# Patient Record
Sex: FEMALE | Race: WHITE | NOT HISPANIC OR LATINO
[De-identification: names, ages, dates, MRNs, and addresses within clinical notes are randomized per-mention and may not be internally consistent; named-entity substitution may affect disease eponyms.]

---

## 2019-03-25 PROBLEM — Z87.891 FORMER SMOKER: Status: ACTIVE | Noted: 2019-03-25

## 2019-03-25 PROBLEM — Z71.9 ENCOUNTER FOR CONSULTATION: Status: ACTIVE | Noted: 2019-03-25

## 2019-03-26 ENCOUNTER — APPOINTMENT (OUTPATIENT)
Dept: CV DIAGNOSITCS | Facility: HOSPITAL | Age: 84
End: 2019-03-26

## 2019-03-26 ENCOUNTER — APPOINTMENT (OUTPATIENT)
Dept: CARDIOTHORACIC SURGERY | Facility: CLINIC | Age: 84
End: 2019-03-26
Payer: MEDICARE

## 2019-03-26 ENCOUNTER — NON-APPOINTMENT (OUTPATIENT)
Age: 84
End: 2019-03-26

## 2019-03-26 ENCOUNTER — APPOINTMENT (OUTPATIENT)
Age: 84
End: 2019-03-26
Payer: MEDICARE

## 2019-03-26 ENCOUNTER — OUTPATIENT (OUTPATIENT)
Dept: OUTPATIENT SERVICES | Facility: HOSPITAL | Age: 84
LOS: 1 days | End: 2019-03-26

## 2019-03-26 VITALS — WEIGHT: 123 LBS | HEIGHT: 59 IN | BODY MASS INDEX: 24.8 KG/M2

## 2019-03-26 VITALS — HEART RATE: 57 BPM | RESPIRATION RATE: 12 BRPM | OXYGEN SATURATION: 100 % | TEMPERATURE: 98 F

## 2019-03-26 VITALS — SYSTOLIC BLOOD PRESSURE: 180 MMHG | DIASTOLIC BLOOD PRESSURE: 87 MMHG

## 2019-03-26 DIAGNOSIS — Z71.9 COUNSELING, UNSPECIFIED: ICD-10-CM

## 2019-03-26 DIAGNOSIS — I35.0 NONRHEUMATIC AORTIC (VALVE) STENOSIS: ICD-10-CM

## 2019-03-26 DIAGNOSIS — Z01.419 ENCOUNTER FOR GYNECOLOGICAL EXAMINATION (GENERAL) (ROUTINE) W/OUT ABNORMAL FINDINGS: ICD-10-CM

## 2019-03-26 DIAGNOSIS — Z87.891 PERSONAL HISTORY OF NICOTINE DEPENDENCE: ICD-10-CM

## 2019-03-26 DIAGNOSIS — I10 ESSENTIAL (PRIMARY) HYPERTENSION: ICD-10-CM

## 2019-03-26 LAB
ALBUMIN SERPL ELPH-MCNC: 4.6 G/DL
ALP BLD-CCNC: 57 U/L
ALT SERPL-CCNC: 8 U/L
ANION GAP SERPL CALC-SCNC: 13 MMOL/L
AST SERPL-CCNC: 19 U/L
BASOPHILS # BLD AUTO: 0.04 K/UL
BASOPHILS NFR BLD AUTO: 0.4 %
BILIRUB SERPL-MCNC: 0.6 MG/DL
BUN SERPL-MCNC: 23 MG/DL
CALCIUM SERPL-MCNC: 10 MG/DL
CHLORIDE SERPL-SCNC: 107 MMOL/L
CO2 SERPL-SCNC: 20 MMOL/L
CREAT SERPL-MCNC: 0.84 MG/DL
EOSINOPHIL # BLD AUTO: 0.3 K/UL
EOSINOPHIL NFR BLD AUTO: 3.2 %
GLUCOSE SERPL-MCNC: 87 MG/DL
HCT VFR BLD CALC: 40 %
HGB BLD-MCNC: 12.8 G/DL
IMM GRANULOCYTES NFR BLD AUTO: 0.2 %
LYMPHOCYTES # BLD AUTO: 3.6 K/UL
LYMPHOCYTES NFR BLD AUTO: 38.2 %
MAN DIFF?: NORMAL
MCHC RBC-ENTMCNC: 31.4 PG
MCHC RBC-ENTMCNC: 32 GM/DL
MCV RBC AUTO: 98 FL
MONOCYTES # BLD AUTO: 0.57 K/UL
MONOCYTES NFR BLD AUTO: 6.1 %
NEUTROPHILS # BLD AUTO: 4.89 K/UL
NEUTROPHILS NFR BLD AUTO: 51.9 %
NT-PROBNP SERPL-MCNC: 369 PG/ML
PLATELET # BLD AUTO: 280 K/UL
POTASSIUM SERPL-SCNC: 4.2 MMOL/L
PROT SERPL-MCNC: 7.6 G/DL
RBC # BLD: 4.08 M/UL
RBC # FLD: 13.4 %
SODIUM SERPL-SCNC: 140 MMOL/L
WBC # FLD AUTO: 9.42 K/UL

## 2019-03-26 PROCEDURE — 93000 ELECTROCARDIOGRAM COMPLETE: CPT | Mod: PD

## 2019-03-26 PROCEDURE — 94010 BREATHING CAPACITY TEST: CPT

## 2019-03-26 PROCEDURE — 99205 OFFICE O/P NEW HI 60 MIN: CPT | Mod: PD

## 2019-03-26 PROCEDURE — 93306 TTE W/DOPPLER COMPLETE: CPT | Mod: 26

## 2019-03-26 NOTE — HISTORY OF PRESENT ILLNESS
[FreeTextEntry1] : Nerissa is a 89 year old female with PMH of asthma, HLD, HTN and aortic stenosis. She has followed with her cardiologist Dr. Sosa for several years for routine cardiac care. She reports fatigue and dyspnea on moderate to heavy exertion. A recent echocardiogram revealed severe AS and she was referred to valve clinic for consideration for MARC. \par \par Echocardiogram on 3/14/2019 revealed \par Aov 5.2 m/sec, pGr 106 mmHg, mGr 55 mmHg, DELON 0.53 cm2, EF 64%

## 2019-03-26 NOTE — ASSESSMENT
[FreeTextEntry1] : 89 year old woman with critical aortic valve stenosis, severe hypertension and questionable symptoms for possible TAVR.  Patient is intermediate risk for TAVR . She will require a cath and CTA prior to scheduling.  Risks of TAVR explained in detail including risk of pacemaker and paravalvular regurgitation.

## 2019-03-26 NOTE — PHYSICAL EXAM
[General Appearance - Well Developed] : well developed [Normal Appearance] : normal appearance [Well Groomed] : well groomed [No Deformities] : no deformities [General Appearance - In No Acute Distress] : no acute distress [Eyelids - No Xanthelasma] : the eyelids demonstrated no xanthelasmas [No Oral Pallor] : no oral pallor [No Oral Cyanosis] : no oral cyanosis [Normal Jugular Venous V Waves Present] : normal jugular venous V waves present [Bradycardia] : bradycardic [Rhythm Regular] : regular [IV] : a grade 4 [No Pitting Edema] : no pitting edema present [Respiration, Rhythm And Depth] : normal respiratory rhythm and effort [Exaggerated Use Of Accessory Muscles For Inspiration] : no accessory muscle use [Auscultation Breath Sounds / Voice Sounds] : lungs were clear to auscultation bilaterally [Bowel Sounds] : normal bowel sounds [Abdomen Soft] : soft [Abdomen Tenderness] : non-tender [Nail Clubbing] : no clubbing of the fingernails [Cyanosis, Localized] : no localized cyanosis [Petechial Hemorrhages (___cm)] : no petechial hemorrhages [Nail Splinter Hemorrhages] : no splinter hemorrhages of the nails [Skin Color & Pigmentation] : normal skin color and pigmentation [] : no rash [No Venous Stasis] : no venous stasis [Skin Lesions] : no skin lesions [No Skin Ulcers] : no skin ulcer [No Xanthoma] : no  xanthoma was observed [Oriented To Time, Place, And Person] : oriented to person, place, and time [Affect] : the affect was normal [Mood] : the mood was normal [No Anxiety] : not feeling anxious [FreeTextEntry1] : gait speed assessed today

## 2019-03-26 NOTE — PHYSICAL EXAM
[General Appearance - Alert] : alert [General Appearance - In No Acute Distress] : in no acute distress [Sclera] : the sclera and conjunctiva were normal [PERRL With Normal Accommodation] : pupils were equal in size, round, and reactive to light [Extraocular Movements] : extraocular movements were intact [Outer Ear] : the ears and nose were normal in appearance [Oropharynx] : the oropharynx was normal [Jugular Venous Distention Increased] : there was no jugular-venous distention [] : no respiratory distress [Respiration, Rhythm And Depth] : normal respiratory rhythm and effort [III] : a grade 3 [II] : a grade 2 [Examination Of The Chest] : the chest was normal in appearance [Edema] : there was no peripheral edema [Veins - Varicosity Changes] : there were no varicosital changes [Breast Appearance] : normal in appearance [Breast Palpation Mass] : no palpable masses [Bowel Sounds] : normal bowel sounds [Abdomen Soft] : soft [Cervical Lymph Nodes Enlarged Posterior Bilaterally] : posterior cervical [Cervical Lymph Nodes Enlarged Anterior Bilaterally] : anterior cervical [No CVA Tenderness] : no ~M costovertebral angle tenderness [Abnormal Walk] : normal gait [Involuntary Movements] : no involuntary movements were seen [Skin Color & Pigmentation] : normal skin color and pigmentation [Skin Turgor] : normal skin turgor [No Focal Deficits] : no focal deficits [Oriented To Time, Place, And Person] : oriented to person, place, and time [Impaired Insight] : insight and judgment were intact [FreeTextEntry1] : deferred

## 2019-03-26 NOTE — CONSULT LETTER
[Dear  ___] : Dear ~CAMMIE, [Please see my note below.] : Please see my note below. [Consult Closing:] : Thank you very much for allowing me to participate in the care of this patient.  If you have any questions, please do not hesitate to contact me. [Sincerely,] : Sincerely, [Consult Letter:] : I had the pleasure of evaluating your patient, [unfilled]. [FreeTextEntry2] : James Palmer DO\par [address]\par [address]\[place] [address] [FreeTextEntry3] : Leighton Saenz MD\par \par Cardiovascular & Thoracic Surgery\par Professor\par [facility]\par \par

## 2019-03-26 NOTE — HISTORY OF PRESENT ILLNESS
[FreeTextEntry1] : Nerissa is referred by Dr HOWIE Palmer for evaluation of severe aortic stenosis and recently progressive symptoms.  She notes fatigue and a decline in functional capacity over the prior few months (NYHA II).  Her TTE today revealed (preliminarily) a mean gradient in the 50s and an DELON of 0.5 (similar to her recent study at [facility]).  Her STS is 4.6% and she meets 1 of 4 frailty criteria, placing her into an intermediate risk surgical cohort.  Her PMH is significant for treated HTN, HLD, and asthma (she sees Dr Rosa Muñoz; stable on Breo).  She notes her BP is labile (as low as 98/65mmHg in the morning) but she is nervous being here today.\par \par She notes that Dr Palmer has been following her AS closely for several years; her referral today was prompted by progression of AS on her most recent TTE in his office.  She is "upset" about her AS but realizes it needs to be addressed.  She still walks a mile daily and plays platform tennis (the latter which I advised she stop); she admits to "slowing down" with her activities recently.  She has no angina or syncopal symptoms.  Her appetite and bathroom habits are "good".  She describes her memory as "pretty good", which her daughter in law confirms.  She lives alone and remains independent.

## 2019-03-26 NOTE — REVIEW OF SYSTEMS
[Feeling Fatigued] : feeling fatigued [Dyspnea on exertion] : dyspnea during exertion [Joint Pain] : joint pain [Under Stress] : under stress [Negative] : Heme/Lymph [Fever] : no fever [Chills] : no chills

## 2019-03-26 NOTE — DATA REVIEWED
[FreeTextEntry1] : Echocardiogram on 3/14/2019 revealed \par Aov 5.2 m/sec, pGr 106 mmHg, mGr 55 mmHg, DELON 0.53 cm2, EF 64%

## 2019-03-26 NOTE — DISCUSSION/SUMMARY
[Severe Aortic Stenosis] : severe aortic stenosis [Deteriorating] : deteriorating [Multidetector Cardiac CT] : multidetector cardiac CT [Cardiac Catheterization] : cardiac catheterization [Coronary Angiography] : coronary angiography [Cardiothoracic Surgery Consult] : cardiothoracic surgery consultation [Aortic Valve Replacement] : aortic valve replacement [Essential Hypertension] : essential hypertension [Stable] : stable [None] : none [Patient] : the patient [Family] : the patient's family [FreeTextEntry1] : Nerissa has severe AS with recent functional decline and progression by TTE--for which we are recommending she proceed with evaluation for TAVR.  As such, she will be scheduled for angiography and a cardiac CT.  She will be considered for our TAVR PAD study.  I have answered all of her and her daughter-in-law's questions regarding the potential risks and benefits of TAVR in extensive detail.

## 2019-03-29 ENCOUNTER — TRANSCRIPTION ENCOUNTER (OUTPATIENT)
Age: 84
End: 2019-03-29

## 2019-03-29 ENCOUNTER — INPATIENT (INPATIENT)
Facility: HOSPITAL | Age: 84
LOS: 0 days | Discharge: HOME | DRG: 247 | End: 2019-03-30
Attending: INTERNAL MEDICINE | Admitting: INTERNAL MEDICINE
Payer: MEDICARE

## 2019-03-29 VITALS
TEMPERATURE: 98 F | HEART RATE: 58 BPM | SYSTOLIC BLOOD PRESSURE: 176 MMHG | OXYGEN SATURATION: 100 % | DIASTOLIC BLOOD PRESSURE: 74 MMHG | HEIGHT: 59 IN | WEIGHT: 123.02 LBS | RESPIRATION RATE: 16 BRPM

## 2019-03-29 DIAGNOSIS — Z98.890 OTHER SPECIFIED POSTPROCEDURAL STATES: Chronic | ICD-10-CM

## 2019-03-29 DIAGNOSIS — Z90.721 ACQUIRED ABSENCE OF OVARIES, UNILATERAL: Chronic | ICD-10-CM

## 2019-03-29 DIAGNOSIS — I35.0 NONRHEUMATIC AORTIC (VALVE) STENOSIS: ICD-10-CM

## 2019-03-29 DIAGNOSIS — H26.9 UNSPECIFIED CATARACT: Chronic | ICD-10-CM

## 2019-03-29 LAB
ALBUMIN SERPL ELPH-MCNC: 4.3 G/DL — SIGNIFICANT CHANGE UP (ref 3.3–5)
ALP SERPL-CCNC: 53 U/L — SIGNIFICANT CHANGE UP (ref 40–120)
ALT FLD-CCNC: 10 U/L — SIGNIFICANT CHANGE UP (ref 10–45)
ANION GAP SERPL CALC-SCNC: 13 MMOL/L — SIGNIFICANT CHANGE UP (ref 5–17)
AST SERPL-CCNC: 17 U/L — SIGNIFICANT CHANGE UP (ref 10–40)
BILIRUB SERPL-MCNC: 0.6 MG/DL — SIGNIFICANT CHANGE UP (ref 0.2–1.2)
BUN SERPL-MCNC: 23 MG/DL — SIGNIFICANT CHANGE UP (ref 7–23)
CALCIUM SERPL-MCNC: 9.9 MG/DL — SIGNIFICANT CHANGE UP (ref 8.4–10.5)
CHLORIDE SERPL-SCNC: 109 MMOL/L — HIGH (ref 96–108)
CO2 SERPL-SCNC: 19 MMOL/L — LOW (ref 22–31)
CREAT SERPL-MCNC: 0.94 MG/DL — SIGNIFICANT CHANGE UP (ref 0.5–1.3)
GLUCOSE SERPL-MCNC: 92 MG/DL — SIGNIFICANT CHANGE UP (ref 70–99)
HCT VFR BLD CALC: 36 % — SIGNIFICANT CHANGE UP (ref 34.5–45)
HGB BLD-MCNC: 12.3 G/DL — SIGNIFICANT CHANGE UP (ref 11.5–15.5)
MCHC RBC-ENTMCNC: 32.9 PG — SIGNIFICANT CHANGE UP (ref 27–34)
MCHC RBC-ENTMCNC: 34.2 GM/DL — SIGNIFICANT CHANGE UP (ref 32–36)
MCV RBC AUTO: 96 FL — SIGNIFICANT CHANGE UP (ref 80–100)
PLATELET # BLD AUTO: 279 K/UL — SIGNIFICANT CHANGE UP (ref 150–400)
POTASSIUM SERPL-MCNC: 4.3 MMOL/L — SIGNIFICANT CHANGE UP (ref 3.5–5.3)
POTASSIUM SERPL-SCNC: 4.3 MMOL/L — SIGNIFICANT CHANGE UP (ref 3.5–5.3)
PROT SERPL-MCNC: 7.3 G/DL — SIGNIFICANT CHANGE UP (ref 6–8.3)
RBC # BLD: 3.75 M/UL — LOW (ref 3.8–5.2)
RBC # FLD: 12.7 % — SIGNIFICANT CHANGE UP (ref 10.3–14.5)
SODIUM SERPL-SCNC: 141 MMOL/L — SIGNIFICANT CHANGE UP (ref 135–145)
WBC # BLD: 8.6 K/UL — SIGNIFICANT CHANGE UP (ref 3.8–10.5)
WBC # FLD AUTO: 8.6 K/UL — SIGNIFICANT CHANGE UP (ref 3.8–10.5)

## 2019-03-29 PROCEDURE — 99152 MOD SED SAME PHYS/QHP 5/>YRS: CPT | Mod: GC

## 2019-03-29 PROCEDURE — 93456 R HRT CORONARY ARTERY ANGIO: CPT | Mod: 26,59,GC

## 2019-03-29 PROCEDURE — 93010 ELECTROCARDIOGRAM REPORT: CPT | Mod: 77

## 2019-03-29 PROCEDURE — 92928 PRQ TCAT PLMT NTRAC ST 1 LES: CPT | Mod: RC,GC

## 2019-03-29 PROCEDURE — 93010 ELECTROCARDIOGRAM REPORT: CPT

## 2019-03-29 RX ORDER — PANTOPRAZOLE SODIUM 20 MG/1
40 TABLET, DELAYED RELEASE ORAL
Qty: 0 | Refills: 0 | Status: DISCONTINUED | OUTPATIENT
Start: 2019-03-29 | End: 2019-03-30

## 2019-03-29 RX ORDER — SIMVASTATIN 20 MG/1
40 TABLET, FILM COATED ORAL AT BEDTIME
Qty: 0 | Refills: 0 | Status: DISCONTINUED | OUTPATIENT
Start: 2019-03-29 | End: 2019-03-30

## 2019-03-29 RX ORDER — SUCRALFATE 1 G
1 TABLET ORAL ONCE
Qty: 0 | Refills: 0 | Status: DISCONTINUED | OUTPATIENT
Start: 2019-03-29 | End: 2019-03-30

## 2019-03-29 RX ORDER — CLOPIDOGREL BISULFATE 75 MG/1
75 TABLET, FILM COATED ORAL DAILY
Qty: 0 | Refills: 0 | Status: DISCONTINUED | OUTPATIENT
Start: 2019-03-30 | End: 2019-03-30

## 2019-03-29 RX ORDER — ASPIRIN/CALCIUM CARB/MAGNESIUM 324 MG
81 TABLET ORAL DAILY
Qty: 0 | Refills: 0 | Status: DISCONTINUED | OUTPATIENT
Start: 2019-03-30 | End: 2019-03-30

## 2019-03-29 RX ORDER — LOSARTAN POTASSIUM 100 MG/1
50 TABLET, FILM COATED ORAL DAILY
Qty: 0 | Refills: 0 | Status: DISCONTINUED | OUTPATIENT
Start: 2019-03-29 | End: 2019-03-30

## 2019-03-29 RX ORDER — SODIUM CHLORIDE 9 MG/ML
3 INJECTION INTRAMUSCULAR; INTRAVENOUS; SUBCUTANEOUS EVERY 8 HOURS
Qty: 0 | Refills: 0 | Status: DISCONTINUED | OUTPATIENT
Start: 2019-03-29 | End: 2019-03-30

## 2019-03-29 RX ADMIN — SODIUM CHLORIDE 3 MILLILITER(S): 9 INJECTION INTRAMUSCULAR; INTRAVENOUS; SUBCUTANEOUS at 18:07

## 2019-03-29 RX ADMIN — SODIUM CHLORIDE 3 MILLILITER(S): 9 INJECTION INTRAMUSCULAR; INTRAVENOUS; SUBCUTANEOUS at 21:20

## 2019-03-29 NOTE — H&P CARDIOLOGY - HISTORY OF PRESENT ILLNESS
89 year old  female with PMH of asthma, HTN, HLD, severe AS, CHF NYHA class 11, ACC/AHA stage C, and basal cell cancer presents today for cardiac catheterization. Patient reports she has known AS for years but upon recent follow up with Dr Palmer her TTE revealed DELON 0.5. Referred for TAVR evaluation. Patient reports fatigue and decline in functional capacity over the past few months. No chest pain.   For R+L heart cath

## 2019-03-29 NOTE — H&P CARDIOLOGY - PMH
Aortic stenosis    Asthma    Hiatal hernia    HLD (hyperlipidemia)    HTN (hypertension)    Skin cancer  basal Aortic stenosis    Asthma    Heart failure    Hiatal hernia    HLD (hyperlipidemia)    HTN (hypertension)    Skin cancer  basal

## 2019-03-29 NOTE — PROGRESS NOTE ADULT - SUBJECTIVE AND OBJECTIVE BOX
Removal of Femoral Sheath    Pulses in the (right) lower extremity are (palpable). The patient was placed in the supine position. The insertion site was identified and the sutures were removed per protocol.  The 6 arterial  Malaysian catheter and 7venous  Icelandic femoral sheath was then removed. Direct pressure was applied for  22_ minutes.     Monitoring of the (right) groin and both lower extremities including neuro-vascular checks and vital signs every 15 minutes x 4, then every 30 minutes x 2, then every 1 hour was ordered.    Complications: None    Comments: right groin with dsg clean dry intact no hematoma

## 2019-03-29 NOTE — CHART NOTE - NSCHARTNOTEFT_GEN_A_CORE
Patient underwent a PCI procedure and is being admitted as they are at increased risk for major adverse cardiac and vascular events if discharged due to the following high risk characteristics:      Pre- Procedural Clinical Criteria  Unstable angina     Admit- patient underwent a PCI procedure and is being admitted due to high risk characteristics and is considered to be at an increased risk of major adverse cardiovascular events if discharged at this time   MISTY to RCA via RFA dry intact no hematoma noted or bleeding sheath intact to be removed at 1310

## 2019-03-30 ENCOUNTER — TRANSCRIPTION ENCOUNTER (OUTPATIENT)
Age: 84
End: 2019-03-30

## 2019-03-30 VITALS
OXYGEN SATURATION: 99 % | TEMPERATURE: 98 F | DIASTOLIC BLOOD PRESSURE: 74 MMHG | SYSTOLIC BLOOD PRESSURE: 157 MMHG | RESPIRATION RATE: 16 BRPM | HEART RATE: 71 BPM

## 2019-03-30 DIAGNOSIS — I25.118 ATHEROSCLEROTIC HEART DISEASE OF NATIVE CORONARY ARTERY WITH OTHER FORMS OF ANGINA PECTORIS: ICD-10-CM

## 2019-03-30 DIAGNOSIS — I10 ESSENTIAL (PRIMARY) HYPERTENSION: ICD-10-CM

## 2019-03-30 DIAGNOSIS — E78.5 HYPERLIPIDEMIA, UNSPECIFIED: ICD-10-CM

## 2019-03-30 LAB
ANION GAP SERPL CALC-SCNC: 12 MMOL/L — SIGNIFICANT CHANGE UP (ref 5–17)
BUN SERPL-MCNC: 21 MG/DL — SIGNIFICANT CHANGE UP (ref 7–23)
CALCIUM SERPL-MCNC: 9.2 MG/DL — SIGNIFICANT CHANGE UP (ref 8.4–10.5)
CHLORIDE SERPL-SCNC: 107 MMOL/L — SIGNIFICANT CHANGE UP (ref 96–108)
CO2 SERPL-SCNC: 21 MMOL/L — LOW (ref 22–31)
CREAT SERPL-MCNC: 0.92 MG/DL — SIGNIFICANT CHANGE UP (ref 0.5–1.3)
GLUCOSE SERPL-MCNC: 97 MG/DL — SIGNIFICANT CHANGE UP (ref 70–99)
HCT VFR BLD CALC: 31 % — LOW (ref 34.5–45)
HGB BLD-MCNC: 10.8 G/DL — LOW (ref 11.5–15.5)
MCHC RBC-ENTMCNC: 33.4 PG — SIGNIFICANT CHANGE UP (ref 27–34)
MCHC RBC-ENTMCNC: 34.7 GM/DL — SIGNIFICANT CHANGE UP (ref 32–36)
MCV RBC AUTO: 96.3 FL — SIGNIFICANT CHANGE UP (ref 80–100)
PLATELET # BLD AUTO: 224 K/UL — SIGNIFICANT CHANGE UP (ref 150–400)
POTASSIUM SERPL-MCNC: 4.3 MMOL/L — SIGNIFICANT CHANGE UP (ref 3.5–5.3)
POTASSIUM SERPL-SCNC: 4.3 MMOL/L — SIGNIFICANT CHANGE UP (ref 3.5–5.3)
RBC # BLD: 3.22 M/UL — LOW (ref 3.8–5.2)
RBC # FLD: 12.7 % — SIGNIFICANT CHANGE UP (ref 10.3–14.5)
SODIUM SERPL-SCNC: 140 MMOL/L — SIGNIFICANT CHANGE UP (ref 135–145)
WBC # BLD: 8 K/UL — SIGNIFICANT CHANGE UP (ref 3.8–10.5)
WBC # FLD AUTO: 8 K/UL — SIGNIFICANT CHANGE UP (ref 3.8–10.5)

## 2019-03-30 PROCEDURE — C1725: CPT

## 2019-03-30 PROCEDURE — 93306 TTE W/DOPPLER COMPLETE: CPT

## 2019-03-30 PROCEDURE — 99231 SBSQ HOSP IP/OBS SF/LOW 25: CPT

## 2019-03-30 PROCEDURE — 85027 COMPLETE CBC AUTOMATED: CPT

## 2019-03-30 PROCEDURE — 80053 COMPREHEN METABOLIC PANEL: CPT

## 2019-03-30 PROCEDURE — C9600: CPT | Mod: RC

## 2019-03-30 PROCEDURE — C1887: CPT

## 2019-03-30 PROCEDURE — 93005 ELECTROCARDIOGRAM TRACING: CPT

## 2019-03-30 PROCEDURE — 99153 MOD SED SAME PHYS/QHP EA: CPT

## 2019-03-30 PROCEDURE — 93456 R HRT CORONARY ARTERY ANGIO: CPT | Mod: 59

## 2019-03-30 PROCEDURE — C1874: CPT

## 2019-03-30 PROCEDURE — 99152 MOD SED SAME PHYS/QHP 5/>YRS: CPT

## 2019-03-30 PROCEDURE — C1769: CPT

## 2019-03-30 PROCEDURE — 80048 BASIC METABOLIC PNL TOTAL CA: CPT

## 2019-03-30 PROCEDURE — C1894: CPT

## 2019-03-30 RX ORDER — SIMVASTATIN 20 MG/1
1 TABLET, FILM COATED ORAL
Qty: 0 | Refills: 0 | COMMUNITY

## 2019-03-30 RX ORDER — CLOPIDOGREL BISULFATE 75 MG/1
1 TABLET, FILM COATED ORAL
Qty: 90 | Refills: 3 | OUTPATIENT
Start: 2019-03-30 | End: 2020-03-23

## 2019-03-30 RX ORDER — SIMVASTATIN 20 MG/1
1 TABLET, FILM COATED ORAL
Qty: 30 | Refills: 0 | OUTPATIENT
Start: 2019-03-30 | End: 2019-04-28

## 2019-03-30 RX ADMIN — CLOPIDOGREL BISULFATE 75 MILLIGRAM(S): 75 TABLET, FILM COATED ORAL at 06:41

## 2019-03-30 RX ADMIN — PANTOPRAZOLE SODIUM 40 MILLIGRAM(S): 20 TABLET, DELAYED RELEASE ORAL at 06:41

## 2019-03-30 RX ADMIN — Medication 81 MILLIGRAM(S): at 06:41

## 2019-03-30 RX ADMIN — LOSARTAN POTASSIUM 50 MILLIGRAM(S): 100 TABLET, FILM COATED ORAL at 06:41

## 2019-03-30 NOTE — DISCHARGE NOTE NURSING/CASE MANAGEMENT/SOCIAL WORK - NSDCPEPT PROEDHF_GEN_ALL_CORE
Activities as tolerated/Call primary care provider for follow up after discharge/Monitor weight daily/Report signs and symptoms to primary care provider/Low salt diet

## 2019-03-30 NOTE — DISCHARGE NOTE NURSING/CASE MANAGEMENT/SOCIAL WORK - NSDCDPATPORTLINK_GEN_ALL_CORE
You can access the [facility] Patient Portal, offered by [facility], by registering with the following website: [email or web address]

## 2019-03-30 NOTE — DISCHARGE NOTE PROVIDER - CARE PROVIDER_API CALL
James Palmer (DO)  Cardiovascular Disease; Internal Medicine  [facility], [address] [place] [address]  Phone: [phone]  Fax: [phone]  Follow Up Time:

## 2019-03-30 NOTE — DISCHARGE NOTE PROVIDER - CARE PROVIDERS DIRECT ADDRESSES
[email or web address]

## 2019-03-30 NOTE — DISCHARGE NOTE PROVIDER - HOSPITAL COURSE
HPI:    89 year old  female with PMH of asthma, HTN, HLD, severe AS, CHF NYHA class 11, ACC/AHA stage C, and basal cell cancer presents today for cardiac catheterization. Patient reports she has known AS for years but upon recent follow up with Dr Palmer, her TTE revealed DELON 0.5. Referred for TAVR evaluation. Patient reports fatigue and decline in functional capacity over the past few months. No chest pain.    For R+L heart cath (29 Mar 2019 09:11)        3/29 cardiac cath with one stent to the RCA. Right groin site without swelling, bleeding.

## 2019-03-30 NOTE — DISCHARGE NOTE PROVIDER - NSDCCPTREATMENT_GEN_ALL_CORE_FT
PRINCIPAL PROCEDURE  Procedure: Catheterization of both left and right heart  Findings and Treatment: one RCA stent

## 2019-03-30 NOTE — PROGRESS NOTE ADULT - ASSESSMENT
HPI:  89 year old  female with PMH of asthma, HTN, HLD, severe AS, CHF NYHA class 11, ACC/AHA stage C, and basal cell cancer presents today for cardiac catheterization. Patient reports she has known AS for years but upon recent follow up with Dr Palmer her TTE revealed DELON 0.5. Referred for TAVR evaluation. Patient reports fatigue and decline in functional capacity over the past few months. No chest pain.   For R+L heart cath (29 Mar 2019 09:11)

## 2019-03-30 NOTE — PROGRESS NOTE ADULT - SUBJECTIVE AND OBJECTIVE BOX
Patient is a 89y old  Female who presents with a chief complaint of abnormal stress test (30 Mar 2019 03:30)          Allergies    amoxicillin (Rash)    Intolerances        Medications:  aspirin enteric coated 81 milliGRAM(s) Oral daily  clopidogrel Tablet 75 milliGRAM(s) Oral daily  losartan 50 milliGRAM(s) Oral daily  pantoprazole    Tablet 40 milliGRAM(s) Oral before breakfast  simvastatin 40 milliGRAM(s) Oral at bedtime  sodium chloride 0.9% lock flush 3 milliLiter(s) IV Push every 8 hours  sucralfate 1 Gram(s) Oral once      Vitals:  T(C): 36.6 (19 @ 21:40), Max: 36.6 (19 @ 09:11)  HR: 65 (19 @ 21:40) (45 - 67)  BP: 139/70 (19 @ 21:40) (139/70 - 176/74)  BP(mean): 108 (19 @ 09:11) (108 - 108)  RR: 16 (19 @ 21:40) (16 - 16)  SpO2: 100% (19 @ 21:40) (100% - 100%)  Wt(kg): --  Daily Height in cm: 149.86 (29 Mar 2019 11:35)    Daily Weight in k.8 (29 Mar 2019 09:11)  I&O's Summary    29 Mar 2019 07:01  -  30 Mar 2019 05:16  --------------------------------------------------------  IN: 240 mL / OUT: 0 mL / NET: 240 mL          Physical Exam:  Appearance: Normal  Eyes: PERRL, EOMI  HENT: Normal oral muscosa, NC/AT  Cardiovascular: S1S2, RRR, No M/R/G, no JVD, No Lower extremity edema  Procedural Access Site: No hematoma, Non-tender to palpation, 2+ pulse, No bruit, No Ecchymosis  Respiratory: Clear to auscultation bilaterally  Gastrointestinal: Soft, Non tender, Normal Bowel Sounds  Musculoskeletal: No clubbing, No joint deformity   Neurologic: Non-focal  Lymphatic: No lymphadenopathy  Psychiatry: AAOx3, Mood & affect appropriate  Skin: No rashes, No ecchymoses, No cyanosis        140  |  107  |  21  ----------------------------<  97  4.3   |  21<L>  |  0.92    Ca    9.2      30 Mar 2019 01:15    TPro  7.3  /  Alb  4.3  /  TBili  0.6  /  DBili  x   /  AST  17  /  ALT  10  /  AlkPhos  53  -            Lipid panel   Hgb A1c                         10.8   8.0   )-----------( 224      ( 30 Mar 2019 01:15 )             31.0         ECG: SB 1st degree 55 bpm    Cath: one RCA stent    Imaging:    Interpretation of Telemetry:

## 2019-03-30 NOTE — DISCHARGE NOTE PROVIDER - NSDCCPCAREPLAN_GEN_ALL_CORE_FT
PRINCIPAL DISCHARGE DIAGNOSIS  Diagnosis: CAD (coronary artery disease), native coronary artery  Assessment and Plan of Treatment: Do not stop your aspirin or Plavix unless instructed to do so by your cardiologist, they help keep your stented arteries open.   No heavy lifting, strenuous activity, bending, straining, or unnecessary stair climbing for 2 weeks. No driving for 2 days. You may shower 24 hours following the procedure but avoid baths/swimming for 1 week. Check your groin site for bleeding and/or swelling daily following procedure and call your doctor immediately if it occurs or if you experience increased pain at the site. Follow up with your cardiologist in 1-2 weeks. You may call [facility] if you have any questions/concerns regarding your procedure [phone].      SECONDARY DISCHARGE DIAGNOSES  Diagnosis: HTN (hypertension)  Assessment and Plan of Treatment: Continue with your blood pressure medications; eat a heart healthy diet with low salt diet; exercise regularly (consult with your physician or cardiologist first); maintain a heart healthy weight; if you smoke - quit (A resource to help you stop smoking is the [facility] [employer] – phone number [phone].); include healthy ways to manage stress. Continue to follow with your primary care physician or cardiologist.    Diagnosis: HLD (hyperlipidemia)  Assessment and Plan of Treatment: Continue with your cholesterol medications. Eat a heart healthy diet that is low in saturated fats and salt, and includes whole grains, fruits, vegetables and lean protein; exercise regularly (consult with your physician or cardiologist first); maintain a heart healthy weight; if you smoke - quit (A resource to help you stop smoking is the [facility] [employer] – phone number [phone].). Continue to follow with your primary physician or cardiologist.

## 2019-03-30 NOTE — DISCHARGE NOTE PROVIDER - NSDCPNSUBOBJ_GEN_ALL_CORE
Patient is a 89y old  Female who presents with a chief complaint of abnormal stress test (30 Mar 2019 03:30)                    Allergies        amoxicillin (Rash)        Intolerances                Medications:    aspirin enteric coated 81 milliGRAM(s) Oral daily    clopidogrel Tablet 75 milliGRAM(s) Oral daily    losartan 50 milliGRAM(s) Oral daily    pantoprazole    Tablet 40 milliGRAM(s) Oral before breakfast    simvastatin 40 milliGRAM(s) Oral at bedtime    sodium chloride 0.9% lock flush 3 milliLiter(s) IV Push every 8 hours    sucralfate 1 Gram(s) Oral once            Vitals:    T(C): 36.6 (19 @ 21:40), Max: 36.6 (19 @ 09:11)    HR: 65 (19 @ 21:40) (45 - 67)    BP: 139/70 (19 @ 21:40) (139/70 - 176/74)    BP(mean): 108 (19 @ 09:11) (108 - 108)    RR: 16 (19 @ 21:40) (16 - 16)    SpO2: 100% (19 @ 21:40) (100% - 100%)    Wt(kg): --    Daily Height in cm: 149.86 (29 Mar 2019 11:35)      Daily Weight in k.8 (29 Mar 2019 09:11)    I&O's Summary        29 Mar 2019 07:01  -  30 Mar 2019 05:20    --------------------------------------------------------    IN: 240 mL / OUT: 0 mL / NET: 240 mL                    Physical Exam:    Appearance: Normal    Eyes: PERRL, EOMI    HENT: Normal oral muscosa, NC/AT    Cardiovascular: S1S2, RRR, No M/R/G, no JVD, No Lower extremity edema    Procedural Access Site: No hematoma, Non-tender to palpation, 2+ pulse, No bruit, No Ecchymosis    Respiratory: Clear to auscultation bilaterally    Gastrointestinal: Soft, Non tender, Normal Bowel Sounds    Musculoskeletal: No clubbing, No joint deformity     Neurologic: Non-focal    Lymphatic: No lymphadenopathy    Psychiatry: AAOx3, Mood & affect appropriate    Skin: No rashes, No ecchymoses, No cyanosis                140  |  107  |  21    ----------------------------<  97    4.3   |  21<L>  |  0.92        Ca    9.2      30 Mar 2019 01:15        TPro  7.3  /  Alb  4.3  /  TBili  0.6  /  DBili  x   /  AST  17  /  ALT  10  /  AlkPhos  53  03-29                        Lipid panel     Hgb A1c                             10.8     8.0   )-----------( 224      ( 30 Mar 2019 01:15 )               31.0                 ECG: SB 55 bpm        Cath: one RCA stent        Imaging:        Interpretation of Telemetry: SR 60-70 bpm

## 2019-04-03 PROBLEM — K44.9 DIAPHRAGMATIC HERNIA WITHOUT OBSTRUCTION OR GANGRENE: Chronic | Status: ACTIVE | Noted: 2019-03-29

## 2019-04-03 PROBLEM — I35.0 NONRHEUMATIC AORTIC (VALVE) STENOSIS: Chronic | Status: ACTIVE | Noted: 2019-03-29

## 2019-04-03 PROBLEM — I50.9 HEART FAILURE, UNSPECIFIED: Chronic | Status: ACTIVE | Noted: 2019-03-29

## 2019-04-03 PROBLEM — C44.90 UNSPECIFIED MALIGNANT NEOPLASM OF SKIN, UNSPECIFIED: Chronic | Status: ACTIVE | Noted: 2019-03-29

## 2019-04-03 PROBLEM — I10 ESSENTIAL (PRIMARY) HYPERTENSION: Chronic | Status: ACTIVE | Noted: 2019-03-29

## 2019-04-05 ENCOUNTER — OUTPATIENT (OUTPATIENT)
Dept: OUTPATIENT SERVICES | Facility: HOSPITAL | Age: 84
LOS: 1 days | End: 2019-04-05
Payer: MEDICARE

## 2019-04-05 ENCOUNTER — APPOINTMENT (OUTPATIENT)
Dept: CARDIOLOGY | Facility: CLINIC | Age: 84
End: 2019-04-05

## 2019-04-05 DIAGNOSIS — H26.9 UNSPECIFIED CATARACT: Chronic | ICD-10-CM

## 2019-04-05 DIAGNOSIS — I35.0 NONRHEUMATIC AORTIC (VALVE) STENOSIS: ICD-10-CM

## 2019-04-05 DIAGNOSIS — Z98.890 OTHER SPECIFIED POSTPROCEDURAL STATES: Chronic | ICD-10-CM

## 2019-04-05 DIAGNOSIS — Z90.721 ACQUIRED ABSENCE OF OVARIES, UNILATERAL: Chronic | ICD-10-CM

## 2019-04-05 DIAGNOSIS — R07.9 CHEST PAIN, UNSPECIFIED: ICD-10-CM

## 2019-04-05 PROCEDURE — 75572 CT HRT W/3D IMAGE: CPT | Mod: 26

## 2019-04-05 PROCEDURE — 75572 CT HRT W/3D IMAGE: CPT

## 2019-04-22 ENCOUNTER — APPOINTMENT (OUTPATIENT)
Dept: ULTRASOUND IMAGING | Facility: HOSPITAL | Age: 84
End: 2019-04-22

## 2019-04-22 ENCOUNTER — OUTPATIENT (OUTPATIENT)
Dept: OUTPATIENT SERVICES | Facility: HOSPITAL | Age: 84
LOS: 1 days | End: 2019-04-22
Payer: MEDICARE

## 2019-04-22 VITALS
HEART RATE: 57 BPM | OXYGEN SATURATION: 99 % | HEIGHT: 59 IN | TEMPERATURE: 98 F | RESPIRATION RATE: 20 BRPM | SYSTOLIC BLOOD PRESSURE: 146 MMHG | WEIGHT: 125 LBS | DIASTOLIC BLOOD PRESSURE: 79 MMHG

## 2019-04-22 DIAGNOSIS — Z98.890 OTHER SPECIFIED POSTPROCEDURAL STATES: Chronic | ICD-10-CM

## 2019-04-22 DIAGNOSIS — H26.9 UNSPECIFIED CATARACT: Chronic | ICD-10-CM

## 2019-04-22 DIAGNOSIS — I35.0 NONRHEUMATIC AORTIC (VALVE) STENOSIS: ICD-10-CM

## 2019-04-22 DIAGNOSIS — Z98.61 CORONARY ANGIOPLASTY STATUS: Chronic | ICD-10-CM

## 2019-04-22 DIAGNOSIS — Z90.721 ACQUIRED ABSENCE OF OVARIES, UNILATERAL: Chronic | ICD-10-CM

## 2019-04-22 DIAGNOSIS — Z01.84 ENCOUNTER FOR ANTIBODY RESPONSE EXAMINATION: ICD-10-CM

## 2019-04-22 LAB
ALBUMIN SERPL ELPH-MCNC: 4.2 G/DL — SIGNIFICANT CHANGE UP (ref 3.3–5)
ALP SERPL-CCNC: 50 U/L — SIGNIFICANT CHANGE UP (ref 40–120)
ALT FLD-CCNC: 10 U/L — SIGNIFICANT CHANGE UP (ref 10–45)
ANION GAP SERPL CALC-SCNC: 10 MMOL/L — SIGNIFICANT CHANGE UP (ref 5–17)
AST SERPL-CCNC: 13 U/L — SIGNIFICANT CHANGE UP (ref 10–40)
BILIRUB SERPL-MCNC: 0.5 MG/DL — SIGNIFICANT CHANGE UP (ref 0.2–1.2)
BLD GP AB SCN SERPL QL: NEGATIVE — SIGNIFICANT CHANGE UP
BUN SERPL-MCNC: 22 MG/DL — SIGNIFICANT CHANGE UP (ref 7–23)
CALCIUM SERPL-MCNC: 9.9 MG/DL — SIGNIFICANT CHANGE UP (ref 8.4–10.5)
CHLORIDE SERPL-SCNC: 105 MMOL/L — SIGNIFICANT CHANGE UP (ref 96–108)
CO2 SERPL-SCNC: 23 MMOL/L — SIGNIFICANT CHANGE UP (ref 22–31)
CREAT SERPL-MCNC: 0.9 MG/DL — SIGNIFICANT CHANGE UP (ref 0.5–1.3)
GLUCOSE SERPL-MCNC: 99 MG/DL — SIGNIFICANT CHANGE UP (ref 70–99)
HBA1C BLD-MCNC: 5.2 % — SIGNIFICANT CHANGE UP (ref 4–5.6)
HCT VFR BLD CALC: 34.3 % — LOW (ref 34.5–45)
HGB BLD-MCNC: 10.8 G/DL — LOW (ref 11.5–15.5)
MCHC RBC-ENTMCNC: 31.5 GM/DL — LOW (ref 32–36)
MCHC RBC-ENTMCNC: 31.8 PG — SIGNIFICANT CHANGE UP (ref 27–34)
MCV RBC AUTO: 100.9 FL — HIGH (ref 80–100)
MRSA PCR RESULT.: SIGNIFICANT CHANGE UP
PLATELET # BLD AUTO: 211 K/UL — SIGNIFICANT CHANGE UP (ref 150–400)
POTASSIUM SERPL-MCNC: 4.4 MMOL/L — SIGNIFICANT CHANGE UP (ref 3.5–5.3)
POTASSIUM SERPL-SCNC: 4.4 MMOL/L — SIGNIFICANT CHANGE UP (ref 3.5–5.3)
PROT SERPL-MCNC: 6.8 G/DL — SIGNIFICANT CHANGE UP (ref 6–8.3)
RBC # BLD: 3.4 M/UL — LOW (ref 3.8–5.2)
RBC # FLD: 13.5 % — SIGNIFICANT CHANGE UP (ref 10.3–14.5)
RH IG SCN BLD-IMP: POSITIVE — SIGNIFICANT CHANGE UP
S AUREUS DNA NOSE QL NAA+PROBE: SIGNIFICANT CHANGE UP
SODIUM SERPL-SCNC: 138 MMOL/L — SIGNIFICANT CHANGE UP (ref 135–145)
WBC # BLD: 6.99 K/UL — SIGNIFICANT CHANGE UP (ref 3.8–10.5)
WBC # FLD AUTO: 6.99 K/UL — SIGNIFICANT CHANGE UP (ref 3.8–10.5)

## 2019-04-22 PROCEDURE — 93880 EXTRACRANIAL BILAT STUDY: CPT | Mod: 26

## 2019-04-22 PROCEDURE — 71046 X-RAY EXAM CHEST 2 VIEWS: CPT | Mod: 26

## 2019-04-22 RX ORDER — SODIUM CHLORIDE 9 MG/ML
3 INJECTION INTRAMUSCULAR; INTRAVENOUS; SUBCUTANEOUS EVERY 8 HOURS
Qty: 0 | Refills: 0 | Status: DISCONTINUED | OUTPATIENT
Start: 2019-04-24 | End: 2019-04-24

## 2019-04-22 RX ORDER — VANCOMYCIN HCL 1 G
750 VIAL (EA) INTRAVENOUS ONCE
Qty: 0 | Refills: 0 | Status: DISCONTINUED | OUTPATIENT
Start: 2019-04-24 | End: 2019-04-24

## 2019-04-22 NOTE — H&P PST ADULT - NSICDXPROBLEM_GEN_ALL_CORE_FT
PROBLEM DIAGNOSES  Problem: Nonrheumatic aortic (valve) stenosis  Assessment and Plan: TAVR/transfemoral

## 2019-04-22 NOTE — H&P PST ADULT - NSICDXPASTSURGICALHX_GEN_ALL_CORE_FT
PAST SURGICAL HISTORY:  Acquired cataract     H/O eye surgery     H/O unilateral oophorectomy PAST SURGICAL HISTORY:  H/O eye surgery bilateral cataracts    H/O unilateral oophorectomy     S/P PTCA (percutaneous transluminal coronary angioplasty) coronary stent 3/19

## 2019-04-22 NOTE — H&P PST ADULT - NSANTHOSAYNRD_GEN_A_CORE
No. ALIA screening performed.  STOP BANG Legend: 0-2 = LOW Risk; 3-4 = INTERMEDIATE Risk; 5-8 = HIGH Risk

## 2019-04-22 NOTE — H&P PST ADULT - ABILITY TO HEAR (WITH HEARING AID OR HEARING APPLIANCE IF NORMALLY USED):
hearing aids bilaterally/Mildly to Moderately Impaired: difficulty hearing in some environments or speaker may need to increase volume or speak distinctly

## 2019-04-22 NOTE — H&P PST ADULT - ASSESSMENT
MIRLANDEI VTE 2.0 SCORE [CLOT updated 2019]    AGE RELATED RISK FACTORS                                                       MOBILITY RELATED FACTORS  [ ] Age 41-60 years                                            (1 Point)                    [ ] Bed rest                                                        (1 Point)  [ ] Age: 61-74 years                                           (2 Points)                  [ ] Plaster cast                                                   (2 Points)  [x ] Age= 75 years                                              (3 Points)                    [ ] Bed bound for more than 72 hours                 (2 Points)    DISEASE RELATED RISK FACTORS                                               GENDER SPECIFIC FACTORS  [ ] Edema in the lower extremities                       (1 Point)              [ ] Pregnancy                                                     (1 Point)  [ ] Varicose veins                                               (1 Point)                     [ ] Post-partum < 6 weeks                                   (1 Point)             [x ] BMI > 25 Kg/m2                                            (1 Point)                     [ ] Hormonal therapy  or oral contraception          (1 Point)                 [ ] Sepsis (in the previous month)                        (1 Point)               [ ] History of pregnancy complications                 (1 point)  [ ] Pneumonia or serious lung disease                                               [ ] Unexplained or recurrent                     (1 Point)           (in the previous month)                               (1 Point)  [ ] Abnormal pulmonary function test                     (1 Point)                 SURGERY RELATED RISK FACTORS  [ ] Acute myocardial infarction                              (1 Point)               [ ]  Section                                             (1 Point)  [ ] Congestive heart failure (in the previous month)  (1 Point)      [ ] Minor surgery                                                  (1 Point)   [ ] Inflammatory bowel disease                             (1 Point)               [ ] Arthroscopic surgery                                        (2 Points)  [ ] Central venous access                                      (2 Points)                [ x] General surgery lasting more than 45 minutes (2 points)  [x ] Malignancy- Present or previous                   (2 Points)                [ ] Elective arthroplasty                                         (5 points)    [ ] Stroke (in the previous month)                          (5 Points)                                                                                                                                                           HEMATOLOGY RELATED FACTORS                                                 TRAUMA RELATED RISK FACTORS  [ ] Prior episodes of VTE                                     (3 Points)                [ ] Fracture of the hip, pelvis, or leg                       (5 Points)  [ ] Positive family history for VTE                         (3 Points)             [ ] Acute spinal cord injury (in the previous month)  (5 Points)  [ ] Prothrombin 76561 A                                     (3 Points)               [ ] Paralysis  (less than 1 month)                             (5 Points)  [ ] Factor V Leiden                                             (3 Points)                  [ ] Multiple Trauma within 1 month                        (5 Points)  [ ] Lupus anticoagulants                                     (3 Points)                                                           [ ] Anticardiolipin antibodies                               (3 Points)                                                       [ ] High homocysteine in the blood                      (3 Points)                                             [ ] Other congenital or acquired thrombophilia      (3 Points)                                                [ ] Heparin induced thrombocytopenia                  (3 Points)                                     Total Score [      8    ]

## 2019-04-22 NOTE — H&P PST ADULT - NSICDXPASTMEDICALHX_GEN_ALL_CORE_FT
PAST MEDICAL HISTORY:  Aortic stenosis     Asthma on breo for 2 yrs    Heart failure     Hiatal hernia     HLD (hyperlipidemia)     HTN (hypertension)     Skin cancer basal PAST MEDICAL HISTORY:  Aortic stenosis     Asthma on breo for 2 yrs    CAD (coronary artery disease) recent coronary stent 3/19    Heart failure     Hiatal hernia     HLD (hyperlipidemia)     HTN (hypertension)     Skin cancer basal

## 2019-04-23 PROBLEM — I25.10 ATHEROSCLEROTIC HEART DISEASE OF NATIVE CORONARY ARTERY WITHOUT ANGINA PECTORIS: Chronic | Status: ACTIVE | Noted: 2019-04-22

## 2019-04-23 PROBLEM — J45.909 UNSPECIFIED ASTHMA, UNCOMPLICATED: Chronic | Status: ACTIVE | Noted: 2019-03-29

## 2019-04-24 ENCOUNTER — INPATIENT (INPATIENT)
Facility: HOSPITAL | Age: 84
LOS: 2 days | Discharge: HOME | DRG: 266 | End: 2019-04-27
Attending: THORACIC SURGERY (CARDIOTHORACIC VASCULAR SURGERY) | Admitting: THORACIC SURGERY (CARDIOTHORACIC VASCULAR SURGERY)
Payer: MEDICARE

## 2019-04-24 ENCOUNTER — APPOINTMENT (OUTPATIENT)
Dept: CARDIOTHORACIC SURGERY | Facility: HOSPITAL | Age: 84
End: 2019-04-24

## 2019-04-24 VITALS
HEIGHT: 59 IN | OXYGEN SATURATION: 100 % | DIASTOLIC BLOOD PRESSURE: 84 MMHG | SYSTOLIC BLOOD PRESSURE: 147 MMHG | WEIGHT: 113.32 LBS | TEMPERATURE: 98 F | RESPIRATION RATE: 18 BRPM | HEART RATE: 52 BPM

## 2019-04-24 DIAGNOSIS — Z98.890 OTHER SPECIFIED POSTPROCEDURAL STATES: Chronic | ICD-10-CM

## 2019-04-24 DIAGNOSIS — Z90.721 ACQUIRED ABSENCE OF OVARIES, UNILATERAL: Chronic | ICD-10-CM

## 2019-04-24 DIAGNOSIS — Z95.2 PRESENCE OF PROSTHETIC HEART VALVE: ICD-10-CM

## 2019-04-24 DIAGNOSIS — Z98.61 CORONARY ANGIOPLASTY STATUS: Chronic | ICD-10-CM

## 2019-04-24 DIAGNOSIS — I35.0 NONRHEUMATIC AORTIC (VALVE) STENOSIS: ICD-10-CM

## 2019-04-24 LAB
ALBUMIN SERPL ELPH-MCNC: 3.6 G/DL — SIGNIFICANT CHANGE UP (ref 3.3–5)
ALP SERPL-CCNC: 39 U/L — LOW (ref 40–120)
ALT FLD-CCNC: 8 U/L — LOW (ref 10–45)
ANION GAP SERPL CALC-SCNC: 11 MMOL/L — SIGNIFICANT CHANGE UP (ref 5–17)
APTT BLD: 114.9 SEC — HIGH (ref 27.5–36.3)
APTT BLD: 25.7 SEC — LOW (ref 27.5–36.3)
AST SERPL-CCNC: 17 U/L — SIGNIFICANT CHANGE UP (ref 10–40)
BASOPHILS # BLD AUTO: 0 K/UL — SIGNIFICANT CHANGE UP (ref 0–0.2)
BASOPHILS NFR BLD AUTO: 0.6 % — SIGNIFICANT CHANGE UP (ref 0–2)
BILIRUB SERPL-MCNC: 0.6 MG/DL — SIGNIFICANT CHANGE UP (ref 0.2–1.2)
BUN SERPL-MCNC: 21 MG/DL — SIGNIFICANT CHANGE UP (ref 7–23)
CALCIUM SERPL-MCNC: 9 MG/DL — SIGNIFICANT CHANGE UP (ref 8.4–10.5)
CHLORIDE SERPL-SCNC: 106 MMOL/L — SIGNIFICANT CHANGE UP (ref 96–108)
CK MB CFR SERPL CALC: 9.8 NG/ML — HIGH (ref 0–3.8)
CK SERPL-CCNC: 73 U/L — SIGNIFICANT CHANGE UP (ref 25–170)
CO2 SERPL-SCNC: 19 MMOL/L — LOW (ref 22–31)
CREAT SERPL-MCNC: 0.88 MG/DL — SIGNIFICANT CHANGE UP (ref 0.5–1.3)
EOSINOPHIL # BLD AUTO: 0.5 K/UL — SIGNIFICANT CHANGE UP (ref 0–0.5)
EOSINOPHIL NFR BLD AUTO: 9.3 % — HIGH (ref 0–6)
FIBRINOGEN PPP-MCNC: 372 MG/DL — SIGNIFICANT CHANGE UP (ref 350–510)
GAS PNL BLDA: SIGNIFICANT CHANGE UP
GAS PNL BLDA: SIGNIFICANT CHANGE UP
GLUCOSE BLDC GLUCOMTR-MCNC: 96 MG/DL — SIGNIFICANT CHANGE UP (ref 70–99)
GLUCOSE SERPL-MCNC: 101 MG/DL — HIGH (ref 70–99)
HCT VFR BLD CALC: 28.9 % — LOW (ref 34.5–45)
HGB BLD-MCNC: 9.9 G/DL — LOW (ref 11.5–15.5)
INR BLD: 1.09 RATIO — SIGNIFICANT CHANGE UP (ref 0.88–1.16)
INR BLD: 1.16 RATIO — SIGNIFICANT CHANGE UP (ref 0.88–1.16)
LYMPHOCYTES # BLD AUTO: 1.7 K/UL — SIGNIFICANT CHANGE UP (ref 1–3.3)
LYMPHOCYTES # BLD AUTO: 33.3 % — SIGNIFICANT CHANGE UP (ref 13–44)
MAGNESIUM SERPL-MCNC: 1.8 MG/DL — SIGNIFICANT CHANGE UP (ref 1.6–2.6)
MCHC RBC-ENTMCNC: 33.8 PG — SIGNIFICANT CHANGE UP (ref 27–34)
MCHC RBC-ENTMCNC: 34.4 GM/DL — SIGNIFICANT CHANGE UP (ref 32–36)
MCV RBC AUTO: 98.2 FL — SIGNIFICANT CHANGE UP (ref 80–100)
MONOCYTES # BLD AUTO: 0.3 K/UL — SIGNIFICANT CHANGE UP (ref 0–0.9)
MONOCYTES NFR BLD AUTO: 6.5 % — SIGNIFICANT CHANGE UP (ref 2–14)
NEUTROPHILS # BLD AUTO: 2.6 K/UL — SIGNIFICANT CHANGE UP (ref 1.8–7.4)
NEUTROPHILS NFR BLD AUTO: 50.4 % — SIGNIFICANT CHANGE UP (ref 43–77)
PHOSPHATE SERPL-MCNC: 3.9 MG/DL — SIGNIFICANT CHANGE UP (ref 2.5–4.5)
PLATELET # BLD AUTO: 172 K/UL — SIGNIFICANT CHANGE UP (ref 150–400)
POTASSIUM SERPL-MCNC: 4.3 MMOL/L — SIGNIFICANT CHANGE UP (ref 3.5–5.3)
POTASSIUM SERPL-SCNC: 4.3 MMOL/L — SIGNIFICANT CHANGE UP (ref 3.5–5.3)
PROT SERPL-MCNC: 6 G/DL — SIGNIFICANT CHANGE UP (ref 6–8.3)
PROTHROM AB SERPL-ACNC: 12.6 SEC — SIGNIFICANT CHANGE UP (ref 10–12.9)
PROTHROM AB SERPL-ACNC: 13.4 SEC — HIGH (ref 10–12.9)
RBC # BLD: 2.94 M/UL — LOW (ref 3.8–5.2)
RBC # FLD: 12.2 % — SIGNIFICANT CHANGE UP (ref 10.3–14.5)
RH IG SCN BLD-IMP: POSITIVE — SIGNIFICANT CHANGE UP
SODIUM SERPL-SCNC: 136 MMOL/L — SIGNIFICANT CHANGE UP (ref 135–145)
TROPONIN T, HIGH SENSITIVITY RESULT: 229 NG/L — HIGH (ref 0–51)
WBC # BLD: 5.2 K/UL — SIGNIFICANT CHANGE UP (ref 3.8–10.5)
WBC # FLD AUTO: 5.2 K/UL — SIGNIFICANT CHANGE UP (ref 3.8–10.5)

## 2019-04-24 PROCEDURE — 37246 TRLUML BALO ANGIOP 1ST ART: CPT

## 2019-04-24 PROCEDURE — 93010 ELECTROCARDIOGRAM REPORT: CPT

## 2019-04-24 PROCEDURE — 33361 REPLACE AORTIC VALVE PERQ: CPT | Mod: 62,Q0

## 2019-04-24 PROCEDURE — ZZZZZ: CPT

## 2019-04-24 PROCEDURE — 93306 TTE W/DOPPLER COMPLETE: CPT | Mod: 26

## 2019-04-24 PROCEDURE — 75716 ARTERY X-RAYS ARMS/LEGS: CPT | Mod: 26,XU

## 2019-04-24 RX ORDER — CEFUROXIME AXETIL 250 MG
1500 TABLET ORAL EVERY 8 HOURS
Qty: 0 | Refills: 0 | Status: COMPLETED | OUTPATIENT
Start: 2019-04-24 | End: 2019-04-25

## 2019-04-24 RX ORDER — NICARDIPINE HYDROCHLORIDE 30 MG/1
5 CAPSULE, EXTENDED RELEASE ORAL
Qty: 40 | Refills: 0 | Status: DISCONTINUED | OUTPATIENT
Start: 2019-04-24 | End: 2019-04-25

## 2019-04-24 RX ORDER — CHLORHEXIDINE GLUCONATE 213 G/1000ML
5 SOLUTION TOPICAL EVERY 4 HOURS
Qty: 0 | Refills: 0 | Status: DISCONTINUED | OUTPATIENT
Start: 2019-04-24 | End: 2019-04-24

## 2019-04-24 RX ORDER — ALBUTEROL 90 UG/1
1 AEROSOL, METERED ORAL EVERY 4 HOURS
Qty: 0 | Refills: 0 | Status: DISCONTINUED | OUTPATIENT
Start: 2019-04-24 | End: 2019-04-27

## 2019-04-24 RX ORDER — TIMOLOL 0.5 %
1 DROPS OPHTHALMIC (EYE)
Qty: 0 | Refills: 0 | Status: DISCONTINUED | OUTPATIENT
Start: 2019-04-24 | End: 2019-04-27

## 2019-04-24 RX ORDER — POTASSIUM CHLORIDE 20 MEQ
10 PACKET (EA) ORAL
Qty: 0 | Refills: 0 | Status: DISCONTINUED | OUTPATIENT
Start: 2019-04-24 | End: 2019-04-27

## 2019-04-24 RX ORDER — PROTAMINE SULFATE 10 MG/ML
25 AMPUL (ML) INTRAVENOUS ONCE
Qty: 0 | Refills: 0 | Status: COMPLETED | OUTPATIENT
Start: 2019-04-24 | End: 2019-04-24

## 2019-04-24 RX ORDER — POTASSIUM CHLORIDE 20 MEQ
10 PACKET (EA) ORAL
Qty: 0 | Refills: 0 | Status: COMPLETED | OUTPATIENT
Start: 2019-04-24 | End: 2019-04-24

## 2019-04-24 RX ORDER — TIOTROPIUM BROMIDE 18 UG/1
1 CAPSULE ORAL; RESPIRATORY (INHALATION) DAILY
Qty: 0 | Refills: 0 | Status: DISCONTINUED | OUTPATIENT
Start: 2019-04-24 | End: 2019-04-27

## 2019-04-24 RX ORDER — CLOPIDOGREL BISULFATE 75 MG/1
75 TABLET, FILM COATED ORAL DAILY
Qty: 0 | Refills: 0 | Status: DISCONTINUED | OUTPATIENT
Start: 2019-04-24 | End: 2019-04-27

## 2019-04-24 RX ORDER — SODIUM CHLORIDE 9 MG/ML
1000 INJECTION INTRAMUSCULAR; INTRAVENOUS; SUBCUTANEOUS
Qty: 0 | Refills: 0 | Status: DISCONTINUED | OUTPATIENT
Start: 2019-04-24 | End: 2019-04-25

## 2019-04-24 RX ORDER — LOSARTAN POTASSIUM 100 MG/1
25 TABLET, FILM COATED ORAL DAILY
Qty: 0 | Refills: 0 | Status: DISCONTINUED | OUTPATIENT
Start: 2019-04-24 | End: 2019-04-27

## 2019-04-24 RX ORDER — IPRATROPIUM/ALBUTEROL SULFATE 18-103MCG
3 AEROSOL WITH ADAPTER (GRAM) INHALATION EVERY 6 HOURS
Qty: 0 | Refills: 0 | Status: DISCONTINUED | OUTPATIENT
Start: 2019-04-24 | End: 2019-04-27

## 2019-04-24 RX ORDER — BRIMONIDINE TARTRATE 2 MG/MG
1 SOLUTION/ DROPS OPHTHALMIC
Qty: 0 | Refills: 0 | Status: DISCONTINUED | OUTPATIENT
Start: 2019-04-24 | End: 2019-04-27

## 2019-04-24 RX ORDER — ACETAMINOPHEN 500 MG
1000 TABLET ORAL ONCE
Qty: 0 | Refills: 0 | Status: COMPLETED | OUTPATIENT
Start: 2019-04-24 | End: 2019-04-24

## 2019-04-24 RX ORDER — SIMVASTATIN 20 MG/1
40 TABLET, FILM COATED ORAL AT BEDTIME
Qty: 0 | Refills: 0 | Status: DISCONTINUED | OUTPATIENT
Start: 2019-04-24 | End: 2019-04-27

## 2019-04-24 RX ORDER — LIDOCAINE 4 G/100G
1 CREAM TOPICAL DAILY
Qty: 0 | Refills: 0 | Status: DISCONTINUED | OUTPATIENT
Start: 2019-04-24 | End: 2019-04-27

## 2019-04-24 RX ORDER — PANTOPRAZOLE SODIUM 20 MG/1
40 TABLET, DELAYED RELEASE ORAL DAILY
Qty: 0 | Refills: 0 | Status: DISCONTINUED | OUTPATIENT
Start: 2019-04-24 | End: 2019-04-27

## 2019-04-24 RX ORDER — DOCUSATE SODIUM 100 MG
100 CAPSULE ORAL THREE TIMES A DAY
Qty: 0 | Refills: 0 | Status: DISCONTINUED | OUTPATIENT
Start: 2019-04-24 | End: 2019-04-27

## 2019-04-24 RX ORDER — ASPIRIN/CALCIUM CARB/MAGNESIUM 324 MG
81 TABLET ORAL DAILY
Qty: 0 | Refills: 0 | Status: DISCONTINUED | OUTPATIENT
Start: 2019-04-24 | End: 2019-04-27

## 2019-04-24 RX ADMIN — Medication 400 MILLIGRAM(S): at 21:41

## 2019-04-24 RX ADMIN — Medication 1000 MILLIGRAM(S): at 22:06

## 2019-04-24 RX ADMIN — LOSARTAN POTASSIUM 25 MILLIGRAM(S): 100 TABLET, FILM COATED ORAL at 18:49

## 2019-04-24 RX ADMIN — Medication 100 MILLIEQUIVALENT(S): at 13:00

## 2019-04-24 RX ADMIN — Medication 100 MILLIGRAM(S): at 16:37

## 2019-04-24 RX ADMIN — Medication 25 MILLIGRAM(S): at 12:23

## 2019-04-24 RX ADMIN — CHLORHEXIDINE GLUCONATE 5 MILLILITER(S): 213 SOLUTION TOPICAL at 12:52

## 2019-04-24 RX ADMIN — Medication 1 DROP(S): at 22:28

## 2019-04-24 RX ADMIN — Medication 50 MILLIEQUIVALENT(S): at 22:28

## 2019-04-24 RX ADMIN — SODIUM CHLORIDE 3 MILLILITER(S): 9 INJECTION INTRAMUSCULAR; INTRAVENOUS; SUBCUTANEOUS at 06:03

## 2019-04-24 RX ADMIN — Medication 50 MILLIEQUIVALENT(S): at 21:16

## 2019-04-24 RX ADMIN — CLOPIDOGREL BISULFATE 75 MILLIGRAM(S): 75 TABLET, FILM COATED ORAL at 17:17

## 2019-04-24 RX ADMIN — BRIMONIDINE TARTRATE 1 DROP(S): 2 SOLUTION/ DROPS OPHTHALMIC at 22:29

## 2019-04-24 RX ADMIN — Medication 3 MILLILITER(S): at 17:01

## 2019-04-24 RX ADMIN — PANTOPRAZOLE SODIUM 40 MILLIGRAM(S): 20 TABLET, DELAYED RELEASE ORAL at 12:22

## 2019-04-24 RX ADMIN — Medication 100 MILLIEQUIVALENT(S): at 13:44

## 2019-04-24 RX ADMIN — SIMVASTATIN 40 MILLIGRAM(S): 20 TABLET, FILM COATED ORAL at 22:28

## 2019-04-24 RX ADMIN — Medication 81 MILLIGRAM(S): at 17:21

## 2019-04-24 RX ADMIN — Medication 3 MILLILITER(S): at 11:54

## 2019-04-24 RX ADMIN — LIDOCAINE 1 PATCH: 4 CREAM TOPICAL at 21:41

## 2019-04-24 RX ADMIN — Medication 100 MILLIEQUIVALENT(S): at 13:43

## 2019-04-24 RX ADMIN — Medication 100 MILLIGRAM(S): at 22:28

## 2019-04-24 NOTE — PATIENT PROFILE ADULT - FUNCTIONAL SCREEN CURRENT LEVEL: DRESSING, MLM
no abdominal pain, no bloating, no constipation, no diarrhea, no nausea and no vomiting.
0 = independent

## 2019-04-24 NOTE — PROGRESS NOTE ADULT - PROBLEM SELECTOR PLAN 1
post tavr echo in the AM  L groin TVP in place  BP control  Asa, Plavix restarted  monitor tele  daily EKG's  monitor lytes, replete prn  titrate cardene gtt  resume home anti-BP meds post tavr echo in the AM  L groin TVP in place, d/c in AM if rhythm stable overnight  BP control  Asa, Plavix restarted  monitor tele  daily EKG's  monitor lytes, replete prn  titrate cardene gtt  resume home anti-BP meds

## 2019-04-24 NOTE — PROGRESS NOTE ADULT - SUBJECTIVE AND OBJECTIVE BOX
Interval Hx; Events Overnight:  SUBJECTIVE: "I feel okay, I have no pain  "    LABS:                9.9                  136  | 19   | 21           5.2   >-----------< 172     ------------------------< 101                   28.9                 4.3  | 106  | 0.88                                         Ca 9.0   Mg 1.8   Ph 3.9        PT/INR - ( 24 Apr 2019 13:18 )   PT: 13.4 sec;   INR: 1.16 ratio         PTT - ( 24 Apr 2019 13:18 )  PTT:25.7 sec    VITAL SIGNS    Telemetry: SR 75     Daily Height in cm: 149.86 (24 Apr 2019 07:21)    Daily       Vital Signs Last 24 Hrs  T(C): 36.9 (04-24-19 @ 20:00), Max: 36.9 (04-24-19 @ 20:00)  T(F): 98.4 (04-24-19 @ 20:00), Max: 98.4 (04-24-19 @ 20:00)  HR: 67 (04-24-19 @ 20:00) (45 - 81)  BP: 108/54 (04-24-19 @ 18:00) (108/54 - 157/64)  RR: 17 (04-24-19 @ 20:00) (13 - 33)  SpO2: 97% (04-24-19 @ 20:00) (95% - 100%)             I&O's Detail    24 Apr 2019 07:01  -  24 Apr 2019 20:05  --------------------------------------------------------  IN:    IV PiggyBack: 250 mL    niCARdipine Infusion: 50 mL    Oral Fluid: 300 mL    sodium chloride 0.9%.: 200 mL  Total IN: 800 mL    OUT:    Voided: 750 mL  Total OUT: 750 mL    Total NET: 50 mL                    GLUCOSE  CAPILLARY BLOOD GLUCOSE      POCT Blood Glucose.: 96 mg/dL (24 Apr 2019 05:50)            Tubes/Lines/Drains  L groin sheath , + TVP VVI 40/10           PACER WIRE: YES [x ] NO [ ]  Setting: VVI 40        A-LINE:  [ x] YES [ ] NO  LOCATION:  RADIAL                        PHYSICAL EXAM      General: NAD, well appearing, in no distress  Neurology: A&O x3, non focal, no neuro deficits. Moves all extremities to command.  CV : s1 s2 RRR, no murmurs, gallops, clicks.   Wounds: b/l groin stable  Lungs: clear to auscultation  Abdomen: soft, nontender, nondistended, positive bowel sounds, +flatus  :    voiding         Extremities:    no  edema. + pedal pulses , L groin TVP. +Fem emma   Incision: R groin stable, cdi, no hematoma, +distal PP  Skin: intact, no lesions        MEDICATIONS  ALBUTerol    90 MICROgram(s) HFA Inhaler 1 Puff(s) Inhalation every 4 hours  ALBUTerol/ipratropium for Nebulization 3 milliLiter(s) Nebulizer every 6 hours  aspirin enteric coated 81 milliGRAM(s) Oral daily  brimonidine 0.2% Ophthalmic Solution 1 Drop(s) Both EYES two times a day  cefuroxime  IVPB 1500 milliGRAM(s) IV Intermittent every 8 hours  clopidogrel Tablet 75 milliGRAM(s) Oral daily  docusate sodium 100 milliGRAM(s) Oral three times a day  losartan 25 milliGRAM(s) Oral daily  niCARdipine Infusion 5 mG/Hr IV Continuous <Continuous>  pantoprazole  Injectable 40 milliGRAM(s) IV Push daily  potassium chloride  10 mEq/50 mL IVPB 10 milliEquivalent(s) IV Intermittent every 1 hour  potassium chloride  10 mEq/50 mL IVPB 10 milliEquivalent(s) IV Intermittent every 1 hour  potassium chloride  10 mEq/50 mL IVPB 10 milliEquivalent(s) IV Intermittent every 1 hour  potassium chloride  10 mEq/50 mL IVPB 10 milliEquivalent(s) IV Intermittent every 1 hour  simvastatin 40 milliGRAM(s) Oral at bedtime  sodium chloride 0.9%. 1000 milliLiter(s) IV Continuous <Continuous>  timolol 0.5% Solution 1 Drop(s) Both EYES two times a day  tiotropium 18 MICROgram(s) Capsule 1 Capsule(s) Inhalation daily Interval Hx; Events Overnight:  SUBJECTIVE: "I feel okay, I have no pain  "    LABS:                9.9                  136  | 19   | 21           5.2   >-----------< 172     ------------------------< 101                   28.9                 4.3  | 106  | 0.88                                         Ca 9.0   Mg 1.8   Ph 3.9        PT/INR - ( 24 Apr 2019 13:18 )   PT: 13.4 sec;   INR: 1.16 ratio         PTT - ( 24 Apr 2019 13:18 )  PTT:25.7 sec    VITAL SIGNS    Telemetry: SR 75     Daily Height in cm: 149.86 (24 Apr 2019 07:21)    Daily       Vital Signs Last 24 Hrs  T(C): 36.9 (04-24-19 @ 20:00), Max: 36.9 (04-24-19 @ 20:00)  T(F): 98.4 (04-24-19 @ 20:00), Max: 98.4 (04-24-19 @ 20:00)  HR: 67 (04-24-19 @ 20:00) (45 - 81)  BP: 108/54 (04-24-19 @ 18:00) (108/54 - 157/64)  RR: 17 (04-24-19 @ 20:00) (13 - 33)  SpO2: 97% (04-24-19 @ 20:00) (95% - 100%)             I&O's Detail    24 Apr 2019 07:01  -  24 Apr 2019 20:05  --------------------------------------------------------  IN:    IV PiggyBack: 250 mL    niCARdipine Infusion: 50 mL    Oral Fluid: 300 mL    sodium chloride 0.9%.: 200 mL  Total IN: 800 mL    OUT:    Voided: 750 mL  Total OUT: 750 mL    Total NET: 50 mL                    GLUCOSE  CAPILLARY BLOOD GLUCOSE      POCT Blood Glucose.: 96 mg/dL (24 Apr 2019 05:50)            Tubes/Lines/Drains  L groin sheath , + TVP VVI 40/10           PACER WIRE: YES [x ] NO [ ]  Setting: VVI 40        A-LINE:  [ x] YES [ ] NO  LOCATION:  L fem                         PHYSICAL EXAM      General: NAD, well appearing, in no distress  Neurology: A&O x3, non focal, no neuro deficits. Moves all extremities to command.  CV : s1 s2 RRR, no murmurs, gallops, clicks.   Wounds: b/l groin stable  Lungs: clear to auscultation  Abdomen: soft, nontender, nondistended, positive bowel sounds, +flatus  :    voiding         Extremities:    no  edema. + pedal pulses , L groin TVP. +Fem emma   Incision: R groin stable, cdi, no hematoma, +distal PP  Skin: intact, no lesions        MEDICATIONS  ALBUTerol    90 MICROgram(s) HFA Inhaler 1 Puff(s) Inhalation every 4 hours  ALBUTerol/ipratropium for Nebulization 3 milliLiter(s) Nebulizer every 6 hours  aspirin enteric coated 81 milliGRAM(s) Oral daily  brimonidine 0.2% Ophthalmic Solution 1 Drop(s) Both EYES two times a day  cefuroxime  IVPB 1500 milliGRAM(s) IV Intermittent every 8 hours  clopidogrel Tablet 75 milliGRAM(s) Oral daily  docusate sodium 100 milliGRAM(s) Oral three times a day  losartan 25 milliGRAM(s) Oral daily  niCARdipine Infusion 5 mG/Hr IV Continuous <Continuous>  pantoprazole  Injectable 40 milliGRAM(s) IV Push daily  potassium chloride  10 mEq/50 mL IVPB 10 milliEquivalent(s) IV Intermittent every 1 hour  potassium chloride  10 mEq/50 mL IVPB 10 milliEquivalent(s) IV Intermittent every 1 hour  potassium chloride  10 mEq/50 mL IVPB 10 milliEquivalent(s) IV Intermittent every 1 hour  potassium chloride  10 mEq/50 mL IVPB 10 milliEquivalent(s) IV Intermittent every 1 hour  simvastatin 40 milliGRAM(s) Oral at bedtime  sodium chloride 0.9%. 1000 milliLiter(s) IV Continuous <Continuous>  timolol 0.5% Solution 1 Drop(s) Both EYES two times a day  tiotropium 18 MICROgram(s) Capsule 1 Capsule(s) Inhalation daily

## 2019-04-24 NOTE — PRE-ANESTHESIA EVALUATION ADULT - NSANTHPMHFT_GEN_ALL_CORE
89 year old  female with PMH of asthma, HTN, HLD, severe AS, CAD, CHF NYHA class 11, ACC/AHA stage C, and basal cell cancer, s/p cardiac cath with cardiac stent placed x1 on 3/29/19.   Patient reports she has known AS for years but upon recent follow up with Dr Palmer her TTE revealed DELON 0.5. presents for PSt for  TAVR.  Patient reports fatigue and decline in functional capacity over the past few months. No chest pain.

## 2019-04-24 NOTE — PROGRESS NOTE ADULT - SUBJECTIVE AND OBJECTIVE BOX
This patient has been implanted with a Transcatheter Aortic Valve Implant under the following NCT/DAHIANA:    TAVR:    Commercial Implant NCT [id number], DAHIANA N/A and will be placed into the TVT Registry.    DEDE Dillon  [id number]

## 2019-04-24 NOTE — PROGRESS NOTE ADULT - ASSESSMENT
89 year old  female with PMH of asthma, HTN, HLD, severe AS, CAD, CHF NYHA class 11, ACC/AHA stage C, and basal cell cancer, s/p cardiac cath with cardiac stent placed x1 on 3/29/19.   Patient reports she has known AS for years but upon recent follow up with Dr Palmer her TTE revealed DELON 0.5. presents for PSt for TAVR.  Patient reports fatigue and decline in functional capacity over the past few months. No chest pain. Pt now s/p TAVR 4/24.     4/24 TAVR (26mm CoreValve Evolut) via percutaneous right transfemoral approach - R femoral balloon angioplasty, post op LBBB

## 2019-04-25 LAB
ALBUMIN SERPL ELPH-MCNC: 3.5 G/DL — SIGNIFICANT CHANGE UP (ref 3.3–5)
ALP SERPL-CCNC: 38 U/L — LOW (ref 40–120)
ALT FLD-CCNC: 12 U/L — SIGNIFICANT CHANGE UP (ref 10–45)
ANION GAP SERPL CALC-SCNC: 10 MMOL/L — SIGNIFICANT CHANGE UP (ref 5–17)
ANION GAP SERPL CALC-SCNC: 14 MMOL/L — SIGNIFICANT CHANGE UP (ref 5–17)
APTT BLD: 28.2 SEC — SIGNIFICANT CHANGE UP (ref 27.5–36.3)
AST SERPL-CCNC: 36 U/L — SIGNIFICANT CHANGE UP (ref 10–40)
BILIRUB SERPL-MCNC: 0.5 MG/DL — SIGNIFICANT CHANGE UP (ref 0.2–1.2)
BUN SERPL-MCNC: 14 MG/DL — SIGNIFICANT CHANGE UP (ref 7–23)
BUN SERPL-MCNC: 19 MG/DL — SIGNIFICANT CHANGE UP (ref 7–23)
CALCIUM SERPL-MCNC: 8.6 MG/DL — SIGNIFICANT CHANGE UP (ref 8.4–10.5)
CALCIUM SERPL-MCNC: 9.1 MG/DL — SIGNIFICANT CHANGE UP (ref 8.4–10.5)
CHLORIDE SERPL-SCNC: 108 MMOL/L — SIGNIFICANT CHANGE UP (ref 96–108)
CHLORIDE SERPL-SCNC: 109 MMOL/L — HIGH (ref 96–108)
CO2 SERPL-SCNC: 19 MMOL/L — LOW (ref 22–31)
CO2 SERPL-SCNC: 19 MMOL/L — LOW (ref 22–31)
CREAT SERPL-MCNC: 0.77 MG/DL — SIGNIFICANT CHANGE UP (ref 0.5–1.3)
CREAT SERPL-MCNC: 0.9 MG/DL — SIGNIFICANT CHANGE UP (ref 0.5–1.3)
GAS PNL BLDA: SIGNIFICANT CHANGE UP
GAS PNL BLDA: SIGNIFICANT CHANGE UP
GLUCOSE SERPL-MCNC: 106 MG/DL — HIGH (ref 70–99)
GLUCOSE SERPL-MCNC: 93 MG/DL — SIGNIFICANT CHANGE UP (ref 70–99)
HCT VFR BLD CALC: 26 % — LOW (ref 34.5–45)
HCT VFR BLD CALC: 28.9 % — LOW (ref 34.5–45)
HGB BLD-MCNC: 10.2 G/DL — LOW (ref 11.5–15.5)
HGB BLD-MCNC: 9.1 G/DL — LOW (ref 11.5–15.5)
INR BLD: 1.04 RATIO — SIGNIFICANT CHANGE UP (ref 0.88–1.16)
MCHC RBC-ENTMCNC: 34.3 PG — HIGH (ref 27–34)
MCHC RBC-ENTMCNC: 34.6 PG — HIGH (ref 27–34)
MCHC RBC-ENTMCNC: 35 GM/DL — SIGNIFICANT CHANGE UP (ref 32–36)
MCHC RBC-ENTMCNC: 35.2 GM/DL — SIGNIFICANT CHANGE UP (ref 32–36)
MCV RBC AUTO: 98.3 FL — SIGNIFICANT CHANGE UP (ref 80–100)
MCV RBC AUTO: 98.4 FL — SIGNIFICANT CHANGE UP (ref 80–100)
PLATELET # BLD AUTO: 144 K/UL — LOW (ref 150–400)
PLATELET # BLD AUTO: 145 K/UL — LOW (ref 150–400)
POTASSIUM SERPL-MCNC: 4.3 MMOL/L — SIGNIFICANT CHANGE UP (ref 3.5–5.3)
POTASSIUM SERPL-MCNC: 5.3 MMOL/L — SIGNIFICANT CHANGE UP (ref 3.5–5.3)
POTASSIUM SERPL-SCNC: 4.3 MMOL/L — SIGNIFICANT CHANGE UP (ref 3.5–5.3)
POTASSIUM SERPL-SCNC: 5.3 MMOL/L — SIGNIFICANT CHANGE UP (ref 3.5–5.3)
PROT SERPL-MCNC: 5.3 G/DL — LOW (ref 6–8.3)
PROTHROM AB SERPL-ACNC: 11.9 SEC — SIGNIFICANT CHANGE UP (ref 10–12.9)
RBC # BLD: 2.65 M/UL — LOW (ref 3.8–5.2)
RBC # BLD: 2.94 M/UL — LOW (ref 3.8–5.2)
RBC # FLD: 12.3 % — SIGNIFICANT CHANGE UP (ref 10.3–14.5)
RBC # FLD: 12.4 % — SIGNIFICANT CHANGE UP (ref 10.3–14.5)
SODIUM SERPL-SCNC: 137 MMOL/L — SIGNIFICANT CHANGE UP (ref 135–145)
SODIUM SERPL-SCNC: 142 MMOL/L — SIGNIFICANT CHANGE UP (ref 135–145)
WBC # BLD: 6.4 K/UL — SIGNIFICANT CHANGE UP (ref 3.8–10.5)
WBC # BLD: 7 K/UL — SIGNIFICANT CHANGE UP (ref 3.8–10.5)
WBC # FLD AUTO: 6.4 K/UL — SIGNIFICANT CHANGE UP (ref 3.8–10.5)
WBC # FLD AUTO: 7 K/UL — SIGNIFICANT CHANGE UP (ref 3.8–10.5)

## 2019-04-25 PROCEDURE — 93010 ELECTROCARDIOGRAM REPORT: CPT | Mod: 76

## 2019-04-25 PROCEDURE — 93306 TTE W/DOPPLER COMPLETE: CPT | Mod: 26

## 2019-04-25 PROCEDURE — 71045 X-RAY EXAM CHEST 1 VIEW: CPT | Mod: 26,76

## 2019-04-25 PROCEDURE — 33274 TCAT INSJ/RPL PERM LDLS PM: CPT | Mod: Q0

## 2019-04-25 PROCEDURE — 99232 SBSQ HOSP IP/OBS MODERATE 35: CPT

## 2019-04-25 RX ORDER — HYDRALAZINE HCL 50 MG
5 TABLET ORAL ONCE
Qty: 0 | Refills: 0 | Status: COMPLETED | OUTPATIENT
Start: 2019-04-25 | End: 2019-04-25

## 2019-04-25 RX ORDER — FENTANYL CITRATE 50 UG/ML
25 INJECTION INTRAVENOUS ONCE
Qty: 0 | Refills: 0 | Status: DISCONTINUED | OUTPATIENT
Start: 2019-04-25 | End: 2019-04-25

## 2019-04-25 RX ADMIN — FENTANYL CITRATE 25 MICROGRAM(S): 50 INJECTION INTRAVENOUS at 16:44

## 2019-04-25 RX ADMIN — Medication 100 MILLIGRAM(S): at 21:19

## 2019-04-25 RX ADMIN — Medication 5 MILLIGRAM(S): at 16:17

## 2019-04-25 RX ADMIN — Medication 3 MILLILITER(S): at 00:01

## 2019-04-25 RX ADMIN — Medication 100 MILLIGRAM(S): at 00:17

## 2019-04-25 RX ADMIN — LIDOCAINE 1 PATCH: 4 CREAM TOPICAL at 10:50

## 2019-04-25 RX ADMIN — LIDOCAINE 1 PATCH: 4 CREAM TOPICAL at 07:09

## 2019-04-25 RX ADMIN — Medication 3 MILLILITER(S): at 23:41

## 2019-04-25 RX ADMIN — LIDOCAINE 1 PATCH: 4 CREAM TOPICAL at 22:00

## 2019-04-25 RX ADMIN — FENTANYL CITRATE 25 MICROGRAM(S): 50 INJECTION INTRAVENOUS at 17:01

## 2019-04-25 RX ADMIN — CLOPIDOGREL BISULFATE 75 MILLIGRAM(S): 75 TABLET, FILM COATED ORAL at 10:51

## 2019-04-25 RX ADMIN — LIDOCAINE 1 PATCH: 4 CREAM TOPICAL at 20:00

## 2019-04-25 RX ADMIN — LOSARTAN POTASSIUM 25 MILLIGRAM(S): 100 TABLET, FILM COATED ORAL at 05:19

## 2019-04-25 RX ADMIN — PANTOPRAZOLE SODIUM 40 MILLIGRAM(S): 20 TABLET, DELAYED RELEASE ORAL at 10:51

## 2019-04-25 RX ADMIN — BRIMONIDINE TARTRATE 1 DROP(S): 2 SOLUTION/ DROPS OPHTHALMIC at 21:21

## 2019-04-25 RX ADMIN — BRIMONIDINE TARTRATE 1 DROP(S): 2 SOLUTION/ DROPS OPHTHALMIC at 05:19

## 2019-04-25 RX ADMIN — Medication 1 DROP(S): at 05:19

## 2019-04-25 RX ADMIN — LIDOCAINE 1 PATCH: 4 CREAM TOPICAL at 09:13

## 2019-04-25 RX ADMIN — Medication 81 MILLIGRAM(S): at 10:51

## 2019-04-25 RX ADMIN — Medication 3 MILLILITER(S): at 06:07

## 2019-04-25 RX ADMIN — Medication 3 MILLILITER(S): at 17:16

## 2019-04-25 RX ADMIN — Medication 1 DROP(S): at 21:20

## 2019-04-25 RX ADMIN — Medication 100 MILLIGRAM(S): at 05:19

## 2019-04-25 RX ADMIN — SIMVASTATIN 40 MILLIGRAM(S): 20 TABLET, FILM COATED ORAL at 21:19

## 2019-04-25 RX ADMIN — Medication 3 MILLILITER(S): at 11:42

## 2019-04-25 NOTE — CHART NOTE - NSCHARTNOTEFT_GEN_A_CORE
BRIEF PROCEDURE NOTE    BLANCA BENSON  [id number]    Pre-op Diagnosis: LBBB/AZ prolongation post TAVR    Post-op Diagnosis: same    Procedure: Micra leadless pacemaker    Electrophysiologist: Sirisha Early MD    Assistant: none    Anesthesia: IV sedation/local    Access: RFV    Description:  6Fr sheath RFV  serial dilation with 12/18Fr, 27Fr Micra sheath placed to RA  Micra delivered to RV septum  Tested and released  Sheath removed, hemostasis with figure of 8 stitch  temp wire removed    Complications: none    EBL: 10cc    Disposition: to CTU stable    Plan:  Remove stitch in Am  CXR  ECG

## 2019-04-25 NOTE — CONSULT NOTE ADULT - SUBJECTIVE AND OBJECTIVE BOX
89 y/o F w/ Pmhx  of asthma, HTN, HLD, severe AS, CAD, CHF NYHA class 11, ACC/AHA stage C, and basal cell cancer, s/p cardiac cath with cardiac stent placed x1 on 3/29/19.  Admitted for TAVR due to Severe AS, s/p procedure ; now with new LBBB for which EP was consulted. Patient seen and examined; lying in bed, in no acute distress. She denied chest pain/sob/dizziness/palpitations.     PAST MEDICAL & SURGICAL HISTORY:  CAD (coronary artery disease): recent coronary stent to mid RCA 3/19  Heart failure  Skin cancer: basal  Hiatal hernia  HLD (hyperlipidemia)  HTN (hypertension)  Aortic stenosis  Asthma: on breo for 2 yrs  S/P PTCA (percutaneous transluminal coronary angioplasty): coronary stent 3/19  H/O eye surgery: bilateral cataracts  H/O unilateral oophorectomy      SOCIAL HISTORY: Single                            Quit smoking 40 yrs ago                            ETOH use on occasion   FAMILY HISTORY:  Family history of heart failure; Father       MEDICATIONS  (STANDING):  ALBUTerol    90 MICROgram(s) HFA Inhaler 1 Puff(s) Inhalation every 4 hours  ALBUTerol/ipratropium for Nebulization 3 milliLiter(s) Nebulizer every 6 hours  aspirin enteric coated 81 milliGRAM(s) Oral daily  brimonidine 0.2% Ophthalmic Solution 1 Drop(s) Both EYES two times a day  clopidogrel Tablet 75 milliGRAM(s) Oral daily  docusate sodium 100 milliGRAM(s) Oral three times a day  lidocaine   Patch 1 Patch Transdermal daily  losartan 25 milliGRAM(s) Oral daily  pantoprazole  Injectable 40 milliGRAM(s) IV Push daily  potassium chloride  10 mEq/50 mL IVPB 10 milliEquivalent(s) IV Intermittent every 1 hour  potassium chloride  10 mEq/50 mL IVPB 10 milliEquivalent(s) IV Intermittent every 1 hour  potassium chloride  10 mEq/50 mL IVPB 10 milliEquivalent(s) IV Intermittent every 1 hour  simvastatin 40 milliGRAM(s) Oral at bedtime  sodium chloride 0.9%. 1000 milliLiter(s) (10 mL/Hr) IV Continuous <Continuous>  timolol 0.5% Solution 1 Drop(s) Both EYES two times a day  tiotropium 18 MICROgram(s) Capsule 1 Capsule(s) Inhalation daily    MEDICATIONS  (PRN):      Allergies  amoxicillin (Rash)        REVIEW OF SYSTEM:    Constitutional: denies fevers/ constitutional discomfort.   Neuro: denies weakness/dizziness  Resp: denies shortness of breath  CVS: denies chest pain/palpitations.  GI: denies abdominal pain/ nausea/diarrhea   : denies dysuria  Msk: denies joint pain or swelling  Psych: denies anxiety/depression/insomnia  Heme: denies abnormal bleeding    Vital Signs Last 24 Hrs  T(C): 36.8 (2019 12:00), Max: 36.9 (2019 20:00)  T(F): 98.3 (2019 12:00), Max: 98.4 (2019 20:00)  HR: 62 (2019 12:00) (46 - 81)  BP: 133/60 (2019 12:00) (108/54 - 157/64)  BP(mean): 87 (2019 12:00) (78 - 101)  RR: 30 (2019 12:00) (13 - 34)  SpO2: 96% (2019 12:00) (95% - 100%)    Physical Exam:  General : well developed, well nourished and in  no acute distress  Neuro : Alert and oriented x 3, no focal deficits  HEENT : Sclera clear, no JVD, no lymphadenopathy,  neck supple  Lungs: Breathing nonlabored; clear to auscultation bilaterally.   Cardiovascular : +S 1 +S 2  GI : abdomen soft, NT, ND, + BS   :  external catheter in place; light maribel urine  Extremities : +2 bilateral radial, +1 bilateral DP pulses.   Skin : Normal color and pigmentation with no skin lesions noted. left femoral access in place.     TELE: Sinus w/ 1st degree AV block ( 249 ms), +LBBB  40's-60's with  occasional Vpacing, APCs and PVCs    EK/25: SR w/ 1st degree AV block ( 240 ms on manual calculation) + LBBB QRSd 122 ms, 61 bpm     Previous EKG 3/29/19; SB w/ 1st deg AV block ( 216ms)  . QRSd 74ms    LABS:                        9.1    6.4   )-----------( 144      ( 2019 01:20 )             26.0     -    137  |  108  |  19  ----------------------------<  106<H>  5.3   |  19<L>  |  0.90    Ca    8.6      2019 01:20  Phos  3.9       Mg     1.8         TPro  5.3<L>  /  Alb  3.5  /  TBili  0.5  /  DBili  x   /  AST  36  /  ALT  12  /  AlkPhos  38<L>      PT/INR - ( 2019 13:18 )   PT: 13.4 sec;   INR: 1.16 ratio         PTT - ( 2019 13:18 )  PTT:25.7 sec      RADIOLOGY & ADDITIONAL STUDIES: 89 y/o F w/ Pmhx  of asthma, HTN, HLD, severe AS, Diastolic HF, CAD, s/p cardiac cath with cardiac stent placed x1 on 3/29/19.  Admitted for TAVR due to Severe AS, s/p procedure ; now with new LBBB for which EP was consulted. Patient seen and examined; lying in bed, in no acute distress. She denied chest pain/sob/dizziness/palpitations.     PAST MEDICAL & SURGICAL HISTORY:  CAD (coronary artery disease): recent coronary stent to mid RCA 3/19  Heart failure  Skin cancer: basal  Hiatal hernia  HLD (hyperlipidemia)  HTN (hypertension)  Aortic stenosis  Asthma: on breo for 2 yrs  S/P PTCA (percutaneous transluminal coronary angioplasty): coronary stent 3/19  H/O eye surgery: bilateral cataracts  H/O unilateral oophorectomy      SOCIAL HISTORY: Single                            Quit smoking 40 yrs ago                            ETOH use on occasion   FAMILY HISTORY:  Family history of heart failure; Father       MEDICATIONS  (STANDING):  ALBUTerol    90 MICROgram(s) HFA Inhaler 1 Puff(s) Inhalation every 4 hours  ALBUTerol/ipratropium for Nebulization 3 milliLiter(s) Nebulizer every 6 hours  aspirin enteric coated 81 milliGRAM(s) Oral daily  brimonidine 0.2% Ophthalmic Solution 1 Drop(s) Both EYES two times a day  clopidogrel Tablet 75 milliGRAM(s) Oral daily  docusate sodium 100 milliGRAM(s) Oral three times a day  lidocaine   Patch 1 Patch Transdermal daily  losartan 25 milliGRAM(s) Oral daily  pantoprazole  Injectable 40 milliGRAM(s) IV Push daily  potassium chloride  10 mEq/50 mL IVPB 10 milliEquivalent(s) IV Intermittent every 1 hour  potassium chloride  10 mEq/50 mL IVPB 10 milliEquivalent(s) IV Intermittent every 1 hour  potassium chloride  10 mEq/50 mL IVPB 10 milliEquivalent(s) IV Intermittent every 1 hour  simvastatin 40 milliGRAM(s) Oral at bedtime  sodium chloride 0.9%. 1000 milliLiter(s) (10 mL/Hr) IV Continuous <Continuous>  timolol 0.5% Solution 1 Drop(s) Both EYES two times a day  tiotropium 18 MICROgram(s) Capsule 1 Capsule(s) Inhalation daily    MEDICATIONS  (PRN):      Allergies  amoxicillin (Rash)        REVIEW OF SYSTEM:    Constitutional: denies fevers/ constitutional discomfort.   Neuro: denies weakness/dizziness  Resp: denies shortness of breath  CVS: denies chest pain/palpitations.  GI: denies abdominal pain/ nausea/diarrhea   : denies dysuria  Msk: denies joint pain or swelling  Psych: denies anxiety/depression/insomnia  Heme: denies abnormal bleeding    Vital Signs Last 24 Hrs  T(C): 36.8 (2019 12:00), Max: 36.9 (2019 20:00)  T(F): 98.3 (2019 12:00), Max: 98.4 (2019 20:00)  HR: 62 (2019 12:00) (46 - 81)  BP: 133/60 (2019 12:00) (108/54 - 157/64)  BP(mean): 87 (2019 12:00) (78 - 101)  RR: 30 (2019 12:00) (13 - 34)  SpO2: 96% (2019 12:00) (95% - 100%)    Physical Exam:  General : well developed, well nourished and in  no acute distress  Neuro : Alert and oriented x 3, no focal deficits  HEENT : Sclera clear, no JVD, no lymphadenopathy,  neck supple  Lungs: Breathing nonlabored; clear to auscultation bilaterally.   Cardiovascular : +S 1 +S 2  GI : abdomen soft, NT, ND, + BS   :  external catheter in place; light maribel urine  Extremities : +2 bilateral radial, +1 bilateral DP pulses.   Skin : Normal color and pigmentation with no skin lesions noted. left femoral access in place.     TELE: Sinus w/ 1st degree AV block ( 249 ms), +LBBB  40's-60's with  occasional Vpacing, APCs and PVCs    EK/25: SR w/ 1st degree AV block ( 240 ms on manual calculation) + LBBB QRSd 122 ms, 61 bpm     Previous EKG 3/29/19; SB w/ 1st deg AV block ( 216ms)  . QRSd 74ms    LABS:                        9.1    6.4   )-----------( 144      ( 2019 01:20 )             26.0     -    137  |  108  |  19  ----------------------------<  106<H>  5.3   |  19<L>  |  0.90    Ca    8.6      2019 01:20  Phos  3.9     04-24  Mg     1.8     04-24    TPro  5.3<L>  /  Alb  3.5  /  TBili  0.5  /  DBili  x   /  AST  36  /  ALT  12  /  AlkPhos  38<L>  04-25    PT/INR - ( 2019 13:18 )   PT: 13.4 sec;   INR: 1.16 ratio         PTT - ( 2019 13:18 )  PTT:25.7 sec      RADIOLOGY & ADDITIONAL STUDIES: < from: Transthoracic Echocardiogram (19 @ 09:56) >  Patient name: BLANCA BENSON  YOB: 1927   Age: 89 (F)   MR#: [id number]  Study Date: 3/26/2019  Location: O/PSonographer: Gilberto Jin RDCS  Study quality: Technically good  Referring Physician: MARY LOU ALDRIDGE MD  Blood Pressure:182/92 mmHg  Height: 150 cm  Weight: 56 kg  BSA: 1.5 m2  ------------------------------------------------------------------------  PROCEDURE: Transthoracic echocardiogram with 2-D, M-Mode  and complete spectral and color flow Doppler.  INDICATION: Nonrheumatic aortic (valve) stenosis (I35.0)  ------------------------------------------------------------------------  Dimensions:    Normal Values:  LA:     3.0    2.0 - 4.0 cm  Ao:     2.5    2.0 - 3.8 cm  SEPTUM: 1.0    0.6 - 1.2 cm  PWT:    0.7    0.6 - 1.1 cm  LVIDd:  3.8    3.0 - 5.6 cm  LVIDs:  2.3    1.8 - 4.0 cm  Derived variables:  LVMI: 62 g/m2  RWT: 0.36  Fractional short: 39 %  EF (Visual Estimate): 80 %  Doppler Peak Velocity (m/sec): AoV=4.6  ------------------------------------------------------------------------  Observations:  Mitral Valve: Mitral annular calcification  Calcified mitral chordae Minimal mitral regurgitation.  Aortic Valve/Aorta: Severe aortic stenosis.  Peak velocity in the LVOT and aortic valve 1.4 m/s and 4.5  m/s, respectively  Mean aortic gradient approximately 50 mmHg. Peak aortic  gradient is approximately 73 mmHg.  Aortic valve area by continuity (based on a measured LVOTd  of 1.8 cm) = 0.75 square cm. Mild aortic regurgitation.  Aortic Root: 2.5 cm.  LVOT diameter: 1.8 cm.  Left Atrium: Moderately dilated left atrium.  LA volume  index = 43 cc/m2.  Left Ventricle: Hyperdynamic left ventricle. Intracavitary  gradient seen.  Small left ventricular internal dimensions  and wall thicknesses. LV filling pressures are moderately  elevated (moderate diastolic dysfunction)  Right Heart: Normal right atrium. Normal right ventricular  size and function. Normal tricuspid valve. Mild tricuspid  regurgitation. Normal pulmonic valve.  Pericardium/Pleura: Normal pericardium with no pericardial  effusion.  Hemodynamic: No PFO seen with color Doppler.  Mild pulmonary hypertension. Estimated PASP 39 mmHg.  ------------------------------------------------------------------------  Conclusions:  Hyperdynamicleft ventricle. Intracavitary gradient seen.  Severe aortic stenosis. Aortic valve area by continuity  (based on a measured LVOTd of 1.8 cm) = 0.75 square cm.  Mild pulmonary hypertension  ------------------------------------------------------------------------  Confirmed on  3/26/2019 - 13:59:41 by Lisandro Shaikh M.D.  ------------------------------------------------------------------------

## 2019-04-25 NOTE — CONSULT NOTE ADULT - ASSESSMENT
89 y/o F w/ Pmhx  of asthma, HTN, HLD, severe AS,Diastolic HF, CAD s/p cardiac cath with cardiac stent placed x1 on 3/29/19.  Admitted for TAVR due to Severe AS, s/p procedure 4/24; now with new LBBB for which EP was consulted    # New LBBB with increased VT prolongation post TAVR;  - Keep NPO for micra placement today; risk/benefit of micra discussed with patient  who verbalized understanding and agrees with procedure  - Hold off AVN blockers for now  - Has active type and screen  - Continue telemetry monitoring   - Plan of care discussed with Attending and Primary Team.     [id number]

## 2019-04-25 NOTE — PROGRESS NOTE ADULT - SUBJECTIVE AND OBJECTIVE BOX
*****Structural Heart Team*****    Subjective:    Patient resting comfortably in bed, offering no complaints. She has a persistent new LBBB, for which the TVP wire was kept in place.      PAST MEDICAL & SURGICAL HISTORY:  CAD (coronary artery disease): recent coronary stent 3/19  Heart failure  Skin cancer: basal  Hiatal hernia  HLD (hyperlipidemia)  HTN (hypertension)  Aortic stenosis  Asthma: on breo for 2 yrs  S/P PTCA (percutaneous transluminal coronary angioplasty): coronary stent 3/19  H/O eye surgery: bilateral cataracts  H/O unilateral oophorectomy        T(C): 36.7 (04-25-19 @ 08:00), Max: 36.9 (04-24-19 @ 20:00)  HR: 65 (04-25-19 @ 08:00) (45 - 81)  BP: 108/54 (04-24-19 @ 18:00) (108/54 - 157/64)  RR: 28 (04-25-19 @ 08:00) (13 - 33)  SpO2: 97% (04-25-19 @ 08:00) (95% - 100%)  Wt(kg): --  04-24 @ 07:01  -  04-25 @ 07:00  --------------------------------------------------------  IN: 1310 mL / OUT: 1200 mL / NET: 110 mL    04-25 @ 07:01  -  04-25 @ 09:09  --------------------------------------------------------  IN: 40 mL / OUT: 350 mL / NET: -310 mL      MEDICATIONS  (STANDING):  ALBUTerol    90 MICROgram(s) HFA Inhaler 1 Puff(s) Inhalation every 4 hours  ALBUTerol/ipratropium for Nebulization 3 milliLiter(s) Nebulizer every 6 hours  aspirin enteric coated 81 milliGRAM(s) Oral daily  brimonidine 0.2% Ophthalmic Solution 1 Drop(s) Both EYES two times a day  clopidogrel Tablet 75 milliGRAM(s) Oral daily  docusate sodium 100 milliGRAM(s) Oral three times a day  lidocaine   Patch 1 Patch Transdermal daily  losartan 25 milliGRAM(s) Oral daily  pantoprazole  Injectable 40 milliGRAM(s) IV Push daily  potassium chloride  10 mEq/50 mL IVPB 10 milliEquivalent(s) IV Intermittent every 1 hour  potassium chloride  10 mEq/50 mL IVPB 10 milliEquivalent(s) IV Intermittent every 1 hour  potassium chloride  10 mEq/50 mL IVPB 10 milliEquivalent(s) IV Intermittent every 1 hour  simvastatin 40 milliGRAM(s) Oral at bedtime  sodium chloride 0.9%. 1000 milliLiter(s) (10 mL/Hr) IV Continuous <Continuous>  timolol 0.5% Solution 1 Drop(s) Both EYES two times a day  tiotropium 18 MICROgram(s) Capsule 1 Capsule(s) Inhalation daily    MEDICATIONS  (PRN):      Review of Symptoms:  Constitutional: Awake, Alert, Follows commands  Respiratory:  Denies SOB, Denies Cough  Cardiac: Denies CP, Denies Palpitations  Gastrointestinal: Denies Pain, Denies N/V, tolerating po intake  Vascular: Negative  Extremities: NO Edema, No joint pain or swelling  Neurological: Negative  Endocrine: No heat or cold intolerance, No excessive thirst  Heme/Onc: Negative    Exam:  General: A/O x3, NAD, Following commands  HEENT: Supple, No JVD, Trachea midline, no masses  Pulmonary: CTAB, = Chest Excursion, no accessory muscle use  Cor: S1S2, RRR, No Murmur noted, No gallups/rubs  ECG: NSR with LBBB and 1st degree AVB  Groin/Wound: MIld ecchymosis B/L, Soft, NT, TVP wire and Roro in left groin  Gastrointestinal: Soft, NT/ND, + Bowel Sounds  Neuro: = motor and sensory B/L, No focal deficits  Vascular: 1+ pulses B/L, Cap Refill < 2 seconds, No Bruits  Extremities: No Edema noted, No joint pain or swelling, + PMS x4  Skin: Warm/Dry/Normal color, Normal turgor, no rashes                          9.1    6.4   )-----------( 144      ( 25 Apr 2019 01:20 )             26.0   04-25    137  |  108  |  19  ----------------------------<  106<H>  5.3   |  19<L>  |  0.90    Ca    8.6      25 Apr 2019 01:20  Phos  3.9     04-24  Mg     1.8     04-24    TPro  5.3<L>  /  Alb  3.5  /  TBili  0.5  /  DBili  x   /  AST  36  /  ALT  12  /  AlkPhos  38<L>  04-25  PT/INR - ( 24 Apr 2019 13:18 )   PT: 13.4 sec;   INR: 1.16 ratio         PTT - ( 24 Apr 2019 13:18 )  PTT:25.7 sec    Imaging Reviewed:    Post Op TTE: Ordered    ECG: As above    Assesment/Plan:    89 y/o female, S/P Percutaneous Femoral TAVR for Severe Symptomatic Aortic Stenosis POD#1, Chronic Diastolic HF, CAD with Recent PCI, New LBBB, HTN    1.) S/P TAVR: ASA 81 mg po daily, Plavix 75 mg po daily, Continue to Monitor Groins. Ambulate as tolerated once TVP Wire out. Post operative TTE ordered, will follow up once complete.  2.) New LBBB: Will maintain TVP wire. EP Consult called, Discussed with Dr. Monsivais and Raymundo. Will keep patient NPO for possible PPM placement. No beta blockers at this time. If no PPM indicated, D/C with remote monitoring patch  3.) Chronic Diastolic Heart Failure: No indication for Diuretics, Monitor I and O's, Daily weights.  4.) CAD with recent PCI: Continue Plavix 75 mg po, no Beta Blockers at this time  5.) HTN: Continue with current Losartan dose.  6.) Once decision made regarding possible PPM, D/C TVP wire and femoral roro, transfer to [address] [place] [address]  7.) Discharge Plan: Patient is to follow up with Dr. Saenz  in 1 week post discharge. HE/She should then follow up with the [facility]  in 30 days, with a repeat Transthoracic Echo to be done at that visit.    DEDE Salgado  [id number]

## 2019-04-26 LAB
ALBUMIN SERPL ELPH-MCNC: 3.5 G/DL — SIGNIFICANT CHANGE UP (ref 3.3–5)
ALP SERPL-CCNC: 41 U/L — SIGNIFICANT CHANGE UP (ref 40–120)
ALT FLD-CCNC: 11 U/L — SIGNIFICANT CHANGE UP (ref 10–45)
ANION GAP SERPL CALC-SCNC: 11 MMOL/L — SIGNIFICANT CHANGE UP (ref 5–17)
AST SERPL-CCNC: 31 U/L — SIGNIFICANT CHANGE UP (ref 10–40)
BILIRUB SERPL-MCNC: 0.6 MG/DL — SIGNIFICANT CHANGE UP (ref 0.2–1.2)
BUN SERPL-MCNC: 14 MG/DL — SIGNIFICANT CHANGE UP (ref 7–23)
CALCIUM SERPL-MCNC: 9.2 MG/DL — SIGNIFICANT CHANGE UP (ref 8.4–10.5)
CHLORIDE SERPL-SCNC: 108 MMOL/L — SIGNIFICANT CHANGE UP (ref 96–108)
CO2 SERPL-SCNC: 21 MMOL/L — LOW (ref 22–31)
CREAT SERPL-MCNC: 0.83 MG/DL — SIGNIFICANT CHANGE UP (ref 0.5–1.3)
GLUCOSE SERPL-MCNC: 106 MG/DL — HIGH (ref 70–99)
HCT VFR BLD CALC: 28.3 % — LOW (ref 34.5–45)
HGB BLD-MCNC: 9.8 G/DL — LOW (ref 11.5–15.5)
MCHC RBC-ENTMCNC: 34.4 PG — HIGH (ref 27–34)
MCHC RBC-ENTMCNC: 34.6 GM/DL — SIGNIFICANT CHANGE UP (ref 32–36)
MCV RBC AUTO: 99.2 FL — SIGNIFICANT CHANGE UP (ref 80–100)
PLATELET # BLD AUTO: 129 K/UL — LOW (ref 150–400)
POTASSIUM SERPL-MCNC: 4.7 MMOL/L — SIGNIFICANT CHANGE UP (ref 3.5–5.3)
POTASSIUM SERPL-SCNC: 4.7 MMOL/L — SIGNIFICANT CHANGE UP (ref 3.5–5.3)
PROT SERPL-MCNC: 6 G/DL — SIGNIFICANT CHANGE UP (ref 6–8.3)
RBC # BLD: 2.86 M/UL — LOW (ref 3.8–5.2)
RBC # FLD: 12.5 % — SIGNIFICANT CHANGE UP (ref 10.3–14.5)
SODIUM SERPL-SCNC: 140 MMOL/L — SIGNIFICANT CHANGE UP (ref 135–145)
WBC # BLD: 6.9 K/UL — SIGNIFICANT CHANGE UP (ref 3.8–10.5)
WBC # FLD AUTO: 6.9 K/UL — SIGNIFICANT CHANGE UP (ref 3.8–10.5)

## 2019-04-26 PROCEDURE — 71046 X-RAY EXAM CHEST 2 VIEWS: CPT | Mod: 26

## 2019-04-26 PROCEDURE — 99231 SBSQ HOSP IP/OBS SF/LOW 25: CPT

## 2019-04-26 PROCEDURE — 93010 ELECTROCARDIOGRAM REPORT: CPT

## 2019-04-26 PROCEDURE — 71045 X-RAY EXAM CHEST 1 VIEW: CPT | Mod: 26,59

## 2019-04-26 PROCEDURE — 93321 DOPPLER ECHO F-UP/LMTD STD: CPT | Mod: 26

## 2019-04-26 PROCEDURE — 93308 TTE F-UP OR LMTD: CPT | Mod: 26

## 2019-04-26 RX ORDER — ACETAMINOPHEN 500 MG
650 TABLET ORAL EVERY 6 HOURS
Qty: 0 | Refills: 0 | Status: DISCONTINUED | OUTPATIENT
Start: 2019-04-26 | End: 2019-04-27

## 2019-04-26 RX ORDER — SODIUM CHLORIDE 9 MG/ML
3 INJECTION INTRAMUSCULAR; INTRAVENOUS; SUBCUTANEOUS EVERY 8 HOURS
Qty: 0 | Refills: 0 | Status: DISCONTINUED | OUTPATIENT
Start: 2019-04-26 | End: 2019-04-27

## 2019-04-26 RX ORDER — ACETAMINOPHEN 500 MG
650 TABLET ORAL ONCE
Qty: 0 | Refills: 0 | Status: COMPLETED | OUTPATIENT
Start: 2019-04-26 | End: 2019-04-26

## 2019-04-26 RX ADMIN — Medication 100 MILLIGRAM(S): at 11:06

## 2019-04-26 RX ADMIN — Medication 3 MILLILITER(S): at 17:54

## 2019-04-26 RX ADMIN — Medication 3 MILLILITER(S): at 05:06

## 2019-04-26 RX ADMIN — LIDOCAINE 1 PATCH: 4 CREAM TOPICAL at 11:05

## 2019-04-26 RX ADMIN — Medication 3 MILLILITER(S): at 23:45

## 2019-04-26 RX ADMIN — BRIMONIDINE TARTRATE 1 DROP(S): 2 SOLUTION/ DROPS OPHTHALMIC at 17:01

## 2019-04-26 RX ADMIN — BRIMONIDINE TARTRATE 1 DROP(S): 2 SOLUTION/ DROPS OPHTHALMIC at 05:49

## 2019-04-26 RX ADMIN — CLOPIDOGREL BISULFATE 75 MILLIGRAM(S): 75 TABLET, FILM COATED ORAL at 11:05

## 2019-04-26 RX ADMIN — LIDOCAINE 1 PATCH: 4 CREAM TOPICAL at 18:18

## 2019-04-26 RX ADMIN — Medication 81 MILLIGRAM(S): at 11:05

## 2019-04-26 RX ADMIN — PANTOPRAZOLE SODIUM 40 MILLIGRAM(S): 20 TABLET, DELAYED RELEASE ORAL at 11:06

## 2019-04-26 RX ADMIN — SODIUM CHLORIDE 3 MILLILITER(S): 9 INJECTION INTRAMUSCULAR; INTRAVENOUS; SUBCUTANEOUS at 13:25

## 2019-04-26 RX ADMIN — Medication 1 DROP(S): at 05:49

## 2019-04-26 RX ADMIN — SIMVASTATIN 40 MILLIGRAM(S): 20 TABLET, FILM COATED ORAL at 21:08

## 2019-04-26 RX ADMIN — Medication 100 MILLIGRAM(S): at 05:49

## 2019-04-26 RX ADMIN — Medication 100 MILLIGRAM(S): at 21:08

## 2019-04-26 RX ADMIN — SODIUM CHLORIDE 3 MILLILITER(S): 9 INJECTION INTRAMUSCULAR; INTRAVENOUS; SUBCUTANEOUS at 21:07

## 2019-04-26 RX ADMIN — Medication 3 MILLILITER(S): at 13:17

## 2019-04-26 RX ADMIN — LIDOCAINE 1 PATCH: 4 CREAM TOPICAL at 22:11

## 2019-04-26 RX ADMIN — Medication 1 DROP(S): at 17:01

## 2019-04-26 RX ADMIN — LOSARTAN POTASSIUM 25 MILLIGRAM(S): 100 TABLET, FILM COATED ORAL at 05:49

## 2019-04-26 NOTE — PROGRESS NOTE ADULT - ASSESSMENT
89 y/o F w/ Pmhx  of asthma, HTN, HLD, severe AS,Diastolic HF, CAD s/p cardiac cath with cardiac stent placed x1 on 3/29/19.  Admitted for TAVR due to Severe AS, s/p procedure 4/24; now with new LBBB for which EP was consulted. Patient now s/p leadless ( micra) PPM on 4/25/19.    # New LBBB with increased MA prolongation post TAVR    -Patient tolerated procedure well  - Right groin stitches removed this am; continue to monitor  - PA/ LAT cxray pending  -Post leadless PPM teaching reviewed with patient who verbalized understanding.  - Booklet with further instructions provided to patient.  -F/U appointment in  EP Clinic  given for 5/14/19 at 7:45 am.  -Patient advised that if fever or swelling/redness/discharge from access site, she should call EP clinic at [phone]. [id number] 89 y/o F w/ Pmhx  of asthma, HTN, HLD, severe AS,Diastolic HF, CAD s/p cardiac cath with cardiac stent placed x1 on 3/29/19.  Admitted for TAVR due to Severe AS, s/p procedure 4/24; now with new LBBB for which EP was consulted. Patient now s/p leadless ( micra) PPM on 4/25/19.    # New LBBB with increased MA prolongation post TAVR    -Patient tolerated procedure well  - Right groin stitches removed this am; continue to monitor  - PA/ LAT cxray  reveals  device in good position.  -Post leadless PPM teaching reviewed with patient who verbalized understanding.  - Booklet with further instructions provided to patient.  -F/U appointment in  EP Clinic  given for 5/14/19 at 7:45 am.  -Patient advised that if fever or swelling/redness/discharge from access site, she should call EP clinic at [phone].  EP signing off; please recall if any further  issues/concerns should arise.     [id number] 89 y/o F w/ Pmhx  of asthma, HTN, HLD, severe AS,Diastolic HF, CAD s/p cardiac cath with cardiac stent placed x1 on 3/29/19.  Admitted for TAVR due to Severe AS, s/p procedure 4/24; now with new LBBB for which EP was consulted. Patient now s/p leadless ( micra) PPM on 4/25/19.    # New LBBB with increased SD prolongation post TAVR    -Patient tolerated procedure well  - Right groin stitches removed this am; site benign, continue to monitor  - PA/ LAT cxray  reveals  device in good position.  -Post leadless PPM teaching reviewed with patient who verbalized understanding.  - Booklet with further instructions provided to patient.  -F/U appointment in  EP Clinic  given for 5/14/19 at 7:45 am.  -Patient advised that if fever or swelling/redness/discharge from access site, she should call EP clinic at [phone].  EP signing off; please recall if any further  issues/concerns should arise.     [id number]

## 2019-04-26 NOTE — PROGRESS NOTE ADULT - SUBJECTIVE AND OBJECTIVE BOX
INTERVAL HPI/OVERNIGHT EVENTS: Patient s/p Rick 4/25/19.    MEDICATIONS  (STANDING):  ALBUTerol    90 MICROgram(s) HFA Inhaler 1 Puff(s) Inhalation every 4 hours  ALBUTerol/ipratropium for Nebulization 3 milliLiter(s) Nebulizer every 6 hours  aspirin enteric coated 81 milliGRAM(s) Oral daily  brimonidine 0.2% Ophthalmic Solution 1 Drop(s) Both EYES two times a day  clopidogrel Tablet 75 milliGRAM(s) Oral daily  docusate sodium 100 milliGRAM(s) Oral three times a day  lidocaine   Patch 1 Patch Transdermal daily  losartan 25 milliGRAM(s) Oral daily  pantoprazole  Injectable 40 milliGRAM(s) IV Push daily  potassium chloride  10 mEq/50 mL IVPB 10 milliEquivalent(s) IV Intermittent every 1 hour  potassium chloride  10 mEq/50 mL IVPB 10 milliEquivalent(s) IV Intermittent every 1 hour  potassium chloride  10 mEq/50 mL IVPB 10 milliEquivalent(s) IV Intermittent every 1 hour  simvastatin 40 milliGRAM(s) Oral at bedtime  sodium chloride 0.9% lock flush 3 milliLiter(s) IV Push every 8 hours  timolol 0.5% Solution 1 Drop(s) Both EYES two times a day  tiotropium 18 MICROgram(s) Capsule 1 Capsule(s) Inhalation daily    MEDICATIONS  (PRN):      Allergies  amoxicillin (Rash)    ROS:  General: Pt denies fever/chills  Cardiovascular: denies chest pain/palpitations/dizziness.  Respiratory and Thorax: denies SOB  Gastrointestinal: denies abdominal pain/diarrhea/bloody stool  Hematologic: denies abnormal bleeding    Vital Signs Last 24 Hrs  T(C): 36.7 (26 Apr 2019 07:00), Max: 36.9 (25 Apr 2019 20:00)  T(F): 98.1 (26 Apr 2019 07:00), Max: 98.4 (25 Apr 2019 20:00)  HR: 63 (26 Apr 2019 09:00) (54 - 88)  BP: 96/53 (26 Apr 2019 09:00) (87/52 - 178/79)  BP(mean): 72 (26 Apr 2019 09:00) (64 - 113)  RR: 15 (26 Apr 2019 09:00) (15 - 37)  SpO2: 97% (26 Apr 2019 09:00) (95% - 100%)    Physical Exam:  Constitutional: well developed, well nourished,  and no acute distress  Neurological: Alert & Oriented x 3,  no focal deficits  Respiratory: Breathing nonlabored; CTA bilaterally.   Cardiovascular: (+) S1 & S2  Gastrointestinal: soft, NT, nondistended, (+) BS  Extremities: +2 bilateral radial +1 bilateral DP pulses. No pedal edema.   Skin: Right groin s/p micra access site with stitches; which was removed without incident. + mild surrounding ecchymosis to site, no hematoma/ active external bleed. Left groin site with clear dressing in place; + surrounding ecchymosis; no hematoma/active external bleed.           LABS:                        9.8    6.9   )-----------( 129      ( 26 Apr 2019 03:38 )             28.3     04-26    140  |  108  |  14  ----------------------------<  106<H>  4.7   |  21<L>  |  0.83    Ca    9.2      26 Apr 2019 03:38  Phos  3.9     04-24  Mg     1.8     04-24    TPro  6.0  /  Alb  3.5  /  TBili  0.6  /  DBili  x   /  AST  31  /  ALT  11  /  AlkPhos  41  04-26    PT/INR - ( 25 Apr 2019 15:35 )   PT: 11.9 sec;   INR: 1.04 ratio         PTT - ( 25 Apr 2019 15:35 )  PTT:28.2 sec      RADIOLOGY & ADDITIONAL TESTS: < from: Xray Chest 1 View- PORTABLE-Routine (04.26.19 @ 03:10) >  INTERPRETATION:  A single chest x-ray was obtained on April 26, 2019.    Indication: Status post cardiac surgery.    Impression:    The heart is normal in size. No change in appearance of the lungs when   compared to previous study done April 25, 2019. Status post transaortic   valve replacement. A loop recorder is present.          TELE: Sinus w/ 1st deg AVB + LBBB, occasional APCs, VPCs 50's- 80's with intermittent pacing overnight.

## 2019-04-27 ENCOUNTER — TRANSCRIPTION ENCOUNTER (OUTPATIENT)
Age: 84
End: 2019-04-27

## 2019-04-27 VITALS
TEMPERATURE: 98 F | RESPIRATION RATE: 18 BRPM | OXYGEN SATURATION: 93 % | HEART RATE: 85 BPM | DIASTOLIC BLOOD PRESSURE: 63 MMHG | SYSTOLIC BLOOD PRESSURE: 118 MMHG

## 2019-04-27 LAB
ALBUMIN SERPL ELPH-MCNC: 3.5 G/DL — SIGNIFICANT CHANGE UP (ref 3.3–5)
ALP SERPL-CCNC: 47 U/L — SIGNIFICANT CHANGE UP (ref 40–120)
ALT FLD-CCNC: 15 U/L — SIGNIFICANT CHANGE UP (ref 10–45)
ANION GAP SERPL CALC-SCNC: 9 MMOL/L — SIGNIFICANT CHANGE UP (ref 5–17)
AST SERPL-CCNC: 27 U/L — SIGNIFICANT CHANGE UP (ref 10–40)
BILIRUB SERPL-MCNC: 0.6 MG/DL — SIGNIFICANT CHANGE UP (ref 0.2–1.2)
BUN SERPL-MCNC: 20 MG/DL — SIGNIFICANT CHANGE UP (ref 7–23)
CALCIUM SERPL-MCNC: 9.4 MG/DL — SIGNIFICANT CHANGE UP (ref 8.4–10.5)
CHLORIDE SERPL-SCNC: 104 MMOL/L — SIGNIFICANT CHANGE UP (ref 96–108)
CO2 SERPL-SCNC: 22 MMOL/L — SIGNIFICANT CHANGE UP (ref 22–31)
CREAT SERPL-MCNC: 0.92 MG/DL — SIGNIFICANT CHANGE UP (ref 0.5–1.3)
GLUCOSE SERPL-MCNC: 95 MG/DL — SIGNIFICANT CHANGE UP (ref 70–99)
HCT VFR BLD CALC: 29 % — LOW (ref 34.5–45)
HGB BLD-MCNC: 9.3 G/DL — LOW (ref 11.5–15.5)
MCHC RBC-ENTMCNC: 31.6 PG — SIGNIFICANT CHANGE UP (ref 27–34)
MCHC RBC-ENTMCNC: 32 GM/DL — SIGNIFICANT CHANGE UP (ref 32–36)
MCV RBC AUTO: 98.8 FL — SIGNIFICANT CHANGE UP (ref 80–100)
PLATELET # BLD AUTO: 135 K/UL — LOW (ref 150–400)
POTASSIUM SERPL-MCNC: 4.3 MMOL/L — SIGNIFICANT CHANGE UP (ref 3.5–5.3)
POTASSIUM SERPL-SCNC: 4.3 MMOL/L — SIGNIFICANT CHANGE UP (ref 3.5–5.3)
PROT SERPL-MCNC: 6.2 G/DL — SIGNIFICANT CHANGE UP (ref 6–8.3)
RBC # BLD: 2.93 M/UL — LOW (ref 3.8–5.2)
RBC # FLD: 11.9 % — SIGNIFICANT CHANGE UP (ref 10.3–14.5)
SODIUM SERPL-SCNC: 135 MMOL/L — SIGNIFICANT CHANGE UP (ref 135–145)
WBC # BLD: 6.9 K/UL — SIGNIFICANT CHANGE UP (ref 3.8–10.5)
WBC # FLD AUTO: 6.9 K/UL — SIGNIFICANT CHANGE UP (ref 3.8–10.5)

## 2019-04-27 PROCEDURE — 82947 ASSAY GLUCOSE BLOOD QUANT: CPT

## 2019-04-27 PROCEDURE — C1760: CPT

## 2019-04-27 PROCEDURE — 85730 THROMBOPLASTIN TIME PARTIAL: CPT

## 2019-04-27 PROCEDURE — 93308 TTE F-UP OR LMTD: CPT

## 2019-04-27 PROCEDURE — C1725: CPT

## 2019-04-27 PROCEDURE — 93306 TTE W/DOPPLER COMPLETE: CPT

## 2019-04-27 PROCEDURE — 33274 TCAT INSJ/RPL PERM LDLS PM: CPT | Mod: Q0

## 2019-04-27 PROCEDURE — 93005 ELECTROCARDIOGRAM TRACING: CPT

## 2019-04-27 PROCEDURE — 83735 ASSAY OF MAGNESIUM: CPT

## 2019-04-27 PROCEDURE — 86901 BLOOD TYPING SEROLOGIC RH(D): CPT

## 2019-04-27 PROCEDURE — 82962 GLUCOSE BLOOD TEST: CPT

## 2019-04-27 PROCEDURE — C1887: CPT

## 2019-04-27 PROCEDURE — C1894: CPT

## 2019-04-27 PROCEDURE — G0463: CPT

## 2019-04-27 PROCEDURE — 80048 BASIC METABOLIC PNL TOTAL CA: CPT

## 2019-04-27 PROCEDURE — C1726: CPT

## 2019-04-27 PROCEDURE — 86923 COMPATIBILITY TEST ELECTRIC: CPT

## 2019-04-27 PROCEDURE — 85610 PROTHROMBIN TIME: CPT

## 2019-04-27 PROCEDURE — 83605 ASSAY OF LACTIC ACID: CPT

## 2019-04-27 PROCEDURE — 84484 ASSAY OF TROPONIN QUANT: CPT

## 2019-04-27 PROCEDURE — 86900 BLOOD TYPING SEROLOGIC ABO: CPT

## 2019-04-27 PROCEDURE — 85014 HEMATOCRIT: CPT

## 2019-04-27 PROCEDURE — 71045 X-RAY EXAM CHEST 1 VIEW: CPT

## 2019-04-27 PROCEDURE — 99238 HOSP IP/OBS DSCHRG MGMT 30/<: CPT

## 2019-04-27 PROCEDURE — C1786: CPT

## 2019-04-27 PROCEDURE — 84295 ASSAY OF SERUM SODIUM: CPT

## 2019-04-27 PROCEDURE — 82435 ASSAY OF BLOOD CHLORIDE: CPT

## 2019-04-27 PROCEDURE — 84132 ASSAY OF SERUM POTASSIUM: CPT

## 2019-04-27 PROCEDURE — 85384 FIBRINOGEN ACTIVITY: CPT

## 2019-04-27 PROCEDURE — 93010 ELECTROCARDIOGRAM REPORT: CPT

## 2019-04-27 PROCEDURE — 87640 STAPH A DNA AMP PROBE: CPT

## 2019-04-27 PROCEDURE — 85027 COMPLETE CBC AUTOMATED: CPT

## 2019-04-27 PROCEDURE — 80053 COMPREHEN METABOLIC PANEL: CPT

## 2019-04-27 PROCEDURE — 93321 DOPPLER ECHO F-UP/LMTD STD: CPT

## 2019-04-27 PROCEDURE — 82803 BLOOD GASES ANY COMBINATION: CPT

## 2019-04-27 PROCEDURE — 83036 HEMOGLOBIN GLYCOSYLATED A1C: CPT

## 2019-04-27 PROCEDURE — P9016: CPT

## 2019-04-27 PROCEDURE — 87641 MR-STAPH DNA AMP PROBE: CPT

## 2019-04-27 PROCEDURE — 71046 X-RAY EXAM CHEST 2 VIEWS: CPT

## 2019-04-27 PROCEDURE — 82553 CREATINE MB FRACTION: CPT

## 2019-04-27 PROCEDURE — 94640 AIRWAY INHALATION TREATMENT: CPT

## 2019-04-27 PROCEDURE — C1769: CPT

## 2019-04-27 PROCEDURE — 76000 FLUOROSCOPY <1 HR PHYS/QHP: CPT

## 2019-04-27 PROCEDURE — 86850 RBC ANTIBODY SCREEN: CPT

## 2019-04-27 PROCEDURE — C1889: CPT

## 2019-04-27 PROCEDURE — L8699: CPT

## 2019-04-27 PROCEDURE — 82330 ASSAY OF CALCIUM: CPT

## 2019-04-27 PROCEDURE — 82550 ASSAY OF CK (CPK): CPT

## 2019-04-27 PROCEDURE — 84100 ASSAY OF PHOSPHORUS: CPT

## 2019-04-27 PROCEDURE — 71045 X-RAY EXAM CHEST 1 VIEW: CPT | Mod: 26

## 2019-04-27 PROCEDURE — 93880 EXTRACRANIAL BILAT STUDY: CPT

## 2019-04-27 RX ORDER — DOCUSATE SODIUM 100 MG
1 CAPSULE ORAL
Qty: 0 | Refills: 0 | DISCHARGE
Start: 2019-04-27

## 2019-04-27 RX ORDER — LOSARTAN POTASSIUM 100 MG/1
1 TABLET, FILM COATED ORAL
Qty: 0 | Refills: 0 | COMMUNITY

## 2019-04-27 RX ORDER — SIMVASTATIN 20 MG/1
1 TABLET, FILM COATED ORAL
Qty: 30 | Refills: 0
Start: 2019-04-27 | End: 2019-05-26

## 2019-04-27 RX ORDER — ASPIRIN/CALCIUM CARB/MAGNESIUM 324 MG
1 TABLET ORAL
Qty: 30 | Refills: 0
Start: 2019-04-27 | End: 2019-05-26

## 2019-04-27 RX ORDER — LOSARTAN POTASSIUM 100 MG/1
1 TABLET, FILM COATED ORAL
Qty: 30 | Refills: 0
Start: 2019-04-27 | End: 2019-05-26

## 2019-04-27 RX ORDER — ASPIRIN/CALCIUM CARB/MAGNESIUM 324 MG
1 TABLET ORAL
Qty: 0 | Refills: 0 | COMMUNITY

## 2019-04-27 RX ORDER — ACETAMINOPHEN 500 MG
2 TABLET ORAL
Qty: 0 | Refills: 0 | DISCHARGE
Start: 2019-04-27

## 2019-04-27 RX ORDER — CLOPIDOGREL BISULFATE 75 MG/1
1 TABLET, FILM COATED ORAL
Qty: 30 | Refills: 0
Start: 2019-04-27 | End: 2019-05-26

## 2019-04-27 RX ADMIN — LOSARTAN POTASSIUM 25 MILLIGRAM(S): 100 TABLET, FILM COATED ORAL at 05:24

## 2019-04-27 RX ADMIN — Medication 81 MILLIGRAM(S): at 13:14

## 2019-04-27 RX ADMIN — SODIUM CHLORIDE 3 MILLILITER(S): 9 INJECTION INTRAMUSCULAR; INTRAVENOUS; SUBCUTANEOUS at 05:23

## 2019-04-27 RX ADMIN — BRIMONIDINE TARTRATE 1 DROP(S): 2 SOLUTION/ DROPS OPHTHALMIC at 05:24

## 2019-04-27 RX ADMIN — CLOPIDOGREL BISULFATE 75 MILLIGRAM(S): 75 TABLET, FILM COATED ORAL at 13:14

## 2019-04-27 RX ADMIN — Medication 100 MILLIGRAM(S): at 05:24

## 2019-04-27 RX ADMIN — LIDOCAINE 1 PATCH: 4 CREAM TOPICAL at 13:14

## 2019-04-27 RX ADMIN — Medication 3 MILLILITER(S): at 05:23

## 2019-04-27 RX ADMIN — Medication 1 DROP(S): at 05:24

## 2019-04-27 NOTE — DISCHARGE NOTE PROVIDER - NSDCCPCAREPLAN_GEN_ALL_CORE_FT
PRINCIPAL DISCHARGE DIAGNOSIS  Diagnosis: S/P TAVR (transcatheter aortic valve replacement)  Assessment and Plan of Treatment:       SECONDARY DISCHARGE DIAGNOSES  Diagnosis: S/P placement of cardiac pacemaker  Assessment and Plan of Treatment:

## 2019-04-27 NOTE — DISCHARGE NOTE NURSING/CASE MANAGEMENT/SOCIAL WORK - NSDCPNDISPN_GEN_ALL_CORE
Opioids not applicable/not prescribed/Side effects of pain management treatment/Activities of daily living, including home environment that might     exacerbate pain or reduce effectiveness of the pain management plan of care as well as strategies to address these issues/Education provided on the pain management plan of care/Safe use, storage and disposal of opioids when prescribed

## 2019-04-27 NOTE — PROGRESS NOTE ADULT - PROBLEM SELECTOR PLAN 1
s/p Micra PPM   BP control  Asa, Plavix restarted  monitor tele  daily EKG's  monitor lytes, replete prn  resume home anti-BP meds  d/c planning in AM  EP signed off

## 2019-04-27 NOTE — DISCHARGE NOTE PROVIDER - CARE PROVIDER_API CALL
Leighton Saenz)  Thoracic and Cardiac Surgery  [address] [place] [address]  Phone: [phone]  Fax: [phone]  Follow Up Time:

## 2019-04-27 NOTE — DISCHARGE NOTE NURSING/CASE MANAGEMENT/SOCIAL WORK - NSDCDPATPORTLINK_GEN_ALL_CORE
You can access the [facility] Portal, offered by [facility], by registering with the following website: [email or web address]

## 2019-04-27 NOTE — PROGRESS NOTE ADULT - SUBJECTIVE AND OBJECTIVE BOX
Interval Hx; Events Overnight:  SUBJECTIVE: "I feel ok, I have no pain, I am going home tomorrow  "    LABS:                9.8                  140  | 21   | 14           6.9   >-----------< 129     ------------------------< 106                   28.3                 4.7  | 108  | 0.83                                         Ca 9.2   Mg x     Ph x              VITAL SIGNS    Telemetry: [id number]     Daily     Daily       Vital Signs Last 24 Hrs  T(C): 36.8 (04-26-19 @ 19:13), Max: 36.9 (04-26-19 @ 11:00)  T(F): 98.2 (04-26-19 @ 19:13), Max: 98.5 (04-26-19 @ 11:00)  HR: 70 (04-26-19 @ 19:13) (61 - 101)  BP: 112/72 (04-26-19 @ 19:13) (88/45 - 161/68)  RR: 18 (04-26-19 @ 19:13) (15 - 35)  SpO2: 95% (04-26-19 @ 19:13) (95% - 100%)             I&O's Detail    25 Apr 2019 07:01  -  26 Apr 2019 07:00  --------------------------------------------------------  IN:    Oral Fluid: 250 mL    sodium chloride 0.9%: 160 mL  Total IN: 410 mL    OUT:    Voided: 1425 mL  Total OUT: 1425 mL    Total NET: -1015 mL      26 Apr 2019 07:01  -  27 Apr 2019 00:33  --------------------------------------------------------  IN:    Oral Fluid: 720 mL  Total IN: 720 mL    OUT:    Voided: 450 mL  Total OUT: 450 mL    Total NET: 270 mL                    GLUCOSE  CAPILLARY BLOOD GLUCOSE                Tubes/Lines/Drains            PACER WIRE: YES [x ] NO [ ]  Setting: MICRA PPM         PHYSICAL EXAM      General: NAD, well appearing, in no distress  Neurology: A&O x3, non focal, no neuro deficits. Moves all extremities to command.  CV : s1 s2 RRR, no murmurs, gallops, clicks.   Lungs: clear to auscultation  Abdomen: soft, nontender, nondistended, positive bowel sounds  :    voiding        Extremities:    no  edema. + pedal pulses      Incision: L/R leg groin sites stable, no hematoma, +distal PP b/l   Skin: intact, no lesions        MEDICATIONS  acetaminophen   Tablet .. 650 milliGRAM(s) Oral every 6 hours PRN  ALBUTerol    90 MICROgram(s) HFA Inhaler 1 Puff(s) Inhalation every 4 hours  ALBUTerol/ipratropium for Nebulization 3 milliLiter(s) Nebulizer every 6 hours  aspirin enteric coated 81 milliGRAM(s) Oral daily  brimonidine 0.2% Ophthalmic Solution 1 Drop(s) Both EYES two times a day  clopidogrel Tablet 75 milliGRAM(s) Oral daily  docusate sodium 100 milliGRAM(s) Oral three times a day  lidocaine   Patch 1 Patch Transdermal daily  losartan 25 milliGRAM(s) Oral daily  pantoprazole  Injectable 40 milliGRAM(s) IV Push daily  potassium chloride  10 mEq/50 mL IVPB 10 milliEquivalent(s) IV Intermittent every 1 hour  potassium chloride  10 mEq/50 mL IVPB 10 milliEquivalent(s) IV Intermittent every 1 hour  potassium chloride  10 mEq/50 mL IVPB 10 milliEquivalent(s) IV Intermittent every 1 hour  simvastatin 40 milliGRAM(s) Oral at bedtime  sodium chloride 0.9% lock flush 3 milliLiter(s) IV Push every 8 hours  timolol 0.5% Solution 1 Drop(s) Both EYES two times a day  tiotropium 18 MICROgram(s) Capsule 1 Capsule(s) Inhalation daily

## 2019-04-27 NOTE — DISCHARGE NOTE PROVIDER - NSDCPNSUBOBJ_GEN_ALL_CORE
89yo F s/p TAVR for AS now post 3 doing well and POD # 2 from Micra PPM.  Pt doing well EKG post op with new LBBB and MICRA PPM placed .has refused homecare for discharge        VITAL SIGNS        Telemetry:  SR 60-70 LBBB        Vital Signs Last 24 Hrs    T(C): 36.7 (04-27-19 @ 05:15), Max: 36.9 (04-26-19 @ 11:00)    T(F): 98 (04-27-19 @ 05:15), Max: 98.5 (04-26-19 @ 11:00)    HR: 77 (04-27-19 @ 05:15) (64 - 101)    BP: 118/62 (04-27-19 @ 05:15) (103/57 - 147/75)    RR: 18 (04-27-19 @ 05:15) (18 - 35)    SpO2: 95% (04-27-19 @ 05:15) (95% - 100%)                               04-26 @ 07:01  -  04-27 @ 07:00    --------------------------------------------------------    IN: 900 mL / OUT: 950 mL / NET: -50 mL            CAPILLARY BLOOD GLUCOSE                                      PHYSICAL EXAM        Neurology: alert and oriented x 3, nonfocal, no gross deficits    CV :  RRR No audible murmurs    Groin Wound :  CDI , Stable +Ecchymosis    Lungs: CTA B/L    Abdomen: soft, nontender, nondistended, positive bowel sounds, last bowel movement     :      +Urination          Extremities:     FROM X 4 NO C/E/ Some ecchymosis B/L groins no hematoma            acetaminophen   Tablet .. 650 milliGRAM(s) Oral every 6 hours PRN    ALBUTerol    90 MICROgram(s) HFA Inhaler 1 Puff(s) Inhalation every 4 hours    ALBUTerol/ipratropium for Nebulization 3 milliLiter(s) Nebulizer every 6 hours    aspirin enteric coated 81 milliGRAM(s) Oral daily    brimonidine 0.2% Ophthalmic Solution 1 Drop(s) Both EYES two times a day    clopidogrel Tablet 75 milliGRAM(s) Oral daily    docusate sodium 100 milliGRAM(s) Oral three times a day    lidocaine   Patch 1 Patch Transdermal daily    losartan 25 milliGRAM(s) Oral daily    pantoprazole  Injectable 40 milliGRAM(s) IV Push daily    potassium chloride  10 mEq/50 mL IVPB 10 milliEquivalent(s) IV Intermittent every 1 hour    potassium chloride  10 mEq/50 mL IVPB 10 milliEquivalent(s) IV Intermittent every 1 hour    potassium chloride  10 mEq/50 mL IVPB 10 milliEquivalent(s) IV Intermittent every 1 hour    simvastatin 40 milliGRAM(s) Oral at bedtime    sodium chloride 0.9% lock flush 3 milliLiter(s) IV Push every 8 hours    timolol 0.5% Solution 1 Drop(s) Both EYES two times a day    tiotropium 18 MICROgram(s) Capsule 1 Capsule(s) Inhalation daily                                      Physical Therapy Rec:   Home  [  X]   Home w/ PT  [  ]  Rehab  [  ]    Discussed with Cardiothoracic Team at AM rounds.

## 2019-04-27 NOTE — DISCHARGE NOTE PROVIDER - NSDCCPGOAL_GEN_ALL_CORE_FT
To get better and follow your care plan as instructed. continue to walk daily and weigh yourself each day.  Call us for any change in weight greater then 3 lb gain or loss over 24 hr period. To get better and follow your care plan as instructed. continue to walk daily and weigh yourself each day.  Call us for any change in weight greater then 3 lb gain or loss over 24 hr period.  Please refer to your TAVR Discharge instructions sheet call us at [phone] for any questions

## 2019-04-27 NOTE — PROGRESS NOTE ADULT - ASSESSMENT
89 y/o F w/ Pmhx  of asthma, HTN, HLD, severe AS,Diastolic HF, CAD s/p cardiac cath with cardiac stent placed x1 on 3/29/19.  Admitted for TAVR due to Severe AS, s/p procedure 4/24; now with new LBBB for which EP was consulted. Patient now s/p leadless ( micra) PPM on 4/25/19 4/24 TAVR (26mm CoreValve Evolut) via percutaneous right transfemoral approach - R femoral balloon angioplasty, post op LBBB   4/26 Limited Transthoracic Echo  Conclusions:  1. Normal left ventricular systolic function. No segmental  wall motion abnormalities.  2. Small pericardial effusion.    4/26 TC to floor. D/C planning in AM.

## 2019-04-27 NOTE — DISCHARGE NOTE PROVIDER - HOSPITAL COURSE
89 y/o F w/ Pmhx  of asthma, HTN, HLD, severe AS,Diastolic HF, CAD s/p cardiac cath with cardiac stent placed x1 on 3/29/19.  Admitted for TAVR due to Severe AS, s/p procedure 4/24; now with new LBBB for which EP was consulted. Patient now s/p leadless ( micra) PPM on 4/25/19 4/24 TAVR (26mm CoreValve Evolut) via percutaneous right transfemoral approach - R femoral balloon angioplasty, post op LBBB     4/26 Limited Transthoracic Echo    Conclusions:    1. Normal left ventricular systolic function. No segmental    wall motion abnormalities.    2. Small pericardial effusion.        4/26 TC to floor. D/C planning in AM.    4/27 Pt doing well this AM seen in chair and is egar and ready for discharge home

## 2019-05-02 ENCOUNTER — OUTPATIENT (OUTPATIENT)
Dept: OUTPATIENT SERVICES | Facility: HOSPITAL | Age: 84
LOS: 1 days | End: 2019-05-02
Payer: MEDICARE

## 2019-05-02 ENCOUNTER — APPOINTMENT (OUTPATIENT)
Dept: ELECTROPHYSIOLOGY | Facility: CLINIC | Age: 84
End: 2019-05-02
Payer: MEDICARE

## 2019-05-02 ENCOUNTER — NON-APPOINTMENT (OUTPATIENT)
Age: 84
End: 2019-05-02

## 2019-05-02 VITALS
DIASTOLIC BLOOD PRESSURE: 70 MMHG | WEIGHT: 123 LBS | SYSTOLIC BLOOD PRESSURE: 104 MMHG | BODY MASS INDEX: 24.8 KG/M2 | HEIGHT: 59 IN

## 2019-05-02 DIAGNOSIS — Z98.61 CORONARY ANGIOPLASTY STATUS: Chronic | ICD-10-CM

## 2019-05-02 DIAGNOSIS — I35.0 NONRHEUMATIC AORTIC (VALVE) STENOSIS: ICD-10-CM

## 2019-05-02 DIAGNOSIS — R52 PAIN, UNSPECIFIED: ICD-10-CM

## 2019-05-02 DIAGNOSIS — Z90.721 ACQUIRED ABSENCE OF OVARIES, UNILATERAL: Chronic | ICD-10-CM

## 2019-05-02 DIAGNOSIS — Z98.890 OTHER SPECIFIED POSTPROCEDURAL STATES: Chronic | ICD-10-CM

## 2019-05-02 PROCEDURE — 93321 DOPPLER ECHO F-UP/LMTD STD: CPT

## 2019-05-02 PROCEDURE — 99024 POSTOP FOLLOW-UP VISIT: CPT

## 2019-05-02 PROCEDURE — 93279 PRGRMG DEV EVAL PM/LDLS PM: CPT

## 2019-05-02 PROCEDURE — 93308 TTE F-UP OR LMTD: CPT | Mod: 26

## 2019-05-02 PROCEDURE — 93308 TTE F-UP OR LMTD: CPT

## 2019-05-02 PROCEDURE — 93321 DOPPLER ECHO F-UP/LMTD STD: CPT | Mod: 26

## 2019-05-07 ENCOUNTER — APPOINTMENT (OUTPATIENT)
Dept: CARDIOTHORACIC SURGERY | Facility: CLINIC | Age: 84
End: 2019-05-07
Payer: MEDICARE

## 2019-05-07 ENCOUNTER — NON-APPOINTMENT (OUTPATIENT)
Age: 84
End: 2019-05-07

## 2019-05-07 VITALS
TEMPERATURE: 98 F | HEART RATE: 57 BPM | RESPIRATION RATE: 12 BRPM | BODY MASS INDEX: 24.8 KG/M2 | SYSTOLIC BLOOD PRESSURE: 150 MMHG | DIASTOLIC BLOOD PRESSURE: 70 MMHG | WEIGHT: 123 LBS | OXYGEN SATURATION: 100 % | HEIGHT: 59 IN

## 2019-05-07 PROCEDURE — 99215 OFFICE O/P EST HI 40 MIN: CPT

## 2019-05-10 ENCOUNTER — INPATIENT (INPATIENT)
Facility: HOSPITAL | Age: 84
LOS: 2 days | Discharge: HOME | DRG: 551 | End: 2019-05-13
Attending: INTERNAL MEDICINE | Admitting: HOSPITALIST
Payer: MEDICARE

## 2019-05-10 VITALS
RESPIRATION RATE: 15 BRPM | SYSTOLIC BLOOD PRESSURE: 138 MMHG | DIASTOLIC BLOOD PRESSURE: 79 MMHG | HEIGHT: 59 IN | WEIGHT: 123.02 LBS | HEART RATE: 94 BPM | OXYGEN SATURATION: 94 % | TEMPERATURE: 98 F

## 2019-05-10 DIAGNOSIS — Z90.721 ACQUIRED ABSENCE OF OVARIES, UNILATERAL: Chronic | ICD-10-CM

## 2019-05-10 DIAGNOSIS — Z98.890 OTHER SPECIFIED POSTPROCEDURAL STATES: Chronic | ICD-10-CM

## 2019-05-10 DIAGNOSIS — Z98.61 CORONARY ANGIOPLASTY STATUS: Chronic | ICD-10-CM

## 2019-05-10 DIAGNOSIS — Z95.2 PRESENCE OF PROSTHETIC HEART VALVE: Chronic | ICD-10-CM

## 2019-05-10 LAB
ALBUMIN SERPL ELPH-MCNC: 4.1 G/DL — SIGNIFICANT CHANGE UP (ref 3.3–5)
ALP SERPL-CCNC: 131 U/L — HIGH (ref 40–120)
ALT FLD-CCNC: 58 U/L — SIGNIFICANT CHANGE UP (ref 12–78)
ANION GAP SERPL CALC-SCNC: 14 MMOL/L — SIGNIFICANT CHANGE UP (ref 5–17)
APTT BLD: 31 SEC — SIGNIFICANT CHANGE UP (ref 28.5–37)
AST SERPL-CCNC: 49 U/L — HIGH (ref 15–37)
BASOPHILS # BLD AUTO: 0.04 K/UL — SIGNIFICANT CHANGE UP (ref 0–0.2)
BASOPHILS NFR BLD AUTO: 0.4 % — SIGNIFICANT CHANGE UP (ref 0–2)
BILIRUB SERPL-MCNC: 1.3 MG/DL — HIGH (ref 0.2–1.2)
BUN SERPL-MCNC: 43 MG/DL — HIGH (ref 7–23)
CALCIUM SERPL-MCNC: 8.7 MG/DL — SIGNIFICANT CHANGE UP (ref 8.5–10.1)
CHLORIDE SERPL-SCNC: 115 MMOL/L — HIGH (ref 96–108)
CO2 SERPL-SCNC: 16 MMOL/L — LOW (ref 22–31)
CREAT SERPL-MCNC: 1.8 MG/DL — HIGH (ref 0.5–1.3)
D DIMER BLD IA.RAPID-MCNC: 470 NG/ML DDU — HIGH
EOSINOPHIL # BLD AUTO: 0.21 K/UL — SIGNIFICANT CHANGE UP (ref 0–0.5)
EOSINOPHIL NFR BLD AUTO: 2.1 % — SIGNIFICANT CHANGE UP (ref 0–6)
GLUCOSE SERPL-MCNC: 127 MG/DL — HIGH (ref 70–99)
HCT VFR BLD CALC: 30.4 % — LOW (ref 34.5–45)
HGB BLD-MCNC: 9.2 G/DL — LOW (ref 11.5–15.5)
IMM GRANULOCYTES NFR BLD AUTO: 0.5 % — SIGNIFICANT CHANGE UP (ref 0–1.5)
INR BLD: 3.95 RATIO — HIGH (ref 0.88–1.16)
LYMPHOCYTES # BLD AUTO: 2.5 K/UL — SIGNIFICANT CHANGE UP (ref 1–3.3)
LYMPHOCYTES # BLD AUTO: 24.8 % — SIGNIFICANT CHANGE UP (ref 13–44)
MCHC RBC-ENTMCNC: 30.3 GM/DL — LOW (ref 32–36)
MCHC RBC-ENTMCNC: 31.7 PG — SIGNIFICANT CHANGE UP (ref 27–34)
MCV RBC AUTO: 104.8 FL — HIGH (ref 80–100)
MONOCYTES # BLD AUTO: 0.55 K/UL — SIGNIFICANT CHANGE UP (ref 0–0.9)
MONOCYTES NFR BLD AUTO: 5.5 % — SIGNIFICANT CHANGE UP (ref 2–14)
NEUTROPHILS # BLD AUTO: 6.73 K/UL — SIGNIFICANT CHANGE UP (ref 1.8–7.4)
NEUTROPHILS NFR BLD AUTO: 66.7 % — SIGNIFICANT CHANGE UP (ref 43–77)
NRBC # BLD: 0 /100 WBCS — SIGNIFICANT CHANGE UP (ref 0–0)
NT-PROBNP SERPL-SCNC: 5498 PG/ML — HIGH (ref 0–450)
PLATELET # BLD AUTO: 201 K/UL — SIGNIFICANT CHANGE UP (ref 150–400)
POTASSIUM SERPL-MCNC: 4.9 MMOL/L — SIGNIFICANT CHANGE UP (ref 3.5–5.3)
POTASSIUM SERPL-SCNC: 4.9 MMOL/L — SIGNIFICANT CHANGE UP (ref 3.5–5.3)
PROT SERPL-MCNC: 7.1 G/DL — SIGNIFICANT CHANGE UP (ref 6–8.3)
PROTHROM AB SERPL-ACNC: 47 SEC — HIGH (ref 10–12.9)
RBC # BLD: 2.9 M/UL — LOW (ref 3.8–5.2)
RBC # FLD: 14.9 % — HIGH (ref 10.3–14.5)
SODIUM SERPL-SCNC: 145 MMOL/L — SIGNIFICANT CHANGE UP (ref 135–145)
WBC # BLD: 10.08 K/UL — SIGNIFICANT CHANGE UP (ref 3.8–10.5)
WBC # FLD AUTO: 10.08 K/UL — SIGNIFICANT CHANGE UP (ref 3.8–10.5)

## 2019-05-10 PROCEDURE — 71045 X-RAY EXAM CHEST 1 VIEW: CPT | Mod: 26

## 2019-05-10 RX ORDER — LIDOCAINE 4 G/100G
1 CREAM TOPICAL ONCE
Refills: 0 | Status: COMPLETED | OUTPATIENT
Start: 2019-05-10 | End: 2019-05-10

## 2019-05-10 RX ORDER — MORPHINE SULFATE 50 MG/1
2 CAPSULE, EXTENDED RELEASE ORAL ONCE
Refills: 0 | Status: DISCONTINUED | OUTPATIENT
Start: 2019-05-10 | End: 2019-05-10

## 2019-05-10 RX ORDER — SODIUM CHLORIDE 9 MG/ML
3 INJECTION INTRAMUSCULAR; INTRAVENOUS; SUBCUTANEOUS ONCE
Refills: 0 | Status: COMPLETED | OUTPATIENT
Start: 2019-05-10 | End: 2019-05-10

## 2019-05-10 RX ADMIN — MORPHINE SULFATE 2 MILLIGRAM(S): 50 CAPSULE, EXTENDED RELEASE ORAL at 23:07

## 2019-05-10 RX ADMIN — SODIUM CHLORIDE 3 MILLILITER(S): 9 INJECTION INTRAMUSCULAR; INTRAVENOUS; SUBCUTANEOUS at 23:00

## 2019-05-10 NOTE — ED PROVIDER NOTE - CLINICAL SUMMARY MEDICAL DECISION MAKING FREE TEXT BOX
pt with back pain s/p procedure. appears sob. will do labs, d-dimer to r/o dvt, cta chest/abd/pelvis to r/o dissection, pain control, ekg, attending to follow up.

## 2019-05-10 NOTE — ED PROVIDER NOTE - PROGRESS NOTE DETAILS
Results of CT discussed with patient and son.  Patient states she is very weak, dizzy, and has not slept in 2 weeks.  She states she must be admitted because she lives alone and cannot take care of herself.  Son in agreement.

## 2019-05-10 NOTE — ED ADULT NURSE NOTE - CHPI ED NUR SYMPTOMS NEG
no motor function loss/no numbness/no bowel dysfunction/no constipation/no fatigue/no neck tenderness/no anorexia/no tingling/no bladder dysfunction

## 2019-05-10 NOTE — ED ADULT NURSE NOTE - OBJECTIVE STATEMENT
89 year old female brought in by EMS from home for complaints of back pain. As per patient she has been experiencing back pain for 2 weeks. Patient has seen her PMD and also her cardiologist for the pain. As per her cardiologist patient was instructed to take aleve and tylenol for pain. Patient states the pain has intensified which brought her to the ED for further evaluation. Denies chest pain. Denies shortness of breath. Denies abdominal pain, nausea/vomiting.

## 2019-05-10 NOTE — ED PROVIDER NOTE - PHYSICAL EXAMINATION
Spine Exam:+ scoliosis      Cervical: No erythema, ecchymosis, or visible deformity. No midline tenderness or step-off appreciated on palpation. No paravertebral tenderness. No muscle spasm. FROM. NEG NEXUS criteria.          Thoracic: No erythema, ecchymosis, or visible deformity. + midline tenderness. no step-off appreciated on palpation. + paravertebral tenderness. No muscle spasm.           Lumbosacral: No erythema, ecchymosis, or visible deformity. No midline tenderness or step-off appreciated on palpation. No paravertebral tenderness. No muscle spasm.  + 2 radial bl ue

## 2019-05-10 NOTE — ED ADULT NURSE NOTE - NSIMPLEMENTINTERV_GEN_ALL_ED
Implemented All Fall Risk Interventions:  [place] to call system. Call bell, personal items and telephone within reach. Instruct patient to call for assistance. Room bathroom lighting operational. Non-slip footwear when patient is off stretcher. Physically safe environment: no spills, clutter or unnecessary equipment. Stretcher in lowest position, wheels locked, appropriate side rails in place. Provide visual cue, wrist band, yellow gown, etc. Monitor gait and stability. Monitor for mental status changes and reorient to person, place, and time. Review medications for side effects contributing to fall risk. Reinforce activity limits and safety measures with patient and family.

## 2019-05-10 NOTE — ED PROVIDER NOTE - OBJECTIVE STATEMENT
pt is a 89yo female with pmhx of a-fib, htn, s/o TAVR and stent 2 weeks ago c/o back pain x 2 weeks. pt reports constant aching severe upper back pain without radiation worse with movement with associated sob. pt reports she has been taking aleve which provides minimal relief. pt followed up with dr stovall who did x rays and advised pt to take aleve. pt denies cp, cough, n/v, numbness.   cardio/interventional: sia   pmd: michael.

## 2019-05-10 NOTE — ED PROVIDER NOTE - ATTENDING CONTRIBUTION TO CARE
p tis an elderly f who just underwent TAVR procedure at [facility] 1 month ago while there had back pain she was put on lidoderm patch and had skin reaction to this, she has had pain going on since she saw the cardiologist from [facility] and was told to use aleve for the pain. but pt cam ebecause the pain is severe in her upper and lower back  no radiation she has some sob with sx and is accompanied by aide and son  on eval:  plesant elderly w f nad  heent nad  cor rrr [place]   chest cta  +jvd  back severe kyphoscoliosis with resolving blistered rash on lumbarspine  ext no clubbing cyanosis edema no calf pain no calf tender has bruise to left calf  neuro normal  good distlal pulses  ro pn ro acs ro chf ro dissection or acute vascular disruption ct labs xray ekg if no actue treat for back pain and dc

## 2019-05-11 DIAGNOSIS — R53.1 WEAKNESS: ICD-10-CM

## 2019-05-11 DIAGNOSIS — E78.5 HYPERLIPIDEMIA, UNSPECIFIED: ICD-10-CM

## 2019-05-11 DIAGNOSIS — I10 ESSENTIAL (PRIMARY) HYPERTENSION: ICD-10-CM

## 2019-05-11 DIAGNOSIS — I48.91 UNSPECIFIED ATRIAL FIBRILLATION: ICD-10-CM

## 2019-05-11 DIAGNOSIS — M54.9 DORSALGIA, UNSPECIFIED: ICD-10-CM

## 2019-05-11 DIAGNOSIS — Z29.9 ENCOUNTER FOR PROPHYLACTIC MEASURES, UNSPECIFIED: ICD-10-CM

## 2019-05-11 DIAGNOSIS — R79.1 ABNORMAL COAGULATION PROFILE: ICD-10-CM

## 2019-05-11 DIAGNOSIS — I50.9 HEART FAILURE, UNSPECIFIED: ICD-10-CM

## 2019-05-11 DIAGNOSIS — N17.9 ACUTE KIDNEY FAILURE, UNSPECIFIED: ICD-10-CM

## 2019-05-11 DIAGNOSIS — Z95.5 PRESENCE OF CORONARY ANGIOPLASTY IMPLANT AND GRAFT: Chronic | ICD-10-CM

## 2019-05-11 DIAGNOSIS — Z95.2 PRESENCE OF PROSTHETIC HEART VALVE: ICD-10-CM

## 2019-05-11 LAB
ALBUMIN SERPL ELPH-MCNC: 3.9 G/DL — SIGNIFICANT CHANGE UP (ref 3.3–5)
ALP SERPL-CCNC: 118 U/L — SIGNIFICANT CHANGE UP (ref 40–120)
ALT FLD-CCNC: 62 U/L — SIGNIFICANT CHANGE UP (ref 12–78)
ANION GAP SERPL CALC-SCNC: 13 MMOL/L — SIGNIFICANT CHANGE UP (ref 5–17)
AST SERPL-CCNC: 62 U/L — HIGH (ref 15–37)
BASOPHILS # BLD AUTO: 0.01 K/UL — SIGNIFICANT CHANGE UP (ref 0–0.2)
BASOPHILS NFR BLD AUTO: 0.1 % — SIGNIFICANT CHANGE UP (ref 0–2)
BILIRUB SERPL-MCNC: 1.7 MG/DL — HIGH (ref 0.2–1.2)
BUN SERPL-MCNC: 53 MG/DL — HIGH (ref 7–23)
CALCIUM SERPL-MCNC: 9 MG/DL — SIGNIFICANT CHANGE UP (ref 8.5–10.1)
CHLORIDE SERPL-SCNC: 114 MMOL/L — HIGH (ref 96–108)
CO2 SERPL-SCNC: 19 MMOL/L — LOW (ref 22–31)
CREAT SERPL-MCNC: 2 MG/DL — HIGH (ref 0.5–1.3)
EOSINOPHIL # BLD AUTO: 0.01 K/UL — SIGNIFICANT CHANGE UP (ref 0–0.5)
EOSINOPHIL NFR BLD AUTO: 0.1 % — SIGNIFICANT CHANGE UP (ref 0–6)
GLUCOSE SERPL-MCNC: 120 MG/DL — HIGH (ref 70–99)
HCT VFR BLD CALC: 28 % — LOW (ref 34.5–45)
HGB BLD-MCNC: 8.7 G/DL — LOW (ref 11.5–15.5)
IMM GRANULOCYTES NFR BLD AUTO: 0.6 % — SIGNIFICANT CHANGE UP (ref 0–1.5)
INR BLD: 4.27 RATIO — HIGH (ref 0.88–1.16)
LYMPHOCYTES # BLD AUTO: 1.62 K/UL — SIGNIFICANT CHANGE UP (ref 1–3.3)
LYMPHOCYTES # BLD AUTO: 17.3 % — SIGNIFICANT CHANGE UP (ref 13–44)
MAGNESIUM SERPL-MCNC: 2.2 MG/DL — SIGNIFICANT CHANGE UP (ref 1.6–2.6)
MCHC RBC-ENTMCNC: 31.1 GM/DL — LOW (ref 32–36)
MCHC RBC-ENTMCNC: 32.1 PG — SIGNIFICANT CHANGE UP (ref 27–34)
MCV RBC AUTO: 103.3 FL — HIGH (ref 80–100)
MONOCYTES # BLD AUTO: 0.64 K/UL — SIGNIFICANT CHANGE UP (ref 0–0.9)
MONOCYTES NFR BLD AUTO: 6.8 % — SIGNIFICANT CHANGE UP (ref 2–14)
NEUTROPHILS # BLD AUTO: 7.02 K/UL — SIGNIFICANT CHANGE UP (ref 1.8–7.4)
NEUTROPHILS NFR BLD AUTO: 75.1 % — SIGNIFICANT CHANGE UP (ref 43–77)
NRBC # BLD: 0 /100 WBCS — SIGNIFICANT CHANGE UP (ref 0–0)
PLATELET # BLD AUTO: 166 K/UL — SIGNIFICANT CHANGE UP (ref 150–400)
POTASSIUM SERPL-MCNC: 5 MMOL/L — SIGNIFICANT CHANGE UP (ref 3.5–5.3)
POTASSIUM SERPL-SCNC: 5 MMOL/L — SIGNIFICANT CHANGE UP (ref 3.5–5.3)
PROT SERPL-MCNC: 6.8 G/DL — SIGNIFICANT CHANGE UP (ref 6–8.3)
PROTHROM AB SERPL-ACNC: 50.5 SEC — HIGH (ref 10–12.9)
RBC # BLD: 2.71 M/UL — LOW (ref 3.8–5.2)
RBC # FLD: 14.9 % — HIGH (ref 10.3–14.5)
SODIUM SERPL-SCNC: 146 MMOL/L — HIGH (ref 135–145)
WBC # BLD: 9.36 K/UL — SIGNIFICANT CHANGE UP (ref 3.8–10.5)
WBC # FLD AUTO: 9.36 K/UL — SIGNIFICANT CHANGE UP (ref 3.8–10.5)

## 2019-05-11 PROCEDURE — 99223 1ST HOSP IP/OBS HIGH 75: CPT | Mod: GC,AI

## 2019-05-11 PROCEDURE — 93306 TTE W/DOPPLER COMPLETE: CPT | Mod: 26

## 2019-05-11 PROCEDURE — 12345: CPT | Mod: NC

## 2019-05-11 PROCEDURE — 99223 1ST HOSP IP/OBS HIGH 75: CPT | Mod: 25

## 2019-05-11 PROCEDURE — 99285 EMERGENCY DEPT VISIT HI MDM: CPT

## 2019-05-11 PROCEDURE — 74176 CT ABD & PELVIS W/O CONTRAST: CPT | Mod: 26

## 2019-05-11 PROCEDURE — 71250 CT THORAX DX C-: CPT | Mod: 26

## 2019-05-11 RX ORDER — PANTOPRAZOLE SODIUM 20 MG/1
40 TABLET, DELAYED RELEASE ORAL
Refills: 0 | Status: DISCONTINUED | OUTPATIENT
Start: 2019-05-11 | End: 2019-05-13

## 2019-05-11 RX ORDER — ACETAMINOPHEN 500 MG
650 TABLET ORAL EVERY 6 HOURS
Refills: 0 | Status: DISCONTINUED | OUTPATIENT
Start: 2019-05-11 | End: 2019-05-13

## 2019-05-11 RX ORDER — ONDANSETRON 8 MG/1
4 TABLET, FILM COATED ORAL ONCE
Refills: 0 | Status: COMPLETED | OUTPATIENT
Start: 2019-05-11 | End: 2019-05-11

## 2019-05-11 RX ORDER — FUROSEMIDE 40 MG
20 TABLET ORAL ONCE
Refills: 0 | Status: COMPLETED | OUTPATIENT
Start: 2019-05-11 | End: 2019-05-11

## 2019-05-11 RX ORDER — SIMVASTATIN 20 MG/1
40 TABLET, FILM COATED ORAL AT BEDTIME
Refills: 0 | Status: DISCONTINUED | OUTPATIENT
Start: 2019-05-11 | End: 2019-05-11

## 2019-05-11 RX ORDER — CLOPIDOGREL BISULFATE 75 MG/1
75 TABLET, FILM COATED ORAL DAILY
Refills: 0 | Status: DISCONTINUED | OUTPATIENT
Start: 2019-05-11 | End: 2019-05-13

## 2019-05-11 RX ORDER — TRAMADOL HYDROCHLORIDE 50 MG/1
25 TABLET ORAL EVERY 8 HOURS
Refills: 0 | Status: DISCONTINUED | OUTPATIENT
Start: 2019-05-11 | End: 2019-05-13

## 2019-05-11 RX ORDER — ONDANSETRON 8 MG/1
4 TABLET, FILM COATED ORAL EVERY 6 HOURS
Refills: 0 | Status: DISCONTINUED | OUTPATIENT
Start: 2019-05-11 | End: 2019-05-13

## 2019-05-11 RX ORDER — APIXABAN 2.5 MG/1
5 TABLET, FILM COATED ORAL EVERY 12 HOURS
Refills: 0 | Status: DISCONTINUED | OUTPATIENT
Start: 2019-05-11 | End: 2019-05-11

## 2019-05-11 RX ORDER — BRIMONIDINE TARTRATE, TIMOLOL MALEATE 2; 5 MG/ML; MG/ML
1 SOLUTION/ DROPS OPHTHALMIC EVERY 12 HOURS
Refills: 0 | Status: DISCONTINUED | OUTPATIENT
Start: 2019-05-11 | End: 2019-05-13

## 2019-05-11 RX ORDER — MORPHINE SULFATE 50 MG/1
4 CAPSULE, EXTENDED RELEASE ORAL ONCE
Refills: 0 | Status: DISCONTINUED | OUTPATIENT
Start: 2019-05-11 | End: 2019-05-11

## 2019-05-11 RX ORDER — METOPROLOL TARTRATE 50 MG
25 TABLET ORAL
Refills: 0 | Status: DISCONTINUED | OUTPATIENT
Start: 2019-05-11 | End: 2019-05-13

## 2019-05-11 RX ORDER — APIXABAN 2.5 MG/1
2.5 TABLET, FILM COATED ORAL EVERY 12 HOURS
Refills: 0 | Status: DISCONTINUED | OUTPATIENT
Start: 2019-05-11 | End: 2019-05-13

## 2019-05-11 RX ORDER — HYDROCORTISONE 1 %
1 OINTMENT (GRAM) TOPICAL DAILY
Refills: 0 | Status: DISCONTINUED | OUTPATIENT
Start: 2019-05-11 | End: 2019-05-13

## 2019-05-11 RX ADMIN — APIXABAN 5 MILLIGRAM(S): 2.5 TABLET, FILM COATED ORAL at 07:02

## 2019-05-11 RX ADMIN — Medication 1 TABLET(S): at 12:52

## 2019-05-11 RX ADMIN — ONDANSETRON 4 MILLIGRAM(S): 8 TABLET, FILM COATED ORAL at 03:14

## 2019-05-11 RX ADMIN — MORPHINE SULFATE 4 MILLIGRAM(S): 50 CAPSULE, EXTENDED RELEASE ORAL at 04:58

## 2019-05-11 RX ADMIN — ONDANSETRON 4 MILLIGRAM(S): 8 TABLET, FILM COATED ORAL at 08:51

## 2019-05-11 RX ADMIN — PANTOPRAZOLE SODIUM 40 MILLIGRAM(S): 20 TABLET, DELAYED RELEASE ORAL at 07:41

## 2019-05-11 RX ADMIN — Medication 20 MILLIGRAM(S): at 05:44

## 2019-05-11 RX ADMIN — MORPHINE SULFATE 2 MILLIGRAM(S): 50 CAPSULE, EXTENDED RELEASE ORAL at 01:48

## 2019-05-11 RX ADMIN — APIXABAN 2.5 MILLIGRAM(S): 2.5 TABLET, FILM COATED ORAL at 17:39

## 2019-05-11 RX ADMIN — Medication 1 APPLICATION(S): at 20:59

## 2019-05-11 RX ADMIN — Medication 25 MILLIGRAM(S): at 17:39

## 2019-05-11 RX ADMIN — ONDANSETRON 4 MILLIGRAM(S): 8 TABLET, FILM COATED ORAL at 07:02

## 2019-05-11 RX ADMIN — MORPHINE SULFATE 4 MILLIGRAM(S): 50 CAPSULE, EXTENDED RELEASE ORAL at 02:47

## 2019-05-11 RX ADMIN — CLOPIDOGREL BISULFATE 75 MILLIGRAM(S): 75 TABLET, FILM COATED ORAL at 17:39

## 2019-05-11 NOTE — CONSULT NOTE ADULT - ASSESSMENT
Mrs. Junior is a 89 y/o F with asthma, HTN, HLD, severe AS, Diastolic HF, CAD s/p PCI to mid RCA in 3/2019.  She had severe AS, and is s/p tavr on 4/24/2019. Post-operatively, she had a new LBBB and a micra PPM was placed on 4/25/2019.  She presents yesterday with severe back pain. CT chest with pulmonary edema  Acute on chronic diastolic congestive heart failure.    - s/p IV Lasix with improvement in her symptoms. Her creatinine and sodium have trended upward. Would hold off on additional diurteics and start her on lasix 20 mg po daily pending tomorrow's labs.  - No sign of acute ischemia. her EKG is a SR with LBBB  - Echocardiogram performed with well seated TAVR, with trace paravalvular leak. She does have significant MR, which was not present on prior echocardiograms, which is hopefully volume and BP related, and will improve with diuretics. Her LV function is normal.  - continue eliquis for her AF history. She is on Plavix for her recent TAVR and PCI.  - continue bb  - Pain control  - Watch creatinine and electrolytes. Keep K>4, Mg>2  - Will follow with you. Mrs. Junior is a 89 y/o F with asthma, HTN, HLD, severe AS, Diastolic HF, CAD s/p PCI to mid RCA in 3/2019.  She had severe AS, and is s/p tavr on 4/24/2019. Post-operatively, she had a new LBBB and a micra PPM was placed on 4/25/2019.  She presents yesterday with severe back pain. CT chest with pulmonary edema  Acute on chronic diastolic congestive heart failure.    - s/p IV Lasix with improvement in her symptoms. Her creatinine and sodium have trended upward. Would hold off on additional diurteics and start her on lasix 20 mg po daily pending tomorrow's labs.  - No sign of acute ischemia. her EKG is a SR with LBBB  - Echocardiogram performed with well seated TAVR, with trace paravalvular leak. She does have significant MR, which was not present on prior echocardiograms, which is hopefully volume and BP related, and will improve with diuretics. There is no obvious systolic anterior motion of the MV or lvot obstruction. Her LV function is normal.  - continue eliquis for her AF history. She is on Plavix for her recent TAVR and PCI.  - continue bb  - Pain control  - Watch creatinine and electrolytes. Keep K>4, Mg>2  - Will follow with you.

## 2019-05-11 NOTE — H&P ADULT - NSICDXPASTSURGICALHX_GEN_ALL_CORE_FT
PAST SURGICAL HISTORY:  S/P coronary artery stent placement     S/P TAVR (transcatheter aortic valve replacement)

## 2019-05-11 NOTE — H&P ADULT - PROBLEM SELECTOR PLAN 3
- unclear etiology for elevated INR and elevated liver enzyme  - patient not on coumadin, abdominal exam unremarkable, no jaundice   - will trend repeat labs  - f/u repeat INR

## 2019-05-11 NOTE — ED ADULT NURSE REASSESSMENT NOTE - NS ED NURSE REASSESS COMMENT FT1
Received report from Yuki RAND, . pt mentating at baseline. no change in neuro status. pt denies pain at this time. on cardiac monitor, pt placed in position of comfort, given warm blankets, side rails up, core bell at reach plan of care explained to patient will monitor support and safety maintained.

## 2019-05-11 NOTE — H&P ADULT - PROBLEM SELECTOR PLAN 4
- CHASITY, baseline renal fxn in 4/2019  - avoid fluid overload due to underlying CHF exacerbation  - avoid nephrotoxic medications   - hold losartan for now until renal fxn improves  - trend renal fxn

## 2019-05-11 NOTE — H&P ADULT - PROBLEM SELECTOR PLAN 7
- appear to be in HF exacerbation   - ProBNP elevated at 5498 (previous was 369 on 3/26/19)   - CT chest showed pulm edema and pleural effusion   - patient appeared to be HD stable at this point  - will give 1x lasix 20mg IV, avoid overdiuresing due to underlying CHASITY   - repeat TTE  - cardio consult - appear to be in HF exacerbation   - ProBNP elevated at 5498 (previous was 369 on 3/26/19)   - CT chest showed pulm edema and pleural effusion   - patient appeared to be HD stable at this point  - will give 1x lasix 20mg IV, avoid overdiuresing due to underlying CHASITY   - repeat TTE  - cardio consult - [facility] [facility]

## 2019-05-11 NOTE — H&P ADULT - PROBLEM SELECTOR PLAN 2
- generalized weakness, patient stated that she feels weak after climbing 6 steps at home and has not slept well for 2 weeks due to back pain  - possibly related to underlying CHF exacerbation vs poor PO intake (patient lives alone)  - PT eval  - nutrition eval  - fall precaution - generalized weakness, patient stated that she feels weak after climbing 6 steps at home and has not slept well for 2 weeks due to back pain  - possibly related to underlying CHF exacerbation vs poor PO intake (patient lives alone)  - PT eval  - nutrition eval  - fall precaution  - cardio consult - [facility]

## 2019-05-11 NOTE — H&P ADULT - ASSESSMENT
Patient is a 89 yo female with pmhx of recent TAVR for severe AS (4/2019), new LBBB s/p leadless micra PPM (4/2019), afib (on eliquis), CAD s/p 1 stent (3/2019), Diastolic HF, HTN, HLD, asthma, skin basal cell presented to ED with complaints of persistent upper left scapula pain, generalized weakness since her cardiac procedures in April 2019. Admit for evaluation and management of persistent back pain, weakness, CHASITY, pulm edema.

## 2019-05-11 NOTE — H&P ADULT - NSICDXPASTMEDICALHX_GEN_ALL_CORE_FT
PAST MEDICAL HISTORY:  Afib     Aortic stenosis     Asthma     Bronchitis     Glaucoma     Heart failure     HLD (hyperlipidemia)     Hypertension     Pacemaker

## 2019-05-11 NOTE — H&P ADULT - PROBLEM SELECTOR PLAN 10
11) Asthma - stable, continue home med breo-ellipta (patient to bring from home)  12) Glaucoma - continue combigan drop (patient to bring from home)  13) GERD - continue omeprazole  14) need for prophylactic measures - DVT ppx on eliquis, PO diet

## 2019-05-11 NOTE — PHARMACOTHERAPY INTERVENTION NOTE - COMMENTS
Patient is on apixaban 2.5mg q12h with CrCl 15.9mL/min.  Patient also has elevated INR of 4.27.  Dr. Araya s/w ED pharmacist Sindy and said that he wishes to continue DOAC (with renal function monitoring) as the INR could be falsely elevated.

## 2019-05-11 NOTE — PHARMACOTHERAPY INTERVENTION NOTE - COMMENTS
Patient receiving apixaban 5mg q12h (home medication) for AFib.  Spoke with Dr. Mina as patient's CrCl is 15.9mL/min and DOAC use is not recommended in CrCl<30mL/min.  Also alerted her that patient's INR today is 4.27.  She would prefer to not discontinue medication until cardio sees patient.  Recommended lowering dose to 2.5mg q12h until patient is assessed further (age >80, wt <60kg, SCr >1.5) - she agreed.  Order changed.

## 2019-05-11 NOTE — H&P ADULT - HISTORY OF PRESENT ILLNESS
Patient is a 89 yo female with pmhx of recent TAVR for severe AS (4/2019), new LBBB s/p leadless micra PPM (4/2019), afib (on eliquis), CAD s/p 1 stent (3/2019), Diastolic HF, HTN, HLD, asthma, skin basal cell presented to ED with complaints of persistent upper left scapula pain, generalized weakness since her cardiac procedures in April 2019. Patient stated that her back pain is constant throughout the day, achy in nature, went to see her cardiologist regarding her back pain and was told to take aleve for back spasm. However, patient stated that aleve and hot shower only help alleviate the back pain for less than 1 hour before pain returns. Patient also stated that she has not slept well for 2 weeks, feels weak all over, and cannot take care of herself at home. Patient denies noticing any sob with back pain. Sleeps on a flat bed without SOB, however, stated that she feels weak when she has to climb just 6 steps in her house and needed to sit down afterwards. Denies feeling SANCHEZ while walking. Patient denies any fever, headache, dizziness, chest pain, palpitations, sob, sanchez, abdominal pain, nausea, diarrhea, dysuria, hematuria, or melena.       In the ED, afebrile 67bpm, 139/70, RR 16@ 97% room air  no leukocytosis  H/H 9.2/30.4 (appeared to be consistent with lab results in 4/2019 on [facility])  INR 3.95  D-dimer 470  Na 145, K 4.9, BUN 43, Cr 1.8, glucose 127, GFR 24  Tbili 1.3, Alk phos 131, AST 49, ALT 58  ProBNP 5498 (previous 369 in 3/2019)  CT chest/abdomen/pelvis: Interstitial pulmonary edema. Mild bilateral pleural effusions. Moderate hiatal hernia. No aortic aneurysm or retroperitoneal hemorrhage. Diverticulosis.  EKG showed sinus princess, LBBB at 54bpm   Received morphine x2 zofran x1 in ED

## 2019-05-11 NOTE — H&P ADULT - NSHPREVIEWOFSYSTEMS_GEN_ALL_CORE
Constitutional: denies fever, chills, diaphoresis   HEENT: denies blurry vision, difficulty hearing  Respiratory: denies SOB, SANCHEZ, cough, sputum production, wheezing, hemoptysis  Cardiovascular: denies CP, palpitations, edema  Gastrointestinal: 1x vomiting in ED, denies nausea, diarrhea, constipation, abdominal pain, melena, hematochezia   Genitourinary: denies dysuria, frequency, urgency, hematuria   Skin: rash with lidocaine cream or patch  Musculoskeletal: upper left scapula pain since 4/2019, chronic mid-back pain, generalized weakness  Neurologic: denies headache, dizziness, paresthesias, numbness/tingling  ROS negative except as noted above

## 2019-05-11 NOTE — H&P ADULT - NSHPPHYSICALEXAM_GEN_ALL_CORE
Physical Exam:  General: Well developed, well nourished, NAD, appeared comfortable  HEENT: NCAT, EOMI bl, dry mucous membranes   Neck: Supple, no JVD  Neurology: A&Ox3, nonfocal, sensation intact  Respiratory: mildly coarse breath sounds b/l Lower lobes  CV: RRR, +S1/S2  Abdominal: Soft, NT, ND +BSx4  Extremities: trace pitting edema b/l LE, + peripheral pulses  MSK: significant kyphosis of thoracic spine, tenderness to palpation of upper paraspinal region   Skin: warm, dry, normal color

## 2019-05-11 NOTE — ED ADULT NURSE REASSESSMENT NOTE - NS ED NURSE REASSESS COMMENT FT1
Patient had one episode of two episode of emesis spoke to Dr. Brock regarding the emesis, per Dr. order to give patient Zofran 4 mg. will monitor support and safety maintained. Patient had  two episode of emesis spoke to Dr. Brock regarding the emesis, per Dr. order to give patient Zofran 4 mg. will monitor support and safety maintained.

## 2019-05-11 NOTE — ED ADULT NURSE REASSESSMENT NOTE - NS ED NURSE REASSESS COMMENT FT1
On the way back from CT scan, pt vomited x1. MD Goins made aware. Pt reports now after vomiting she does not feel nauseous, will hold zofran per pt. Pt reporting pain, MD aware.

## 2019-05-11 NOTE — H&P ADULT - NSHPSOCIALHISTORY_GEN_ALL_CORE
quit smoking over 40 years ago  denies current tobacco, alcohol, or drug abuse  lives at home by self

## 2019-05-11 NOTE — H&P ADULT - PROBLEM SELECTOR PLAN 1
- HIE chart reviewed   - back pain since cardiac procedure at [facility] in 4/2019, evaluated outpatient for msk related pain; aleve and warm shower provided only mild temporary relief   - back pain likely MSK related, CT negative for aortic aneurysm   - patient reported skin sensitivity to lidocaine gel and patch  - warm compress as needed for back pain  - PT eval

## 2019-05-11 NOTE — ED ADULT NURSE REASSESSMENT NOTE - NS ED NURSE REASSESS COMMENT FT1
Pt vomited shortly after morphine administration, states she feels better after vomiting. Pt given zofran per orders. MD Goins aware.

## 2019-05-12 LAB
ANION GAP SERPL CALC-SCNC: 10 MMOL/L — SIGNIFICANT CHANGE UP (ref 5–17)
BUN SERPL-MCNC: 56 MG/DL — HIGH (ref 7–23)
CALCIUM SERPL-MCNC: 8.3 MG/DL — LOW (ref 8.5–10.1)
CHLORIDE SERPL-SCNC: 112 MMOL/L — HIGH (ref 96–108)
CO2 SERPL-SCNC: 22 MMOL/L — SIGNIFICANT CHANGE UP (ref 22–31)
CREAT SERPL-MCNC: 1.8 MG/DL — HIGH (ref 0.5–1.3)
GLUCOSE SERPL-MCNC: 103 MG/DL — HIGH (ref 70–99)
HCT VFR BLD CALC: 24.7 % — LOW (ref 34.5–45)
HGB BLD-MCNC: 7.6 G/DL — LOW (ref 11.5–15.5)
INR BLD: 4.58 RATIO — HIGH (ref 0.88–1.16)
MCHC RBC-ENTMCNC: 30.8 GM/DL — LOW (ref 32–36)
MCHC RBC-ENTMCNC: 31.4 PG — SIGNIFICANT CHANGE UP (ref 27–34)
MCV RBC AUTO: 102.1 FL — HIGH (ref 80–100)
NRBC # BLD: 1 /100 WBCS — HIGH (ref 0–0)
PLATELET # BLD AUTO: 150 K/UL — SIGNIFICANT CHANGE UP (ref 150–400)
POTASSIUM SERPL-MCNC: 4.5 MMOL/L — SIGNIFICANT CHANGE UP (ref 3.5–5.3)
POTASSIUM SERPL-SCNC: 4.5 MMOL/L — SIGNIFICANT CHANGE UP (ref 3.5–5.3)
PROTHROM AB SERPL-ACNC: 54.7 SEC — HIGH (ref 10–12.9)
RBC # BLD: 2.42 M/UL — LOW (ref 3.8–5.2)
RBC # FLD: 14.8 % — HIGH (ref 10.3–14.5)
SODIUM SERPL-SCNC: 144 MMOL/L — SIGNIFICANT CHANGE UP (ref 135–145)
WBC # BLD: 8.41 K/UL — SIGNIFICANT CHANGE UP (ref 3.8–10.5)
WBC # FLD AUTO: 8.41 K/UL — SIGNIFICANT CHANGE UP (ref 3.8–10.5)

## 2019-05-12 PROCEDURE — 99233 SBSQ HOSP IP/OBS HIGH 50: CPT

## 2019-05-12 PROCEDURE — 99232 SBSQ HOSP IP/OBS MODERATE 35: CPT

## 2019-05-12 RX ORDER — POLYETHYLENE GLYCOL 3350 17 G/17G
17 POWDER, FOR SOLUTION ORAL ONCE
Refills: 0 | Status: COMPLETED | OUTPATIENT
Start: 2019-05-12 | End: 2019-05-12

## 2019-05-12 RX ADMIN — TRAMADOL HYDROCHLORIDE 25 MILLIGRAM(S): 50 TABLET ORAL at 06:13

## 2019-05-12 RX ADMIN — Medication 25 MILLIGRAM(S): at 18:14

## 2019-05-12 RX ADMIN — POLYETHYLENE GLYCOL 3350 17 GRAM(S): 17 POWDER, FOR SOLUTION ORAL at 12:45

## 2019-05-12 RX ADMIN — BRIMONIDINE TARTRATE, TIMOLOL MALEATE 1 DROP(S): 2; 5 SOLUTION/ DROPS OPHTHALMIC at 18:14

## 2019-05-12 RX ADMIN — APIXABAN 2.5 MILLIGRAM(S): 2.5 TABLET, FILM COATED ORAL at 18:14

## 2019-05-12 RX ADMIN — Medication 650 MILLIGRAM(S): at 05:47

## 2019-05-12 RX ADMIN — APIXABAN 2.5 MILLIGRAM(S): 2.5 TABLET, FILM COATED ORAL at 05:15

## 2019-05-12 RX ADMIN — TRAMADOL HYDROCHLORIDE 25 MILLIGRAM(S): 50 TABLET ORAL at 18:58

## 2019-05-12 RX ADMIN — CLOPIDOGREL BISULFATE 75 MILLIGRAM(S): 75 TABLET, FILM COATED ORAL at 12:43

## 2019-05-12 RX ADMIN — TRAMADOL HYDROCHLORIDE 25 MILLIGRAM(S): 50 TABLET ORAL at 19:17

## 2019-05-12 RX ADMIN — Medication 1 TABLET(S): at 12:43

## 2019-05-12 RX ADMIN — Medication 1 APPLICATION(S): at 10:06

## 2019-05-12 RX ADMIN — PANTOPRAZOLE SODIUM 40 MILLIGRAM(S): 20 TABLET, DELAYED RELEASE ORAL at 05:15

## 2019-05-12 RX ADMIN — TRAMADOL HYDROCHLORIDE 25 MILLIGRAM(S): 50 TABLET ORAL at 06:48

## 2019-05-12 RX ADMIN — Medication 650 MILLIGRAM(S): at 05:15

## 2019-05-12 NOTE — PROGRESS NOTE ADULT - PROBLEM SELECTOR PLAN 6
- recent TAVR in 4/2019 for severe AS  - continue home med plavix
- recent TAVR in 4/2019 for severe AS  - continue home med plavix

## 2019-05-12 NOTE — PROGRESS NOTE ADULT - PROBLEM SELECTOR PLAN 3
- unclear etiology for elevated INR and elevated liver enzyme  - patient not on coumadin, abdominal exam unremarkable, no jaundice   - will trend repeat labs  - f/u repeat INR  -anemia work up  -may consider heme consult
- unclear etiology for elevated INR and elevated liver enzyme  - patient not on coumadin, abdominal exam unremarkable, no jaundice   - will trend repeat labs  - f/u repeat INR

## 2019-05-12 NOTE — PROGRESS NOTE ADULT - ASSESSMENT
Patient is a 89 yo female with pmhx of recent TAVR for severe AS (4/2019), new LBBB s/p leadless micra PPM (4/2019), afib (on eliquis), CAD s/p 1 stent (3/2019), Diastolic HF, HTN, HLD, asthma, skin basal cell presented to ED with complaints of persistent upper left scapula pain, generalized weakness since her cardiac procedures in April 2019. Admit for evaluation and management of persistent back pain, weakness, CHASITY, pulm edema. Anemia and elevated INR.

## 2019-05-12 NOTE — PROGRESS NOTE ADULT - ASSESSMENT
Mrs. Junior is a 89 y/o F with asthma, HTN, HLD, severe AS, Diastolic HF, CAD s/p PCI to mid RCA in 3/2019.  She had severe AS, and is s/p tavr on 4/24/2019. Post-operatively, she had a new LBBB and a micra PPM was placed on 4/25/2019.  She presents yesterday with severe back pain. CT chest with pulmonary edema  Acute on chronic diastolic congestive heart failure.    - s/p IV Lasix with improvement in her symptoms. Her creatinine and sodium have trended upward. Will start her on lasix 20 mg po daily.  Her Sodium is stable and her Cr trended down today.    - No sign of acute ischemia. her EKG is a SR with LBBB  - Echocardiogram performed with well seated TAVR, with trace paravalvular leak. She does have significant MR, which was not present on prior echocardiograms, which is hopefully volume and BP related, and will improve with diuretics. There is no obvious systolic anterior motion of the MV or lvot obstruction. Her LV function is normal.  - continue eliquis for her AF history. She is on Plavix for her recent TAVR and PCI.    - Her INR is trending up and her Hgb is trending down.  Anemia w/u as per medicine.  Monitor for active bleed.  Trend INR.    - HTN - continue bb, started Lasix monitor BP   - Pain control  - Watch creatinine and electrolytes. Keep K>4, Mg>2  - Will follow with you.    Katia Tee, NP-C  Cardiology Mrs. Junior is a 89 y/o F with asthma, HTN, HLD, severe AS, Diastolic HF, CAD s/p PCI to mid RCA in 3/2019.  She had severe AS, and is s/p tavr on 4/24/2019. Post-operatively, she had a new LBBB and a micra PPM was placed on 4/25/2019.  She presents yesterday with severe back pain. CT chest with pulmonary edema though no rp bleed or hematoma.  Acute on chronic diastolic congestive heart failure.    - s/p IV Lasix with improvement in her symptoms. Her creatinine and sodium have trended upward. Will start her on lasix 20 mg po daily.  Her Sodium is stable and her Cr trended down today.    - No sign of acute ischemia. her EKG is a SR with LBBB  - Echocardiogram performed with well seated TAVR, with trace paravalvular leak. She does have significant MR, which was not present on prior echocardiograms, which is hopefully volume and BP related, and will improve with diuretics. There is no obvious systolic anterior motion of the MV or lvot obstruction. Her LV function is normal.  - She is on eliquis for AF, though her INR is supratherapeutic. I would hold eliquis if her hb continues to drop. She is on Plavix for her recent TAVR and PCI.    - Her INR is trending up and her Hgb is trending down.  Anemia w/u as per medicine.  Monitor for active bleed.  Trend INR.    - HTN - continue bb, started Lasix monitor BP   - Pain control  - Watch creatinine and electrolytes. Keep K>4, Mg>2  - Will follow with you.    Katia Tee NP-C  Cardiology

## 2019-05-12 NOTE — DIETITIAN INITIAL EVALUATION ADULT. - NS AS NUTRI INTERV MEALS SNACK
General/healthful diet/Continue with current DASH/TLC diet. Pt encouraged to continue good PO intake.

## 2019-05-12 NOTE — DIETITIAN INITIAL EVALUATION ADULT. - ENERGY NEEDS
Wt: 123lbs (admission), Ht: 59in, BMI: 24.8, IBW: 98lbs (+/-10%), %IBW: 126  No edema or pressure injuries noted at this time

## 2019-05-12 NOTE — PROGRESS NOTE ADULT - PROBLEM SELECTOR PLAN 1
- HIE chart reviewed   - back pain as per patient was present before cardiac procedure at [facility] in 4/2019, evaluated outpatient for msk related pain; aleve and warm shower provided only mild temporary relief   - back pain likely MSK related, CT negative for aortic aneurysm   - patient reported skin sensitivity to lidocaine gel and patch  - warm compress as needed for back pain  - PT eval  - tramadol prn for severe pain
- HIE chart reviewed   - back pain as per patient was present before cardiac procedure at [facility] in 4/2019, evaluated outpatient for msk related pain; aleve and warm shower provided only mild temporary relief   - back pain likely MSK related, CT negative for aortic aneurysm   - patient reported skin sensitivity to lidocaine gel and patch  - warm compress as needed for back pain  - PT eval  - tramadol prn for severe pain

## 2019-05-12 NOTE — DIETITIAN INITIAL EVALUATION ADULT. - PROBLEM SELECTOR PLAN 7
- appear to be in HF exacerbation   - ProBNP elevated at 5498 (previous was 369 on 3/26/19)   - CT chest showed pulm edema and pleural effusion   - patient appeared to be HD stable at this point  - will give 1x lasix 20mg IV, avoid overdiuresing due to underlying CHASITY   - repeat TTE  - cardio consult - [facility]

## 2019-05-12 NOTE — PROGRESS NOTE ADULT - PROBLEM SELECTOR PLAN 7
- appear to be in HF exacerbation   - ProBNP elevated at 5498 (previous was 369 on 3/26/19)   - CT chest showed pulm edema and pleural effusion   - patient appeared to be HD stable at this point  - will give 1x lasix 20mg IV, avoid overdiuresing due to underlying CHASITY   - repeat TTE  - cardio consult - [facility]
- appear to be in HF exacerbation   - ProBNP elevated at 5498 (previous was 369 on 3/26/19)   - CT chest showed pulm edema and pleural effusion   - patient appeared to be HD stable at this point  - will give 1x lasix 20mg IV, avoid overdiuresing due to underlying CHASITY   - repeat TTE  - cardio consult - [place] [facility]
no

## 2019-05-12 NOTE — PHYSICAL THERAPY INITIAL EVALUATION ADULT - ADDITIONAL COMMENTS
Pt states she lives in split level home alone with 6 MINDI with 1 HR and 18 steps total inside, one staircase with 2 rails and 2 other with single rail, denies having used AD nor does she own any, drives and was fully independent prior.

## 2019-05-12 NOTE — DIETITIAN INITIAL EVALUATION ADULT. - NS AS NUTRI INTERV ED CONTENT
Pt provided with oral and written education on heart healthy diet. Topics covered: 1) foods high in sodium that should be limited, 2) importance of weighing herself daily and to notify doctor of sudden increase in weight. RD to remain available./Purpose of the nutrition education/Priority modifications/Nutrition relationship to health/disease/Recommended modifications/Other or related topics

## 2019-05-12 NOTE — PROGRESS NOTE ADULT - PROBLEM SELECTOR PLAN 10
11) Asthma - stable, continue home med breo-ellipta (patient to bring from home)  12) Glaucoma - continue combigan drop (patient to bring from home)  13) GERD - continue omeprazole  14) need for prophylactic measures - DVT ppx on eliquis, PO diet
11) Asthma - stable, continue home med breo-ellipta (patient to bring from home)  12) Glaucoma - continue combigan drop (patient to bring from home)  13) GERD - continue omeprazole  14) need for prophylactic measures - DVT ppx on eliquis, PO diet

## 2019-05-12 NOTE — PROGRESS NOTE ADULT - PROBLEM SELECTOR PLAN 8
- continue metoprolol 25 BID with hold parameters  - hold losartan for now due to CHASITY
- continue metoprolol 25 BID with hold parameters  - hold losartan for now due to CHASITY

## 2019-05-12 NOTE — DIETITIAN INITIAL EVALUATION ADULT. - PROBLEM SELECTOR PLAN 1
- HIE chart reviewed   - back pain since cardiac procedure at [facility] in 4/2019, evaluated outpatient for msk related pain; aleve and warm shower provided only mild temporary relief   - back pain likely MSK related, CT negative for aortic aneurysm   - patient reported skin sensitivity to lidocaine gel and patch  - warm compress as needed for back pain  - PT eval

## 2019-05-12 NOTE — DIETITIAN INITIAL EVALUATION ADULT. - OTHER INFO
Pt seen for nutrition consult for nutrition assessment, calorie count, education. As per chart pt is a 89 year old female with a PMH of  recent TAVR for severe AS (4/2019), new LBBB s/p leadless micra PPM (4/2019), afib (on eliquis), CAD s/p 1 stent (3/2019), Diastolic HF, HTN, HLD, asthma, skin basal cell presented to ED with complaints of persistent upper left scapula pain, generalized weakness since her cardiac procedures in April 2019. Admit for evaluation and management of persistent back pain, weakness, CHASITY, pulm edema. Pt reports that her appetite previously was poor but is now improving greatly. Pt's current weight per chart (5/12) 145.9lbs, (admission wt) 123lbs. Pt reports current and UBW of 123lbs, denies any significant recent major weight changes. Pt visually appears closer to admission weight. Of note pt s/p IV lasix. Pt denies any nausea/ vomiting/ reports some constipation, has not had a BM since admission.

## 2019-05-12 NOTE — PROGRESS NOTE ADULT - ATTENDING COMMENTS
seen/examined. agree with above.
Anemia- check FOBT, iron panel, ferriting, patient on apixiban, denies any symptoms of GIB but will need to check and do work up at this time.

## 2019-05-12 NOTE — DIETITIAN INITIAL EVALUATION ADULT. - PROBLEM SELECTOR PROBLEM 1
Sergio Damon, is a 76 year old male who is here today for follow up of his hypertension, hyperlipidemia, diabetes and hypothyroidism.     He has a diagnosis of hypertension on medication. No chest pains or palpitations.    He has a history of hyperlipidemia , on simvastatin. Recent cholesterol level in an acceptable range. No muscle cramps.  He had a diagnosis of diabetes mellitus.  He is managing this with diet. Sugars are under good control. Recent A1c was stable at 6.1.  He has a history of hypothyroidism, on levothyroxine. Recent TSH was therapeutic.    The patient carries a diagnosis of recurrent prostate cancer having undergone prostatectomy in the more distant past recurrence of measurable PSA levels. He is been followed by Dr. José Miguel Mattson and has received Lupron every 3 months since 7/2016. His PSA had come down and he states that he had negotiated changed to a every six-month course of treatment. However, he states that there was a mixup in this particular order when he returned in December. Consequently, he opted not to proceed with additional treatment at that time. He would like an opinion from another oncologist. The interim, he would like to have a PSA drawn.    Past Medical History:   Diagnosis Date   • Arthritis    • Atrial fibrillation 2000    paroxysmal   • Congestive heart failure, unspecified 9/27/2013   • Coronary artery disease    • Esophageal reflux    • Essential hypertension, benign 3/30/2011   • Failed moderate sedation during procedure    • Gout 9/27/2013   • History of colon polyps 2012    Done at [facility] [place]. 5 year f/u recommended.   • History of prostate cancer 2/21/2013   • Hyperlipidemia 1/27/2012   • Hypothyroidism 8/20/2012   • Old myocardial infarction    • DARRIN (obstructive sleep apnea) 3/30/2011   • DARRIN on CPAP 13, AHI 55. 5/23/2012   • Other and unspecified hyperlipidemia 3/30/2011   • Prostate cancer 2003   • Type 2 diabetes mellitus 4/22/2014   • Unspecified sinusitis (chronic)       Health Maintenance   Topic Date Due   • Diabetes Foot Exam  11/13/1958   • DTaP/Tdap/Td Vaccine (1 - Tdap) 11/13/1959   • Diabetes Eye Exam  05/01/2015   • Diabetes A1C  09/28/2017   • Medicare Wellness 65+  11/02/2017   • Diabetes GFR  03/28/2018   • Zoster Vaccine  Completed   • Pneumococcal Vaccine Adult  Completed   • Influenza Vaccine  Completed     Current Outpatient Prescriptions   Medication Sig Dispense Refill   • blood glucose test strips (ACCU-CHEK SMARTVIEW) Test BS once daily alt fasting and 2 hours after meal.  DX=E11.9 100 strip 1   • ACCU-CHEK FASTCLIX LANCETS Misc Test BS once daily alt fasting and 2 hours after meal.  DX=E11.9 100 each 1   • Glucose Blood strip Test glucose once daily- alternating fasting and two hours after a meal Dx: E11.9 100 each 3   • Blood Glucose Monitoring Suppl (ACCU-CHEK TED SMARTVIEW) W/DEVICE KIT 1 each by Other route daily. 1 kit 0   • acetaminophen (TYLENOL) 325 MG tablet Take 650 mg by mouth every 4 hours as needed.     • ASPIRIN CHILD 81 MG PO CHEW 1 TABLET DAILY 0 0   • lisinopril (PRINIVIL,ZESTRIL) 40 MG tablet Take 0.5 tablets by mouth daily. 45 tablet 3   • simvastatin (ZOCOR) 80 MG tablet Take 0.5 tablets by mouth daily. 45 tablet 3   • levothyroxine 75 MCG capsule Take 1 capsule by mouth daily. 90 capsule 3     No current facility-administered medications for this visit.      ALLERGIES:   Allergen Reactions   • Hydrochlorothiazide      Headaches, sinus pressure, reflux       PHYSICAL EXAM  GENERAL: No acute distress.  VITAL SIGNS:   Visit Vitals   • /82   • Pulse 88   • Wt (!) 168.3 kg   • SpO2 99%   • BMI 53.23 kg/m2     HEENT: Neck supple no lymphadenopathy postman  thyromegaly or mass.  COR: Heart rhythm is regular without ectopy pulses 2+ symmetrical.  LUNGS: Breath sounds are full and symmetrical without wheeze, rales or rhonchi.  EXTREMITIES: No clubbing or cyanosis. No edema. Sensation in feet is intact.    IMPRESSION:  1. Hypertension  2.  Hyperlipidemia  3. Type 2 diabetes mellitus  4. Hypothyroidism line 5. Recurrent prostate cancer on basis of recurrent measurable PSA. Patient did not receive Lupron in December. He is requesting second opinion on treatment.    PLAN:  At patient request, will check PSA and have submitted a referral for second opinion from an oncologist.  Reviewed recent laboratory tests  Refill medications  Follow-up 6 months.     Back pain

## 2019-05-12 NOTE — PROGRESS NOTE ADULT - PROBLEM SELECTOR PLAN 5
- adjust eliquis for renal dose  - continue metoprolol for rate control
- adjust eliquis for renal dose  - continue metoprolol for rate control

## 2019-05-12 NOTE — PROGRESS NOTE ADULT - PROBLEM SELECTOR PLAN 4
- CHASITY, baseline renal fxn in 4/2019  - avoid fluid overload due to underlying CHF exacerbation  - avoid nephrotoxic medications   - hold losartan for now until renal fxn improves  - trend renal fxn
- CHASITY, baseline renal fxn in 4/2019  - avoid fluid overload due to underlying CHF exacerbation  - avoid nephrotoxic medications   - hold losartan for now until renal fxn improves  - trend renal fxn

## 2019-05-12 NOTE — PHYSICAL THERAPY INITIAL EVALUATION ADULT - PERTINENT HX OF CURRENT PROBLEM, REHAB EVAL
Pt is 89 y.o. F who presented to ED with complaints of persistent L upper scapular pain, weakness since cardiac procedure April 2019, admitted for evaluation and management of persistent back pain, weakness, CHASITY and pulmonary edema.

## 2019-05-12 NOTE — DIETITIAN INITIAL EVALUATION ADULT. - ADHERENCE
fair/Pt reports that she does not follow any dietary restrictions PTA, however does not add salt to foods. Supplement use PTA includes MVI, Calcium, Biotin. LUIS.

## 2019-05-12 NOTE — PHYSICAL THERAPY INITIAL EVALUATION ADULT - CRITERIA FOR SKILLED THERAPEUTIC INTERVENTIONS
predicted duration of therapy intervention/functional limitations in following categories/risk reduction/prevention/rehab potential/impairments found/therapy frequency/anticipated discharge recommendation

## 2019-05-12 NOTE — PROGRESS NOTE ADULT - PROBLEM SELECTOR PLAN 2
- generalized weakness, patient stated that she feels weak after climbing 6 steps at home and has not slept well for 2 weeks due to back pain  - possibly related to underlying CHF exacerbation vs poor PO intake (patient lives alone)  - PT eval  - nutrition eval  - fall precaution  - cardio consult - [place] [facility] no longer in [place], will d/w Dr Nur group  -Anemia work up
- generalized weakness, patient stated that she feels weak after climbing 6 steps at home and has not slept well for 2 weeks due to back pain  - possibly related to underlying CHF exacerbation vs poor PO intake (patient lives alone)  - PT eval  - nutrition eval  - fall precaution  - cardio consult - [place] [facility] no longer in [place], will d/w Dr Nur group

## 2019-05-12 NOTE — PROGRESS NOTE ADULT - PROBLEM SELECTOR PLAN 9
- hold simvastatin due to elevated LFTs  - trend LFTs for now, can restart if okay by cardio
- hold simvastatin due to elevated LFTs  - trend LFTs for now, can restart if okay by cardio

## 2019-05-13 ENCOUNTER — TRANSCRIPTION ENCOUNTER (OUTPATIENT)
Age: 84
End: 2019-05-13

## 2019-05-13 VITALS
HEART RATE: 61 BPM | OXYGEN SATURATION: 94 % | RESPIRATION RATE: 16 BRPM | TEMPERATURE: 98 F | DIASTOLIC BLOOD PRESSURE: 62 MMHG | SYSTOLIC BLOOD PRESSURE: 157 MMHG

## 2019-05-13 LAB
ANION GAP SERPL CALC-SCNC: 9 MMOL/L — SIGNIFICANT CHANGE UP (ref 5–17)
APTT BLD: 31.7 SEC — SIGNIFICANT CHANGE UP (ref 27.5–36.3)
BUN SERPL-MCNC: 48 MG/DL — HIGH (ref 7–23)
CALCIUM SERPL-MCNC: 8.5 MG/DL — SIGNIFICANT CHANGE UP (ref 8.5–10.1)
CHLORIDE SERPL-SCNC: 110 MMOL/L — HIGH (ref 96–108)
CO2 SERPL-SCNC: 24 MMOL/L — SIGNIFICANT CHANGE UP (ref 22–31)
CREAT SERPL-MCNC: 1.3 MG/DL — SIGNIFICANT CHANGE UP (ref 0.5–1.3)
FOLATE SERPL-MCNC: 16 NG/ML — SIGNIFICANT CHANGE UP
GLUCOSE SERPL-MCNC: 100 MG/DL — HIGH (ref 70–99)
HCT VFR BLD CALC: 24 % — LOW (ref 34.5–45)
HGB BLD-MCNC: 7.5 G/DL — LOW (ref 11.5–15.5)
INR BLD: 2.91 RATIO — HIGH (ref 0.88–1.16)
MCHC RBC-ENTMCNC: 31.3 GM/DL — LOW (ref 32–36)
MCHC RBC-ENTMCNC: 31.6 PG — SIGNIFICANT CHANGE UP (ref 27–34)
MCV RBC AUTO: 101.3 FL — HIGH (ref 80–100)
NRBC # BLD: 0 /100 WBCS — SIGNIFICANT CHANGE UP (ref 0–0)
OB PNL STL: NEGATIVE — SIGNIFICANT CHANGE UP
PLATELET # BLD AUTO: 154 K/UL — SIGNIFICANT CHANGE UP (ref 150–400)
POTASSIUM SERPL-MCNC: 4.4 MMOL/L — SIGNIFICANT CHANGE UP (ref 3.5–5.3)
POTASSIUM SERPL-SCNC: 4.4 MMOL/L — SIGNIFICANT CHANGE UP (ref 3.5–5.3)
PROTHROM AB SERPL-ACNC: 34 SEC — HIGH (ref 10–12.9)
RBC # BLD: 2.37 M/UL — LOW (ref 3.8–5.2)
RBC # FLD: 14.5 % — SIGNIFICANT CHANGE UP (ref 10.3–14.5)
SODIUM SERPL-SCNC: 143 MMOL/L — SIGNIFICANT CHANGE UP (ref 135–145)
VIT B12 SERPL-MCNC: 725 PG/ML — SIGNIFICANT CHANGE UP (ref 232–1245)
WBC # BLD: 7.99 K/UL — SIGNIFICANT CHANGE UP (ref 3.8–10.5)
WBC # FLD AUTO: 7.99 K/UL — SIGNIFICANT CHANGE UP (ref 3.8–10.5)

## 2019-05-13 PROCEDURE — 83880 ASSAY OF NATRIURETIC PEPTIDE: CPT

## 2019-05-13 PROCEDURE — 82272 OCCULT BLD FECES 1-3 TESTS: CPT

## 2019-05-13 PROCEDURE — 83550 IRON BINDING TEST: CPT

## 2019-05-13 PROCEDURE — 71045 X-RAY EXAM CHEST 1 VIEW: CPT

## 2019-05-13 PROCEDURE — 86901 BLOOD TYPING SEROLOGIC RH(D): CPT

## 2019-05-13 PROCEDURE — 99232 SBSQ HOSP IP/OBS MODERATE 35: CPT

## 2019-05-13 PROCEDURE — 71250 CT THORAX DX C-: CPT

## 2019-05-13 PROCEDURE — 93005 ELECTROCARDIOGRAM TRACING: CPT

## 2019-05-13 PROCEDURE — 83540 ASSAY OF IRON: CPT

## 2019-05-13 PROCEDURE — 99285 EMERGENCY DEPT VISIT HI MDM: CPT | Mod: 25

## 2019-05-13 PROCEDURE — 82746 ASSAY OF FOLIC ACID SERUM: CPT

## 2019-05-13 PROCEDURE — 82728 ASSAY OF FERRITIN: CPT

## 2019-05-13 PROCEDURE — 36415 COLL VENOUS BLD VENIPUNCTURE: CPT

## 2019-05-13 PROCEDURE — 97161 PT EVAL LOW COMPLEX 20 MIN: CPT

## 2019-05-13 PROCEDURE — 80048 BASIC METABOLIC PNL TOTAL CA: CPT

## 2019-05-13 PROCEDURE — 82607 VITAMIN B-12: CPT

## 2019-05-13 PROCEDURE — 85730 THROMBOPLASTIN TIME PARTIAL: CPT

## 2019-05-13 PROCEDURE — 99238 HOSP IP/OBS DSCHRG MGMT 30/<: CPT

## 2019-05-13 PROCEDURE — 86850 RBC ANTIBODY SCREEN: CPT

## 2019-05-13 PROCEDURE — 96376 TX/PRO/DX INJ SAME DRUG ADON: CPT

## 2019-05-13 PROCEDURE — 83735 ASSAY OF MAGNESIUM: CPT

## 2019-05-13 PROCEDURE — 74176 CT ABD & PELVIS W/O CONTRAST: CPT

## 2019-05-13 PROCEDURE — 85610 PROTHROMBIN TIME: CPT

## 2019-05-13 PROCEDURE — 86900 BLOOD TYPING SEROLOGIC ABO: CPT

## 2019-05-13 PROCEDURE — 85379 FIBRIN DEGRADATION QUANT: CPT

## 2019-05-13 PROCEDURE — 85027 COMPLETE CBC AUTOMATED: CPT

## 2019-05-13 PROCEDURE — 96374 THER/PROPH/DIAG INJ IV PUSH: CPT

## 2019-05-13 PROCEDURE — 93306 TTE W/DOPPLER COMPLETE: CPT

## 2019-05-13 PROCEDURE — 80053 COMPREHEN METABOLIC PANEL: CPT

## 2019-05-13 RX ORDER — TRAMADOL HYDROCHLORIDE 50 MG/1
25 TABLET ORAL ONCE
Refills: 0 | Status: DISCONTINUED | OUTPATIENT
Start: 2019-05-13 | End: 2019-05-13

## 2019-05-13 RX ORDER — APIXABAN 2.5 MG/1
1 TABLET, FILM COATED ORAL
Qty: 60 | Refills: 0
Start: 2019-05-13 | End: 2019-06-11

## 2019-05-13 RX ORDER — FOLIC ACID 0.8 MG
1 TABLET ORAL DAILY
Refills: 0 | Status: DISCONTINUED | OUTPATIENT
Start: 2019-05-13 | End: 2019-05-13

## 2019-05-13 RX ORDER — FOLIC ACID 0.8 MG
1 TABLET ORAL
Qty: 30 | Refills: 0
Start: 2019-05-13 | End: 2019-06-11

## 2019-05-13 RX ORDER — ACETAMINOPHEN 500 MG
2 TABLET ORAL
Qty: 0 | Refills: 0 | DISCHARGE
Start: 2019-05-13

## 2019-05-13 RX ORDER — TRAMADOL HYDROCHLORIDE 50 MG/1
0.5 TABLET ORAL
Qty: 6 | Refills: 0
Start: 2019-05-13 | End: 2019-05-16

## 2019-05-13 RX ADMIN — PANTOPRAZOLE SODIUM 40 MILLIGRAM(S): 20 TABLET, DELAYED RELEASE ORAL at 05:56

## 2019-05-13 RX ADMIN — TRAMADOL HYDROCHLORIDE 25 MILLIGRAM(S): 50 TABLET ORAL at 10:34

## 2019-05-13 RX ADMIN — Medication 25 MILLIGRAM(S): at 05:56

## 2019-05-13 RX ADMIN — Medication 1 MILLIGRAM(S): at 12:33

## 2019-05-13 RX ADMIN — TRAMADOL HYDROCHLORIDE 25 MILLIGRAM(S): 50 TABLET ORAL at 16:00

## 2019-05-13 RX ADMIN — Medication 1 APPLICATION(S): at 12:33

## 2019-05-13 RX ADMIN — TRAMADOL HYDROCHLORIDE 25 MILLIGRAM(S): 50 TABLET ORAL at 09:34

## 2019-05-13 RX ADMIN — Medication 1 TABLET(S): at 12:32

## 2019-05-13 RX ADMIN — CLOPIDOGREL BISULFATE 75 MILLIGRAM(S): 75 TABLET, FILM COATED ORAL at 12:32

## 2019-05-13 RX ADMIN — APIXABAN 2.5 MILLIGRAM(S): 2.5 TABLET, FILM COATED ORAL at 05:55

## 2019-05-13 RX ADMIN — BRIMONIDINE TARTRATE, TIMOLOL MALEATE 1 DROP(S): 2; 5 SOLUTION/ DROPS OPHTHALMIC at 05:56

## 2019-05-13 NOTE — PROGRESS NOTE ADULT - SUBJECTIVE AND OBJECTIVE BOX
[facility] -- Chandra Tomlinson, Pamela, Ezekiel, Zeke Barber Savella  Office # [phone]      Follow Up:  MR    Subjective/Observations: Patient seen and examined. Events noted. Resting comfortably in bed. No complaints of chest pain, dyspnea, or palpitations reported. No signs of orthopnea or PND. C/O back pain      REVIEW OF SYSTEMS: All other review of systems is negative unless indicated above    PAST MEDICAL & SURGICAL HISTORY:  Asthma  HLD (hyperlipidemia)  Aortic stenosis  Heart failure  Pacemaker  Glaucoma  Hypertension  Bronchitis  Afib  S/P coronary artery stent placement  S/P TAVR (transcatheter aortic valve replacement)      MEDICATIONS  (STANDING):  apixaban 2.5 milliGRAM(s) Oral every 12 hours  brimonidine 0.2%/ timolol 0.5% Ophthalmic Solution 1 Drop(s) Both EYES every 12 hours  clopidogrel Tablet 75 milliGRAM(s) Oral daily  hydrocortisone 1% Cream 1 Application(s) Topical daily  metoprolol tartrate 25 milliGRAM(s) Oral two times a day  multivitamin 1 Tablet(s) Oral daily  pantoprazole    Tablet 40 milliGRAM(s) Oral before breakfast    MEDICATIONS  (PRN):  acetaminophen   Tablet .. 650 milliGRAM(s) Oral every 6 hours PRN Mild Pain (1 - 3)  ondansetron    Tablet 4 milliGRAM(s) Oral every 6 hours PRN Nausea and/or Vomiting  traMADol 25 milliGRAM(s) Oral every 8 hours PRN Moderate Pain (4 - 6)      Allergies    adhesives (Rash)  penicillin (Unknown)    Intolerances            Vital Signs Last 24 Hrs  T(C): 37.1 (13 May 2019 08:00), Max: 37.1 (13 May 2019 08:00)  T(F): 98.7 (13 May 2019 08:00), Max: 98.7 (13 May 2019 08:00)  HR: 56 (13 May 2019 08:00) (56 - 67)  BP: 146/73 (13 May 2019 08:00) (144/78 - 168/75)  BP(mean): --  RR: 16 (13 May 2019 08:00) (16 - 16)  SpO2: 96% (13 May 2019 08:00) (91% - 96%)    I&O's Summary        PHYSICAL EXAM:     Constitutional: NAD, awake    HEENT: Moist Mucous Membranes, Anicteric  Pulmonary: Decreased breath sounds b/l. No rales, crackles or wheeze appreciated.   Cardiovascular: Regular, S1 and S2, 1/6 SM  Gastrointestinal: Bowel Sounds present, soft, nontender.   Lymph: trace peripheral edema. No lymphadenopathy.  Skin: No visible rashes or ulcers.  Psych:  Mood & affect appropriate for situation    LABS: All Labs Reviewed:                        7.5    7.99  )-----------( 154      ( 13 May 2019 06:19 )             24.0                         7.6    8.41  )-----------( 150      ( 12 May 2019 07:54 )             24.7                         8.7    9.36  )-----------( 166      ( 11 May 2019 05:55 )             28.0     13 May 2019 06:19    143    |  110    |  48     ----------------------------<  100    4.4     |  24     |  1.30   12 May 2019 07:54    144    |  112    |  56     ----------------------------<  103    4.5     |  22     |  1.80   11 May 2019 05:55    146    |  114    |  53     ----------------------------<  120    5.0     |  19     |  2.00     Ca    8.5        13 May 2019 06:19  Ca    8.3        12 May 2019 07:54  Ca    9.0        11 May 2019 05:55  Mg     2.2       11 May 2019 05:55    TPro  6.8    /  Alb  3.9    /  TBili  1.7    /  DBili  x      /  AST  62     /  ALT  62     /  AlkPhos  118    11 May 2019 05:55  TPro  7.1    /  Alb  4.1    /  TBili  1.3    /  DBili  x      /  AST  49     /  ALT  58     /  AlkPhos  131    10 May 2019 22:53    PT/INR - ( 13 May 2019 06:19 )   PT: 34.0 sec;   INR: 2.91 ratio         PTT - ( 13 May 2019 06:19 )  PTT:31.7 sec
[facility] -- Chandra Tomlinson, Ezekiel Santiago, eZke Barber Savella  Office # [phone]      Follow Up:  CHF, AS s/p TAVR    Subjective/Observations: Seen and examined.  Pt sitting in chair with family around her.  Pt with  no new complaints.  Noted to be anemic today with no reports of black stool or bleeding.  No reports of cp, sob, palpitations or dizziness.  NAD.        REVIEW OF SYSTEMS: All other review of systems is negative unless indicated above    PAST MEDICAL & SURGICAL HISTORY:  Asthma  HLD (hyperlipidemia)  Aortic stenosis  Heart failure  Pacemaker  Glaucoma  Hypertension  Bronchitis  Afib  S/P coronary artery stent placement  S/P TAVR (transcatheter aortic valve replacement)      MEDICATIONS  (STANDING):  apixaban 2.5 milliGRAM(s) Oral every 12 hours  brimonidine 0.2%/ timolol 0.5% Ophthalmic Solution 1 Drop(s) Both EYES every 12 hours  clopidogrel Tablet 75 milliGRAM(s) Oral daily  hydrocortisone 1% Cream 1 Application(s) Topical daily  metoprolol tartrate 25 milliGRAM(s) Oral two times a day  multivitamin 1 Tablet(s) Oral daily  pantoprazole    Tablet 40 milliGRAM(s) Oral before breakfast    MEDICATIONS  (PRN):  acetaminophen   Tablet .. 650 milliGRAM(s) Oral every 6 hours PRN Mild Pain (1 - 3)  ondansetron    Tablet 4 milliGRAM(s) Oral every 6 hours PRN Nausea and/or Vomiting  traMADol 25 milliGRAM(s) Oral every 8 hours PRN Moderate Pain (4 - 6)      Allergies    adhesives (Rash)  penicillin (Unknown)    Intolerances            Vital Signs Last 24 Hrs  T(C): 36.7 (12 May 2019 08:31), Max: 36.8 (12 May 2019 04:28)  T(F): 98 (12 May 2019 08:31), Max: 98.2 (12 May 2019 04:28)  HR: 60 (12 May 2019 11:49) (53 - 66)  BP: 168/75 (12 May 2019 11:49) (103/69 - 174/69)  BP(mean): --  RR: 16 (12 May 2019 08:31) (16 - 16)  SpO2: 96% (12 May 2019 11:49) (92% - 96%)    I&O's Summary        PHYSICAL EXAM:  TELE: Not on tele  Constitutional: NAD, awake and alert, well-developed  HEENT: Moist Mucous Membranes, Anicteric  Pulmonary: Non-labored, breath sounds are decreased in RLL and basal rales in RLL  Cardiovascular: Regular, S1 and S2, +Valve click No rubs or gallops  Gastrointestinal: Bowel Sounds present, soft, nontender.   Lymph: No peripheral edema. No lymphadenopathy.  Skin: No visible rashes or ulcers.  Psych:  Mood & affect appropriate    LABS: All Labs Reviewed:                        7.6    8.41  )-----------( 150      ( 12 May 2019 07:54 )             24.7                         8.7    9.36  )-----------( 166      ( 11 May 2019 05:55 )             28.0                         9.2    10.08 )-----------( 201      ( 10 May 2019 22:53 )             30.4     12 May 2019 07:54    144    |  112    |  56     ----------------------------<  103    4.5     |  22     |  1.80   11 May 2019 05:55    146    |  114    |  53     ----------------------------<  120    5.0     |  19     |  2.00   10 May 2019 22:53    145    |  115    |  43     ----------------------------<  127    4.9     |  16     |  1.80     Ca    8.3        12 May 2019 07:54  Ca    9.0        11 May 2019 05:55  Ca    8.7        10 May 2019 22:53  Mg     2.2       11 May 2019 05:55    TPro  6.8    /  Alb  3.9    /  TBili  1.7    /  DBili  x      /  AST  62     /  ALT  62     /  AlkPhos  118    11 May 2019 05:55  TPro  7.1    /  Alb  4.1    /  TBili  1.3    /  DBili  x      /  AST  49     /  ALT  58     /  AlkPhos  131    10 May 2019 22:53    PT/INR - ( 12 May 2019 07:54 )   PT: 54.7 sec;   INR: 4.58 ratio         PTT - ( 10 May 2019 22:53 )  PTT:31.0 sec    < from: 12 Lead ECG (05.10.19 @ 22:49) >  Ventricular Rate 54 BPM    Atrial Rate 54 BPM    P-R Interval 204 ms    QRS Duration 120 ms    Q-T Interval 484 ms    QTC Calculation(Bezet) 458 ms    P Axis 70 degrees    R Axis -37 degrees    T Axis 81 degrees    Diagnosis Line Sinus bradycardia with occasional ventricular-paced complexes  Incomplete left bundle branch block  Abnormal ECG  No previous ECGs available  Confirmed by humble Araya ([id number]) on 5/11/2019 1:35:38 PM    < end of copied text >  < from: TTE Echo Doppler w/o Cont (05.11.19 @ 10:07) >   EXAM:  ECHO TTE WO CON COMP W DOPPLR         PROCEDURE DATE:  05/11/2019        INTERPRETATION:  Ordering Physician: GREGORY M REYES [id number]    Indication: Congestive heart failure    Study Quality: Technically difficult   A complete echocardiographic study was performed utilizing standard   protocol including spectral and color Doppler in all echocardiographic   windows.    Height: 149.8 cm  Weight: 55.8 kg  BSA: 1.5 sq m  Blood Pressure: 152/76    MEASUREMENTS  IVS: 0.9cm  PWT: 0.8cm  LA:2.9cm  AO: 2.1cm  LVIDd: 4.0cm  LVIDs: 2.2cm    RVSP: 61mmHg    FINDINGS  Left Ventricle: The endocardium is not well-visualized. There appears to   be grossly preserved left ventricular systolic function.  Aortic Valve: Status post bioprosthetic aortic valve replacement with   trace to mild paravalvular leak  Mitral Valve: Thickened mitral valve leaflets with decreased diastolic   opening. Mitral annular calcification. Severe mitral regurgitation  Tricuspid Valve: Moderate to severe tricuspid regurgitation.  Pulmonic Valve: The pulmonic valve is not well-visualized. Mild pulmonic   insufficiency  Left Atrium: Mild left atrial enlargement  Right Ventricle: The right ventricle is not well-visualized.  Right Atrium: Grossly normal right atrium  Diastolic Function: Marked (grade 3) diastolic dysfunction. E/e' is   elevated suggestive of elevated left sided filling pressures.  Pericardium/Pleura: No significant pericardial effusion  Hemodynamics: The pulmonary artery systolic pressure is estimated to be   61 mmHg, assuming the right atrial pressure is equal to 8 mmHg,   consistent with severely elevated pulmonary pressures.    CONCLUSIONS:  1. Technically difficult study  2. The endocardium is not well-visualized. There appears to be grossly   preserved left ventricular systolic function.  3. Status post bioprosthetic aortic valve replacement with trace to mild   paravalvular leak  4. Thickened mitral valve leaflets with decreased diastolic opening.   Severe mitral regurgitation  5. Moderate to severe tricuspid regurgitation.  6. Marked diastolic dysfunction. E/e' is elevated suggestive of elevated   left sided filling pressures.  7. Severely elevated pulmonary pressures  8. No significant pericardial effusion.    No prior exam for comparison.          < end of copied text >    < from: CT Abdomen and Pelvis No Cont (05.11.19 @ 01:13) >    EXAM:  CT ABDOMEN AND PELVIS                          EXAM:  CT CHEST                            PROCEDURE DATE:  05/11/2019          INTERPRETATION:  CT of the chest, abdomen and pelvis    Indication: Upper back and chest pain    Technique: Axial images were obtained from the thoracic inlet through   pubic symphysis without oral or IV contrast. Reformatted coronal and   sagittal images were submitted.    Comparison: None    FINDINGS:    Evaluation of the solid abdominal organs is limited without contrast.    The visualized neck, axilla and subcutaneous tissues are unremarkable.    The tracheobronchial tree is patent centrally.  There is no mediastinal   hematoma.  The great vessels are not enlarged. Coronary atherosclerosis.   Nonspecific mediastinal lymph nodes measuring up to 1 cm in the right   paratracheal region. Classification in the subcarinal region and right   hilum are related to previous granulomatous disease.    The heart is not enlarged. There is radiodense device in the right   ventricle. There is a stent in the aortic valve. There is no pericardial   effusion.    Lungs: Groundglass opacities and septal thickening supporting   interstitial edema.    Pleura: Mild bilateral pleural effusions.  There is no free air or focal collection.  No free fluid.    Liver: Coarse calcification related to previous granulomatous disease..  Spleen: Normal.  Gallbladder: Questionable nonspecific wall thickening which may be   further characterized with ultrasound.  Biliary tree: Unremarkable.  Adrenal glands: Normal.  Pancreas: Normal.  Kidneys: No hydronephrosis or obstructing stone.    Bowel:  There is no small bowel obstruction.  Moderate hiatal hernia.   Diffuse colonic diverticulosis.    Bladder: Decompressed.  Pelvic organs: No pelvic masses.    There is no significant adenopathy.  Vasculature: The aorta is not dilated. Moderate atherosclerotic vascular   calcification    Retroperitoneum: There is no mass.  Bones: Mild S-shaped scoliosis of the thoracolumbar spine.  Subcutaneous tissues: Unremarkable.    IMPRESSION:    Interstitial pulmonary edema. Mild bilateral pleural effusions.  Moderate hiatal hernia.  No aortic aneurysm or retroperitoneal hemorrhage.  Diverticulosis.                STEW JOEL M.D, ATTENDING RADIOLOGIST  This document has been electronically signed. May 11 2019  2:01AM    < end of copied text >           < from: CT Chest No Cont (05.11.19 @ 01:12) >  EXAM:  CT ABDOMEN AND PELVIS                          EXAM:  CT CHEST                            PROCEDURE DATE:  05/11/2019          INTERPRETATION:  CT of the chest, abdomen and pelvis    Indication: Upper back and chest pain    Technique: Axial images were obtained from the thoracic inlet through   pubic symphysis without oral or IV contrast. Reformatted coronal and   sagittal images were submitted.    Comparison: None    FINDINGS:    Evaluation of the solid abdominal organs is limited without contrast.    The visualized neck, axilla and subcutaneous tissues are unremarkable.    The tracheobronchial tree is patent centrally.  There is no mediastinal   hematoma.  The great vessels are not enlarged. Coronary atherosclerosis.   Nonspecific mediastinal lymph nodes measuring up to 1 cm in the right   paratracheal region. Classification in the subcarinal region and right   hilum are related to previous granulomatous disease.    The heart is not enlarged. There is radiodense device in the right   ventricle. There is a stent in the aortic valve. There is no pericardial   effusion.    Lungs: Groundglass opacities and septal thickening supporting   interstitial edema.    Pleura: Mild bilateral pleural effusions.  There is no free air or focal collection.  No free fluid.    Liver: Coarse calcification related to previous granulomatous disease..  Spleen: Normal.  Gallbladder: Questionable nonspecific wall thickening which may be   further characterized with ultrasound.  Biliary tree: Unremarkable.  Adrenal glands: Normal.  Pancreas: Normal.  Kidneys: No hydronephrosis or obstructing stone.    Bowel:  There is no small bowel obstruction.  Moderate hiatal hernia.   Diffuse colonic diverticulosis.    Bladder: Decompressed.  Pelvic organs: No pelvic masses.    There is no significant adenopathy.  Vasculature: The aorta is not dilated. Moderate atherosclerotic vascular   calcification    Retroperitoneum: There is no mass.  Bones: Mild S-shaped scoliosis of the thoracolumbar spine.  Subcutaneous tissues: Unremarkable.    IMPRESSION:    Interstitial pulmonary edema. Mild bilateral pleural effusions.  Moderate hiatal hernia.  No aortic aneurysm or retroperitoneal hemorrhage.  Diverticulosis.                STEW MALDONADO M.D, ATTENDING RADIOLOGIST  This document has been electronically signed. May 11 2019  2:01AM    < end of copied text >
INTERVAL HPI/OVERNIGHT EVENTS: Patient seen and examined at bedside. Had nausea from morphine, stated tramadol instead. Improved nausea with zofran.     MEDICATIONS  (STANDING):  apixaban 2.5 milliGRAM(s) Oral every 12 hours  clopidogrel Tablet 75 milliGRAM(s) Oral daily  metoprolol tartrate 25 milliGRAM(s) Oral two times a day  multivitamin 1 Tablet(s) Oral daily  pantoprazole    Tablet 40 milliGRAM(s) Oral before breakfast    MEDICATIONS  (PRN):  acetaminophen   Tablet .. 650 milliGRAM(s) Oral every 6 hours PRN Mild Pain (1 - 3)  ondansetron    Tablet 4 milliGRAM(s) Oral every 6 hours PRN Nausea and/or Vomiting  traMADol 25 milliGRAM(s) Oral every 8 hours PRN Moderate Pain (4 - 6)      Allergies    adhesives (Rash)  penicillin (Unknown)    Intolerances        CONSTITUTIONAL: No weakness, fevers or chills  RESPIRATORY: No cough, wheezing, hemoptysis; No shortness of breath  Cvs: No chest pain or palpitations  Gi: No abdominal pain. No nausea, vomiting, diarrhea or constipation. No melena or hematochezia.  Neuro: no weakness    All other review of systems is negative unless indicated above.    Vital Signs Last 24 Hrs  T(C): 36.8 (11 May 2019 11:23), Max: 36.8 (11 May 2019 07:34)  T(F): 98.3 (11 May 2019 11:23), Max: 98.3 (11 May 2019 11:23)  HR: 73 (11 May 2019 11:23) (55 - 94)  BP: 145/68 (11 May 2019 11:23) (133/76 - 164/72)  BP(mean): 95 (11 May 2019 07:01) (93 - 103)  RR: 17 (11 May 2019 11:23) (15 - 18)  SpO2: 99% (11 May 2019 11:23) (94% - 99%)    General: NAD  Neurology: A&Ox3 , nonfocal  Respiratory: CTA B/L  CV: RRR, S1S2 +murmur  Abdominal: Soft, NT, ND +BS  Extremities: No edema, + peripheral pulses      LABS:                        8.7    9.36  )-----------( 166      ( 11 May 2019 05:55 )             28.0     05-11    146<H>  |  114<H>  |  53<H>  ----------------------------<  120<H>  5.0   |  19<L>  |  2.00<H>    Ca    9.0      11 May 2019 05:55  Mg     2.2     05-11    TPro  6.8  /  Alb  3.9  /  TBili  1.7<H>  /  DBili  x   /  AST  62<H>  /  ALT  62  /  AlkPhos  118  05-11    PT/INR - ( 11 May 2019 05:55 )   PT: 50.5 sec;   INR: 4.27 ratio         PTT - ( 10 May 2019 22:53 )  PTT:31.0 sec      RADIOLOGY & ADDITIONAL TESTS:
INTERVAL HPI/OVERNIGHT EVENTS: Patient seen and examined at bedside. Back pain improved. Noted to be anemic today. Denies any melena. Will need to check, patient aware.     MEDICATIONS  (STANDING):  apixaban 2.5 milliGRAM(s) Oral every 12 hours  brimonidine 0.2%/ timolol 0.5% Ophthalmic Solution 1 Drop(s) Both EYES every 12 hours  clopidogrel Tablet 75 milliGRAM(s) Oral daily  hydrocortisone 1% Cream 1 Application(s) Topical daily  metoprolol tartrate 25 milliGRAM(s) Oral two times a day  multivitamin 1 Tablet(s) Oral daily  pantoprazole    Tablet 40 milliGRAM(s) Oral before breakfast    MEDICATIONS  (PRN):  acetaminophen   Tablet .. 650 milliGRAM(s) Oral every 6 hours PRN Mild Pain (1 - 3)  ondansetron    Tablet 4 milliGRAM(s) Oral every 6 hours PRN Nausea and/or Vomiting  traMADol 25 milliGRAM(s) Oral every 8 hours PRN Moderate Pain (4 - 6)      Allergies    adhesives (Rash)  penicillin (Unknown)    Intolerances        CONSTITUTIONAL: No weakness, fevers or chills  RESPIRATORY: No cough, wheezing, hemoptysis; No shortness of breath  Cvs: No chest pain or palpitations  Gi: No abdominal pain. No nausea, vomiting, diarrhea or constipation. No melena or hematochezia.  Neuro: no weakness    All other review of systems is negative unless indicated above.    Vital Signs Last 24 Hrs  T(C): 36.7 (12 May 2019 08:31), Max: 36.8 (12 May 2019 04:28)  T(F): 98 (12 May 2019 08:31), Max: 98.2 (12 May 2019 04:28)  HR: 60 (12 May 2019 11:49) (53 - 66)  BP: 168/75 (12 May 2019 11:49) (103/69 - 175/71)  BP(mean): --  RR: 16 (12 May 2019 08:31) (16 - 16)  SpO2: 96% (12 May 2019 11:49) (92% - 96%)    General: NAD  Neurology: A&Ox3 , nonfocal  Respiratory: CTA B/L  CV: RRR, S1S2 +murmur  Abdominal: Soft, NT, ND +BS  Extremities: No edema, + peripheral pulses    LABS:                        7.6    8.41  )-----------( 150      ( 12 May 2019 07:54 )             24.7     05-12    144  |  112<H>  |  56<H>  ----------------------------<  103<H>  4.5   |  22  |  1.80<H>    Ca    8.3<L>      12 May 2019 07:54  Mg     2.2     05-11    TPro  6.8  /  Alb  3.9  /  TBili  1.7<H>  /  DBili  x   /  AST  62<H>  /  ALT  62  /  AlkPhos  118  05-11    PT/INR - ( 12 May 2019 07:54 )   PT: 54.7 sec;   INR: 4.58 ratio         PTT - ( 10 May 2019 22:53 )  PTT:31.0 sec

## 2019-05-13 NOTE — DISCHARGE NOTE PROVIDER - CARE PROVIDER_API CALL
Triston Abernathy)  Hematology; Medical Oncology  [address] [facility], [place] [address]  Phone: [phone]  Fax: [phone]  Follow Up Time: Triston Abernathy (MD)  Hematology; Medical Oncology  [address] [facility], [address] [place] [address]  Phone: [phone]  Fax: [phone]  Follow Up Time:     James Palmer (DO)  Cardiovascular Disease; Internal Medicine  [facility], [address] [place] [address]  Phone: [phone]  Fax: [phone]  Follow Up Time:     Dr Chaney,   Phone: (   )    -  Fax: (   )    -  Follow Up Time:

## 2019-05-13 NOTE — DISCHARGE NOTE PROVIDER - NSDCCPCAREPLAN_GEN_ALL_CORE_FT
PRINCIPAL DISCHARGE DIAGNOSIS  Diagnosis: Back pain  Assessment and Plan of Treatment: Your CT showed scoliosis, if persistent back pain consider orthopedic follow up outpatient      SECONDARY DISCHARGE DIAGNOSES  Diagnosis: HLD (hyperlipidemia)  Assessment and Plan of Treatment: Continue home medications    Diagnosis: Elevated INR  Assessment and Plan of Treatment: Anemia  Your apixiban dose was reduced base on your renal functiona and because your INR was elevated  Please call and follow up with hematologist for close outpatient follow up and work up  Continue folic acid      Diagnosis: Afib  Assessment and Plan of Treatment: Your apixiban dose was reduced base on your renal function and because your INR was elevated    Diagnosis: Aortic valve replaced  Assessment and Plan of Treatment: Follow up with cardiologist as instructed

## 2019-05-13 NOTE — DISCHARGE NOTE PROVIDER - HOSPITAL COURSE
89 yo female with pmhx of recent TAVR for severe AS (4/2019), new LBBB s/p leadless micra PPM (4/2019), afib (on eliquis), CAD s/p 1 stent (3/2019), Diastolic HF, HTN, HLD, asthma, skin basal cell presented to ED with complaints of persistent upper left scapula pain, generalized weakness since her cardiac procedures in April 2019. Patient stated that her back pain is constant throughout the day, achy in nature, went to see her cardiologist regarding her back pain and was told to take aleve for back spasm. However, patient stated that aleve and hot shower only help alleviate the back pain for less than 1 hour before pain returns. Patient also stated that she has not slept well for 2 weeks, feels weak all over, and cannot take care of herself at home. Patient denies noticing any sob with back pain. Sleeps on a flat bed without SOB, however, stated that she feels weak when she has to climb just 6 steps in her house and needed to sit down afterwards. Denies feeling SANCHEZ while walking. Patient denies any fever, headache, dizziness, chest pain, palpitations, sob, sanchez, abdominal pain, nausea, diarrhea, dysuria, hematuria, or melena.             In the ED, afebrile 67bpm, 139/70, RR 16@ 97% room air    no leukocytosis    H/H 9.2/30.4 ([facility] to [facility]    INR 3.95    D-dimer 470    Na 145, K 4.9, BUN 43, Cr 1.8, glucose 127, GFR 24    Tbili 1.3, Alk phos 131, AST 49, ALT 58    ProBNP 5498 (previous 369 in 3/2019)    CT chest/abdomen/pelvis: Interstitial pulmonary edema. Mild bilateral pleural effusions. Moderate hiatal hernia. No aortic aneurysm or retroperitoneal hemorrhage. Diverticulosis.    EKG showed sinus princess, LBBB at 54bpm     Received morphine x2 zofran x1 in ED 89 yo female with pmhx of recent TAVR for severe AS (4/2019), new LBBB s/p leadless micra PPM (4/2019), afib (on eliquis), CAD s/p 1 stent (3/2019), Diastolic HF, HTN, HLD, asthma, skin basal cell presented to ED with complaints of persistent upper left scapula pain, generalized weakness since her cardiac procedures in April 2019. Patient stated that her back pain is constant throughout the day, achy in nature, went to see her cardiologist regarding her back pain and was told to take aleve for back spasm. However, patient stated that aleve and hot shower only help alleviate the back pain for less than 1 hour before pain returns. Patient also stated that she has not slept well for 2 weeks, feels weak all over, and cannot take care of herself at home. Patient denies noticing any sob with back pain. Sleeps on a flat bed without SOB, however, stated that she feels weak when she has to climb just 6 steps in her house and needed to sit down afterwards. Denies feeling SANCHEZ while walking. Patient denies any fever, headache, dizziness, chest pain, palpitations, sob, sanchez, abdominal pain, nausea, diarrhea, dysuria, hematuria, or melena.             In the ED, afebrile 67bpm, 139/70, RR 16@ 97% room air    no leukocytosis    H/H 9.2/30.4 ([facility]    INR 3.95    D-dimer 470    Na 145, K 4.9, BUN 43, Cr 1.8, glucose 127, GFR 24    Tbili 1.3, Alk phos 131, AST 49, ALT 58    ProBNP 5498 (previous 369 in 3/2019)    CT chest/abdomen/pelvis: Interstitial pulmonary edema. Mild bilateral pleural effusions. Moderate hiatal hernia. No aortic aneurysm or retroperitoneal hemorrhage. Diverticulosis.    EKG showed sinus princess, LBBB at 54bpm     Received morphine x2 zofran x1 in ED        Patient was admitted for  persistent back pain, weakness, CHASITY, pulm edema. She had a CT done showing Bones: Mild S-shaped scoliosis of the thoracolumbar spine. No aneurysm or retroperitoneal hemorrhage. She was seen by cardiology, no acute findings given recent TAVR. Her CHASITY improved with oral hydration and her apixiban dose was adjusted for renal clearance. She was noted to have anemia and Elevated INR. She was seen my Hematology, FOBT was negative. Work up sent and as per Hematology stable for discharge with close outpatient hematology follow up.    Patient remains hemodynamically stable for discharge with close outpatient follow up. 89 yo female with pmhx of recent TAVR for severe AS (4/2019), new LBBB s/p leadless micra PPM (4/2019), afib (on eliquis), CAD s/p 1 stent (3/2019), Diastolic HF, HTN, HLD, asthma, skin basal cell presented to ED with complaints of persistent upper left scapula pain, generalized weakness since her cardiac procedures in April 2019. Patient stated that her back pain is constant throughout the day, achy in nature, went to see her cardiologist regarding her back pain and was told to take aleve for back spasm. However, patient stated that aleve and hot shower only help alleviate the back pain for less than 1 hour before pain returns. Patient also stated that she has not slept well for 2 weeks, feels weak all over, and cannot take care of herself at home. Patient denies noticing any sob with back pain. Sleeps on a flat bed without SOB, however, stated that she feels weak when she has to climb just 6 steps in her house and needed to sit down afterwards. Denies feeling SANCHEZ while walking. Patient denies any fever, headache, dizziness, chest pain, palpitations, sob, sanchez, abdominal pain, nausea, diarrhea, dysuria, hematuria, or melena.             In the ED, afebrile 67bpm, 139/70, RR 16@ 97% room air    no leukocytosis    H/H 9.2/30.4 ([facility] to [facility]    INR 3.95    D-dimer 470    Na 145, K 4.9, BUN 43, Cr 1.8, glucose 127, GFR 24    Tbili 1.3, Alk phos 131, AST 49, ALT 58    ProBNP 5498 (previous 369 in 3/2019)    CT chest/abdomen/pelvis: Interstitial pulmonary edema. Mild bilateral pleural effusions. Moderate hiatal hernia. No aortic aneurysm or retroperitoneal hemorrhage. Diverticulosis.    EKG showed sinus princess, LBBB at 54bpm     Received morphine x2 zofran x1 in ED        Patient was admitted for  persistent back pain, weakness, CHASITY, pulm edema. She had a CT done showing Bones: Mild S-shaped scoliosis of the thoracolumbar spine. No aneurysm or retroperitoneal hemorrhage. She was seen by cardiology, no acute findings given recent TAVR. Her CHASITY improved with oral hydration and her apixiban dose was adjusted for renal clearance. She was noted to have anemia and Elevated INR. 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 Patient remains hemodynamically stable for discharge with close outpatient follow up.        Vital Signs Last 24 Hrs    T(C): 37.1 (13 May 2019 08:00), Max: 37.1 (13 May 2019 08:00)    T(F): 98.7 (13 May 2019 08:00), Max: 98.7 (13 May 2019 08:00)    HR: 56 (13 May 2019 08:00) (56 - 67)    BP: 146/73 (13 May 2019 08:00) (144/78 - 165/79)    BP(mean): --    RR: 16 (13 May 2019 08:00) (16 - 16)    SpO2: 96% (13 May 2019 08:00) (91% - 96%) 89 yo female with pmhx of recent TAVR for severe AS (4/2019), new LBBB s/p leadless micra PPM (4/2019), afib (on eliquis), CAD s/p 1 stent (3/2019), Diastolic HF, HTN, HLD, asthma, skin basal cell presented to ED with complaints of persistent upper left scapula pain, generalized weakness since her cardiac procedures in April 2019. Patient stated that her back pain is constant throughout the day, achy in nature, went to see her cardiologist regarding her back pain and was told to take aleve for back spasm. However, patient stated that aleve and hot shower only help alleviate the back pain for less than 1 hour before pain returns. Patient also stated that she has not slept well for 2 weeks, feels weak all over, and cannot take care of herself at home. Patient denies noticing any sob with back pain. Sleeps on a flat bed without SOB, however, stated that she feels weak when she has to climb just 6 steps in her house and needed to sit down afterwards. Denies feeling SANCHEZ while walking. Patient denies any fever, headache, dizziness, chest pain, palpitations, sob, sanchez, abdominal pain, nausea, diarrhea, dysuria, hematuria, or melena.             In the ED, afebrile 67bpm, 139/70, RR 16@ 97% room air    no leukocytosis    H/H 9.2/30.4 ([facility] to [facility]    INR 3.95    D-dimer 470    Na 145, K 4.9, BUN 43, Cr 1.8, glucose 127, GFR 24    Tbili 1.3, Alk phos 131, AST 49, ALT 58    ProBNP 5498 (previous 369 in 3/2019)    CT chest/abdomen/pelvis: Interstitial pulmonary edema. Mild bilateral pleural effusions. Moderate hiatal hernia. No aortic aneurysm or retroperitoneal hemorrhage. Diverticulosis.    EKG showed sinus princess, LBBB at 54bpm     Received morphine x2 zofran x1 in ED        Patient was admitted for  persistent back pain, weakness, CHASITY, pulm edema. She had a CT done showing Bones: Mild S-shaped scoliosis of the thoracolumbar spine. No aneurysm or retroperitoneal hemorrhage. She was seen by cardiology, no acute findings given recent TAVR. Her CHASITY improved with oral hydration and her apixiban dose was adjusted for renal clearance. She was noted to have anemia and Elevated INR. 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 Patient remains hemodynamically stable for discharge with close outpatient follow up.        Vital Signs Last 24 Hrs    T(C): 37.1 (13 May 2019 08:00), Max: 37.1 (13 May 2019 08:00)    T(F): 98.7 (13 May 2019 08:00), Max: 98.7 (13 May 2019 08:00)    HR: 56 (13 May 2019 08:00) (56 - 67)    BP: 146/73 (13 May 2019 08:00) (144/78 - 165/79)    BP(mean): --    RR: 16 (13 May 2019 08:00) (16 - 16)    SpO2: 96% (13 May 2019 08:00) (91% - 96%)        General: NAD    Neurology: A&Ox3 , nonfocal    Respiratory: CTA B/L    CV: RRR, S1S2 +murmur    Abdominal: Soft, NT, ND +BS    Extremities: No edema, + peripheral pulses

## 2019-05-13 NOTE — DISCHARGE NOTE PROVIDER - PROVIDER TOKENS
PROVIDER:[TOKEN:[[id number]]] PROVIDER:[TOKEN:[[id number]]],PROVIDER:[TOKEN:[[id number]]],FREE:[LAST:[Dr Chaney],PHONE:[(   )    -],FAX:[(   )    -]]

## 2019-05-13 NOTE — DISCHARGE NOTE NURSING/CASE MANAGEMENT/SOCIAL WORK - NSDCPEPT PROEDHF_GEN_ALL_CORE
Low salt diet/Call primary care provider for follow up after discharge/Monitor weight daily/Report signs and symptoms to primary care provider/Activities as tolerated

## 2019-05-13 NOTE — CONSULT NOTE ADULT - ASSESSMENT
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

## 2019-05-13 NOTE — PROGRESS NOTE ADULT - ASSESSMENT
Mrs. Junior is a 89 y/o F with asthma, HTN, HLD, severe AS, Diastolic HF, CAD s/p PCI to mid RCA in 3/2019.  She had severe AS, and is s/p tavr on 4/24/2019. Post-operatively, she had a new LBBB and a micra PPM was placed on 4/25/2019.  She presents yesterday with severe back pain. CT chest with pulmonary edema though no rp bleed or hematoma.  Acute on chronic diastolic congestive heart failure.    - s/p IV Lasix with improvement in her symptoms.   - Cr has now improved. Start lasix 20mg PO Qday.   - Please continue to maintain strict I/Os, monitor daily weights, Cr, and K.     - No sign of acute ischemia. her EKG is a SR with LBBB  - Echocardiogram performed with well seated TAVR, with trace paravalvular leak. She does have significant MR, which was not present on prior echocardiograms, which is hopefully volume and BP related, and will improve with diuretics. There is no obvious systolic anterior motion of the MV or lvot obstruction. Her LV function is normal. Will repeat as an outpt.     - She is on eliquis for AF,   - She is on Plavix for her recent TAVR and PCI.    - Her INR is trending up and her Hgb is trending down.  Anemia w/u as per medicine.  Monitor for active bleed.  Trend INR.    - ?poor PO intake  - May need heme eval.     - HTN - continue bb  - May try norvasc if needed.     - Pain control  - Watch creatinine and electrolytes. Keep K>4, Mg>2  - Will follow with you.

## 2019-05-13 NOTE — DISCHARGE NOTE PROVIDER - NSDCFUADDAPPT_GEN_ALL_CORE_FT
Call hematologist and make a follow  up appointment in 1-2 weeks.  Call cardiologist and make follow up appointment in 1-2 weeks, your eliquis was reduced based on your renal clearance you need close renal function testing with your cardiologist

## 2019-05-13 NOTE — CONSULT NOTE ADULT - SUBJECTIVE AND OBJECTIVE BOX
[facility] - Chandra Tomlinson, Pamela, Ezekiel, Sunil, Marshal Alanis  Office Number: [phone]    Initial Consult Note    CHIEF COMPLAINT: Patient is a 89y old  Female who presents with a chief complaint of back pain, weakness (11 May 2019 03:17)      HPI:  Patient is a 89 yo female with pmhx of recent TAVR for severe AS (4/2019), new LBBB s/p leadless micra PPM (4/2019), afib (on eliquis), CAD s/p 1 stent (3/2019), Diastolic HF, HTN, HLD, asthma, skin basal cell presented to ED with complaints of persistent upper left scapula pain, generalized weakness since her cardiac procedures in April 2019. Patient stated that her back pain is constant throughout the day, achy in nature, went to see her cardiologist regarding her back pain and was told to take aleve for back spasm. However, patient stated that aleve and hot shower only help alleviate the back pain for less than 1 hour before pain returns. Patient also stated that she has not slept well for 2 weeks, feels weak all over, and cannot take care of herself at home. Patient denies noticing any sob with back pain. Sleeps on a flat bed without SOB, however, stated that she feels weak when she has to climb just 6 steps in her house and needed to sit down afterwards. Denies feeling SANCHEZ while walking. Patient denies any fever, headache, dizziness, chest pain, palpitations, sob, sanchez, abdominal pain, nausea, diarrhea, dysuria, hematuria, or melena.     CT chest/abdomen/pelvis: Interstitial pulmonary edema. Mild bilateral pleural effusions. Moderate hiatal hernia. No aortic aneurysm or retroperitoneal hemorrhage. Diverticulosis.  EKG showed sinus princess, LBBB at 54bpm   Received morphine x2 zofran x1 in ED (11 May 2019 03:17)    She is feeling better this morning, and her breathing has improved.  She has no chest pain, difficulty breathing or palpitations.  She is s/p Lasix 20 iv x 1 this morning      PAST MEDICAL & SURGICAL HISTORY:  Asthma  HLD (hyperlipidemia)  Aortic stenosis  Heart failure  Pacemaker  Glaucoma  Hypertension  Bronchitis  Afib  S/P coronary artery stent placement  S/P TAVR (transcatheter aortic valve replacement)      SOCIAL HISTORY:  No tobacco, ethanol, or drug abuse.    FAMILY HISTORY:    No family history of acute MI or sudden cardiac death.    MEDICATIONS  (STANDING):  apixaban 2.5 milliGRAM(s) Oral every 12 hours  clopidogrel Tablet 75 milliGRAM(s) Oral daily  metoprolol tartrate 25 milliGRAM(s) Oral two times a day  multivitamin 1 Tablet(s) Oral daily  pantoprazole    Tablet 40 milliGRAM(s) Oral before breakfast    MEDICATIONS  (PRN):  acetaminophen   Tablet .. 650 milliGRAM(s) Oral every 6 hours PRN Mild Pain (1 - 3)  ondansetron    Tablet 4 milliGRAM(s) Oral every 6 hours PRN Nausea and/or Vomiting  traMADol 25 milliGRAM(s) Oral every 8 hours PRN Moderate Pain (4 - 6)      Allergies    adhesives (Rash)  penicillin (Unknown)    Intolerances        REVIEW OF SYSTEMS:    CONSTITUTIONAL: No weakness, fevers or chills; + back pain  EYES/ENT: No visual changes;  No vertigo or throat pain   NECK: No pain or stiffness  RESPIRATORY: No cough, wheezing, hemoptysis; No shortness of breath  CARDIOVASCULAR: No chest pain or palpitations  GASTROINTESTINAL: No abdominal pain. No nausea, vomiting, or hematemesis; No diarrhea or constipation. No melena or hematochezia.  GENITOURINARY: No dysuria, frequency or hematuria  NEUROLOGICAL: No numbness or weakness  SKIN: No itching or rash  All other review of systems is negative unless indicated above    VITAL SIGNS:   Vital Signs Last 24 Hrs  T(C): 36.8 (11 May 2019 11:23), Max: 36.8 (11 May 2019 07:34)  T(F): 98.3 (11 May 2019 11:23), Max: 98.3 (11 May 2019 11:23)  HR: 73 (11 May 2019 11:23) (55 - 94)  BP: 145/68 (11 May 2019 11:23) (133/76 - 164/72)  BP(mean): 95 (11 May 2019 07:01) (93 - 103)  RR: 17 (11 May 2019 11:23) (15 - 18)  SpO2: 99% (11 May 2019 11:23) (94% - 99%)    I&O's Summary      On Exam:    Constitutional: NAD, alert and oriented x 3  Lungs:  Non-labored, decreased bs at bases, No wheezing, rales or rhonchi  Cardiovascular: RRR.  S1 and S2 positive.  +sm at rusb, no rubs, gallops or clicks  Gastrointestinal: Bowel Sounds present, soft, nontender.   Lymph: No peripheral edema. No cervical lymphadenopathy.  Neurological: Alert, no focal deficits  Skin: No rashes or ulcers   + healing ecchymosis at site of left groin; small collections at right groin. No significant hematoma.  Psych:  Mood & affect appropriate.    LABS: All Labs Reviewed:                        8.7    9.36  )-----------( 166      ( 11 May 2019 05:55 )             28.0                         9.2    10.08 )-----------( 201      ( 10 May 2019 22:53 )             30.4     11 May 2019 05:55    146    |  114    |  53     ----------------------------<  120    5.0     |  19     |  2.00   10 May 2019 22:53    145    |  115    |  43     ----------------------------<  127    4.9     |  16     |  1.80     Ca    9.0        11 May 2019 05:55  Ca    8.7        10 May 2019 22:53  Mg     2.2       11 May 2019 05:55    TPro  6.8    /  Alb  3.9    /  TBili  1.7    /  DBili  x      /  AST  62     /  ALT  62     /  AlkPhos  118    11 May 2019 05:55  TPro  7.1    /  Alb  4.1    /  TBili  1.3    /  DBili  x      /  AST  49     /  ALT  58     /  AlkPhos  131    10 May 2019 22:53    PT/INR - ( 11 May 2019 05:55 )   PT: 50.5 sec;   INR: 4.27 ratio         PTT - ( 10 May 2019 22:53 )  PTT:31.0 sec      Blood Culture:   05-10 @ 22:53  Pro Bnp 5498        RADIOLOGY:    EKG: sr, lbbb
Patient is a 89y old  Female who presents with a chief complaint of back pain, weakness (13 May 2019 10:33)      HPI:  Patient is a 89 yo female with pmhx of recent TAVR for severe AS (4/2019), new LBBB s/p leadless micra PPM (4/2019), afib (on eliquis), CAD s/p 1 stent (3/2019), Diastolic HF, HTN, HLD, asthma, skin basal cell presented to ED on 5/11/19 with complaints of persistent upper left scapula pain, generalized weakness since her cardiac procedures in April 2019. Patient stated that her back pain is constant throughout the day, achy in nature, went to see her cardiologist regarding her back pain and was told to take aleve for back spasm. Patient denied any fever, headache, dizziness, chest pain, palpitations, sob, trujillo, abdominal pain, nausea, diarrhea, dysuria, hematuria, or melena.   reported to me that she did not eat for 4 days prior to coming in.  Noted on CT chest/abdomen/pelvis: Interstitial pulmonary edema. Mild bilateral pleural effusions. Moderate hiatal hernia. No aortic aneurysm or retroperitoneal hemorrhage. Diverticulosis.    Asked to evaluate patient for anemia and coagulopathy    ROS:  Negative except for: persistent back pain, intermittent nausea, constipation, poor appetite    PAST MEDICAL & SURGICAL HISTORY:  Asthma  HLD (hyperlipidemia)  Aortic stenosis  Heart failure  Pacemaker  Glaucoma  Hypertension  Bronchitis  Afib  S/P coronary artery stent placement  S/P TAVR (transcatheter aortic valve replacement) 4/2019      SOCIAL HISTORY:  Lives at home  denies EtOH   darryn tobacco use    FAMILY HISTORY:  non-contributory    MEDICATIONS  (STANDING):  apixaban 2.5 milliGRAM(s) Oral every 12 hours  brimonidine 0.2%/ timolol 0.5% Ophthalmic Solution 1 Drop(s) Both EYES every 12 hours  clopidogrel Tablet 75 milliGRAM(s) Oral daily  folic acid 1 milliGRAM(s) Oral daily  hydrocortisone 1% Cream 1 Application(s) Topical daily  metoprolol tartrate 25 milliGRAM(s) Oral two times a day  multivitamin 1 Tablet(s) Oral daily  pantoprazole    Tablet 40 milliGRAM(s) Oral before breakfast    MEDICATIONS  (PRN):  acetaminophen   Tablet .. 650 milliGRAM(s) Oral every 6 hours PRN Mild Pain (1 - 3)  ondansetron    Tablet 4 milliGRAM(s) Oral every 6 hours PRN Nausea and/or Vomiting  traMADol 25 milliGRAM(s) Oral every 8 hours PRN Moderate Pain (4 - 6)      Allergies    adhesives (Rash)  penicillin (Unknown)        Vital Signs Last 24 Hrs  T(C): 37.1 (13 May 2019 08:00), Max: 37.1 (13 May 2019 08:00)  T(F): 98.7 (13 May 2019 08:00), Max: 98.7 (13 May 2019 08:00)  HR: 56 (13 May 2019 08:00) (56 - 67)  BP: 146/73 (13 May 2019 08:00) (144/78 - 168/75)  BP(mean): --  RR: 16 (13 May 2019 08:00) (16 - 16)  SpO2: 96% (13 May 2019 08:00) (91% - 96%)    PHYSICAL EXAM  General: adult elderly woman- appears younger than stated age   HEENT: clear oropharynx, anicteric sclera, pink conjunctivae  Neck: supple  CV: normal S1S2 with no murmur rubs or gallops  Lungs: clear to auscultation, no wheezes, no rhales  Abdomen: soft non-tender non-distended, no hepato/splenomegaly  Ext: no clubbing cyanosis or edema  Skin: no rashes and no petichiae  Neuro: alert and oriented X3 no focal deficits      LABS:    CBC Full  -  ( 13 May 2019 06:19 )  WBC Count : 7.99 K/uL  RBC Count : 2.37 M/uL  Hemoglobin : 7.5 g/dL  Hematocrit : 24.0 %  Platelet Count - Automated : 154 K/uL  Mean Cell Volume : 101.3 fl  Mean Cell Hemoglobin : 31.6 pg  Mean Cell Hemoglobin Concentration : 31.3 gm/dL    Hemoglobin: 7.5 g/dL (05-13 @ 06:19)  Hemoglobin: 7.6 g/dL (05-12 @ 07:54)  Hemoglobin: 8.7 g/dL (05-11 @ 05:55)  Hemoglobin: 9.2 g/dL (05-10 @ 22:53)      05-13    143  |  110<H>  |  48<H>  ----------------------------<  100<H>  4.4   |  24  |  1.30    Ca    8.5      13 May 2019 06:19      PT/INR - ( 13 May 2019 06:19 )   PT: 34.0 sec;   INR: 2.91 ratio    PTT - ( 13 May 2019 06:19 )  PTT:31.7 sec    INR: 2.91 ratio (05-13-19 @ 06:19)  INR: 4.58 ratio (05-12-19 @ 07:54)  INR: 4.27 ratio (05-11-19 @ 05:55)  INR: 3.95 ratio (05-10-19 @ 22:53)    Iron with Total Binding Capacity (05.12.19 @ 14:36)    % Saturation, Iron: 5 %    Iron - Total Binding Capacity.: 306 ug/dL    Iron Total, Serum: 15 ug/dL    Unsaturated Iron Binding Capacity: 291 ug/dL  Ferritin, Serum: 83 ng/mL (05.12.19 @ 14:36)          BLOOD SMEAR INTERPRETATION:    * RBC - macrocytic, normochromic, no anisiocytosis or poikiolocytosis; mild increase in polychromasia  * WBC - neutrophils with normal morphology, small mature lymphs, no evidence of left shift  * Platelets - normal in number and morphology     RADIOLOGY :  < from: CT Abdomen and Pelvis No Cont (05.11.19 @ 01:13) >    IMPRESSION:    Interstitial pulmonary edema. Mild bilateral pleural effusions.  Moderate hiatal hernia.  No aortic aneurysm or retroperitoneal hemorrhage.  Diverticulosis.      < end of copied text >

## 2019-05-13 NOTE — DISCHARGE NOTE NURSING/CASE MANAGEMENT/SOCIAL WORK - NSDCDPATPORTLINK_GEN_ALL_CORE
You can access the [facility], offered by [facility], by registering with the following website: [email or web address] General [email or web address] [facility]. 1

## 2019-05-14 ENCOUNTER — APPOINTMENT (OUTPATIENT)
Dept: ELECTROPHYSIOLOGY | Facility: CLINIC | Age: 84
End: 2019-05-14

## 2019-05-14 RX ORDER — APIXABAN 2.5 MG/1
1 TABLET, FILM COATED ORAL
Qty: 0 | Refills: 0 | DISCHARGE

## 2019-05-14 RX ORDER — OMEPRAZOLE 10 MG/1
1 CAPSULE, DELAYED RELEASE ORAL
Qty: 0 | Refills: 0 | DISCHARGE

## 2019-05-14 RX ORDER — LOSARTAN POTASSIUM 100 MG/1
75 TABLET, FILM COATED ORAL
Qty: 0 | Refills: 0 | DISCHARGE

## 2019-06-06 ENCOUNTER — INPATIENT (INPATIENT)
Facility: HOSPITAL | Age: 84
LOS: 4 days | Discharge: HOME | DRG: 292 | End: 2019-06-11
Attending: INTERNAL MEDICINE | Admitting: HOSPITALIST
Payer: MEDICARE

## 2019-06-06 VITALS
HEIGHT: 59 IN | TEMPERATURE: 98 F | HEART RATE: 85 BPM | SYSTOLIC BLOOD PRESSURE: 139 MMHG | DIASTOLIC BLOOD PRESSURE: 91 MMHG | OXYGEN SATURATION: 100 % | WEIGHT: 113.98 LBS | RESPIRATION RATE: 19 BRPM

## 2019-06-06 DIAGNOSIS — I50.9 HEART FAILURE, UNSPECIFIED: ICD-10-CM

## 2019-06-06 DIAGNOSIS — Z90.721 ACQUIRED ABSENCE OF OVARIES, UNILATERAL: Chronic | ICD-10-CM

## 2019-06-06 DIAGNOSIS — Z95.5 PRESENCE OF CORONARY ANGIOPLASTY IMPLANT AND GRAFT: Chronic | ICD-10-CM

## 2019-06-06 DIAGNOSIS — Z95.2 PRESENCE OF PROSTHETIC HEART VALVE: Chronic | ICD-10-CM

## 2019-06-06 DIAGNOSIS — Z98.890 OTHER SPECIFIED POSTPROCEDURAL STATES: Chronic | ICD-10-CM

## 2019-06-06 DIAGNOSIS — Z98.61 CORONARY ANGIOPLASTY STATUS: Chronic | ICD-10-CM

## 2019-06-06 LAB
ALBUMIN SERPL ELPH-MCNC: 3.9 G/DL — SIGNIFICANT CHANGE UP (ref 3.3–5)
ALP SERPL-CCNC: 69 U/L — SIGNIFICANT CHANGE UP (ref 40–120)
ALT FLD-CCNC: 19 U/L — SIGNIFICANT CHANGE UP (ref 10–45)
ANION GAP SERPL CALC-SCNC: 15 MMOL/L — SIGNIFICANT CHANGE UP (ref 5–17)
APTT BLD: 29.7 SEC — SIGNIFICANT CHANGE UP (ref 27.5–36.3)
AST SERPL-CCNC: 26 U/L — SIGNIFICANT CHANGE UP (ref 10–40)
BASOPHILS # BLD AUTO: 0.1 K/UL — SIGNIFICANT CHANGE UP (ref 0–0.2)
BASOPHILS NFR BLD AUTO: 0.8 % — SIGNIFICANT CHANGE UP (ref 0–2)
BILIRUB SERPL-MCNC: 0.7 MG/DL — SIGNIFICANT CHANGE UP (ref 0.2–1.2)
BUN SERPL-MCNC: 29 MG/DL — HIGH (ref 7–23)
CALCIUM SERPL-MCNC: 10 MG/DL — SIGNIFICANT CHANGE UP (ref 8.4–10.5)
CHLORIDE SERPL-SCNC: 100 MMOL/L — SIGNIFICANT CHANGE UP (ref 96–108)
CO2 SERPL-SCNC: 22 MMOL/L — SIGNIFICANT CHANGE UP (ref 22–31)
CREAT SERPL-MCNC: 1.08 MG/DL — SIGNIFICANT CHANGE UP (ref 0.5–1.3)
EOSINOPHIL # BLD AUTO: 0.4 K/UL — SIGNIFICANT CHANGE UP (ref 0–0.5)
EOSINOPHIL NFR BLD AUTO: 4.4 % — SIGNIFICANT CHANGE UP (ref 0–6)
GLUCOSE SERPL-MCNC: 99 MG/DL — SIGNIFICANT CHANGE UP (ref 70–99)
HCT VFR BLD CALC: 30.5 % — LOW (ref 34.5–45)
HGB BLD-MCNC: 9.5 G/DL — LOW (ref 11.5–15.5)
INR BLD: 1.44 RATIO — HIGH (ref 0.88–1.16)
LYMPHOCYTES # BLD AUTO: 2.9 K/UL — SIGNIFICANT CHANGE UP (ref 1–3.3)
LYMPHOCYTES # BLD AUTO: 33.6 % — SIGNIFICANT CHANGE UP (ref 13–44)
MCHC RBC-ENTMCNC: 29.8 PG — SIGNIFICANT CHANGE UP (ref 27–34)
MCHC RBC-ENTMCNC: 31.1 GM/DL — LOW (ref 32–36)
MCV RBC AUTO: 95.9 FL — SIGNIFICANT CHANGE UP (ref 80–100)
MONOCYTES # BLD AUTO: 0.8 K/UL — SIGNIFICANT CHANGE UP (ref 0–0.9)
MONOCYTES NFR BLD AUTO: 9 % — SIGNIFICANT CHANGE UP (ref 2–14)
NEUTROPHILS # BLD AUTO: 4.5 K/UL — SIGNIFICANT CHANGE UP (ref 1.8–7.4)
NEUTROPHILS NFR BLD AUTO: 52.2 % — SIGNIFICANT CHANGE UP (ref 43–77)
NT-PROBNP SERPL-SCNC: 2371 PG/ML — HIGH (ref 0–300)
PLATELET # BLD AUTO: 322 K/UL — SIGNIFICANT CHANGE UP (ref 150–400)
POTASSIUM SERPL-MCNC: 4.3 MMOL/L — SIGNIFICANT CHANGE UP (ref 3.5–5.3)
POTASSIUM SERPL-SCNC: 4.3 MMOL/L — SIGNIFICANT CHANGE UP (ref 3.5–5.3)
PROT SERPL-MCNC: 7.1 G/DL — SIGNIFICANT CHANGE UP (ref 6–8.3)
PROTHROM AB SERPL-ACNC: 16.7 SEC — HIGH (ref 10–12.9)
RBC # BLD: 3.18 M/UL — LOW (ref 3.8–5.2)
RBC # FLD: 13.7 % — SIGNIFICANT CHANGE UP (ref 10.3–14.5)
SODIUM SERPL-SCNC: 137 MMOL/L — SIGNIFICANT CHANGE UP (ref 135–145)
TROPONIN T, HIGH SENSITIVITY RESULT: 55 NG/L — HIGH (ref 0–51)
TROPONIN T, HIGH SENSITIVITY RESULT: 56 NG/L — HIGH (ref 0–51)
WBC # BLD: 8.7 K/UL — SIGNIFICANT CHANGE UP (ref 3.8–10.5)
WBC # FLD AUTO: 8.7 K/UL — SIGNIFICANT CHANGE UP (ref 3.8–10.5)

## 2019-06-06 PROCEDURE — 93010 ELECTROCARDIOGRAM REPORT: CPT

## 2019-06-06 PROCEDURE — 99285 EMERGENCY DEPT VISIT HI MDM: CPT | Mod: GC,25

## 2019-06-06 PROCEDURE — 71045 X-RAY EXAM CHEST 1 VIEW: CPT | Mod: 26

## 2019-06-06 RX ORDER — FUROSEMIDE 40 MG
40 TABLET ORAL ONCE
Refills: 0 | Status: COMPLETED | OUTPATIENT
Start: 2019-06-06 | End: 2019-06-06

## 2019-06-06 RX ORDER — METOPROLOL TARTRATE 50 MG
25 TABLET ORAL ONCE
Refills: 0 | Status: COMPLETED | OUTPATIENT
Start: 2019-06-06 | End: 2019-06-06

## 2019-06-06 RX ADMIN — Medication 40 MILLIGRAM(S): at 20:40

## 2019-06-06 RX ADMIN — Medication 25 MILLIGRAM(S): at 18:48

## 2019-06-06 NOTE — ED CLERICAL - NS ED CLERK NOTE PRE-ARRIVAL INFORMATION; ADDITIONAL PRE-ARRIVAL INFORMATION
CC/Reason For referral: rapid Afib// decompensated HF  Preferred Consultant(if applicable):  Who admits for you (if needed):  Do you have documents you would like to fax over?  Would you still like to speak to an ED attending? yes

## 2019-06-06 NOTE — ED ADULT NURSE NOTE - OBJECTIVE STATEMENT
89YF pmh CAD, afib, HTN, HLD presents to ED from cardiologist office for rapid heart rate. Per EMS her heart rate was in 140s in cardiologist office. At the ED pt HR in 120s. Pt placed on cardiac monitor, EKG done. Denies HA, weakness, tingling, numbness, CP, fever, chills, SOB, N/V/D, abd pain, burning upon urination. Safety and comfort measures provided. Will continue to monitor. no family at bedside.

## 2019-06-06 NOTE — ED PROVIDER NOTE - OBJECTIVE STATEMENT
89yo F hx afib on eliquis, chf, recent tavr p/w several days of progressive generalized weakness, dyspnea on exertion. Found on outpt testing to be in rapid afib to 140s and concern for chf exacerbation. Denies fever, cough, n/v/d, abd pain, urinary sx, or chest pain, or leg pain/swelling. With EMS rapid afib broke to 110s. normotensive. 88% on RA now 98% on NC

## 2019-06-06 NOTE — ED ADULT NURSE NOTE - NSIMPLEMENTINTERV_GEN_ALL_ED
Implemented All Fall with Harm Risk Interventions:  [place] to call system. Call bell, personal items and telephone within reach. Instruct patient to call for assistance. Room bathroom lighting operational. Non-slip footwear when patient is off stretcher. Physically safe environment: no spills, clutter or unnecessary equipment. Stretcher in lowest position, wheels locked, appropriate side rails in place. Provide visual cue, wrist band, yellow gown, etc. Monitor gait and stability. Monitor for mental status changes and reorient to person, place, and time. Review medications for side effects contributing to fall risk. Reinforce activity limits and safety measures with patient and family. Provide visual clues: red socks.

## 2019-06-06 NOTE — ED PROVIDER NOTE - PMH
Afib    Aortic stenosis    Aortic stenosis    Asthma    Asthma  on breo for 2 yrs  Bronchitis    CAD (coronary artery disease)  recent coronary stent 3/19  Glaucoma    Heart failure    Heart failure    Hiatal hernia    HLD (hyperlipidemia)    HLD (hyperlipidemia)    HTN (hypertension)    Hypertension    Pacemaker    Skin cancer  basal

## 2019-06-06 NOTE — ED PROVIDER NOTE - PHYSICAL EXAMINATION
Gen: Well appearing, NAD  Head: NCAT  HEENT: PERRL, MMM, normal conjunctiva, anicteric, neck supple  Lung: Mild crackles b/l in mid/lower lung fields  CV: RRR, no murmurs  Abd: soft, NTND, no rebound or guarding, no CVAT  MSK: No edema, no visible deformities  Neuro: Moving all extremities grossly  Skin: Warm and dry, no evidence of rash  Psych: normal mood and affect

## 2019-06-06 NOTE — ED ADULT NURSE REASSESSMENT NOTE - NS ED NURSE REASSESS COMMENT FT1
pt provided PO fluids. patient appears to be in no acute distress. VSS. pt awaiting lab results. safety maintained.

## 2019-06-06 NOTE — ED PROVIDER NOTE - DISPOSITION TYPE
Orthopaedic Surgery  Progress Note    Subjective:  NAEO  Pain controlled  Elevated overnight  Abx in place - vanc / rocephin    Objective:  Temp:  [96.3 °F (35.7 °C)-98.4 °F (36.9 °C)] 96.6 °F (35.9 °C)  Pulse:  [63-80] 63  Resp:  [16-18] 18  SpO2:  [94 %-100 %] 100 %  BP: (108-121)/(59-76) 113/76    PE:    AA&O x 4.  NAD  HEENT:  NCAT, sclera nonicteric  Lungs:  Respirations are equal and unlabored.  CV:  2+ bilateral upper and lower extremity pulses.  Skin:  Intact throughout.    MSK:    RUE:  Skin intact  Moderate erythema and swelling of the dorsal right middle finger, centered around proximal phalanx, extending to dorsum of hand  No palpable fluctuance or drainage  Sensation intact in M/U/R nerve distributions, decreased sensation in fingertips  Wrist ROM intact  Motor intact AIN/PIN/U/R nerves  TTP around finger dorsally, no volar tenderness  2+ DR pulse      A/P: 38yo male with right long finger cellulitis    NPO  NWB to RUE  MRI with possible dorsal abscess of long finger    Dispo: Will plan for operative I&D today    Risks and complications were discussed including but not limited to the risks of anesthetic complications, infection, wound healing complications, pain, stiffness, DVT, pulmonary embolism, perioperative medical risks (cardiac, pulmonary, renal, neurologic), and death among others were discussed, including the risk of local recurrence.  The patient elects to proceed.      Adal De Luna MD  Orthopaedic Surgery Resident  [facility] [place]           ADMIT

## 2019-06-07 DIAGNOSIS — I50.30 UNSPECIFIED DIASTOLIC (CONGESTIVE) HEART FAILURE: ICD-10-CM

## 2019-06-07 DIAGNOSIS — I10 ESSENTIAL (PRIMARY) HYPERTENSION: ICD-10-CM

## 2019-06-07 DIAGNOSIS — I50.33 ACUTE ON CHRONIC DIASTOLIC (CONGESTIVE) HEART FAILURE: ICD-10-CM

## 2019-06-07 DIAGNOSIS — I48.91 UNSPECIFIED ATRIAL FIBRILLATION: ICD-10-CM

## 2019-06-07 DIAGNOSIS — H40.9 UNSPECIFIED GLAUCOMA: ICD-10-CM

## 2019-06-07 DIAGNOSIS — R06.00 DYSPNEA, UNSPECIFIED: ICD-10-CM

## 2019-06-07 DIAGNOSIS — Z29.9 ENCOUNTER FOR PROPHYLACTIC MEASURES, UNSPECIFIED: ICD-10-CM

## 2019-06-07 DIAGNOSIS — I35.0 NONRHEUMATIC AORTIC (VALVE) STENOSIS: ICD-10-CM

## 2019-06-07 DIAGNOSIS — I25.10 ATHEROSCLEROTIC HEART DISEASE OF NATIVE CORONARY ARTERY WITHOUT ANGINA PECTORIS: ICD-10-CM

## 2019-06-07 DIAGNOSIS — E78.5 HYPERLIPIDEMIA, UNSPECIFIED: ICD-10-CM

## 2019-06-07 DIAGNOSIS — J45.909 UNSPECIFIED ASTHMA, UNCOMPLICATED: ICD-10-CM

## 2019-06-07 PROBLEM — J40 BRONCHITIS, NOT SPECIFIED AS ACUTE OR CHRONIC: Chronic | Status: ACTIVE | Noted: 2019-05-10

## 2019-06-07 LAB
ALBUMIN SERPL ELPH-MCNC: 4.1 G/DL — SIGNIFICANT CHANGE UP (ref 3.3–5)
ALP SERPL-CCNC: 72 U/L — SIGNIFICANT CHANGE UP (ref 40–120)
ALT FLD-CCNC: 17 U/L — SIGNIFICANT CHANGE UP (ref 10–45)
ANION GAP SERPL CALC-SCNC: 16 MMOL/L — SIGNIFICANT CHANGE UP (ref 5–17)
AST SERPL-CCNC: 20 U/L — SIGNIFICANT CHANGE UP (ref 10–40)
BASOPHILS # BLD AUTO: 0 K/UL — SIGNIFICANT CHANGE UP (ref 0–0.2)
BASOPHILS NFR BLD AUTO: 0.4 % — SIGNIFICANT CHANGE UP (ref 0–2)
BILIRUB SERPL-MCNC: 0.9 MG/DL — SIGNIFICANT CHANGE UP (ref 0.2–1.2)
BUN SERPL-MCNC: 27 MG/DL — HIGH (ref 7–23)
CALCIUM SERPL-MCNC: 10.2 MG/DL — SIGNIFICANT CHANGE UP (ref 8.4–10.5)
CHLORIDE SERPL-SCNC: 99 MMOL/L — SIGNIFICANT CHANGE UP (ref 96–108)
CO2 SERPL-SCNC: 24 MMOL/L — SIGNIFICANT CHANGE UP (ref 22–31)
CREAT SERPL-MCNC: 1.05 MG/DL — SIGNIFICANT CHANGE UP (ref 0.5–1.3)
EOSINOPHIL # BLD AUTO: 0.5 K/UL — SIGNIFICANT CHANGE UP (ref 0–0.5)
EOSINOPHIL NFR BLD AUTO: 5.3 % — SIGNIFICANT CHANGE UP (ref 0–6)
GLUCOSE SERPL-MCNC: 103 MG/DL — HIGH (ref 70–99)
HCT VFR BLD CALC: 32.6 % — LOW (ref 34.5–45)
HGB BLD-MCNC: 10.2 G/DL — LOW (ref 11.5–15.5)
INR BLD: 1.29 RATIO — HIGH (ref 0.88–1.16)
LYMPHOCYTES # BLD AUTO: 2.8 K/UL — SIGNIFICANT CHANGE UP (ref 1–3.3)
LYMPHOCYTES # BLD AUTO: 31.4 % — SIGNIFICANT CHANGE UP (ref 13–44)
MAGNESIUM SERPL-MCNC: 1.8 MG/DL — SIGNIFICANT CHANGE UP (ref 1.6–2.6)
MCHC RBC-ENTMCNC: 29.7 PG — SIGNIFICANT CHANGE UP (ref 27–34)
MCHC RBC-ENTMCNC: 31.3 GM/DL — LOW (ref 32–36)
MCV RBC AUTO: 94.7 FL — SIGNIFICANT CHANGE UP (ref 80–100)
MONOCYTES # BLD AUTO: 0.7 K/UL — SIGNIFICANT CHANGE UP (ref 0–0.9)
MONOCYTES NFR BLD AUTO: 8.5 % — SIGNIFICANT CHANGE UP (ref 2–14)
NEUTROPHILS # BLD AUTO: 4.8 K/UL — SIGNIFICANT CHANGE UP (ref 1.8–7.4)
NEUTROPHILS NFR BLD AUTO: 54.3 % — SIGNIFICANT CHANGE UP (ref 43–77)
PHOSPHATE SERPL-MCNC: 4.2 MG/DL — SIGNIFICANT CHANGE UP (ref 2.5–4.5)
PLATELET # BLD AUTO: 329 K/UL — SIGNIFICANT CHANGE UP (ref 150–400)
POTASSIUM SERPL-MCNC: 3.9 MMOL/L — SIGNIFICANT CHANGE UP (ref 3.5–5.3)
POTASSIUM SERPL-SCNC: 3.9 MMOL/L — SIGNIFICANT CHANGE UP (ref 3.5–5.3)
PROT SERPL-MCNC: 7.3 G/DL — SIGNIFICANT CHANGE UP (ref 6–8.3)
PROTHROM AB SERPL-ACNC: 14.9 SEC — HIGH (ref 10–12.9)
RBC # BLD: 3.44 M/UL — LOW (ref 3.8–5.2)
RBC # FLD: 13.7 % — SIGNIFICANT CHANGE UP (ref 10.3–14.5)
SODIUM SERPL-SCNC: 139 MMOL/L — SIGNIFICANT CHANGE UP (ref 135–145)
WBC # BLD: 8.8 K/UL — SIGNIFICANT CHANGE UP (ref 3.8–10.5)
WBC # FLD AUTO: 8.8 K/UL — SIGNIFICANT CHANGE UP (ref 3.8–10.5)

## 2019-06-07 PROCEDURE — 99223 1ST HOSP IP/OBS HIGH 75: CPT

## 2019-06-07 PROCEDURE — 93010 ELECTROCARDIOGRAM REPORT: CPT

## 2019-06-07 PROCEDURE — 99222 1ST HOSP IP/OBS MODERATE 55: CPT

## 2019-06-07 PROCEDURE — 12345: CPT | Mod: NC,GC

## 2019-06-07 RX ORDER — ACETAMINOPHEN 500 MG
650 TABLET ORAL EVERY 6 HOURS
Refills: 0 | Status: DISCONTINUED | OUTPATIENT
Start: 2019-06-07 | End: 2019-06-11

## 2019-06-07 RX ORDER — TIMOLOL 0.5 %
1 DROPS OPHTHALMIC (EYE)
Refills: 0 | Status: DISCONTINUED | OUTPATIENT
Start: 2019-06-07 | End: 2019-06-11

## 2019-06-07 RX ORDER — OMEPRAZOLE 10 MG/1
1 CAPSULE, DELAYED RELEASE ORAL
Qty: 0 | Refills: 0 | DISCHARGE

## 2019-06-07 RX ORDER — IPRATROPIUM/ALBUTEROL SULFATE 18-103MCG
3 AEROSOL WITH ADAPTER (GRAM) INHALATION EVERY 6 HOURS
Refills: 0 | Status: DISCONTINUED | OUTPATIENT
Start: 2019-06-07 | End: 2019-06-11

## 2019-06-07 RX ORDER — TRAMADOL HYDROCHLORIDE 50 MG/1
25 TABLET ORAL ONCE
Refills: 0 | Status: DISCONTINUED | OUTPATIENT
Start: 2019-06-07 | End: 2019-06-10

## 2019-06-07 RX ORDER — SIMVASTATIN 20 MG/1
40 TABLET, FILM COATED ORAL AT BEDTIME
Refills: 0 | Status: DISCONTINUED | OUTPATIENT
Start: 2019-06-07 | End: 2019-06-10

## 2019-06-07 RX ORDER — FUROSEMIDE 40 MG
40 TABLET ORAL DAILY
Refills: 0 | Status: DISCONTINUED | OUTPATIENT
Start: 2019-06-07 | End: 2019-06-07

## 2019-06-07 RX ORDER — BRIMONIDINE TARTRATE 2 MG/MG
1 SOLUTION/ DROPS OPHTHALMIC
Refills: 0 | Status: DISCONTINUED | OUTPATIENT
Start: 2019-06-07 | End: 2019-06-11

## 2019-06-07 RX ORDER — METOPROLOL TARTRATE 50 MG
12.5 TABLET ORAL EVERY 6 HOURS
Refills: 0 | Status: DISCONTINUED | OUTPATIENT
Start: 2019-06-07 | End: 2019-06-08

## 2019-06-07 RX ORDER — PANTOPRAZOLE SODIUM 20 MG/1
40 TABLET, DELAYED RELEASE ORAL
Refills: 0 | Status: DISCONTINUED | OUTPATIENT
Start: 2019-06-07 | End: 2019-06-11

## 2019-06-07 RX ORDER — DOCUSATE SODIUM 100 MG
100 CAPSULE ORAL THREE TIMES A DAY
Refills: 0 | Status: DISCONTINUED | OUTPATIENT
Start: 2019-06-07 | End: 2019-06-11

## 2019-06-07 RX ORDER — TRAMADOL HYDROCHLORIDE 50 MG/1
25 TABLET ORAL EVERY 8 HOURS
Refills: 0 | Status: DISCONTINUED | OUTPATIENT
Start: 2019-06-07 | End: 2019-06-10

## 2019-06-07 RX ORDER — CLOPIDOGREL BISULFATE 75 MG/1
75 TABLET, FILM COATED ORAL DAILY
Refills: 0 | Status: DISCONTINUED | OUTPATIENT
Start: 2019-06-07 | End: 2019-06-11

## 2019-06-07 RX ORDER — LOSARTAN POTASSIUM 100 MG/1
25 TABLET, FILM COATED ORAL DAILY
Refills: 0 | Status: DISCONTINUED | OUTPATIENT
Start: 2019-06-07 | End: 2019-06-07

## 2019-06-07 RX ORDER — CLOPIDOGREL BISULFATE 75 MG/1
1 TABLET, FILM COATED ORAL
Qty: 0 | Refills: 0 | DISCHARGE

## 2019-06-07 RX ORDER — FLUTICASONE FUROATE AND VILANTEROL TRIFENATATE 100; 25 UG/1; UG/1
1 POWDER RESPIRATORY (INHALATION)
Qty: 0 | Refills: 0 | DISCHARGE

## 2019-06-07 RX ORDER — APIXABAN 2.5 MG/1
2.5 TABLET, FILM COATED ORAL EVERY 12 HOURS
Refills: 0 | Status: DISCONTINUED | OUTPATIENT
Start: 2019-06-07 | End: 2019-06-11

## 2019-06-07 RX ORDER — ASPIRIN/CALCIUM CARB/MAGNESIUM 324 MG
81 TABLET ORAL DAILY
Refills: 0 | Status: DISCONTINUED | OUTPATIENT
Start: 2019-06-07 | End: 2019-06-07

## 2019-06-07 RX ORDER — FOLIC ACID 0.8 MG
1 TABLET ORAL DAILY
Refills: 0 | Status: DISCONTINUED | OUTPATIENT
Start: 2019-06-07 | End: 2019-06-11

## 2019-06-07 RX ADMIN — Medication 1 DROP(S): at 18:30

## 2019-06-07 RX ADMIN — Medication 12.5 MILLIGRAM(S): at 18:29

## 2019-06-07 RX ADMIN — APIXABAN 2.5 MILLIGRAM(S): 2.5 TABLET, FILM COATED ORAL at 06:18

## 2019-06-07 RX ADMIN — LOSARTAN POTASSIUM 25 MILLIGRAM(S): 100 TABLET, FILM COATED ORAL at 06:18

## 2019-06-07 RX ADMIN — Medication 1 MILLIGRAM(S): at 13:14

## 2019-06-07 RX ADMIN — TRAMADOL HYDROCHLORIDE 25 MILLIGRAM(S): 50 TABLET ORAL at 09:10

## 2019-06-07 RX ADMIN — Medication 1 DROP(S): at 06:19

## 2019-06-07 RX ADMIN — Medication 100 MILLIGRAM(S): at 13:14

## 2019-06-07 RX ADMIN — TRAMADOL HYDROCHLORIDE 25 MILLIGRAM(S): 50 TABLET ORAL at 08:34

## 2019-06-07 RX ADMIN — CLOPIDOGREL BISULFATE 75 MILLIGRAM(S): 75 TABLET, FILM COATED ORAL at 13:14

## 2019-06-07 RX ADMIN — APIXABAN 2.5 MILLIGRAM(S): 2.5 TABLET, FILM COATED ORAL at 18:29

## 2019-06-07 RX ADMIN — Medication 40 MILLIGRAM(S): at 06:18

## 2019-06-07 RX ADMIN — PANTOPRAZOLE SODIUM 40 MILLIGRAM(S): 20 TABLET, DELAYED RELEASE ORAL at 06:18

## 2019-06-07 RX ADMIN — SIMVASTATIN 40 MILLIGRAM(S): 20 TABLET, FILM COATED ORAL at 21:39

## 2019-06-07 RX ADMIN — BRIMONIDINE TARTRATE 1 DROP(S): 2 SOLUTION/ DROPS OPHTHALMIC at 06:19

## 2019-06-07 RX ADMIN — BRIMONIDINE TARTRATE 1 DROP(S): 2 SOLUTION/ DROPS OPHTHALMIC at 18:30

## 2019-06-07 RX ADMIN — Medication 100 MILLIGRAM(S): at 06:18

## 2019-06-07 RX ADMIN — Medication 12.5 MILLIGRAM(S): at 11:32

## 2019-06-07 NOTE — PROGRESS NOTE ADULT - PROBLEM SELECTOR PLAN 2
- currently afib rate control   - metoprolol 12.5 q6 hr for rate control, can increased to 25 mg q6 if RVR  - c/w eliquis  - f/u interrogation by EP  - appreciate cards recs

## 2019-06-07 NOTE — H&P ADULT - NSICDXPASTSURGICALHX_GEN_ALL_CORE_FT
PAST SURGICAL HISTORY:  H/O eye surgery bilateral cataracts    H/O unilateral oophorectomy     S/P coronary artery stent placement     S/P PTCA (percutaneous transluminal coronary angioplasty) coronary stent 3/19    S/P TAVR (transcatheter aortic valve replacement)

## 2019-06-07 NOTE — H&P ADULT - ASSESSMENT
89 yo woman with PMH of recent TAVR for severe AS(4/19), new LBBB s/p leadless micra PPM (4/19), afib(on eliquis), CAD s/p 1 stent (3/19), diastoloic heart failure, HTN, HLD, asthma, skin BCC p/w SOB w/ pulm edema 89 yo woman with PMH of recent TAVR for severe AS(4/19), new LBBB s/p leadless micra PPM (4/19), afib(on eliquis), CAD s/p 1 stent (3/19), diastoloic heart failure, HTN, HLD, asthma, skin BCC p/w SOB admitted with acute on chronic diastolic CHF in the setting of afib RVR.

## 2019-06-07 NOTE — H&P ADULT - PROBLEM SELECTOR PLAN 1
- likely 2/2 pulmonary edema seen on CXR due to decreased diastolic filling time from afib w/ RVR now improved s/p lasix 40 mg IVP sat 99 on 1 L NC w/ lung exam with minimal crackles  - low concern for PE given no lower extremity swelling, recent surgery or immobility  - hx of asthma but no wheezing on exam  - c/w lasix 40 mg IVP QD w/ strict I+Os  - titrate off oxygen  - duoneb q6 prn for SOB/wheezing - likely 2/2 pulmonary edema seen on CXR due to decreased diastolic filling time from afib w/ RVR now improved s/p lasix 40 mg IVP sat 99 on 1 L NC w/ lung exam with minimal crackles  - low concern for PE given no lower extremity swelling, recent surgery or immobility  - hx of asthma but no wheezing on exam  - c/w lasix 40 mg po QD w/ strict I+Os  - titrate off oxygen  - duoneb q6 prn for SOB/wheezing -CXR with pulmonary edema  - last TTE showing severe MR, mod-severe TR w/ grade 3 diastolic dysfunction  - BP control w/ losartan 25 mg QD  - diuresed with IV lasix in ED, now without crackles or LE edema. Will c/w lasix 40 mg IVP QD, strict I+Os  - BID lytes

## 2019-06-07 NOTE — H&P ADULT - NSHPSOCIALHISTORY_GEN_ALL_CORE
Pt is originally from [place], [place], moved to [place] at the age of 18 to get , is , has 3 children, 8 grandchildren, enjoys playing platform tennis and gardening, smoked over 40 years ago, social drinker, lives independent, very functional at home.

## 2019-06-07 NOTE — CONSULT NOTE ADULT - ASSESSMENT
89 y/o F with asthma, HTN, HLD, severe AS, Diastolic HF, CAD s/p PCI to mid RCA in 3/2019. She had severe AS, and is s/p tavr on 4/24/2019. Post-operatively, she had a new LBBB and a micra PPM was placed on 4/25/2019.    #AFIB with RVR:  -Continues eliquis  -Diurese as above  -Give metop 12.5 q6h, increase to 25 q6h if rapid.  -Interrogate Micra in AM for AFib burden.    #CHF exacerbation: PLEASE CALL [facility]OLOG[facility]SULT[facility] AND STRUCTURAL HEART TEAM IN AM FOR CONTINUED MANAGEMENT OF CHF  [facility] -- Chandra Tomlinson, Pamela, Ezekiel, Sunil, Marshal Alanis; Office # [phone]    For the overnight, until seen by above:  Likely due to AF with RVR. Was told to stop lasix, however symptoms started before she missed a dose.  -Lasix 20 IV now  -I/O  -Standing weights  -BID lytes  -Trend trop  -Repeat TTE to check if valve gradients stable    For all cardiology related questions, please call the current cardiology fellow on service at this time.  ** Please go to [email or web address] ; login: [email or web address] (case-sensitive) **    Shady Denise MD  Department of Cardiovascular Disease
89 yo woman with PMH of recent TAVR for severe AS(4/19), new LBBB s/p leadless micra PPM (4/19), afib(on eliquis), CAD s/p 1 stent (3/19), diastolic heart failure, HTN, HLD, asthma, skin BCC p/w SOB. Patient reports being in normal state of health 2 days prior, developed SOB, at rest worsened w/ exertion, no associated chest pain or palpitations, in AF with RVR:    - Her AF remains uncontrolled.  She needs to be started on metoprolol 12.5 q6h for HR control.  Can d/c losartan in the meantime for additional blood pressure room for félix agents  - She remains volume overloaded with rales on exam and requiring oxygen.  She was given IV Lasix in the ED, and PO Lasix this AM.  A negative balance needs to be maintained.  I suspect she may need an additional dose of IV Lasix today  - Continue Eliquis 2.5 bid for stroke risk reduction  - Aspirin and Plavix for PCI in March.  At this point, I would strongly consider stopping aspirin to avoid triple therapy. She is at risk for bleeding  - Continue statin drug  - Monitor I, O, K, creatinine, and daily weights  - Replete K and Mg as needed  - Supplemental oxygen as needed  - EPS follow up  TO follow

## 2019-06-07 NOTE — PROGRESS NOTE ADULT - ASSESSMENT
89 yo woman with PMH of recent TAVR for severe AS(4/19), new LBBB s/p leadless micra PPM (4/19), afib(on eliquis), CAD s/p 1 stent (3/19), diastoloic heart failure, HTN, HLD, asthma, skin BCC p/w SOB admitted with acute on chronic diastolic CHF in the setting of afib RVR.

## 2019-06-07 NOTE — PROGRESS NOTE ADULT - ASSESSMENT
89 y/o F with asthma, HTN, HLD, severe AS, Diastolic HF, CAD s/p PCI to mid RCA in 3/2019. She had severe AS, and is s/p tavr on 4/24/2019. Post-operatively, she had a new LBBB and a micra PPM was placed on 4/25/2019.    #AFIB with RVR  - Continue apixaban  - Continue metop 12.5 q6h, increase to 25 q6h if rapid.  - Will Interrogate Micra today for AFib burden    Carie Gramajo MD  Cardiology Fellow - PGY4  Text or call: [phone] M-F from [phone]  For all New Consults and Questions:  [email or web address]  Login: [email or web address] 89 y/o F with asthma, HTN, HLD, severe AS, Diastolic HF, CAD s/p PCI to mid RCA in 3/2019. She had severe AS, and is s/p tavr on 4/24/2019. Post-operatively, she had a new LBBB and a micra PPM was placed on 4/25/2019.    #AFIB with RVR  - Continue apixaban  - Continue metop 12.5 q6h, increase to 25 q6h if rapid  - If cannot achieve rate control, she may need to be started on an antiarrhythmic and cardioverted  - Will Interrogate Micra today for AFib burden    Carie Gramajo MD  Cardiology Fellow - PGY4  Text or call: [phone] M-F from [phone]  For all New Consults and Questions:  [email or web address]  Login: [email or web address] 89 y/o F with asthma, HTN, HLD, severe AS, Diastolic HF, CAD s/p PCI to mid RCA in 3/2019. She had severe AS, and is s/p tavr on 4/24/2019. Post-operatively, she had a new LBBB and a micra PPM was placed on 4/25/2019.    #AFIB with RVR  - Continue apixaban  - Currently on metop 12.5 q6h with -130s, consider increasing to 25 q6h if BP tolerates  - If she remains in AFib over the weekend will plan on BRENDA and DCCV on Monday, 6/10  - Make NPO after midnight on Sunday and please order a BRENDA    Carie Gramajo MD  Cardiology Fellow - PGY4  Text or call: [phone] M-F from [phone]  For all New Consults and Questions:  [email or web address]  Login: [email or web address]

## 2019-06-07 NOTE — H&P ADULT - NSHPREVIEWOFSYSTEMS_GEN_ALL_CORE
CONSTITUTIONAL: No weakness, fevers or chills  EYES/ENT: No visual changes;  No vertigo or throat pain   NECK: No pain or stiffness  RESPIRATORY: No cough, wheezing, hemoptysis; +shortness of breath  CARDIOVASCULAR: No chest pain or palpitations  GASTROINTESTINAL: No abdominal or epigastric pain. No nausea, vomiting, or hematemesis; No diarrhea or constipation. No melena or hematochezia.  GENITOURINARY: No dysuria, frequency or hematuria  NEUROLOGICAL: No numbness or weakness  SKIN: No itching, burning, rashes, or lesions   All other review of systems is negative unless indicated above. CONSTITUTIONAL: No weakness, fevers or chills  EYES/ENT: No visual changes;  No vertigo or throat pain   NECK: No pain or stiffness  RESPIRATORY: No cough, wheezing, hemoptysis; +shortness of breath  CARDIOVASCULAR: No chest pain or palpitations  GASTROINTESTINAL: No abdominal or epigastric pain. No nausea, vomiting, or hematemesis; No diarrhea or constipation. No melena or hematochezia.  GENITOURINARY: No dysuria, frequency or hematuria  NEUROLOGICAL: No numbness or weakness  SKIN: No itching, burning, rashes, or lesions   HEME: No bleeding or ecchymosis  PSYCH: No depression or anxiety  All other review of systems is negative unless indicated above.

## 2019-06-07 NOTE — PROGRESS NOTE ADULT - PROBLEM SELECTOR PLAN 1
- CXR with pulmonary edema  - last TTE showing severe MR, mod-severe TR w/ grade 3 diastolic dysfunction  - c/w strict I+Os  - BID lytes  - will d/c losartan and go up on BB as tolerated to control afib, currently on metoprolol 12.5 mg bid

## 2019-06-07 NOTE — H&P ADULT - PROBLEM SELECTOR PLAN 5
- last TTE showing severe MR, mod-severe TR w/ grade 3 diastolic dysfunction  - BP control w/ losartan 25 mg QD  - c/w lasix 40 mg IVP QD, strict I+Os  - BID carlito - c/w losartan

## 2019-06-07 NOTE — H&P ADULT - NSICDXPASTMEDICALHX_GEN_ALL_CORE_FT
PAST MEDICAL HISTORY:  Afib     Aortic stenosis     Aortic stenosis     Asthma on breo for 2 yrs    Asthma     Bronchitis     CAD (coronary artery disease) recent coronary stent 3/19    Glaucoma     Heart failure     Heart failure     Hiatal hernia     HLD (hyperlipidemia)     HLD (hyperlipidemia)     HTN (hypertension)     Hypertension     Pacemaker     Skin cancer basal

## 2019-06-07 NOTE — PROGRESS NOTE ADULT - SUBJECTIVE AND OBJECTIVE BOX
Patient seen and examined at bedside.    Overnight Events:   ***In progress    REVIEW OF SYSTEMS:  Constitutional:     No fevers, chills, weight loss, weight gain  HEENT:                  No dry eyes, nasal congestion, postnasal drip  CV:                         No chest pain, palpitations, orthopnea, PND  Resp:                     No cough, SOB, dyspnea, wheezing, sputum  GI:                          No nausea, vomiting, abdominal pain, diarrhea, constipation  :                        No dysuria, nocturia, hematuria, increased urinary frequency  Musculoskeletal: No back pain, myalgias, arthralgias   Skin:                       No rash, pruritus, ecchymoses  Neurological:        No headache, dizziness, syncope, weakness, numbness  Psychiatric:           No anxiety, depression   Endocrine:            No hot/cold intolerance, polydipsia  Heme/Lymph:      No bleeding, easy bruising  Allergic/Immune: No itchy eyes, rhinorrhea, hives angioedema      Current Meds:  acetaminophen   Tablet .. 650 milliGRAM(s) Oral every 6 hours PRN  ALBUTerol/ipratropium for Nebulization 3 milliLiter(s) Nebulizer every 6 hours PRN  apixaban 2.5 milliGRAM(s) Oral every 12 hours  brimonidine 0.2% Ophthalmic Solution 1 Drop(s) Both EYES two times a day  clopidogrel Tablet 75 milliGRAM(s) Oral daily  docusate sodium 100 milliGRAM(s) Oral three times a day  folic acid 1 milliGRAM(s) Oral daily  furosemide    Tablet 40 milliGRAM(s) Oral daily  metoprolol tartrate 12.5 milliGRAM(s) Oral every 6 hours  pantoprazole    Tablet 40 milliGRAM(s) Oral before breakfast  simvastatin 40 milliGRAM(s) Oral at bedtime  timolol 0.5% Solution 1 Drop(s) Both EYES two times a day  traMADol 25 milliGRAM(s) Oral once  traMADol 25 milliGRAM(s) Oral every 8 hours PRN      PAST MEDICAL & SURGICAL HISTORY:  Asthma  HLD (hyperlipidemia)  Aortic stenosis  Heart failure  Pacemaker  Glaucoma  Hypertension  Bronchitis  Afib  CAD (coronary artery disease): recent coronary stent 3/19  Heart failure  Skin cancer: basal  Hiatal hernia  HLD (hyperlipidemia)  HTN (hypertension)  Aortic stenosis  Asthma: on breo for 2 yrs  S/P coronary artery stent placement  S/P TAVR (transcatheter aortic valve replacement)  S/P PTCA (percutaneous transluminal coronary angioplasty): coronary stent 3/19  H/O eye surgery: bilateral cataracts  H/O unilateral oophorectomy      Vitals:  T(F): 97.7 (06-07), Max: 98.8 (06-06)  HR: 119 (06-07) (85 - 120)  BP: 140/92 (06-07) (115/89 - 152/101)  RR: 18 (06-07)  SpO2: 95% (06-07)  I&O's Summary    06 Jun 2019 07:01  -  07 Jun 2019 07:00  --------------------------------------------------------  IN: 120 mL / OUT: 0 mL / NET: 120 mL        Physical Exam:  Appearance: No acute distress; well appearing  Eyes: PERRL, EOMI, pink conjunctiva  HENT: Normal oral mucosa  Cardiovascular: RRR, S1, S2, no murmurs, rubs, or gallops; no edema; no JVD  Respiratory: Clear to auscultation bilaterally  Gastrointestinal: soft, non-tender, non-distended with normal bowel sounds  Musculoskeletal: No clubbing; no joint deformity   Neurologic: Non-focal  Lymphatic: No lymphadenopathy  Psychiatry: AAOx3, mood & affect appropriate  Skin: No rashes, ecchymoses, or cyanosis                          10.2   8.8   )-----------( 329      ( 07 Jun 2019 04:41 )             32.6     06-07    139  |  99  |  27<H>  ----------------------------<  103<H>  3.9   |  24  |  1.05    Ca    10.2      07 Jun 2019 04:41  Phos  4.2     06-07  Mg     1.8     06-07    TPro  7.3  /  Alb  4.1  /  TBili  0.9  /  DBili  x   /  AST  20  /  ALT  17  /  AlkPhos  72  06-07    PT/INR - ( 07 Jun 2019 04:41 )   PT: 14.9 sec;   INR: 1.29 ratio         PTT - ( 06 Jun 2019 18:42 )  PTT:29.7 sec      Serum Pro-Brain Natriuretic Peptide: 2371 pg/mL (06-06 @ 18:42)          New ECG(s): Personally reviewed    Echo:    Stress Testing:     Cath:    Imaging:    Interpretation of Telemetry: Patient seen and examined at bedside.    Overnight Events: Patient presented with shortness of breath and weakness. Currently while she is in bed, she denies palpitations, SOB, chest pain. No LE edema.     REVIEW OF SYSTEMS:  Constitutional:     No fevers, chills, weight loss, weight gain  HEENT:                  No dry eyes, nasal congestion, postnasal drip  CV:                         No chest pain, palpitations, orthopnea, PND  Resp:                     No cough, SOB, dyspnea, wheezing, sputum  GI:                          No nausea, vomiting, abdominal pain, diarrhea, constipation  :                        No dysuria, nocturia, hematuria, increased urinary frequency  Musculoskeletal: No back pain, myalgias, arthralgias   Skin:                       No rash, pruritus, ecchymoses  Neurological:        No headache, dizziness, syncope, weakness, numbness  Psychiatric:           No anxiety, depression   Endocrine:            No hot/cold intolerance, polydipsia  Heme/Lymph:      No bleeding, easy bruising  Allergic/Immune: No itchy eyes, rhinorrhea, hives angioedema      Current Meds:  acetaminophen   Tablet .. 650 milliGRAM(s) Oral every 6 hours PRN  ALBUTerol/ipratropium for Nebulization 3 milliLiter(s) Nebulizer every 6 hours PRN  apixaban 2.5 milliGRAM(s) Oral every 12 hours  brimonidine 0.2% Ophthalmic Solution 1 Drop(s) Both EYES two times a day  clopidogrel Tablet 75 milliGRAM(s) Oral daily  docusate sodium 100 milliGRAM(s) Oral three times a day  folic acid 1 milliGRAM(s) Oral daily  furosemide    Tablet 40 milliGRAM(s) Oral daily  metoprolol tartrate 12.5 milliGRAM(s) Oral every 6 hours  pantoprazole    Tablet 40 milliGRAM(s) Oral before breakfast  simvastatin 40 milliGRAM(s) Oral at bedtime  timolol 0.5% Solution 1 Drop(s) Both EYES two times a day  traMADol 25 milliGRAM(s) Oral once  traMADol 25 milliGRAM(s) Oral every 8 hours PRN      PAST MEDICAL & SURGICAL HISTORY:  Asthma  HLD (hyperlipidemia)  Aortic stenosis  Heart failure  Pacemaker  Glaucoma  Hypertension  Bronchitis  Afib  CAD (coronary artery disease): recent coronary stent 3/19  Heart failure  Skin cancer: basal  Hiatal hernia  HLD (hyperlipidemia)  HTN (hypertension)  Aortic stenosis  Asthma: on breo for 2 yrs  S/P coronary artery stent placement  S/P TAVR (transcatheter aortic valve replacement)  S/P PTCA (percutaneous transluminal coronary angioplasty): coronary stent 3/19  H/O eye surgery: bilateral cataracts  H/O unilateral oophorectomy      Vitals:  T(F): 97.7 (06-07), Max: 98.8 (06-06)  HR: 119 (06-07) (85 - 120)  BP: 140/92 (06-07) (115/89 - 152/101)  RR: 18 (06-07)  SpO2: 95% (06-07)  I&O's Summary    06 Jun 2019 07:01  -  07 Jun 2019 07:00  --------------------------------------------------------  IN: 120 mL / OUT: 0 mL / NET: 120 mL        Physical Exam:  Appearance: No acute distress; well appearing, appears younger than stated age  Eyes: PERRL, EOMI, pink conjunctiva  HENT: Normal oral mucosa  Cardiovascular: irregularly irregular, S1, S2, no murmurs, rubs, or gallops; no edema; no JVD  Respiratory: Clear to auscultation bilaterally  Gastrointestinal: soft, non-tender, non-distended with normal bowel sounds  Musculoskeletal: No clubbing; no joint deformity   Neurologic: Non-focal  Lymphatic: No lymphadenopathy  Psychiatry: AAOx3, mood & affect appropriate  Skin: No rashes, ecchymoses, or cyanosis                          10.2   8.8   )-----------( 329      ( 07 Jun 2019 04:41 )             32.6     06-07    139  |  99  |  27<H>  ----------------------------<  103<H>  3.9   |  24  |  1.05    Ca    10.2      07 Jun 2019 04:41  Phos  4.2     06-07  Mg     1.8     06-07    TPro  7.3  /  Alb  4.1  /  TBili  0.9  /  DBili  x   /  AST  20  /  ALT  17  /  AlkPhos  72  06-07    PT/INR - ( 07 Jun 2019 04:41 )   PT: 14.9 sec;   INR: 1.29 ratio         PTT - ( 06 Jun 2019 18:42 )  PTT:29.7 sec      Serum Pro-Brain Natriuretic Peptide: 2371 pg/mL (06-06 @ 18:42)      New ECG(s): Personally reviewed  6/6/19: AFib with RVR, LBBB, left axis deviation    Echo:  < from: TTE Echo Doppler w/o Cont (05.11.19 @ 10:07) >  MEASUREMENTS  IVS: 0.9cm  PWT: 0.8cm  LA:2.9cm  AO: 2.1cm  LVIDd: 4.0cm  LVIDs: 2.2cm    RVSP: 61mmHg    FINDINGS  Left Ventricle: The endocardium is not well-visualized. There appears to   be grossly preserved left ventricular systolic function.  Aortic Valve: Status post bioprosthetic aortic valve replacement with   trace to mild paravalvular leak  Mitral Valve: Thickened mitral valve leaflets with decreased diastolic   opening. Mitral annular calcification. Severe mitral regurgitation  Tricuspid Valve: Moderate to severe tricuspid regurgitation.  Pulmonic Valve: The pulmonic valve is not well-visualized. Mild pulmonic   insufficiency  Left Atrium: Mild left atrial enlargement  Right Ventricle: The right ventricle is not well-visualized.  Right Atrium: Grossly normal right atrium  Diastolic Function: Marked (grade 3) diastolic dysfunction. E/e' is   elevated suggestive of elevated left sided filling pressures.  Pericardium/Pleura: No significant pericardial effusion  Hemodynamics: The pulmonary artery systolic pressure is estimated to be   61 mmHg, assuming the right atrial pressure is equal to 8 mmHg,   consistent with severely elevated pulmonary pressures.    CONCLUSIONS:  1. Technically difficult study  2. The endocardium is not well-visualized. There appears to be grossly   preserved left ventricular systolic function.  3. Status post bioprosthetic aortic valve replacement with trace to mild   paravalvular leak  4. Thickened mitral valve leaflets with decreased diastolic opening.   Severe mitral regurgitation  5. Moderate to severe tricuspid regurgitation.  6. Marked diastolic dysfunction. E/e' is elevated suggestive of elevated   left sided filling pressures.  7. Severely elevated pulmonary pressures  8. No significant pericardial effusion.    No prior exam for comparison.    < end of copied text >        Interpretation of Telemetry: AFib 90-130s, sometimes up to 160s

## 2019-06-07 NOTE — H&P ADULT - HISTORY OF PRESENT ILLNESS
89 yo woman with PMH of recent TAVR for severe AS(4/19), new LBBB s/p leadless micra PPM (4/19), afib(on eliquis), CAD s/p 1 stent (3/19), diastoloic heart failure, HTN, HLD, asthma, skin BCC p/w SOB. Patient reports being in normal state of health 2 days prior, developed SOB, at rest worsenened w/ exertion, no assocaited chest pain or palpitations. Has never felt this symptom prior. Per outpatient documentation patient had been experiencing anxiety, dyspnea, w/ mild lower extremity edema and weight losss. She visited her cardiologist Dr. James Palmer where she was found to be in afib w/ RVR, transfered to ED for evaluation. Per ED documentation which in EMS afib w RVR broke to 110's. Patient denying any fever/chills/abdominal pain, N/V, leg pain or swelling.     In the ED:   Lasix IVP 40 mg and lopressor 25 mg po

## 2019-06-07 NOTE — PROGRESS NOTE ADULT - SUBJECTIVE AND OBJECTIVE BOX
Jory Espinosa MD PGY1   Pager [phone]/ [phone]      BLANCA BENSON  89y  Female    Subjective: Pt denies cp, lightheadedness, palpitations.        T(C): 36.7 (06-07-19 @ 11:36), Max: 37.1 (06-06-19 @ 18:35)  HR: 55 (06-07-19 @ 11:36) (55 - 125)  BP: 112/72 (06-07-19 @ 11:36) (112/72 - 152/101)  RR: 18 (06-07-19 @ 11:36) (18 - 20)  SpO2: 92% (06-07-19 @ 11:36) (92% - 100%)  Wt(kg): --Vital Signs Last 24 Hrs  T(C): 36.7 (07 Jun 2019 11:36), Max: 37.1 (06 Jun 2019 18:35)  T(F): 98.1 (07 Jun 2019 11:36), Max: 98.8 (06 Jun 2019 18:35)  HR: 55 (07 Jun 2019 11:36) (55 - 125)  BP: 112/72 (07 Jun 2019 11:36) (112/72 - 152/101)  BP(mean): --  RR: 18 (07 Jun 2019 11:36) (18 - 20)  SpO2: 92% (07 Jun 2019 11:36) (92% - 100%)    PHYSICAL EXAM:  GENERAL: NAD, well-groomed, well-developed  HEAD:  Atraumatic, Normocephalic  EYES: EOMI, PERRLA, conjunctiva and sclera clear  ENMT: No tonsillar erythema, exudates, or enlargement   NECK: Supple, No JVD   NERVOUS SYSTEM:  Alert & Oriented X3, Good concentration   CHEST/LUNG: Clear to percussion bilaterally; No rales, rhonchi, wheezing, or rubs  HEART: tachycardic, irregular rhythm; No murmurs, rubs, or gallops  ABDOMEN: Soft, Nontender, Nondistended; Bowel sounds present  EXTREMITIES:  2+ Peripheral Pulses, No clubbing, cyanosis, or edema  LYMPH: No lymphadenopathy noted  SKIN: No rashes or lesions    Consultant(s) Notes Reviewed:  [x ] YES  [ ] NO  Care Discussed with Consultants/Other Providers [ x] YES  [ ] NO    LABS:                        10.2   8.8   )-----------( 329      ( 07 Jun 2019 04:41 )             32.6     06-07    139  |  99  |  27<H>  ----------------------------<  103<H>  3.9   |  24  |  1.05    Ca    10.2      07 Jun 2019 04:41  Phos  4.2     06-07  Mg     1.8     06-07    TPro  7.3  /  Alb  4.1  /  TBili  0.9  /  DBili  x   /  AST  20  /  ALT  17  /  AlkPhos  72  06-07    PT/INR - ( 07 Jun 2019 04:41 )   PT: 14.9 sec;   INR: 1.29 ratio         PTT - ( 06 Jun 2019 18:42 )  PTT:29.7 sec          RADIOLOGY, EKG & ADDITIONAL TESTS: Reviewed.     RADIOLOGY & ADDITIONAL TESTS:    Imaging Personally Reviewed:  [ ] YES  [ ] NO  MedsMEDICATIONS  (STANDING):  apixaban 2.5 milliGRAM(s) Oral every 12 hours  brimonidine 0.2% Ophthalmic Solution 1 Drop(s) Both EYES two times a day  clopidogrel Tablet 75 milliGRAM(s) Oral daily  docusate sodium 100 milliGRAM(s) Oral three times a day  folic acid 1 milliGRAM(s) Oral daily  furosemide    Tablet 40 milliGRAM(s) Oral daily  metoprolol tartrate 12.5 milliGRAM(s) Oral every 6 hours  pantoprazole    Tablet 40 milliGRAM(s) Oral before breakfast  simvastatin 40 milliGRAM(s) Oral at bedtime  timolol 0.5% Solution 1 Drop(s) Both EYES two times a day  traMADol 25 milliGRAM(s) Oral once    MEDICATIONS  (PRN):  acetaminophen   Tablet .. 650 milliGRAM(s) Oral every 6 hours PRN Moderate Pain (4 - 6)  ALBUTerol/ipratropium for Nebulization 3 milliLiter(s) Nebulizer every 6 hours PRN Shortness of Breath and/or Wheezing  traMADol 25 milliGRAM(s) Oral every 8 hours PRN Severe Pain (7 - 10)

## 2019-06-07 NOTE — H&P ADULT - NSHPPHYSICALEXAM_GEN_ALL_CORE
PHYSICAL EXAM:  Vital Signs Last 24 Hrs  T(C): 36.6 (06-06-19 @ 22:48)  T(F): 97.9 (06-06-19 @ 22:48), Max: 98.8 (06-06-19 @ 18:35)  HR: 98 (06-06-19 @ 22:48) (85 - 120)  BP: 142/81 (06-06-19 @ 22:48)  BP(mean): --  RR: 18 (06-06-19 @ 22:48) (18 - 20)  SpO2: 100% (06-06-19 @ 22:48) (97% - 100%)  Wt(kg): --    Constitutional: NAD, awake and alert  EYES: EOMI  ENT:  Normal Hearing, no tonsillar exudates   Neck: Soft and supple , no thyromegaly   Respiratory: Breath sounds are clear bilaterally, No wheezing, rales or rhonchi  Cardiovascular: S1 and S2, regular rate and rhythm, no Murmurs, gallops or rubs, no JVD,    Gastrointestinal: Bowel Sounds present, soft, nontender, nondistended, no guarding, no rebound  Extremities: No cyanosis or clubbing; warm to touch  Vascular: 2+ peripheral pulses lower ex  Neurological: No focal deficits, CN II-XII intact bilaterally, sensation to light touch intact in all extremities, gait intact. Pupils are equally reactive to light and symmetrical in size.   Musculoskeletal: 5/5 strength b/l upper and lower extremities; no joint swelling.  Skin: No rashes  Psych: no depression or anhedonia, AAOx3  HEME: no bruises, no nose bleeds

## 2019-06-07 NOTE — CHART NOTE - NSCHARTNOTEFT_GEN_A_CORE
Device interrogation    [employer] Micra VR TCP [id number]  Implanted: 4/25/19    Battery voltage 3.19V  Remaining longevity: >8 years    Mode: VVI lower rate 50 bpm    Presenting: AFib with RVR    RV:  Impedance 770 ohms    Capture threshold 0.38 V @ 0.24 ms    Measured R wave 10.8 mV  Programmed Sensitivity 2 mV    91.7% v-sensed  8.3% v-paced    The Micra does not record events, do not know when patient went into AFib.

## 2019-06-08 ENCOUNTER — TRANSCRIPTION ENCOUNTER (OUTPATIENT)
Age: 84
End: 2019-06-08

## 2019-06-08 LAB
ALBUMIN SERPL ELPH-MCNC: 3.7 G/DL — SIGNIFICANT CHANGE UP (ref 3.3–5)
ALP SERPL-CCNC: 68 U/L — SIGNIFICANT CHANGE UP (ref 40–120)
ALT FLD-CCNC: 13 U/L — SIGNIFICANT CHANGE UP (ref 10–45)
ANION GAP SERPL CALC-SCNC: 15 MMOL/L — SIGNIFICANT CHANGE UP (ref 5–17)
AST SERPL-CCNC: 19 U/L — SIGNIFICANT CHANGE UP (ref 10–40)
BILIRUB SERPL-MCNC: 0.7 MG/DL — SIGNIFICANT CHANGE UP (ref 0.2–1.2)
BUN SERPL-MCNC: 36 MG/DL — HIGH (ref 7–23)
CALCIUM SERPL-MCNC: 9.8 MG/DL — SIGNIFICANT CHANGE UP (ref 8.4–10.5)
CHLORIDE SERPL-SCNC: 98 MMOL/L — SIGNIFICANT CHANGE UP (ref 96–108)
CO2 SERPL-SCNC: 25 MMOL/L — SIGNIFICANT CHANGE UP (ref 22–31)
CREAT SERPL-MCNC: 1.42 MG/DL — HIGH (ref 0.5–1.3)
GLUCOSE SERPL-MCNC: 103 MG/DL — HIGH (ref 70–99)
HCT VFR BLD CALC: 29.2 % — LOW (ref 34.5–45)
HGB BLD-MCNC: 8.9 G/DL — LOW (ref 11.5–15.5)
MAGNESIUM SERPL-MCNC: 1.8 MG/DL — SIGNIFICANT CHANGE UP (ref 1.6–2.6)
MCHC RBC-ENTMCNC: 29.6 PG — SIGNIFICANT CHANGE UP (ref 27–34)
MCHC RBC-ENTMCNC: 30.5 GM/DL — LOW (ref 32–36)
MCV RBC AUTO: 97 FL — SIGNIFICANT CHANGE UP (ref 80–100)
PHOSPHATE SERPL-MCNC: 4.2 MG/DL — SIGNIFICANT CHANGE UP (ref 2.5–4.5)
PLATELET # BLD AUTO: 283 K/UL — SIGNIFICANT CHANGE UP (ref 150–400)
POTASSIUM SERPL-MCNC: 3.9 MMOL/L — SIGNIFICANT CHANGE UP (ref 3.5–5.3)
POTASSIUM SERPL-SCNC: 3.9 MMOL/L — SIGNIFICANT CHANGE UP (ref 3.5–5.3)
PROT SERPL-MCNC: 6.7 G/DL — SIGNIFICANT CHANGE UP (ref 6–8.3)
RBC # BLD: 3.01 M/UL — LOW (ref 3.8–5.2)
RBC # FLD: 14.2 % — SIGNIFICANT CHANGE UP (ref 10.3–14.5)
SODIUM SERPL-SCNC: 138 MMOL/L — SIGNIFICANT CHANGE UP (ref 135–145)
WBC # BLD: 7.94 K/UL — SIGNIFICANT CHANGE UP (ref 3.8–10.5)
WBC # FLD AUTO: 7.94 K/UL — SIGNIFICANT CHANGE UP (ref 3.8–10.5)

## 2019-06-08 PROCEDURE — 99232 SBSQ HOSP IP/OBS MODERATE 35: CPT

## 2019-06-08 PROCEDURE — 99233 SBSQ HOSP IP/OBS HIGH 50: CPT | Mod: GC

## 2019-06-08 RX ORDER — AMIODARONE HYDROCHLORIDE 400 MG/1
400 TABLET ORAL
Refills: 0 | Status: DISCONTINUED | OUTPATIENT
Start: 2019-06-08 | End: 2019-06-11

## 2019-06-08 RX ORDER — METOPROLOL TARTRATE 50 MG
25 TABLET ORAL
Refills: 0 | Status: DISCONTINUED | OUTPATIENT
Start: 2019-06-08 | End: 2019-06-11

## 2019-06-08 RX ADMIN — Medication 1 DROP(S): at 21:14

## 2019-06-08 RX ADMIN — AMIODARONE HYDROCHLORIDE 400 MILLIGRAM(S): 400 TABLET ORAL at 17:17

## 2019-06-08 RX ADMIN — CLOPIDOGREL BISULFATE 75 MILLIGRAM(S): 75 TABLET, FILM COATED ORAL at 12:00

## 2019-06-08 RX ADMIN — SIMVASTATIN 40 MILLIGRAM(S): 20 TABLET, FILM COATED ORAL at 21:14

## 2019-06-08 RX ADMIN — PANTOPRAZOLE SODIUM 40 MILLIGRAM(S): 20 TABLET, DELAYED RELEASE ORAL at 07:40

## 2019-06-08 RX ADMIN — APIXABAN 2.5 MILLIGRAM(S): 2.5 TABLET, FILM COATED ORAL at 05:45

## 2019-06-08 RX ADMIN — BRIMONIDINE TARTRATE 1 DROP(S): 2 SOLUTION/ DROPS OPHTHALMIC at 21:14

## 2019-06-08 RX ADMIN — BRIMONIDINE TARTRATE 1 DROP(S): 2 SOLUTION/ DROPS OPHTHALMIC at 05:46

## 2019-06-08 RX ADMIN — APIXABAN 2.5 MILLIGRAM(S): 2.5 TABLET, FILM COATED ORAL at 17:17

## 2019-06-08 RX ADMIN — Medication 1 MILLIGRAM(S): at 11:59

## 2019-06-08 RX ADMIN — Medication 12.5 MILLIGRAM(S): at 05:46

## 2019-06-08 RX ADMIN — Medication 1 DROP(S): at 05:46

## 2019-06-08 RX ADMIN — Medication 25 MILLIGRAM(S): at 17:18

## 2019-06-08 NOTE — PHYSICAL THERAPY INITIAL EVALUATION ADULT - ADDITIONAL COMMENTS
Pt A&Ox4. Pt lives alone in a split level house. 6 stairs to main level and 6 stairs to bedroom/bathroom. Pt independent with functional activities and ambulation without an AD.

## 2019-06-08 NOTE — PROGRESS NOTE ADULT - ASSESSMENT
89 yo woman with PMH of recent TAVR for severe AS(4/19), new LBBB s/p leadless micra PPM (4/19), afib(on eliquis), CAD s/p 1 stent (3/19), diastoloic heart failure, HTN, HLD, asthma, skin BCC p/w SOB admitted with acute on chronic diastolic CHF in the setting of afib RVR. 89 yo woman with PMH of recent TAVR for severe AS(4/19), new LBBB s/p leadless micra PPM (4/19), afib(on eliquis), CAD s/p 1 stent (3/19), diastoloic heart failure, HTN, HLD, asthma, skin BCC p/w SOB admitted with acute on chronic diastolic CHF in the setting of afib w/ RVR. Pt now in normal sinus rhythm.

## 2019-06-08 NOTE — DISCHARGE NOTE PROVIDER - CARE PROVIDER_API CALL
James Palmer (DO)  Cardiovascular Disease; Internal Medicine  [facility], [address] [place] [address]  Phone: [phone]  Fax: [phone]  Follow Up Time:     Layo Downey)  Internal Medicine  [address] [place] [address]  Phone: [phone]  Fax: [phone]  Follow Up Time:

## 2019-06-08 NOTE — PROGRESS NOTE ADULT - ATTENDING COMMENTS
In NSR now. Started on amiodarone. Monitor. D/c planning if remains stable. In NSR now. Started on amiodarone. Monitor.     D/c planning if remains stable.

## 2019-06-08 NOTE — PROGRESS NOTE ADULT - SUBJECTIVE AND OBJECTIVE BOX
Jory Espinosa MD PGY1   Pager [phone]/ [phone]      BLANCA BENSON  89y  Female    Subjective:           T(C): 36.9 (06-08-19 @ 04:30), Max: 36.9 (06-08-19 @ 04:30)  HR: 68 (06-08-19 @ 04:30) (55 - 134)  BP: 130/80 (06-08-19 @ 04:30) (96/61 - 133/94)  RR: 18 (06-08-19 @ 04:30) (18 - 18)  SpO2: 96% (06-08-19 @ 04:30) (92% - 96%)  Wt(kg): --Vital Signs Last 24 Hrs  T(C): 36.9 (08 Jun 2019 04:30), Max: 36.9 (08 Jun 2019 04:30)  T(F): 98.4 (08 Jun 2019 04:30), Max: 98.4 (08 Jun 2019 04:30)  HR: 68 (08 Jun 2019 04:30) (55 - 134)  BP: 130/80 (08 Jun 2019 04:30) (96/61 - 133/94)  BP(mean): --  RR: 18 (08 Jun 2019 04:30) (18 - 18)  SpO2: 96% (08 Jun 2019 04:30) (92% - 96%)    PHYSICAL EXAM:  GENERAL: NAD, well-groomed, well-developed  HEAD:  Atraumatic, Normocephalic  EYES: EOMI, PERRLA, conjunctiva and sclera clear  ENMT: No tonsillar erythema, exudates, or enlargement; Moist mucous membranes, Good dentition, No lesions  NECK: Supple, No JVD, Normal thyroid  NERVOUS SYSTEM:  Alert & Oriented X3, Good concentration; Motor Strength 5/5 B/L upper and lower extremities; DTRs 2+ intact and symmetric  CHEST/LUNG: Clear to percussion bilaterally; No rales, rhonchi, wheezing, or rubs  HEART: Regular rate and rhythm; No murmurs, rubs, or gallops  ABDOMEN: Soft, Nontender, Nondistended; Bowel sounds present  EXTREMITIES:  2+ Peripheral Pulses, No clubbing, cyanosis, or edema  LYMPH: No lymphadenopathy noted  SKIN: No rashes or lesions    Consultant(s) Notes Reviewed:  [x ] YES  [ ] NO  Care Discussed with Consultants/Other Providers [ x] YES  [ ] NO    LABS:          RADIOLOGY & ADDITIONAL TESTS:    Imaging Personally Reviewed:  [ ] YES  [ ] NO  MedsMEDICATIONS  (STANDING):  apixaban 2.5 milliGRAM(s) Oral every 12 hours  brimonidine 0.2% Ophthalmic Solution 1 Drop(s) Both EYES two times a day  clopidogrel Tablet 75 milliGRAM(s) Oral daily  docusate sodium 100 milliGRAM(s) Oral three times a day  folic acid 1 milliGRAM(s) Oral daily  metoprolol tartrate 12.5 milliGRAM(s) Oral every 6 hours  pantoprazole    Tablet 40 milliGRAM(s) Oral before breakfast  simvastatin 40 milliGRAM(s) Oral at bedtime  timolol 0.5% Solution 1 Drop(s) Both EYES two times a day  traMADol 25 milliGRAM(s) Oral once    MEDICATIONS  (PRN):  acetaminophen   Tablet .. 650 milliGRAM(s) Oral every 6 hours PRN Moderate Pain (4 - 6)  ALBUTerol/ipratropium for Nebulization 3 milliLiter(s) Nebulizer every 6 hours PRN Shortness of Breath and/or Wheezing  traMADol 25 milliGRAM(s) Oral every 8 hours PRN Severe Pain (7 - 10) Jory Espinosa MD PGY1   Pager [phone]/ [phone]      BLANCA BENSON  89y  Female    Subjective: No events overnight. Pt converted to sinus rhythm yesterday evening. Denies sob, cp, palpitations.          T(C): 36.9 (06-08-19 @ 04:30), Max: 36.9 (06-08-19 @ 04:30)  HR: 68 (06-08-19 @ 04:30) (55 - 134)  BP: 130/80 (06-08-19 @ 04:30) (96/61 - 133/94)  RR: 18 (06-08-19 @ 04:30) (18 - 18)  SpO2: 96% (06-08-19 @ 04:30) (92% - 96%)  Wt(kg): --Vital Signs Last 24 Hrs  T(C): 36.9 (08 Jun 2019 04:30), Max: 36.9 (08 Jun 2019 04:30)  T(F): 98.4 (08 Jun 2019 04:30), Max: 98.4 (08 Jun 2019 04:30)  HR: 68 (08 Jun 2019 04:30) (55 - 134)  BP: 130/80 (08 Jun 2019 04:30) (96/61 - 133/94)  BP(mean): --  RR: 18 (08 Jun 2019 04:30) (18 - 18)  SpO2: 96% (08 Jun 2019 04:30) (92% - 96%)    PHYSICAL EXAM:  GENERAL: NAD, well-groomed, well-developed  HEAD:  Atraumatic, Normocephalic  EYES: EOMI, PERRLA, conjunctiva and sclera clear  ENMT: No tonsillar erythema, exudates, or enlargement; Moist mucous membranes, Good dentition, No lesions  NECK: Supple, No JVD, Normal thyroid  NERVOUS SYSTEM:  Alert & Oriented X3, Good concentration  CHEST/LUNG: Clear to percussion bilaterally; No rales, rhonchi, wheezing, or rubs  HEART: Regular rate and rhythm; No murmurs, rubs, or gallops  ABDOMEN: Soft, Nontender, Nondistended; Bowel sounds present  EXTREMITIES:  2+ Peripheral Pulses, No clubbing, cyanosis, or edema  LYMPH: No lymphadenopathy noted  SKIN: No rashes or lesions    Consultant(s) Notes Reviewed:  [x ] YES  [ ] NO  Care Discussed with Consultants/Other Providers [ x] YES  [ ] NO    LABS:                        10.2   8.8   )-----------( 329      ( 07 Jun 2019 04:41 )             32.6     06-08    138  |  98  |  36<H>  ----------------------------<  103<H>  3.9   |  25  |  1.42<H>    Ca    9.8      08 Jun 2019 06:05  Phos  4.2     06-08  Mg     1.8     06-08    TPro  6.7  /  Alb  3.7  /  TBili  0.7  /  DBili  x   /  AST  19  /  ALT  13  /  AlkPhos  68  06-08    PT/INR - ( 07 Jun 2019 04:41 )   PT: 14.9 sec;   INR: 1.29 ratio         PTT - ( 06 Jun 2019 18:42 )  PTT:29.7 sec          RADIOLOGY, EKG & ADDITIONAL TESTS: Reviewed.       RADIOLOGY & ADDITIONAL TESTS:    Imaging Personally Reviewed:  [ ] YES  [ ] NO  MedsMEDICATIONS  (STANDING):  apixaban 2.5 milliGRAM(s) Oral every 12 hours  brimonidine 0.2% Ophthalmic Solution 1 Drop(s) Both EYES two times a day  clopidogrel Tablet 75 milliGRAM(s) Oral daily  docusate sodium 100 milliGRAM(s) Oral three times a day  folic acid 1 milliGRAM(s) Oral daily  metoprolol tartrate 12.5 milliGRAM(s) Oral every 6 hours  pantoprazole    Tablet 40 milliGRAM(s) Oral before breakfast  simvastatin 40 milliGRAM(s) Oral at bedtime  timolol 0.5% Solution 1 Drop(s) Both EYES two times a day  traMADol 25 milliGRAM(s) Oral once    MEDICATIONS  (PRN):  acetaminophen   Tablet .. 650 milliGRAM(s) Oral every 6 hours PRN Moderate Pain (4 - 6)  ALBUTerol/ipratropium for Nebulization 3 milliLiter(s) Nebulizer every 6 hours PRN Shortness of Breath and/or Wheezing  traMADol 25 milliGRAM(s) Oral every 8 hours PRN Severe Pain (7 - 10)

## 2019-06-08 NOTE — PROGRESS NOTE ADULT - SUBJECTIVE AND OBJECTIVE BOX
[facility] - Chandra Tomlinson, Pamela, Ezekiel, Suinl, Marshal Alanis  Office Number:  [phone]    Patient resting comfortably in bed in NAD.  Laying flat with no respiratory distress.  No complaints of chest pain, dyspnea, palpitations, PND, or orthopnea.  in sr, feeling better this morning.    ROS: negative unless otherwise mentioned.    Telemetry:  sr, from af with rvr    MEDICATIONS  (STANDING):  amiodarone    Tablet 400 milliGRAM(s) Oral two times a day  apixaban 2.5 milliGRAM(s) Oral every 12 hours  brimonidine 0.2% Ophthalmic Solution 1 Drop(s) Both EYES two times a day  clopidogrel Tablet 75 milliGRAM(s) Oral daily  docusate sodium 100 milliGRAM(s) Oral three times a day  folic acid 1 milliGRAM(s) Oral daily  metoprolol tartrate 25 milliGRAM(s) Oral two times a day  pantoprazole    Tablet 40 milliGRAM(s) Oral before breakfast  simvastatin 40 milliGRAM(s) Oral at bedtime  timolol 0.5% Solution 1 Drop(s) Both EYES two times a day  traMADol 25 milliGRAM(s) Oral once    MEDICATIONS  (PRN):  acetaminophen   Tablet .. 650 milliGRAM(s) Oral every 6 hours PRN Moderate Pain (4 - 6)  ALBUTerol/ipratropium for Nebulization 3 milliLiter(s) Nebulizer every 6 hours PRN Shortness of Breath and/or Wheezing  traMADol 25 milliGRAM(s) Oral every 8 hours PRN Severe Pain (7 - 10)      Allergies    adhesives (Rash)  amoxicillin (Rash)  penicillin (Unknown)    Intolerances        Vital Signs Last 24 Hrs  T(C): 36.8 (08 Jun 2019 12:02), Max: 36.9 (08 Jun 2019 04:30)  T(F): 98.2 (08 Jun 2019 12:02), Max: 98.4 (08 Jun 2019 04:30)  HR: 60 (08 Jun 2019 12:02) (60 - 134)  BP: 130/73 (08 Jun 2019 12:02) (96/61 - 133/94)  BP(mean): --  RR: 18 (08 Jun 2019 12:02) (18 - 18)  SpO2: 94% (08 Jun 2019 12:02) (92% - 96%)    I&O's Summary    07 Jun 2019 07:01  -  08 Jun 2019 07:00  --------------------------------------------------------  IN: 720 mL / OUT: 0 mL / NET: 720 mL    08 Jun 2019 07:01  -  08 Jun 2019 12:19  --------------------------------------------------------  IN: 240 mL / OUT: 0 mL / NET: 240 mL        ON EXAM:  Constitutional: NAD, alert and oriented x 3  Lungs:  Non-labored, breath sounds are clear.  B/L rales at bases  Cardiovascular: RRR.  S1 and S2 positive.  1/6 systolic murmur  Gastrointestinal: Bowel Sounds present, soft, nontender.   Lymph: No peripheral edema. No cervical lymphadenopathy.  Neurological: Alert, no focal deficits  Skin: No rashes or ulcers   Psych:  Mood & affect appropriate.    LABS: All Labs Reviewed:                        8.9    7.94  )-----------( 283      ( 08 Jun 2019 09:12 )             29.2                         10.2   8.8   )-----------( 329      ( 07 Jun 2019 04:41 )             32.6                         9.5    8.7   )-----------( 322      ( 06 Jun 2019 18:42 )             30.5     08 Jun 2019 06:05    138    |  98     |  36     ----------------------------<  103    3.9     |  25     |  1.42   07 Jun 2019 04:41    139    |  99     |  27     ----------------------------<  103    3.9     |  24     |  1.05   06 Jun 2019 18:42    137    |  100    |  29     ----------------------------<  99     4.3     |  22     |  1.08     Ca    9.8        08 Jun 2019 06:05  Ca    10.2       07 Jun 2019 04:41  Ca    10.0       06 Jun 2019 18:42  Phos  4.2       08 Jun 2019 06:05  Phos  4.2       07 Jun 2019 04:41  Mg     1.8       08 Jun 2019 06:05  Mg     1.8       07 Jun 2019 04:41    TPro  6.7    /  Alb  3.7    /  TBili  0.7    /  DBili  x      /  AST  19     /  ALT  13     /  AlkPhos  68     08 Jun 2019 06:05  TPro  7.3    /  Alb  4.1    /  TBili  0.9    /  DBili  x      /  AST  20     /  ALT  17     /  AlkPhos  72     07 Jun 2019 04:41  TPro  7.1    /  Alb  3.9    /  TBili  0.7    /  DBili  x      /  AST  26     /  ALT  19     /  AlkPhos  69     06 Jun 2019 18:42    PT/INR - ( 07 Jun 2019 04:41 )   PT: 14.9 sec;   INR: 1.29 ratio         PTT - ( 06 Jun 2019 18:42 )  PTT:29.7 sec      Blood Culture:   06-06 @ 18:42  Pro Bnp [id number]

## 2019-06-08 NOTE — DISCHARGE NOTE PROVIDER - HOSPITAL COURSE
89 yo woman with PMH of recent TAVR for severe AS(4/19), new LBBB s/p leadless micra PPM (4/19), afib(on eliquis), CAD s/p 1 stent (3/19), diastoloic heart failure, HTN, HLD, asthma, skin BCC p/w SOB. Patient reports being in normal state of health 2 days prior, developed SOB, at rest worsenened w/ exertion, no assocaited chest pain or palpitations. Has never felt this symptom prior. Per outpatient documentation patient had been experiencing anxiety, dyspnea, w/ mild lower extremity edema and weight losss. She visited her cardiologist Dr. James Palmer where she was found to be in afib w/ RVR, transfered to ED for evaluation. Per ED documentation which in EMS afib w RVR broke to 110's. Patient denying any fever/chills/abdominal pain, N/V, leg pain or swelling. In the ED: Lasix IVP 40 mg and lopressor 25 mg po.        Pt admitted to medicine floors for further management. Pt started on metoprolol 12.5 mg q6. Pt attained euvolemia, had significant improvement in respiratory status and lasix d/c-ed. Pt satting in 90s off supplemental O2. Converted to NSR. Pt transitioned to metoprolol 25 mg bid. Pt loaded with amiodarone. Continued on eliquis for stroke ppx as well as plavix but ASA held to decrease bleeding risk.        Pt currently hemodynamically stable and medically clear for d/c home. 89 yo woman with PMH of recent TAVR for severe AS(4/19), new LBBB s/p leadless micra PPM (4/19), afib(on eliquis), CAD s/p 1 stent (3/19), diastoloic heart failure, HTN, HLD, asthma, skin BCC p/w SOB. Patient reports being in normal state of health 2 days prior, developed SOB, at rest worsenened w/ exertion, no assocaited chest pain or palpitations. Has never felt this symptom prior. Per outpatient documentation patient had been experiencing anxiety, dyspnea, w/ mild lower extremity edema and weight losss. She visited her cardiologist Dr. James Palmer where she was found to be in afib w/ RVR, transfered to ED for evaluation. Per ED documentation which in EMS afib w RVR broke to 110's. Patient denying any fever/chills/abdominal pain, N/V, leg pain or swelling. In the ED: Lasix IVP 40 mg and lopressor 25 mg po.        Pt admitted to medicine floors for further management. Patient was treated with rate control (metoprolol and amiodarone) and diuresis (lasix) for RVR and flash pulmonary edema. Patient converted to sinus rhythm and had resolution of her symptoms. Patient was continued on eliquis for stroke ppx and continued on plavix for CAD. ASA being held to decrease bleeding risk. Patient stable for discharge with plan for outpatient cardiology follow up. 89 yo woman with PMH of recent TAVR for severe AS(4/19), new LBBB s/p leadless micra PPM (4/19), afib(on eliquis), CAD s/p 1 stent (3/19), diastoloic heart failure, severe mitral regurg, HTN, HLD, asthma, skin BCC p/w SOB. She visited her cardiologist Dr. James Palmer where she was found to be in afib w/ RVR, transfered to ED for evaluation.         Pt admitted to medicine floors for further management. Patient was treated with rate control (metoprolol and amiodarone) and diuresis (lasix) for RVR and pulmonary edema. Patient converted to sinus rhythm and had resolution of her symptoms. Patient was continued on eliquis for stroke ppx and continued on plavix for CAD. ASA being held to decrease bleeding risk. Patient stable for discharge with plan for outpatient cardiology follow up.

## 2019-06-08 NOTE — PROGRESS NOTE ADULT - SUBJECTIVE AND OBJECTIVE BOX
24H hour events: Patient converted to NSR at approximately 6pm yesterday evening. Denies c/o CP, palpitations or SOB.     MEDICATIONS:  apixaban 2.5 milliGRAM(s) Oral every 12 hours  clopidogrel Tablet 75 milliGRAM(s) Oral daily  metoprolol tartrate 12.5 milliGRAM(s) Oral every 6 hours    ALBUTerol/ipratropium for Nebulization 3 milliLiter(s) Nebulizer every 6 hours PRN    acetaminophen   Tablet .. 650 milliGRAM(s) Oral every 6 hours PRN  traMADol 25 milliGRAM(s) Oral once  traMADol 25 milliGRAM(s) Oral every 8 hours PRN    docusate sodium 100 milliGRAM(s) Oral three times a day  pantoprazole    Tablet 40 milliGRAM(s) Oral before breakfast    simvastatin 40 milliGRAM(s) Oral at bedtime    brimonidine 0.2% Ophthalmic Solution 1 Drop(s) Both EYES two times a day  folic acid 1 milliGRAM(s) Oral daily  timolol 0.5% Solution 1 Drop(s) Both EYES two times a day      REVIEW OF SYSTEMS:  Complete 10point ROS negative.    PHYSICAL EXAM:  T(C): 36.9 (06-08-19 @ 04:30), Max: 36.9 (06-08-19 @ 04:30)  HR: 68 (06-08-19 @ 04:30) (55 - 134)  BP: 132/76 (06-08-19 @ 08:50) (96/61 - 133/94)  RR: 18 (06-08-19 @ 04:30) (18 - 18)  SpO2: 96% (06-08-19 @ 08:50) (92% - 96%)    07 Jun 2019 07:01  -  08 Jun 2019 07:00  --------------------------------------------------------  IN: 720 mL / OUT: 0 mL / NET: 720 mL    08 Jun 2019 07:01  -  08 Jun 2019 10:48  --------------------------------------------------------  IN: 240 mL / OUT: 0 mL / NET: 240 mL    Appearance: Normal	  Cardiovascular: Normal S1 S2, regular.    Respiratory: Lungs clear to auscultation	  Psychiatry: A & O x 3, Mood & affect appropriate  Gastrointestinal:  Soft, Non-tender, + BS	  Extremities: Normal range of motion, No clubbing, cyanosis or edema  Vascular: Peripheral pulses palpable 2+ bilaterally      LABS:	 	    CBC Full  -  ( 08 Jun 2019 09:12 )  WBC Count : 7.94 K/uL  Hemoglobin : 8.9 g/dL  Hematocrit : 29.2 %  Platelet Count - Automated : 283 K/uL  Mean Cell Volume : 97.0 fl  Mean Cell Hemoglobin : 29.6 pg  Mean Cell Hemoglobin Concentration : 30.5 gm/dL  Auto Neutrophil # : x  Auto Lymphocyte # : x  Auto Monocyte # : x  Auto Eosinophil # : x  Auto Basophil # : x  Auto Neutrophil % : x  Auto Lymphocyte % : x  Auto Monocyte % : x  Auto Eosinophil % : x  Auto Basophil % : x    06-08    138  |  98  |  36<H>  ----------------------------<  103<H>  3.9   |  25  |  1.42<H>  06-07    139  |  99  |  27<H>  ----------------------------<  103<H>  3.9   |  24  |  1.05    Ca    9.8      08 Jun 2019 06:05  Ca    10.2      07 Jun 2019 04:41  Phos  4.2     06-08  Phos  4.2     06-07  Mg     1.8     06-08  Mg     1.8     06-07    TPro  6.7  /  Alb  3.7  /  TBili  0.7  /  DBili  x   /  AST  19  /  ALT  13  /  AlkPhos  68  06-08  TPro  7.3  /  Alb  4.1  /  TBili  0.9  /  DBili  x   /  AST  20  /  ALT  17  /  AlkPhos  72  06-07      proBNP: Serum Pro-Brain Natriuretic Peptide: 2371 pg/mL (06-06 @ 18:42)          TELEMETRY: 	  NSR 50's- 70's

## 2019-06-08 NOTE — DISCHARGE NOTE PROVIDER - NSDCCPCAREPLAN_GEN_ALL_CORE_FT
PRINCIPAL DISCHARGE DIAGNOSIS  Diagnosis: Flash pulmonary edema  Assessment and Plan of Treatment: Your presenting symptoms were likely due to excess fluid in the lungs which in turn was likely associated with atrial fibrillation and your history of heart failure. Please continue taking your medications as prescribed and follow up with your cardiologist, PCP, and ophthalmologist within 1 week of discharge.      SECONDARY DISCHARGE DIAGNOSES  Diagnosis: Atrial fibrillation with RVR  Assessment and Plan of Treatment: Please continue taking your medications as prescribed and follow up with your cardiologist and PCP within 1 week of discharge.    Diagnosis: Acute on chronic diastolic heart failure  Assessment and Plan of Treatment: Please continue taking your medications as prescribed and follow up with your cardiologist and PCP within 1 week of discharge.    Diagnosis: CAD (coronary artery disease)  Assessment and Plan of Treatment: Please continue taking your medications as prescribed and follow up with your cardiologist and PCP within 1 week of discharge. PRINCIPAL DISCHARGE DIAGNOSIS  Diagnosis: Acute on chronic diastolic heart failure  Assessment and Plan of Treatment: Your presenting symptoms were likely due to excess fluid in the lungs which in turn was likely associated with atrial fibrillation and your history of heart failure. Please continue taking your medications as prescribed and follow up with your cardiologist, PCP, and ophthalmologist within 1 week of discharge.      SECONDARY DISCHARGE DIAGNOSES  Diagnosis: Atrial fibrillation with RVR  Assessment and Plan of Treatment: Please continue taking your medications as prescribed and follow up with your cardiologist and PCP within 1 week of discharge to check your electrolytes and kidney function.    Diagnosis: CHASITY (acute kidney injury)  Assessment and Plan of Treatment: Your losartan medication was stopped and the kideny function improved. you will need to have your kidney function checked this week by your primary medical doctor    Diagnosis: CAD (coronary artery disease)  Assessment and Plan of Treatment: Please continue taking your medications as prescribed and follow up with your cardiologist and PCP within 1 week of discharge. PRINCIPAL DISCHARGE DIAGNOSIS  Diagnosis: Acute on chronic diastolic heart failure  Assessment and Plan of Treatment: Your presenting symptoms were likely due to excess fluid in the lungs which in turn was likely associated with atrial fibrillation and your history of heart failure. Please continue taking your medications as prescribed and follow up with your cardiologist, PCP, and ophthalmologist within 1 week of discharge.      SECONDARY DISCHARGE DIAGNOSES  Diagnosis: Atrial fibrillation with RVR  Assessment and Plan of Treatment: Please continue taking your medications as prescribed and follow up with your cardiologist and PCP within 1 week of discharge to check your electrolytes and kidney function.    Diagnosis: CHASITY (acute kidney injury)  Assessment and Plan of Treatment: Your losartan medication was stopped and the kidney function improved. you will need to have your kidney function checked this week by your primary medical doctor. do not take any over-the-counter medications without permission from your primary care doctor. your cardiologist and primary care doctor will determine when it should be restarted    Diagnosis: CAD (coronary artery disease)  Assessment and Plan of Treatment: Please continue taking your medications as prescribed and follow up with your cardiologist and PCP within 1 week of discharge. PRINCIPAL DISCHARGE DIAGNOSIS  Diagnosis: Acute on chronic diastolic heart failure  Assessment and Plan of Treatment: Your presenting symptoms were likely due to excess fluid in the lungs which in turn was likely associated with atrial fibrillation and your history of heart failure. Please continue taking your medications as prescribed and follow up with your cardiologist, PCP, and ophthalmologist within 1 week of discharge.      SECONDARY DISCHARGE DIAGNOSES  Diagnosis: Atrial fibrillation with RVR  Assessment and Plan of Treatment: Please continue taking your medications as prescribed and follow up with your cardiologist and PCP within 1 week of discharge to check your electrolytes and kidney function. While on Amiodarone, your will need to have your liver enzymes and thyroid levels monitored. You also need to be referred for lung testing, called pulmonary function tests and an eye doctor while on Amiodarone.    Diagnosis: CHASITY (acute kidney injury)  Assessment and Plan of Treatment: Your losartan medication was stopped and the kidney function improved. you will need to have your kidney function checked this week by your primary medical doctor. do not take any over-the-counter medications without permission from your primary care doctor. your cardiologist and primary care doctor will determine when it should be restarted    Diagnosis: CAD (coronary artery disease)  Assessment and Plan of Treatment: Please continue taking your medications as prescribed and follow up with your cardiologist and PCP within 1 week of discharge.

## 2019-06-08 NOTE — PHYSICAL THERAPY INITIAL EVALUATION ADULT - PERTINENT HX OF CURRENT PROBLEM, REHAB EVAL
Pt is a 89 F with a PMH of TAVR 4/2019, new LBBB s/p leadless micra PPM 4/2019, afib, CAD s/p 1 stent, diastolic HF, HTN, HLD, asthma, skin Bcc that presented with SOB, chronic CHF, and Afib RVR.

## 2019-06-08 NOTE — DISCHARGE NOTE PROVIDER - CARE PROVIDERS DIRECT ADDRESSES
,[email or web address]

## 2019-06-08 NOTE — PROGRESS NOTE ADULT - ASSESSMENT
89 y/o F with asthma, HTN, HLD, severe AS, Diastolic HF, CAD s/p PCI to mid RCA in 3/2019. She had severe AS, and is s/p tavr on 4/24/2019. Post-operatively, she had a new LBBB and a Micra PPM was placed on 4/25/2019.    #AFIB with RVR  - Converted to NSR over night   - Continue apixaban  - Currently on metoprolol 12.5 q6h could switch to 25mg BID   - Recommend starting Amiodarone load to maintain NSR, 400mg BID for 1 week, then 200mg BID for one week, then 200mg daily there after.  - Obtain baseline TSH today, monitor LFT's while in patient. Black box warning discussed with patient who will need out patient monitoring of TSH, LFT's, Pulmonary Function tests and Opthalmology evaluations.   - Team 2 paged to discuss recommendations    JULIO Price -[phone]

## 2019-06-08 NOTE — PROGRESS NOTE ADULT - PROBLEM SELECTOR PLAN 1
- CXR with pulmonary edema  - last TTE showing severe MR, mod-severe TR w/ grade 3 diastolic dysfunction  - c/w strict I+Os  - BID lytes  - will d/c losartan and go up on BB as tolerated to control afib, currently on metoprolol 12.5 mg bid - CXR with pulmonary edema  - last TTE showing severe MR, mod-severe TR w/ grade 3 diastolic dysfunction  - c/w strict I+Os  - Improved w diuresis. Holding for now given elevated Cr. - CXR with pulmonary edema  - last TTE showing severe MR, mod-severe TR w/ grade 3 diastolic dysfunction  - Improved w diuresis. Holding for now given elevated Cr.

## 2019-06-08 NOTE — PROGRESS NOTE ADULT - ASSESSMENT
89 yo woman with PMH of recent TAVR for severe AS(4/19), new LBBB s/p leadless micra PPM (4/19), afib(on eliquis), CAD s/p 1 stent (3/19), diastolic heart failure, HTN, HLD, asthma, skin BCC p/w SOB. Patient reports being in normal state of health 2 days prior, developed SOB, at rest worsened w/ exertion, no associated chest pain or palpitations, in AF with RVR:    - Converted to SR overnight. Recommend starting amiodarone load 400 bid.  - continue with lopressor 25 mg po bid. Continue to watch on telemetry  - Volume status has improved overall. Given rise in creatinine, would hold off on additional diuresis and re-evaluate in the morning. Her most recent echocardiogram did demonstrate significant mitral regurgitation.  - Continue Eliquis 2.5 bid for stroke risk reduction  - Was on Aspirin and Plavix for PCI in March. d/c ASA to avoid triple therapy.  - Continue statin drug  - Monitor I, O, K, creatinine, and daily weights  - Replete K and Mg as needed  - Supplemental oxygen as needed  - EPS follow up  - Will follow with you.

## 2019-06-08 NOTE — PROGRESS NOTE ADULT - PROBLEM SELECTOR PLAN 2
- currently afib rate control   - metoprolol 12.5 q6 hr for rate control, can increased to 25 mg q6 if RVR  - c/w eliquis  - f/u interrogation by EP  - appreciate cards recs - Now in NSR. On Amiodarone loading.

## 2019-06-08 NOTE — DISCHARGE NOTE PROVIDER - NSDCFUADDAPPT_GEN_ALL_CORE_FT
1) You are scheduled for a follow up appointment with cardiologist Dr. James Palmer on 6/13/19 at 12:15PM.  2) You are scheduled for a follow up appointment with your PCP Dr. Layo Downey on 6/14/19 at 12:30PM.  3) You are scheduled for a follow up appointment with your ophthalmologist Dr. Morales on 6/17/19 at 1:45PM. 1) You are scheduled for a follow up appointment with cardiologist Dr. James Palmer on 6/13/19 at 12:15PM.  2) You are scheduled for a follow up appointment with your PCP Dr. Layo Downey on 6/14/19 at 12:30PM. You will need to have your kidney function and potassium levels checked.  3) You are scheduled for a follow up appointment with your ophthalmologist Dr. Morales on 6/17/19 at 1:45PM.

## 2019-06-09 LAB
ALBUMIN SERPL ELPH-MCNC: 3.8 G/DL — SIGNIFICANT CHANGE UP (ref 3.3–5)
ALP SERPL-CCNC: 67 U/L — SIGNIFICANT CHANGE UP (ref 40–120)
ALT FLD-CCNC: 14 U/L — SIGNIFICANT CHANGE UP (ref 10–45)
ANION GAP SERPL CALC-SCNC: 15 MMOL/L — SIGNIFICANT CHANGE UP (ref 5–17)
AST SERPL-CCNC: 26 U/L — SIGNIFICANT CHANGE UP (ref 10–40)
BILIRUB SERPL-MCNC: 0.7 MG/DL — SIGNIFICANT CHANGE UP (ref 0.2–1.2)
BUN SERPL-MCNC: 29 MG/DL — HIGH (ref 7–23)
CALCIUM SERPL-MCNC: 9.5 MG/DL — SIGNIFICANT CHANGE UP (ref 8.4–10.5)
CHLORIDE SERPL-SCNC: 100 MMOL/L — SIGNIFICANT CHANGE UP (ref 96–108)
CO2 SERPL-SCNC: 25 MMOL/L — SIGNIFICANT CHANGE UP (ref 22–31)
CREAT SERPL-MCNC: 1.14 MG/DL — SIGNIFICANT CHANGE UP (ref 0.5–1.3)
GLUCOSE SERPL-MCNC: 83 MG/DL — SIGNIFICANT CHANGE UP (ref 70–99)
HCT VFR BLD CALC: 31.4 % — LOW (ref 34.5–45)
HGB BLD-MCNC: 9.4 G/DL — LOW (ref 11.5–15.5)
MAGNESIUM SERPL-MCNC: 1.9 MG/DL — SIGNIFICANT CHANGE UP (ref 1.6–2.6)
MCHC RBC-ENTMCNC: 29.2 PG — SIGNIFICANT CHANGE UP (ref 27–34)
MCHC RBC-ENTMCNC: 29.9 GM/DL — LOW (ref 32–36)
MCV RBC AUTO: 97.5 FL — SIGNIFICANT CHANGE UP (ref 80–100)
PHOSPHATE SERPL-MCNC: 2.9 MG/DL — SIGNIFICANT CHANGE UP (ref 2.5–4.5)
PLATELET # BLD AUTO: 289 K/UL — SIGNIFICANT CHANGE UP (ref 150–400)
POTASSIUM SERPL-MCNC: 4.1 MMOL/L — SIGNIFICANT CHANGE UP (ref 3.5–5.3)
POTASSIUM SERPL-SCNC: 4.1 MMOL/L — SIGNIFICANT CHANGE UP (ref 3.5–5.3)
PROT SERPL-MCNC: 6.9 G/DL — SIGNIFICANT CHANGE UP (ref 6–8.3)
RBC # BLD: 3.22 M/UL — LOW (ref 3.8–5.2)
RBC # FLD: 14.2 % — SIGNIFICANT CHANGE UP (ref 10.3–14.5)
SODIUM SERPL-SCNC: 140 MMOL/L — SIGNIFICANT CHANGE UP (ref 135–145)
TSH SERPL-MCNC: 2.8 UIU/ML — SIGNIFICANT CHANGE UP (ref 0.27–4.2)
WBC # BLD: 7.58 K/UL — SIGNIFICANT CHANGE UP (ref 3.8–10.5)
WBC # FLD AUTO: 7.58 K/UL — SIGNIFICANT CHANGE UP (ref 3.8–10.5)

## 2019-06-09 PROCEDURE — 99232 SBSQ HOSP IP/OBS MODERATE 35: CPT | Mod: GC

## 2019-06-09 PROCEDURE — 99232 SBSQ HOSP IP/OBS MODERATE 35: CPT

## 2019-06-09 RX ORDER — FUROSEMIDE 40 MG
20 TABLET ORAL DAILY
Refills: 0 | Status: DISCONTINUED | OUTPATIENT
Start: 2019-06-09 | End: 2019-06-11

## 2019-06-09 RX ORDER — FUROSEMIDE 40 MG
20 TABLET ORAL ONCE
Refills: 0 | Status: DISCONTINUED | OUTPATIENT
Start: 2019-06-09 | End: 2019-06-09

## 2019-06-09 RX ORDER — FUROSEMIDE 40 MG
20 TABLET ORAL DAILY
Refills: 0 | Status: DISCONTINUED | OUTPATIENT
Start: 2019-06-09 | End: 2019-06-09

## 2019-06-09 RX ADMIN — AMIODARONE HYDROCHLORIDE 400 MILLIGRAM(S): 400 TABLET ORAL at 05:40

## 2019-06-09 RX ADMIN — Medication 1 DROP(S): at 05:41

## 2019-06-09 RX ADMIN — BRIMONIDINE TARTRATE 1 DROP(S): 2 SOLUTION/ DROPS OPHTHALMIC at 18:47

## 2019-06-09 RX ADMIN — Medication 1 DROP(S): at 18:47

## 2019-06-09 RX ADMIN — Medication 25 MILLIGRAM(S): at 18:48

## 2019-06-09 RX ADMIN — APIXABAN 2.5 MILLIGRAM(S): 2.5 TABLET, FILM COATED ORAL at 05:41

## 2019-06-09 RX ADMIN — Medication 25 MILLIGRAM(S): at 05:41

## 2019-06-09 RX ADMIN — Medication 1 MILLIGRAM(S): at 11:24

## 2019-06-09 RX ADMIN — Medication 20 MILLIGRAM(S): at 18:47

## 2019-06-09 RX ADMIN — APIXABAN 2.5 MILLIGRAM(S): 2.5 TABLET, FILM COATED ORAL at 18:43

## 2019-06-09 RX ADMIN — AMIODARONE HYDROCHLORIDE 400 MILLIGRAM(S): 400 TABLET ORAL at 18:47

## 2019-06-09 RX ADMIN — CLOPIDOGREL BISULFATE 75 MILLIGRAM(S): 75 TABLET, FILM COATED ORAL at 11:24

## 2019-06-09 RX ADMIN — TRAMADOL HYDROCHLORIDE 25 MILLIGRAM(S): 50 TABLET ORAL at 05:43

## 2019-06-09 RX ADMIN — TRAMADOL HYDROCHLORIDE 25 MILLIGRAM(S): 50 TABLET ORAL at 04:28

## 2019-06-09 RX ADMIN — PANTOPRAZOLE SODIUM 40 MILLIGRAM(S): 20 TABLET, DELAYED RELEASE ORAL at 06:05

## 2019-06-09 RX ADMIN — SIMVASTATIN 40 MILLIGRAM(S): 20 TABLET, FILM COATED ORAL at 21:34

## 2019-06-09 RX ADMIN — BRIMONIDINE TARTRATE 1 DROP(S): 2 SOLUTION/ DROPS OPHTHALMIC at 05:41

## 2019-06-09 NOTE — PROGRESS NOTE ADULT - ASSESSMENT
89 y/o F with asthma, HTN, HLD, severe AS, Diastolic HF, CAD s/p PCI to mid RCA in 3/2019. She had severe AS, and is s/p tavr on 4/24/2019. Post-operatively, she had a new LBBB and a Micra PPM was placed on 4/25/2019.    #AFIB with RVR  - Converted to NSR   - Continue apixaban  - Currently on metoprolol   - Amiodarone 400mg BID for 1 week, then 200mg BID for one week, then 200mg daily there after.  - Follow TFT, LFT's, Pulmonary Function tests and Opthalmology evaluations.

## 2019-06-09 NOTE — PROGRESS NOTE ADULT - PROBLEM SELECTOR PLAN 2
- Now in NSR. On Amiodarone loading. - Now in NSR. On Amiodarone loading.  - continue BB and eliquis  - will continue to monitor on tele

## 2019-06-09 NOTE — PROGRESS NOTE ADULT - ASSESSMENT
89 yo woman with PMH of recent TAVR for severe AS(4/19), new LBBB s/p leadless micra PPM (4/19), afib(on eliquis), CAD s/p 1 stent (3/19), diastolic heart failure, HTN, HLD, asthma, skin BCC p/w SOB. Patient reports being in normal state of health 2 days prior, developed SOB, at rest worsened w/ exertion, no associated chest pain or palpitations, in AF with RVR:    - Converted to SR. Continue with amiodarone load 400 bid.  - continue with lopressor 25 mg po bid. Continue to watch on telemetry  - Volume status has improved overall. Given rise in creatinine, diuretics were held. I would restart her on lasix 20 mg po daily. Her most recent echocardiogram did demonstrate significant mitral regurgitation.  - Continue Eliquis 2.5 bid for stroke risk reduction  - Was on Aspirin and Plavix for PCI in March. d/c ASA to avoid triple therapy.  - Continue statin drug  - Monitor I, O, K, creatinine, and daily weights  - Replete K and Mg as needed  - Supplemental oxygen as needed  - Will follow with you.

## 2019-06-09 NOTE — PROGRESS NOTE ADULT - SUBJECTIVE AND OBJECTIVE BOX
[facility] - Chandra Tomlinson, Pamela, Ezekiel, Sunil, Marshal Alanis  Office Number:  [phone]    Patient resting comfortably in bed in NAD.  Laying flat with no respiratory distress.  No complaints of chest pain, dyspnea, palpitations, PND, or orthopnea.  in sr, feeling better this morning.    ROS: negative unless otherwise mentioned.    Telemetry:  sr/sb, short nsvt    MEDICATIONS  (STANDING):  amiodarone    Tablet 400 milliGRAM(s) Oral two times a day  apixaban 2.5 milliGRAM(s) Oral every 12 hours  brimonidine 0.2% Ophthalmic Solution 1 Drop(s) Both EYES two times a day  clopidogrel Tablet 75 milliGRAM(s) Oral daily  docusate sodium 100 milliGRAM(s) Oral three times a day  folic acid 1 milliGRAM(s) Oral daily  metoprolol tartrate 25 milliGRAM(s) Oral two times a day  pantoprazole    Tablet 40 milliGRAM(s) Oral before breakfast  simvastatin 40 milliGRAM(s) Oral at bedtime  timolol 0.5% Solution 1 Drop(s) Both EYES two times a day  traMADol 25 milliGRAM(s) Oral once    MEDICATIONS  (PRN):  acetaminophen   Tablet .. 650 milliGRAM(s) Oral every 6 hours PRN Moderate Pain (4 - 6)  ALBUTerol/ipratropium for Nebulization 3 milliLiter(s) Nebulizer every 6 hours PRN Shortness of Breath and/or Wheezing  traMADol 25 milliGRAM(s) Oral every 8 hours PRN Severe Pain (7 - 10)      Allergies    adhesives (Rash)  amoxicillin (Rash)  penicillin (Unknown)    Intolerances        Vital Signs Last 24 Hrs  T(C): 36.6 (09 Jun 2019 11:19), Max: 36.9 (08 Jun 2019 16:02)  T(F): 97.9 (09 Jun 2019 11:19), Max: 98.4 (08 Jun 2019 16:02)  HR: 62 (09 Jun 2019 11:19) (59 - 80)  BP: 131/68 (09 Jun 2019 11:19) (113/78 - 139/81)  BP(mean): --  RR: 18 (09 Jun 2019 11:19) (17 - 18)  SpO2: 92% (09 Jun 2019 11:19) (92% - 95%)    I&O's Summary    08 Jun 2019 07:01  -  09 Jun 2019 07:00  --------------------------------------------------------  IN: 600 mL / OUT: 0 mL / NET: 600 mL        ON EXAM:    Constitutional: NAD, alert and oriented x 3  Lungs:  Non-labored, breath sounds are clear.  B/L rales at bases  Cardiovascular: RRR.  S1 and S2 positive.  1/6 systolic murmur  Gastrointestinal: Bowel Sounds present, soft, nontender.   Lymph: No peripheral edema. No cervical lymphadenopathy.  Neurological: Alert, no focal deficits  Skin: No rashes or ulcers   Psych:  Mood & affect appropriate.  LABS: All Labs Reviewed:                        9.4    7.58  )-----------( 289      ( 09 Jun 2019 09:13 )             31.4                         8.9    7.94  )-----------( 283      ( 08 Jun 2019 09:12 )             29.2                         10.2   8.8   )-----------( 329      ( 07 Jun 2019 04:41 )             32.6     09 Jun 2019 06:51    140    |  100    |  29     ----------------------------<  83     4.1     |  25     |  1.14   08 Jun 2019 06:05    138    |  98     |  36     ----------------------------<  103    3.9     |  25     |  1.42   07 Jun 2019 04:41    139    |  99     |  27     ----------------------------<  103    3.9     |  24     |  1.05     Ca    9.5        09 Jun 2019 06:51  Ca    9.8        08 Jun 2019 06:05  Ca    10.2       07 Jun 2019 04:41  Phos  2.9       09 Jun 2019 06:51  Phos  4.2       08 Jun 2019 06:05  Phos  4.2       07 Jun 2019 04:41  Mg     1.9       09 Jun 2019 06:51  Mg     1.8       08 Jun 2019 06:05  Mg     1.8       07 Jun 2019 04:41    TPro  6.9    /  Alb  3.8    /  TBili  0.7    /  DBili  x      /  AST  26     /  ALT  14     /  AlkPhos  67     09 Jun 2019 06:51  TPro  6.7    /  Alb  3.7    /  TBili  0.7    /  DBili  x      /  AST  19     /  ALT  13     /  AlkPhos  68     08 Jun 2019 06:05  TPro  7.3    /  Alb  4.1    /  TBili  0.9    /  DBili  x      /  AST  20     /  ALT  17     /  AlkPhos  72     07 Jun 2019 04:41          Blood Culture:   06-06 @ 18:42  Pro Bnp [id number]

## 2019-06-09 NOTE — PROGRESS NOTE ADULT - ASSESSMENT
89 yo woman with PMH of recent TAVR for severe AS(4/19), new LBBB s/p leadless micra PPM (4/19), afib(on eliquis), CAD s/p 1 stent (3/19), diastoloic heart failure, HTN, HLD, asthma, skin BCC p/w SOB admitted with acute on chronic diastolic CHF in the setting of afib w/ RVR. Pt now in normal sinus rhythm.

## 2019-06-09 NOTE — PROGRESS NOTE ADULT - SUBJECTIVE AND OBJECTIVE BOX
Subjective  Able to walk around without symptoms, in contrast to when she was in AF. She feels stronger.     Past Medical History    PAST MEDICAL & SURGICAL HISTORY:  Asthma  HLD (hyperlipidemia)  Aortic stenosis  Heart failure  Pacemaker  Glaucoma  Hypertension  Bronchitis  Afib  CAD (coronary artery disease): recent coronary stent 3/19  Heart failure  Skin cancer: basal  Hiatal hernia  HLD (hyperlipidemia)  HTN (hypertension)  Aortic stenosis  Asthma: on breo for 2 yrs  S/P coronary artery stent placement  S/P TAVR (transcatheter aortic valve replacement)  S/P PTCA (percutaneous transluminal coronary angioplasty): coronary stent 3/19  H/O eye surgery: bilateral cataracts  H/O unilateral oophorectomy      MEDICATIONS:  amiodarone    Tablet 400 milliGRAM(s) Oral two times a day  apixaban 2.5 milliGRAM(s) Oral every 12 hours  clopidogrel Tablet 75 milliGRAM(s) Oral daily  furosemide    Tablet 20 milliGRAM(s) Oral once  metoprolol tartrate 25 milliGRAM(s) Oral two times a day      ALBUTerol/ipratropium for Nebulization 3 milliLiter(s) Nebulizer every 6 hours PRN    acetaminophen   Tablet .. 650 milliGRAM(s) Oral every 6 hours PRN  traMADol 25 milliGRAM(s) Oral once  traMADol 25 milliGRAM(s) Oral every 8 hours PRN    docusate sodium 100 milliGRAM(s) Oral three times a day  pantoprazole    Tablet 40 milliGRAM(s) Oral before breakfast    simvastatin 40 milliGRAM(s) Oral at bedtime    brimonidine 0.2% Ophthalmic Solution 1 Drop(s) Both EYES two times a day  folic acid 1 milliGRAM(s) Oral daily  timolol 0.5% Solution 1 Drop(s) Both EYES two times a day        Allergies    adhesives (Rash)  amoxicillin (Rash)  penicillin (Unknown)    Intolerances        FAMILY HISTORY:  Family history of heart failure      SOCIAL HISTORY    Marital Status:   Occupation:   Lives with:     SUBSTANCE USE  Tobacco Usage:  ( ) never smoked   ( ) former smoker  ( ) current smoker; Packs per day:   Alcohol Usage: ( ) none  ( ) occasional ( ) 2-3 times a week ( ) daily; Last drink:   Recreational drugs ( ) None    REVIEW OF SYSTEMS:    CONSTITUTIONAL: No fevers, No chills, No fatigue, No weight gain  EYES: No vision changes   ENT: No congestion, No ear pain, No sore throat.  NECK: No pain, No stiffness  RESPIRATORY: No shortness of breath, No cough, No wheezing, No hemoptysis  CARDIOVASCULAR: No chest pain. No palpitations, No SANCHEZ, No orthopnea, No paroxysmal nocturnal dyspnea, No pleuritic pain  GASTROINTESTINAL: No abdominal pain, No nausea, No vomiting, No hematemesis, No diarrhea No constipation. No melena  GENITOURINARY: No dysuria, No frequency, No incontinence, No hematuria  NEUROLOGICAL: No dizziness, No lightheadedness, No syncope, No LOC, No headache, No numbness or weakness  EXTREMITIES: No Edema, No joint pain, No joint swelling.  PSYCHIATRIC: No anxiety, No depression  SKIN: No diaphoresis. No itching, No rashes, No pressure ulcers    All other review of systems is negative unless indicated above.    VITAL SIGNS  T(C): 36.6 (06-09-19 @ 11:19), Max: 36.9 (06-08-19 @ 16:02)  HR: 62 (06-09-19 @ 11:19) (59 - 80)  BP: 131/68 (06-09-19 @ 11:19) (113/78 - 139/81)  RR: 18 (06-09-19 @ 11:19) (17 - 18)  SpO2: 92% (06-09-19 @ 11:19) (92% - 95%)  Wt(kg): --    PHYSICAL EXAM:    Appearance: NAD, no distress  HEENT:   Normal oral mucosa, PERRL, EOMI  Cardiovascular: Regular rate and rhythm, Normal S1 S2, No JVD, No murmurs, No edema  Respiratory: Lungs clear to auscultation. No rales, No rhonchi, No wheezing. No tenderness to palpation  Gastrointestinal:  Soft, Non-tender, + BS  Neurologic: Non-focal, A&Ox3  Skin: Warm and dry, No rashes, No ecchymoses, No cyanosis  Extremities: No clubbing, cyanosis or edema  Vascular: Peripheral pulses palpable 2+ bilaterally  Psychiatry: Mood & affect appropriate      	    		        I&O's Summary    08 Jun 2019 07:01  -  09 Jun 2019 07:00  --------------------------------------------------------  IN: 600 mL / OUT: 0 mL / NET: 600 mL        LABORATORY VALUES	 	                          9.4    7.58  )-----------( 289      ( 09 Jun 2019 09:13 )             31.4       06-09    140  |  100  |  29<H>  ----------------------------<  83  4.1   |  25  |  1.14  06-08    138  |  98  |  36<H>  ----------------------------<  103<H>  3.9   |  25  |  1.42<H>    Ca    9.5      09 Jun 2019 06:51  Ca    9.8      08 Jun 2019 06:05  Phos  2.9     06-09  Phos  4.2     06-08  Mg     1.9     06-09  Mg     1.8     06-08    TPro  6.9  /  Alb  3.8  /  TBili  0.7  /  DBili  x   /  AST  26  /  ALT  14  /  AlkPhos  67  06-09  TPro  6.7  /  Alb  3.7  /  TBili  0.7  /  DBili  x   /  AST  19  /  ALT  13  /  AlkPhos  68  06-08    LIVER FUNCTIONS - ( 09 Jun 2019 06:51 )  Alb: 3.8 g/dL / Pro: 6.9 g/dL / ALK PHOS: 67 U/L / ALT: 14 U/L / AST: 26 U/L / GGT: x               CARDIAC MARKERS:              Serum Pro-Brain Natriuretic Peptide: 2371 pg/mL (06-06 @ 18:42)        Hemoglobin A1C, Whole Blood: 5.2 % (04-22 @ 13:44)          CAPILLARY BLOOD GLUCOSE              TELEMETRY: 	 Sinus rhythm 40-80. Brief runs of PAT.   ECG:  	  RADIOLOGY:  OTHER: 	    PREVIOUS DIAGNOSTIC TESTING:    [ ] Echocardiogram:  [ ] Catheterization:  [ ] Stress Test:

## 2019-06-09 NOTE — PROGRESS NOTE ADULT - SUBJECTIVE AND OBJECTIVE BOX
Patient is a 89y old  Female who presents with a chief complaint of SANCHEZ (08 Jun 2019 22:08)      SUBJECTIVE / OVERNIGHT EVENTS:  Patient seen and examined at bedside. No acute events overnight.     MEDICATIONS  (STANDING):  amiodarone    Tablet 400 milliGRAM(s) Oral two times a day  apixaban 2.5 milliGRAM(s) Oral every 12 hours  brimonidine 0.2% Ophthalmic Solution 1 Drop(s) Both EYES two times a day  clopidogrel Tablet 75 milliGRAM(s) Oral daily  docusate sodium 100 milliGRAM(s) Oral three times a day  folic acid 1 milliGRAM(s) Oral daily  metoprolol tartrate 25 milliGRAM(s) Oral two times a day  pantoprazole    Tablet 40 milliGRAM(s) Oral before breakfast  simvastatin 40 milliGRAM(s) Oral at bedtime  timolol 0.5% Solution 1 Drop(s) Both EYES two times a day  traMADol 25 milliGRAM(s) Oral once    MEDICATIONS  (PRN):  acetaminophen   Tablet .. 650 milliGRAM(s) Oral every 6 hours PRN Moderate Pain (4 - 6)  ALBUTerol/ipratropium for Nebulization 3 milliLiter(s) Nebulizer every 6 hours PRN Shortness of Breath and/or Wheezing  traMADol 25 milliGRAM(s) Oral every 8 hours PRN Severe Pain (7 - 10)      Vital Signs Last 24 Hrs  T(C): 36.9 (09 Jun 2019 04:08), Max: 36.9 (08 Jun 2019 16:02)  T(F): 98.4 (09 Jun 2019 04:08), Max: 98.4 (08 Jun 2019 16:02)  HR: 59 (09 Jun 2019 04:08) (59 - 80)  BP: 135/69 (09 Jun 2019 04:08) (113/78 - 139/81)  BP(mean): --  RR: 18 (09 Jun 2019 04:08) (17 - 18)  SpO2: 95% (09 Jun 2019 04:08) (92% - 96%)  CAPILLARY BLOOD GLUCOSE        I&O's Summary    08 Jun 2019 07:01  -  09 Jun 2019 07:00  --------------------------------------------------------  IN: 600 mL / OUT: 0 mL / NET: 600 mL        PHYSICAL EXAM:  GENERAL: NAD, well-developed  HEAD:  Atraumatic, Normocephalic  EYES: EOMI,  conjunctiva and sclera clear  NECK: Supple  CHEST/LUNG: Clear to auscultation bilaterally; No wheeze  HEART: Regular rate and rhythm; No murmurs, rubs, or gallops  ABDOMEN: Soft, Nontender, Nondistended; Bowel sounds present  EXTREMITIES:  No clubbing, cyanosis, or edema  PSYCH: AAOx3  NEUROLOGY: non-focal  SKIN: No rashes or lesions    LABS:                        8.9    7.94  )-----------( 283      ( 08 Jun 2019 09:12 )             29.2     WBC Trend: 7.94<--, 8.8<--, 8.7<--  06-08    138  |  98  |  36<H>  ----------------------------<  103<H>  3.9   |  25  |  1.42<H>    Ca    9.8      08 Jun 2019 06:05  Phos  4.2     06-08  Mg     1.8     06-08    TPro  6.7  /  Alb  3.7  /  TBili  0.7  /  DBili  x   /  AST  19  /  ALT  13  /  AlkPhos  68  06-08    Creatinine Trend: 1.42<--, 1.05<--, 1.08<--, 1.30<--, 1.80<--, 2.00<--              RADIOLOGY & ADDITIONAL TESTS:    Imaging Personally Reviewed:    Consultant(s) Notes Reviewed:      Care Discussed with Consultants/Other Providers: Patient is a 89y old  Female who presents with a chief complaint of SANCHEZ (08 Jun 2019 22:08)      SUBJECTIVE / OVERNIGHT EVENTS:  Patient seen and examined at bedside. No acute events overnight. Was sinus 40-50 with 6 beats WCT on tele. Per patient reports no complaints aside from some back pain. Denies any CP, SOB, palpitations or dizziness.    MEDICATIONS  (STANDING):  amiodarone    Tablet 400 milliGRAM(s) Oral two times a day  apixaban 2.5 milliGRAM(s) Oral every 12 hours  brimonidine 0.2% Ophthalmic Solution 1 Drop(s) Both EYES two times a day  clopidogrel Tablet 75 milliGRAM(s) Oral daily  docusate sodium 100 milliGRAM(s) Oral three times a day  folic acid 1 milliGRAM(s) Oral daily  metoprolol tartrate 25 milliGRAM(s) Oral two times a day  pantoprazole    Tablet 40 milliGRAM(s) Oral before breakfast  simvastatin 40 milliGRAM(s) Oral at bedtime  timolol 0.5% Solution 1 Drop(s) Both EYES two times a day  traMADol 25 milliGRAM(s) Oral once    MEDICATIONS  (PRN):  acetaminophen   Tablet .. 650 milliGRAM(s) Oral every 6 hours PRN Moderate Pain (4 - 6)  ALBUTerol/ipratropium for Nebulization 3 milliLiter(s) Nebulizer every 6 hours PRN Shortness of Breath and/or Wheezing  traMADol 25 milliGRAM(s) Oral every 8 hours PRN Severe Pain (7 - 10)      Vital Signs Last 24 Hrs  T(C): 36.9 (09 Jun 2019 04:08), Max: 36.9 (08 Jun 2019 16:02)  T(F): 98.4 (09 Jun 2019 04:08), Max: 98.4 (08 Jun 2019 16:02)  HR: 59 (09 Jun 2019 04:08) (59 - 80)  BP: 135/69 (09 Jun 2019 04:08) (113/78 - 139/81)  BP(mean): --  RR: 18 (09 Jun 2019 04:08) (17 - 18)  SpO2: 95% (09 Jun 2019 04:08) (92% - 96%)  CAPILLARY BLOOD GLUCOSE        I&O's Summary    08 Jun 2019 07:01  -  09 Jun 2019 07:00  --------------------------------------------------------  IN: 600 mL / OUT: 0 mL / NET: 600 mL        PHYSICAL EXAM:  GENERAL: NAD, well-groomed, well-developed  HEAD:  Atraumatic, Normocephalic  EYES: EOMI, PERRLA, conjunctiva and sclera clear  NECK: Supple  NERVOUS SYSTEM:  Alert & Oriented X3, Good concentration  CHEST/LUNG: Clear to percussion bilaterally; No rales, rhonchi, wheezing, or rubs  HEART: Regular rate and rhythm; No murmurs, rubs, or gallops  ABDOMEN: Soft, Nontender, Nondistended; Bowel sounds present  EXTREMITIES:  2+ Peripheral Pulses, No clubbing, cyanosis, or edema  LYMPH: No lymphadenopathy noted  SKIN: No rashes or lesions    LABS:                        8.9    7.94  )-----------( 283      ( 08 Jun 2019 09:12 )             29.2     WBC Trend: 7.94<--, 8.8<--, 8.7<--  06-08    138  |  98  |  36<H>  ----------------------------<  103<H>  3.9   |  25  |  1.42<H>    Ca    9.8      08 Jun 2019 06:05  Phos  4.2     06-08  Mg     1.8     06-08    TPro  6.7  /  Alb  3.7  /  TBili  0.7  /  DBili  x   /  AST  19  /  ALT  13  /  AlkPhos  68  06-08    Creatinine Trend: 1.42<--, 1.05<--, 1.08<--, 1.30<--, 1.80<--, 2.00<--              RADIOLOGY & ADDITIONAL TESTS:    Imaging Personally Reviewed:    Consultant(s) Notes Reviewed:      Care Discussed with Consultants/Other Providers:

## 2019-06-09 NOTE — PROGRESS NOTE ADULT - PROBLEM SELECTOR PLAN 1
- CXR with pulmonary edema  - last TTE showing severe MR, mod-severe TR w/ grade 3 diastolic dysfunction  - Improved w diuresis. Holding for now given elevated Cr. - CXR with pulmonary edema  - last TTE showing severe MR, mod-severe TR w/ grade 3 diastolic dysfunction  - Improved w diuresis. Started lasix 20mg PO x 1 per cardiology rec. Will observe Scr tomorrow as had recent Scr elevation

## 2019-06-09 NOTE — PROGRESS NOTE ADULT - ATTENDING COMMENTS
89F w afib, recent TAVR couple weeks ago, CHF pw afib w. RVR and flash pulmonary edema. Improved on diuresis. Converted back to NSR.     D/c planning for tomorrow if remains stable. On Amiodarone as above.

## 2019-06-10 LAB
ALBUMIN SERPL ELPH-MCNC: 3.9 G/DL — SIGNIFICANT CHANGE UP (ref 3.3–5)
ALP SERPL-CCNC: 69 U/L — SIGNIFICANT CHANGE UP (ref 40–120)
ALT FLD-CCNC: 15 U/L — SIGNIFICANT CHANGE UP (ref 10–45)
ANION GAP SERPL CALC-SCNC: 15 MMOL/L — SIGNIFICANT CHANGE UP (ref 5–17)
AST SERPL-CCNC: 23 U/L — SIGNIFICANT CHANGE UP (ref 10–40)
BILIRUB SERPL-MCNC: 0.6 MG/DL — SIGNIFICANT CHANGE UP (ref 0.2–1.2)
BUN SERPL-MCNC: 25 MG/DL — HIGH (ref 7–23)
CALCIUM SERPL-MCNC: 9.5 MG/DL — SIGNIFICANT CHANGE UP (ref 8.4–10.5)
CHLORIDE SERPL-SCNC: 99 MMOL/L — SIGNIFICANT CHANGE UP (ref 96–108)
CO2 SERPL-SCNC: 25 MMOL/L — SIGNIFICANT CHANGE UP (ref 22–31)
CREAT SERPL-MCNC: 1.04 MG/DL — SIGNIFICANT CHANGE UP (ref 0.5–1.3)
GLUCOSE SERPL-MCNC: 96 MG/DL — SIGNIFICANT CHANGE UP (ref 70–99)
HCT VFR BLD CALC: 32.3 % — LOW (ref 34.5–45)
HGB BLD-MCNC: 9.8 G/DL — LOW (ref 11.5–15.5)
MAGNESIUM SERPL-MCNC: 1.9 MG/DL — SIGNIFICANT CHANGE UP (ref 1.6–2.6)
MCHC RBC-ENTMCNC: 29.3 PG — SIGNIFICANT CHANGE UP (ref 27–34)
MCHC RBC-ENTMCNC: 30.3 GM/DL — LOW (ref 32–36)
MCV RBC AUTO: 96.4 FL — SIGNIFICANT CHANGE UP (ref 80–100)
PHOSPHATE SERPL-MCNC: 2.5 MG/DL — SIGNIFICANT CHANGE UP (ref 2.5–4.5)
PLATELET # BLD AUTO: 306 K/UL — SIGNIFICANT CHANGE UP (ref 150–400)
POTASSIUM SERPL-MCNC: 3.6 MMOL/L — SIGNIFICANT CHANGE UP (ref 3.5–5.3)
POTASSIUM SERPL-SCNC: 3.6 MMOL/L — SIGNIFICANT CHANGE UP (ref 3.5–5.3)
PROT SERPL-MCNC: 6.8 G/DL — SIGNIFICANT CHANGE UP (ref 6–8.3)
RBC # BLD: 3.35 M/UL — LOW (ref 3.8–5.2)
RBC # FLD: 13.8 % — SIGNIFICANT CHANGE UP (ref 10.3–14.5)
SODIUM SERPL-SCNC: 139 MMOL/L — SIGNIFICANT CHANGE UP (ref 135–145)
WBC # BLD: 9 K/UL — SIGNIFICANT CHANGE UP (ref 3.8–10.5)
WBC # FLD AUTO: 9 K/UL — SIGNIFICANT CHANGE UP (ref 3.8–10.5)

## 2019-06-10 PROCEDURE — 99231 SBSQ HOSP IP/OBS SF/LOW 25: CPT

## 2019-06-10 PROCEDURE — 99233 SBSQ HOSP IP/OBS HIGH 50: CPT | Mod: GC

## 2019-06-10 PROCEDURE — 99232 SBSQ HOSP IP/OBS MODERATE 35: CPT

## 2019-06-10 RX ORDER — ATORVASTATIN CALCIUM 80 MG/1
40 TABLET, FILM COATED ORAL AT BEDTIME
Refills: 0 | Status: DISCONTINUED | OUTPATIENT
Start: 2019-06-10 | End: 2019-06-11

## 2019-06-10 RX ORDER — HYDROCORTISONE 1 %
1 OINTMENT (GRAM) TOPICAL DAILY
Refills: 0 | Status: DISCONTINUED | OUTPATIENT
Start: 2019-06-10 | End: 2019-06-11

## 2019-06-10 RX ADMIN — APIXABAN 2.5 MILLIGRAM(S): 2.5 TABLET, FILM COATED ORAL at 18:03

## 2019-06-10 RX ADMIN — Medication 25 MILLIGRAM(S): at 18:04

## 2019-06-10 RX ADMIN — Medication 1 MILLIGRAM(S): at 11:51

## 2019-06-10 RX ADMIN — Medication 1 DROP(S): at 05:54

## 2019-06-10 RX ADMIN — Medication 1 DROP(S): at 18:04

## 2019-06-10 RX ADMIN — Medication 1 APPLICATION(S): at 18:02

## 2019-06-10 RX ADMIN — BRIMONIDINE TARTRATE 1 DROP(S): 2 SOLUTION/ DROPS OPHTHALMIC at 18:03

## 2019-06-10 RX ADMIN — AMIODARONE HYDROCHLORIDE 400 MILLIGRAM(S): 400 TABLET ORAL at 18:03

## 2019-06-10 RX ADMIN — BRIMONIDINE TARTRATE 1 DROP(S): 2 SOLUTION/ DROPS OPHTHALMIC at 05:56

## 2019-06-10 RX ADMIN — Medication 20 MILLIGRAM(S): at 05:54

## 2019-06-10 RX ADMIN — AMIODARONE HYDROCHLORIDE 400 MILLIGRAM(S): 400 TABLET ORAL at 05:52

## 2019-06-10 RX ADMIN — Medication 25 MILLIGRAM(S): at 05:54

## 2019-06-10 RX ADMIN — CLOPIDOGREL BISULFATE 75 MILLIGRAM(S): 75 TABLET, FILM COATED ORAL at 11:51

## 2019-06-10 RX ADMIN — APIXABAN 2.5 MILLIGRAM(S): 2.5 TABLET, FILM COATED ORAL at 05:53

## 2019-06-10 RX ADMIN — ATORVASTATIN CALCIUM 40 MILLIGRAM(S): 80 TABLET, FILM COATED ORAL at 21:04

## 2019-06-10 NOTE — PROGRESS NOTE ADULT - SUBJECTIVE AND OBJECTIVE BOX
HPI/Interval Hx    Notified by Dr. Palmer of patients admission. Pt. known to structural service, s/p transfemoral TAVR #26 CV with Dr. Saenz and Yodit on 4/24. PMH includes CAD s/p PCI 3/29/19    LBBB post TAVR s/p micra PPM, AFib on Eliquis,  HTN, HLD, asthma, admitted for CHF in the setting of afib w/ RVR.     MEDICATIONS  (STANDING):  amiodarone    Tablet 400 milliGRAM(s) Oral two times a day  apixaban 2.5 milliGRAM(s) Oral every 12 hours  brimonidine 0.2% Ophthalmic Solution 1 Drop(s) Both EYES two times a day  clopidogrel Tablet 75 milliGRAM(s) Oral daily  docusate sodium 100 milliGRAM(s) Oral three times a day  folic acid 1 milliGRAM(s) Oral daily  furosemide    Tablet 20 milliGRAM(s) Oral daily  metoprolol tartrate 25 milliGRAM(s) Oral two times a day  pantoprazole    Tablet 40 milliGRAM(s) Oral before breakfast  simvastatin 40 milliGRAM(s) Oral at bedtime  timolol 0.5% Solution 1 Drop(s) Both EYES two times a day  traMADol 25 milliGRAM(s) Oral once    MEDICATIONS  (PRN):  acetaminophen   Tablet .. 650 milliGRAM(s) Oral every 6 hours PRN Moderate Pain (4 - 6)  ALBUTerol/ipratropium for Nebulization 3 milliLiter(s) Nebulizer every 6 hours PRN Shortness of Breath and/or Wheezing  traMADol 25 milliGRAM(s) Oral every 8 hours PRN Severe Pain (7 - 10)      PAST MEDICAL & SURGICAL HISTORY:  Asthma  HLD (hyperlipidemia)  Aortic stenosis  Heart failure  Pacemaker  Glaucoma  Hypertension  Bronchitis  Afib  CAD (coronary artery disease): recent coronary stent 3/19  Heart failure  Skin cancer: basal  Hiatal hernia  HLD (hyperlipidemia)  HTN (hypertension)  Aortic stenosis  Asthma: on breo for 2 yrs  S/P coronary artery stent placement  S/P TAVR (transcatheter aortic valve replacement)  S/P PTCA (percutaneous transluminal coronary angioplasty): coronary stent 3/19  H/O eye surgery: bilateral cataracts  H/O unilateral oophorectomy      Review of Systems  CONSTITUTIONAL: No weakness, fevers or chills  EYES/ENT: No visual changes;  No vertigo or throat pain   NECK: No pain or stiffness  RESPIRATORY: No cough, wheezing, hemoptysis; No shortness of breath  CARDIOVASCULAR: No chest pain or palpitations  GASTROINTESTINAL: No abdominal or epigastric pain. No nausea, vomiting, or hematemesis; No diarrhea or constipation. No melena or hematochezia.  GENITOURINARY: No dysuria, frequency or hematuria  NEUROLOGICAL: No numbness or weakness  SKIN: No itching, burning, rashes, or lesions   All other review of systems is negative unless indicated above.    Physical Exam  General: A/ox 3, No acute Distress  Neck: Supple, NO JVD  Cardiac: S1 S2, II/VI LUSB murmur  Pulmonary: CTAB, Breathing unlabored, No Rhonchi/Rales/Wheezing  Abdomen: Soft, Non -tender, +BS x 4 quads  Extremities: No Rashes, No edema  Neuro: A/o x 3, No focal deficits  Vital Signs Last 24 Hrs  T(C): 36.8 (10 Scar 2019 11:26), Max: 36.8 (09 Jun 2019 20:39)  T(F): 98.3 (10 Scar 2019 11:26), Max: 98.3 (09 Jun 2019 20:39)  HR: 64 (10 Scar 2019 11:26) (63 - 79)  BP: 138/79 (10 Scar 2019 11:26) (132/76 - 143/82)  BP(mean): --  RR: 18 (10 Scar 2019 11:26) (18 - 18)  SpO2: 95% (10 Scar 2019 11:26) (89% - 98%)                        9.8    9.00  )-----------( 306      ( 10 Scar 2019 08:34 )             32.3     06-10    139  |  99  |  25<H>  ----------------------------<  96  3.6   |  25  |  1.04    Ca    9.5      10 Scar 2019 05:25  Phos  2.5     06-10  Mg     1.9     06-10    TPro  6.8  /  Alb  3.9  /  TBili  0.6  /  DBili  x   /  AST  23  /  ALT  15  /  AlkPhos  69  06-10      Telemetry: NSR/ 60-70    EKG:  < from: 12 Lead ECG (06.07.19 @ 21:32) >  Ventricular Rate 66 BPM    Atrial Rate 66 BPM    P-R Interval 208 ms    QRS Duration 120 ms    Q-T Interval 440 ms    QTC Calculation(Bezet) 461 ms    P Axis 61 degrees    R Axis -44 degrees    T Axis 108 degrees    Diagnosis Line SINUS RHYTHM WITHPREMATURE ATRIAL COMPLEXES  LEFT AXIS DEVIATION  LEFT VENTRICULAR HYPERTROPHY WITH QRS WIDENING AND REPOLARIZATION ABNORMALITY  POSSIBLE LATERAL INFARCT , AGE UNDETERMINED  INFERIOR INFARCT , AGE UNDETERMINED  ABNORMAL ECG    Confirmed by MD SAE, MARY LOU ([id number]) on 6/8/2019 5:38:24 PM    < end of copied text >      Other  < from: TTE Echo Doppler w/o Cont (05.11.19 @ 10:07) >   EXAM:  ECHO TTE WO CON COMP W DOPPLR         PROCEDURE DATE:  05/11/2019        INTERPRETATION:  Ordering Physician: GREGORY M REYES [id number]    Indication: Congestive heart failure    Study Quality: Technically difficult   A complete echocardiographic study was performed utilizing standard   protocol including spectral and color Doppler in all echocardiographic   windows.    Height: 149.8 cm  Weight: 55.8 kg  BSA: 1.5 sq m  Blood Pressure: 152/76    MEASUREMENTS  IVS: 0.9cm  PWT: 0.8cm  LA:2.9cm  AO: 2.1cm  LVIDd: 4.0cm  LVIDs: 2.2cm    RVSP: 61mmHg    FINDINGS  Left Ventricle: The endocardium is not well-visualized. There appears to   be grossly preserved left ventricular systolic function.  Aortic Valve: Status post bioprosthetic aortic valve replacement with   trace to mild paravalvular leak  Mitral Valve: Thickened mitral valve leaflets with decreased diastolic   opening. Mitral annular calcification. Severe mitral regurgitation  Tricuspid Valve: Moderate to severe tricuspid regurgitation.  Pulmonic Valve: The pulmonic valve is not well-visualized. Mild pulmonic   insufficiency  Left Atrium: Mild left atrial enlargement  Right Ventricle: The right ventricle is not well-visualized.  Right Atrium: Grossly normal right atrium  Diastolic Function: Marked (grade 3) diastolic dysfunction. E/e' is   elevated suggestive of elevated left sided filling pressures.  Pericardium/Pleura: No significant pericardial effusion  Hemodynamics: The pulmonary artery systolic pressure is estimated to be   61 mmHg, assuming the right atrial pressure is equal to 8 mmHg,   consistent with severely elevated pulmonary pressures.    CONCLUSIONS:  1. Technically difficult study  2. The endocardium is not well-visualized. There appears to be grossly   preserved left ventricular systolic function.  3. Status post bioprosthetic aortic valve replacement with trace to mild   paravalvular leak  4. Thickened mitral valve leaflets with decreased diastolic opening.   Severe mitral regurgitation  5. Moderate to severe tricuspid regurgitation.  6. Marked diastolic dysfunction. E/e' is elevated suggestive of elevated   left sided filling pressures.  7. Severely elevated pulmonary pressures  8. No significant pericardial effusion.    No prior exam for comparison.                    TAPAN NOVOA   This document has been electronically signed. May 11 2019 12:02PM    < end of copied text >

## 2019-06-10 NOTE — PROGRESS NOTE ADULT - PROBLEM SELECTOR PLAN 5
- holding losartan  - c/w metoprolol - holding losartan in setting of recent CHASITY, monitor BP  - c/w metoprolol

## 2019-06-10 NOTE — PROGRESS NOTE ADULT - ASSESSMENT
89 year old female, known to structural service, s/p transfemoral TAVR #26 CV with Dr. Saenz and Yodit on 4/24. PMH includes CAD s/p PCI 3/29/19    LBBB post TAVR s/p micra PPM, AFib on Eliquis,  HTN, HLD, asthma, admitted for CHF in the setting of afib w/ RVR. She is now in sinus rhythm and symptomatically improved since admission. Reports she is ambulating in the butler without dyspnea, appears euvolemic. Discussed with Dr. Araya and Dr. Monsivais, she is scheduled for a routine post TAVR follow up with repeat TTE on Thursday 6/13.     [phone]

## 2019-06-10 NOTE — PROGRESS NOTE ADULT - ATTENDING COMMENTS
pt seen earlier. Denies any orthopnea/CP/SANCHEZ. No f/c/r. No n/v/d. Feels good. PE: NAD; mild RLL crackles; no edema; abd benign; nonfocal    Acute on chronic diastolic heart failure c severe MR- Lasix restarted today. monitor    Rapid afib- on Amio. Pt aware of all outpt testing and f/u    CHASITY likely sec to prerenal state- resolved. Off ARB. cont current tx. will need close outpt f/u to restart ARB    Apixaban interactions and s/s of bleeding d/w pt

## 2019-06-10 NOTE — PROGRESS NOTE ADULT - ASSESSMENT
89 yo woman with PMH of recent TAVR for severe AS(4/19), new LBBB s/p leadless micra PPM (4/19), afib (on eliquis), CAD s/p 1 stent (3/19), diastolic heart failure, HTN, HLD, asthma, skin BCC p/w SOB. Patient reports being in normal state of health 2 days prior, developed SOB, at rest worsened w/ exertion, no associated chest pain or palpitations, in AF with RVR:    - Converted to SR. Continue with amiodarone load 400 bid x 1 week, then 200 bid x 1 week, then 200 daily.  - continue with lopressor 25 mg po bid.   - Volume status has improved overall. Given rise in creatinine, diuretics were held. I would restart her on lasix 20 mg po daily. Her crackles on exam have resolved. Her most recent echocardiogram did demonstrate significant mitral regurgitation.  - Continue Eliquis 2.5 bid for stroke risk reduction  - Was on Aspirin and Plavix for PCI in March. d/c ASA to avoid triple therapy.  - Continue statin drug (ideally she should not be on zocor, and would change this to lipitor 40). LFTs and TSH to monitored while on Amiodarone.  - Monitor I, O, K, creatinine, and daily weights  - Replete K and Mg as needed  - She will follow up her primary cardiologist in 1-2 weeks.  - No cardiac contraindication to d/c home.

## 2019-06-10 NOTE — PROGRESS NOTE ADULT - PROBLEM SELECTOR PLAN 2
- Now in NSR. On Amiodarone loading.  - continue BB and eliquis  - will continue to monitor on tele CXR with pulmonary edema. last TTE showing severe MR, mod-severe TR w/ grade 3 diastolic dysfunction.  - Improved w diuresis  - c/w lasix 20

## 2019-06-10 NOTE — PROGRESS NOTE ADULT - ASSESSMENT
89 yo woman with PMH of recent TAVR for severe AS(4/19), new LBBB s/p leadless micra PPM (4/19), afib(on eliquis), CAD s/p 1 stent (3/19), diastoloic heart failure, HTN, HLD, asthma, skin BCC p/w SOB admitted with acute on chronic diastolic CHF in the setting of afib w/ RVR. Pt now in normal sinus rhythm. 89 yo F PMH HTN, HLD, CAD s/p stent x1 (3/19), TAVR for severe AS (4/19), diastolic CHF, new LBBB s/p leadless micra PPM (4/19), afib (on eliquis), asthma, skin BCC p/w SOB a/w afib w/ RVR and flash pulm edema. Pt now in sinus rhythm and asymptomatic.

## 2019-06-10 NOTE — PROGRESS NOTE ADULT - SUBJECTIVE AND OBJECTIVE BOX
INTERVAL HPI/OVERNIGHT EVENTS: No significant overnight cardiac events reported. Patient denies chest pain/sob/dizziness/palpitations.    MEDICATIONS  (STANDING):  amiodarone    Tablet 400 milliGRAM(s) Oral two times a day  apixaban 2.5 milliGRAM(s) Oral every 12 hours  brimonidine 0.2% Ophthalmic Solution 1 Drop(s) Both EYES two times a day  clopidogrel Tablet 75 milliGRAM(s) Oral daily  docusate sodium 100 milliGRAM(s) Oral three times a day  folic acid 1 milliGRAM(s) Oral daily  furosemide    Tablet 20 milliGRAM(s) Oral daily  metoprolol tartrate 25 milliGRAM(s) Oral two times a day  pantoprazole    Tablet 40 milliGRAM(s) Oral before breakfast  simvastatin 40 milliGRAM(s) Oral at bedtime  timolol 0.5% Solution 1 Drop(s) Both EYES two times a day  traMADol 25 milliGRAM(s) Oral once    MEDICATIONS  (PRN):  acetaminophen   Tablet .. 650 milliGRAM(s) Oral every 6 hours PRN Moderate Pain (4 - 6)  ALBUTerol/ipratropium for Nebulization 3 milliLiter(s) Nebulizer every 6 hours PRN Shortness of Breath and/or Wheezing  traMADol 25 milliGRAM(s) Oral every 8 hours PRN Severe Pain (7 - 10)      Allergies    adhesives (Rash)  amoxicillin (Rash)  penicillin (Unknown)    Intolerances      ROS:  General: Pt denies fevers/ constitutional discomfort.   Cardiovascular: denies chest pain/palpitations/dizziness  Respiratory and Thorax: denies SOB  Gastrointestinal: denies abdominal pain/diarrhea/constipation/bloody stool  Genitourinary: denies dysuria/ hematuria   Hematologic: denies abnormal bleeding    Vital Signs Last 24 Hrs  T(C): 36.6 (10 Scar 2019 04:05), Max: 36.8 (09 Jun 2019 20:39)  T(F): 97.8 (10 Scar 2019 04:05), Max: 98.3 (09 Jun 2019 20:39)  HR: 63 (10 Scar 2019 04:05) (62 - 79)  BP: 138/77 (10 Scar 2019 04:05) (131/68 - 143/82)  BP(mean): --  RR: 18 (10 Scar 2019 04:05) (18 - 18)  SpO2: 98% (10 Scar 2019 04:05) (89% - 98%)    Physical Exam:  Constitutional: well developed, well nourished and in  no acute distress  Neurological: Alert & Oriented x 3, ANNE  Respiratory: Breathing nonlabored; CTA bilaterally  Cardiovascular: (+) S1 & S2  Gastrointestinal: soft, NT, nondistended, (+) BS  Extremities: +2 bilateral radial pulses. No pedal edema.  Skin:  normal skin color and pigmentation, no skin lesions noted.      LABS:                        9.8    9.00  )-----------( 306      ( 10 Scar 2019 08:34 )             32.3     06-10    139  |  99  |  25<H>  ----------------------------<  96  3.6   |  25  |  1.04    Ca    9.5      10 Scar 2019 05:25  Phos  2.5     06-10  Mg     1.9     06-10    TPro  6.8  /  Alb  3.9  /  TBili  0.6  /  DBili  x   /  AST  23  /  ALT  15  /  AlkPhos  69  06-10          RADIOLOGY & ADDITIONAL TESTS: < from: TTE Echo Doppler w/o Cont (05.11.19 @ 10:07) >  ROCEDURE DATE:  05/11/2019        INTERPRETATION:  Ordering Physician: GREGORY M REYES [id number]    Indication: Congestive heart failure    Study Quality: Technically difficult   A complete echocardiographic study was performed utilizing standard   protocol including spectral and color Doppler in all echocardiographic   windows.    Height: 149.8 cm  Weight: 55.8 kg  BSA: 1.5 sq m  Blood Pressure: 152/76    MEASUREMENTS  IVS: 0.9cm  PWT: 0.8cm  LA:2.9cm  AO: 2.1cm  LVIDd: 4.0cm  LVIDs: 2.2cm    RVSP: 61mmHg    FINDINGS  Left Ventricle: The endocardium is not well-visualized. There appears to   be grossly preserved left ventricular systolic function.  Aortic Valve: Status post bioprosthetic aortic valve replacement with   trace to mild paravalvular leak  Mitral Valve: Thickened mitral valve leaflets with decreased diastolic   opening. Mitral annular calcification. Severe mitral regurgitation  Tricuspid Valve: Moderate to severe tricuspid regurgitation.  Pulmonic Valve: The pulmonic valve is not well-visualized. Mild pulmonic   insufficiency  Left Atrium: Mild left atrial enlargement  Right Ventricle: The right ventricle is not well-visualized.  Right Atrium: Grossly normal right atrium  Diastolic Function: Marked (grade 3) diastolic dysfunction. E/e' is   elevated suggestive of elevated left sided filling pressures.  Pericardium/Pleura: No significant pericardial effusion  Hemodynamics: The pulmonary artery systolic pressure is estimated to be   61 mmHg, assuming the right atrial pressure is equal to 8 mmHg,   consistent with severely elevated pulmonary pressures.    CONCLUSIONS:  1. Technically difficult study  2. The endocardium is not well-visualized. There appears to be grossly   preserved left ventricular systolic function.  3. Status post bioprosthetic aortic valve replacement with trace to mild   paravalvular leak  4. Thickened mitral valve leaflets with decreased diastolic opening.   Severe mitral regurgitation  5. Moderate to severe tricuspid regurgitation.  6. Marked diastolic dysfunction. E/e' is elevated suggestive of elevated   left sided filling pressures.  7. Severely elevated pulmonary pressures  8. No significant pericardial effusion.    No prior exam for comparison.        TAPAN NOVOA   This document has been electronically signed. May 11 2019 12:02PM        TELE: Sinus 60's-70's  w/ occasional  V pacing

## 2019-06-10 NOTE — PROGRESS NOTE ADULT - SUBJECTIVE AND OBJECTIVE BOX
[facility] - Chandra Tomlinson, Pamela, Ezekiel, Sunil, Marshal Alanis  Office Number:  [phone]    Patient resting comfortably in bed in NAD.  Laying flat with no respiratory distress.  No complaints of chest pain, dyspnea, palpitations, PND, or orthopnea.    ROS: negative unless otherwise mentioned.    Telemetry:  sr    MEDICATIONS  (STANDING):  amiodarone    Tablet 400 milliGRAM(s) Oral two times a day  apixaban 2.5 milliGRAM(s) Oral every 12 hours  brimonidine 0.2% Ophthalmic Solution 1 Drop(s) Both EYES two times a day  clopidogrel Tablet 75 milliGRAM(s) Oral daily  docusate sodium 100 milliGRAM(s) Oral three times a day  folic acid 1 milliGRAM(s) Oral daily  furosemide    Tablet 20 milliGRAM(s) Oral daily  metoprolol tartrate 25 milliGRAM(s) Oral two times a day  pantoprazole    Tablet 40 milliGRAM(s) Oral before breakfast  simvastatin 40 milliGRAM(s) Oral at bedtime  timolol 0.5% Solution 1 Drop(s) Both EYES two times a day  traMADol 25 milliGRAM(s) Oral once    MEDICATIONS  (PRN):  acetaminophen   Tablet .. 650 milliGRAM(s) Oral every 6 hours PRN Moderate Pain (4 - 6)  ALBUTerol/ipratropium for Nebulization 3 milliLiter(s) Nebulizer every 6 hours PRN Shortness of Breath and/or Wheezing  traMADol 25 milliGRAM(s) Oral every 8 hours PRN Severe Pain (7 - 10)      Allergies    adhesives (Rash)  amoxicillin (Rash)  penicillin (Unknown)    Intolerances        Vital Signs Last 24 Hrs  T(C): 36.6 (10 Scar 2019 04:05), Max: 36.8 (09 Jun 2019 20:39)  T(F): 97.8 (10 Scar 2019 04:05), Max: 98.3 (09 Jun 2019 20:39)  HR: 63 (10 Scar 2019 04:05) (62 - 79)  BP: 138/77 (10 Scar 2019 04:05) (131/68 - 143/82)  BP(mean): --  RR: 18 (10 Scar 2019 04:05) (18 - 18)  SpO2: 98% (10 Scar 2019 04:05) (89% - 98%)    I&O's Summary    09 Jun 2019 07:01  -  10 Scar 2019 07:00  --------------------------------------------------------  IN: 460 mL / OUT: 0 mL / NET: 460 mL        ON EXAM:    Constitutional: NAD, alert and oriented x 3  Lungs:  Non-labored, breath sounds are clear.  B/L rales at bases  Cardiovascular: RRR.  S1 and S2 positive.  1/6 systolic murmur  Gastrointestinal: Bowel Sounds present, soft, nontender.   Lymph: No peripheral edema. No cervical lymphadenopathy.  Neurological: Alert, no focal deficits  Skin: No rashes or ulcers   Psych:  Mood & affect appropriate.    LABS: All Labs Reviewed:                        9.8    9.00  )-----------( 306      ( 10 Scar 2019 08:34 )             32.3                         9.4    7.58  )-----------( 289      ( 09 Jun 2019 09:13 )             31.4                         8.9    7.94  )-----------( 283      ( 08 Jun 2019 09:12 )             29.2     10 Scar 2019 05:25    139    |  99     |  25     ----------------------------<  96     3.6     |  25     |  1.04   09 Jun 2019 06:51    140    |  100    |  29     ----------------------------<  83     4.1     |  25     |  1.14   08 Jun 2019 06:05    138    |  98     |  36     ----------------------------<  103    3.9     |  25     |  1.42     Ca    9.5        10 Scar 2019 05:25  Ca    9.5        09 Jun 2019 06:51  Ca    9.8        08 Jun 2019 06:05  Phos  2.5       10 Scar 2019 05:25  Phos  2.9       09 Jun 2019 06:51  Phos  4.2       08 Jun 2019 06:05  Mg     1.9       10 Scar 2019 05:25  Mg     1.9       09 Jun 2019 06:51  Mg     1.8       08 Jun 2019 06:05    TPro  6.8    /  Alb  3.9    /  TBili  0.6    /  DBili  x      /  AST  23     /  ALT  15     /  AlkPhos  69     10 Scar 2019 05:25  TPro  6.9    /  Alb  3.8    /  TBili  0.7    /  DBili  x      /  AST  26     /  ALT  14     /  AlkPhos  67     09 Jun 2019 06:51  TPro  6.7    /  Alb  3.7    /  TBili  0.7    /  DBili  x      /  AST  19     /  ALT  13     /  AlkPhos  68     08 Jun 2019 06:05          Blood Culture:     06-08 @ 19:22  TSH: 2.80

## 2019-06-10 NOTE — CDI QUERY NOTE - NSCDIOTHERTXTBX_GEN_ALL_CORE_HH
The patient was admitted with acute on chronic diastolic CHF.    Please review the following creatinine and eGFR  levels on this admission and clarify the medical diagnosis, if significant.                        6/6       6/7       6/8       6/9       6/10             Creatinine -    1.08     1.05      1.42      1.14      1.04  eGFR        -     45       46         32         42        47      Thank you. The patient was admitted with acute on chronic diastolic CHF, also with a history of  HTN.    Please review the following creatinine and eGFR  levels on this admission and clarify the medical diagnosis, if significant.                        6/6       6/7       6/8       6/9       6/10             Creatinine -    1.08     1.05      1.42      1.14      1.04  eGFR        -     45       46         32         42        47      Thank you.

## 2019-06-10 NOTE — PROGRESS NOTE ADULT - ASSESSMENT
89 y/o F with hx of asthma, HTN, HLD, severe AS, Diastolic HF, CAD s/p PCI to mid RCA in 3/2019. She had severe AS, and is s/p tavr on 4/24/2019. Post-operatively, she had a new LBBB and a Micra PPM was placed on 4/25/2019. EP consulted for AFib w/ RVR.     #AFIB with RVR  - Converted to NSR while on Amiodarone; continue Amiodarone 400mg po bid x1 week then 200mg po bid x 1 week then 200mg daily maintenance. Patient will need inpatient and outpatient LFTs and TSH monitoring. She will also require periodic opthalmology exams and PFTs. This was discussed with patient who verbalized understanding. She follows up with Cardiologist, Dr Jarvis, outpatient.   Thyroid Stimulating Hormone, Serum: 2.80 uIU/mL (06.08.19 @ 19:22); Alanine Aminotransferase (ALT/SGPT): 15 U/L (06.10.19 @ 05:25) Aspartate Aminotransferase (AST/SGOT): 23 U/L (06.10.19 @ 05:25)  - Continue anticoagulation therapy with Eliquis  - Continue w/ metoprolol 25mg po bid  - Monitor and replete electrolytes for goal Mg++2; K+4  - Continue with telemetry monitoring.   - EP will sign off; please recall if any further EP issues/concerns should arise     [id number]

## 2019-06-10 NOTE — PROGRESS NOTE ADULT - SUBJECTIVE AND OBJECTIVE BOX
Jordan Culp, PGY1  Pager [phone]    INCOMPLETE NOTE - IN PROGRESS    Patient is a 89y old  Female who presents with a chief complaint of SANCHEZ (09 Jun 2019 13:03)      SUBJECTIVE/INTERVAL EVENTS: Patient seen and examined at bedside. MICHEL o/n.    MEDICATIONS  (STANDING):  amiodarone    Tablet 400 milliGRAM(s) Oral two times a day  apixaban 2.5 milliGRAM(s) Oral every 12 hours  brimonidine 0.2% Ophthalmic Solution 1 Drop(s) Both EYES two times a day  clopidogrel Tablet 75 milliGRAM(s) Oral daily  docusate sodium 100 milliGRAM(s) Oral three times a day  folic acid 1 milliGRAM(s) Oral daily  furosemide    Tablet 20 milliGRAM(s) Oral daily  metoprolol tartrate 25 milliGRAM(s) Oral two times a day  pantoprazole    Tablet 40 milliGRAM(s) Oral before breakfast  simvastatin 40 milliGRAM(s) Oral at bedtime  timolol 0.5% Solution 1 Drop(s) Both EYES two times a day  traMADol 25 milliGRAM(s) Oral once    MEDICATIONS  (PRN):  acetaminophen   Tablet .. 650 milliGRAM(s) Oral every 6 hours PRN Moderate Pain (4 - 6)  ALBUTerol/ipratropium for Nebulization 3 milliLiter(s) Nebulizer every 6 hours PRN Shortness of Breath and/or Wheezing  traMADol 25 milliGRAM(s) Oral every 8 hours PRN Severe Pain (7 - 10)      VITAL SIGNS:  T(F): 97.8 (06-10-19 @ 04:05), Max: 98.3 (06-09-19 @ 20:39)  HR: 63 (06-10-19 @ 04:05) (62 - 79)  BP: 138/77 (06-10-19 @ 04:05) (131/68 - 143/82)  RR: 18 (06-10-19 @ 04:05) (18 - 18)  SpO2: 98% (06-10-19 @ 04:05) (89% - 98%)    I&O's Summary    08 Jun 2019 07:01  -  09 Jun 2019 07:00  --------------------------------------------------------  IN: 600 mL / OUT: 0 mL / NET: 600 mL    09 Jun 2019 07:01  -  10 Jun 2019 06:24  --------------------------------------------------------  IN: 460 mL / OUT: 0 mL / NET: 460 mL      Daily     Daily     PHYSICAL EXAM:  Gen: Alert, well-developed, NAD  HEENT: NCAT, PERRL, EOMI, conjunctiva clear, sclera anicteric, no erythema or exudates in the oropharynx, mmm  Neck: Supple, no JVD  CV: RRR, S1S2, no m/r/g  Resp: CTAB, normal respiratory effort  Abd: Soft, nontender, nondistended, normal bowel sounds  Ext: + peripheral pulses, no edema, clubbing or cyanosis  Neuro: AOx3, no focal deficits  SKIN: No rashes or lesions    LABS:                        9.4    7.58  )-----------( 289      ( 09 Jun 2019 09:13 )             31.4     Hgb Trend: 9.4<--, 8.9<--, 10.2<--, 9.5<--  06-10    139  |  99  |  25<H>  ----------------------------<  96  3.6   |  25  |  1.04    Ca    9.5      10 Scar 2019 05:25  Phos  2.5     06-10  Mg     1.9     06-10    TPro  6.8  /  Alb  3.9  /  TBili  0.6  /  DBili  x   /  AST  23  /  ALT  15  /  AlkPhos  69  06-10    Creatinine Trend: 1.04<--, 1.14<--, 1.42<--, 1.05<--, 1.08<--, 1.30<--  LIVER FUNCTIONS - ( 10 Scar 2019 05:25 )  Alb: 3.9 g/dL / Pro: 6.8 g/dL / ALK PHOS: 69 U/L / ALT: 15 U/L / AST: 23 U/L / GGT: x                     CAPILLARY BLOOD GLUCOSE          RADIOLOGY & ADDITIONAL TESTS: Reviewed    Imaging Personally Reviewed:    Consultant(s) Notes Reviewed:      Care Discussed with Consultants/Other Providers: Jordan Culp, PGY1  Pager [phone]    INCOMPLETE NOTE - IN PROGRESS    Patient is a 89y old  Female who presents with a chief complaint of SANCHEZ (09 Jun 2019 13:03)      SUBJECTIVE/INTERVAL EVENTS: Patient seen and examined at bedside. MICHEL o/n. Sinus 70-90s. Denies any symptoms.    MEDICATIONS  (STANDING):  amiodarone    Tablet 400 milliGRAM(s) Oral two times a day  apixaban 2.5 milliGRAM(s) Oral every 12 hours  brimonidine 0.2% Ophthalmic Solution 1 Drop(s) Both EYES two times a day  clopidogrel Tablet 75 milliGRAM(s) Oral daily  docusate sodium 100 milliGRAM(s) Oral three times a day  folic acid 1 milliGRAM(s) Oral daily  furosemide    Tablet 20 milliGRAM(s) Oral daily  metoprolol tartrate 25 milliGRAM(s) Oral two times a day  pantoprazole    Tablet 40 milliGRAM(s) Oral before breakfast  simvastatin 40 milliGRAM(s) Oral at bedtime  timolol 0.5% Solution 1 Drop(s) Both EYES two times a day  traMADol 25 milliGRAM(s) Oral once    MEDICATIONS  (PRN):  acetaminophen   Tablet .. 650 milliGRAM(s) Oral every 6 hours PRN Moderate Pain (4 - 6)  ALBUTerol/ipratropium for Nebulization 3 milliLiter(s) Nebulizer every 6 hours PRN Shortness of Breath and/or Wheezing  traMADol 25 milliGRAM(s) Oral every 8 hours PRN Severe Pain (7 - 10)      VITAL SIGNS:  T(F): 97.8 (06-10-19 @ 04:05), Max: 98.3 (06-09-19 @ 20:39)  HR: 63 (06-10-19 @ 04:05) (62 - 79)  BP: 138/77 (06-10-19 @ 04:05) (131/68 - 143/82)  RR: 18 (06-10-19 @ 04:05) (18 - 18)  SpO2: 98% (06-10-19 @ 04:05) (89% - 98%)    I&O's Summary    08 Jun 2019 07:01  -  09 Jun 2019 07:00  --------------------------------------------------------  IN: 600 mL / OUT: 0 mL / NET: 600 mL    09 Jun 2019 07:01  -  10 Scar 2019 06:24  --------------------------------------------------------  IN: 460 mL / OUT: 0 mL / NET: 460 mL      Daily     Daily     PHYSICAL EXAM:  Gen: Alert, NAD  HEENT: NCAT, PERRL, EOMI, conjunctiva clear, sclera anicteric  Neck: Supple, no JVD  CV: RRR, S1S2, faint systolic murmur  Resp: mild bibasilar crackles, normal respiratory effort  Abd: Soft, nontender, nondistended, normal bowel sounds  Ext: no edema, clubbing or cyanosis  Neuro: nonfocal  SKIN: No rashes or lesions    LABS:                        9.4    7.58  )-----------( 289      ( 09 Jun 2019 09:13 )             31.4     Hgb Trend: 9.4<--, 8.9<--, 10.2<--, 9.5<--  06-10    139  |  99  |  25<H>  ----------------------------<  96  3.6   |  25  |  1.04    Ca    9.5      10 Scar 2019 05:25  Phos  2.5     06-10  Mg     1.9     06-10    TPro  6.8  /  Alb  3.9  /  TBili  0.6  /  DBili  x   /  AST  23  /  ALT  15  /  AlkPhos  69  06-10    Creatinine Trend: 1.04<--, 1.14<--, 1.42<--, 1.05<--, 1.08<--, 1.30<--  LIVER FUNCTIONS - ( 10 Scar 2019 05:25 )  Alb: 3.9 g/dL / Pro: 6.8 g/dL / ALK PHOS: 69 U/L / ALT: 15 U/L / AST: 23 U/L / GGT: x                     CAPILLARY BLOOD GLUCOSE          RADIOLOGY & ADDITIONAL TESTS: Reviewed    Imaging Personally Reviewed:    Consultant(s) Notes Reviewed:      Care Discussed with Consultants/Other Providers: Jordan Suni, PGY1  Pager [phone]      Patient is a 89y old  Female who presents with a chief complaint of SANCHEZ (09 Jun 2019 13:03)      SUBJECTIVE/INTERVAL EVENTS: Patient seen and examined at bedside. MICHEL o/n. Sinus 70-90s. Denies any symptoms.    MEDICATIONS  (STANDING):  amiodarone    Tablet 400 milliGRAM(s) Oral two times a day  apixaban 2.5 milliGRAM(s) Oral every 12 hours  brimonidine 0.2% Ophthalmic Solution 1 Drop(s) Both EYES two times a day  clopidogrel Tablet 75 milliGRAM(s) Oral daily  docusate sodium 100 milliGRAM(s) Oral three times a day  folic acid 1 milliGRAM(s) Oral daily  furosemide    Tablet 20 milliGRAM(s) Oral daily  metoprolol tartrate 25 milliGRAM(s) Oral two times a day  pantoprazole    Tablet 40 milliGRAM(s) Oral before breakfast  simvastatin 40 milliGRAM(s) Oral at bedtime  timolol 0.5% Solution 1 Drop(s) Both EYES two times a day  traMADol 25 milliGRAM(s) Oral once    MEDICATIONS  (PRN):  acetaminophen   Tablet .. 650 milliGRAM(s) Oral every 6 hours PRN Moderate Pain (4 - 6)  ALBUTerol/ipratropium for Nebulization 3 milliLiter(s) Nebulizer every 6 hours PRN Shortness of Breath and/or Wheezing  traMADol 25 milliGRAM(s) Oral every 8 hours PRN Severe Pain (7 - 10)      VITAL SIGNS:  T(F): 97.8 (06-10-19 @ 04:05), Max: 98.3 (06-09-19 @ 20:39)  HR: 63 (06-10-19 @ 04:05) (62 - 79)  BP: 138/77 (06-10-19 @ 04:05) (131/68 - 143/82)  RR: 18 (06-10-19 @ 04:05) (18 - 18)  SpO2: 98% (06-10-19 @ 04:05) (89% - 98%)    I&O's Summary    08 Jun 2019 07:01  -  09 Jun 2019 07:00  --------------------------------------------------------  IN: 600 mL / OUT: 0 mL / NET: 600 mL    09 Jun 2019 07:01  -  10 Scar 2019 06:24  --------------------------------------------------------  IN: 460 mL / OUT: 0 mL / NET: 460 mL      Daily     Daily     PHYSICAL EXAM:  Gen: Alert, NAD  HEENT: NCAT, PERRL, EOMI, conjunctiva clear, sclera anicteric  Neck: Supple, no JVD  CV: RRR, S1S2, faint systolic murmur  Resp: mild bibasilar crackles, normal respiratory effort  Abd: Soft, nontender, nondistended, normal bowel sounds  Ext: no edema, clubbing or cyanosis  Neuro: nonfocal  SKIN: No rashes or lesions    LABS:                        9.4    7.58  )-----------( 289      ( 09 Jun 2019 09:13 )             31.4     Hgb Trend: 9.4<--, 8.9<--, 10.2<--, 9.5<--  06-10    139  |  99  |  25<H>  ----------------------------<  96  3.6   |  25  |  1.04    Ca    9.5      10 Scar 2019 05:25  Phos  2.5     06-10  Mg     1.9     06-10    TPro  6.8  /  Alb  3.9  /  TBili  0.6  /  DBili  x   /  AST  23  /  ALT  15  /  AlkPhos  69  06-10    Creatinine Trend: 1.04<--, 1.14<--, 1.42<--, 1.05<--, 1.08<--, 1.30<--  LIVER FUNCTIONS - ( 10 Scar 2019 05:25 )  Alb: 3.9 g/dL / Pro: 6.8 g/dL / ALK PHOS: 69 U/L / ALT: 15 U/L / AST: 23 U/L / GGT: x                     CAPILLARY BLOOD GLUCOSE          RADIOLOGY & ADDITIONAL TESTS: Reviewed    Imaging Personally Reviewed:    Consultant(s) Notes Reviewed:      Care Discussed with Consultants/Other Providers:

## 2019-06-10 NOTE — PROGRESS NOTE ADULT - PROBLEM SELECTOR PLAN 1
- CXR with pulmonary edema  - last TTE showing severe MR, mod-severe TR w/ grade 3 diastolic dysfunction  - Improved w diuresis. Started lasix 20mg PO x 1 per cardiology rec. Will observe Scr tomorrow as had recent Scr elevation - CXR with pulmonary edema  - last TTE showing severe MR, mod-severe TR w/ grade 3 diastolic dysfunction  - Improved w diuresis. Started lasix 20mg PO daily per cardiology rec. Will observe Scr tomorrow as had recent Scr elevation Now NSR  - amio load 400 bid x 1 week, then 200 bid x 1 week, then 200 daily.  - lopressor 25 BID  - eliquis 2.5 BID  - tele

## 2019-06-11 ENCOUNTER — TRANSCRIPTION ENCOUNTER (OUTPATIENT)
Age: 84
End: 2019-06-11

## 2019-06-11 VITALS
RESPIRATION RATE: 18 BRPM | HEART RATE: 61 BPM | OXYGEN SATURATION: 96 % | TEMPERATURE: 99 F | SYSTOLIC BLOOD PRESSURE: 111 MMHG | DIASTOLIC BLOOD PRESSURE: 68 MMHG

## 2019-06-11 PROCEDURE — 84100 ASSAY OF PHOSPHORUS: CPT

## 2019-06-11 PROCEDURE — 83735 ASSAY OF MAGNESIUM: CPT

## 2019-06-11 PROCEDURE — 85730 THROMBOPLASTIN TIME PARTIAL: CPT

## 2019-06-11 PROCEDURE — 83880 ASSAY OF NATRIURETIC PEPTIDE: CPT

## 2019-06-11 PROCEDURE — 85610 PROTHROMBIN TIME: CPT

## 2019-06-11 PROCEDURE — 93005 ELECTROCARDIOGRAM TRACING: CPT

## 2019-06-11 PROCEDURE — 71045 X-RAY EXAM CHEST 1 VIEW: CPT

## 2019-06-11 PROCEDURE — 99239 HOSP IP/OBS DSCHRG MGMT >30: CPT

## 2019-06-11 PROCEDURE — 84484 ASSAY OF TROPONIN QUANT: CPT

## 2019-06-11 PROCEDURE — 99285 EMERGENCY DEPT VISIT HI MDM: CPT | Mod: 25

## 2019-06-11 PROCEDURE — 80053 COMPREHEN METABOLIC PANEL: CPT

## 2019-06-11 PROCEDURE — 99232 SBSQ HOSP IP/OBS MODERATE 35: CPT

## 2019-06-11 PROCEDURE — 84443 ASSAY THYROID STIM HORMONE: CPT

## 2019-06-11 PROCEDURE — 85027 COMPLETE CBC AUTOMATED: CPT

## 2019-06-11 PROCEDURE — 96374 THER/PROPH/DIAG INJ IV PUSH: CPT

## 2019-06-11 PROCEDURE — 97161 PT EVAL LOW COMPLEX 20 MIN: CPT

## 2019-06-11 RX ORDER — AMIODARONE HYDROCHLORIDE 400 MG/1
2 TABLET ORAL
Qty: 45 | Refills: 0
Start: 2019-06-11

## 2019-06-11 RX ORDER — LOSARTAN POTASSIUM 100 MG/1
1 TABLET, FILM COATED ORAL
Qty: 0 | Refills: 0 | DISCHARGE

## 2019-06-11 RX ORDER — ATORVASTATIN CALCIUM 80 MG/1
1 TABLET, FILM COATED ORAL
Qty: 30 | Refills: 0
Start: 2019-06-11

## 2019-06-11 RX ORDER — SIMVASTATIN 20 MG/1
1 TABLET, FILM COATED ORAL
Qty: 0 | Refills: 0 | DISCHARGE

## 2019-06-11 RX ORDER — POTASSIUM CHLORIDE 20 MEQ
10 PACKET (EA) ORAL ONCE
Refills: 0 | Status: COMPLETED | OUTPATIENT
Start: 2019-06-11 | End: 2019-06-11

## 2019-06-11 RX ADMIN — Medication 1 MILLIGRAM(S): at 11:56

## 2019-06-11 RX ADMIN — AMIODARONE HYDROCHLORIDE 400 MILLIGRAM(S): 400 TABLET ORAL at 17:48

## 2019-06-11 RX ADMIN — Medication 1 DROP(S): at 17:51

## 2019-06-11 RX ADMIN — AMIODARONE HYDROCHLORIDE 400 MILLIGRAM(S): 400 TABLET ORAL at 05:22

## 2019-06-11 RX ADMIN — PANTOPRAZOLE SODIUM 40 MILLIGRAM(S): 20 TABLET, DELAYED RELEASE ORAL at 05:23

## 2019-06-11 RX ADMIN — Medication 1 DROP(S): at 05:22

## 2019-06-11 RX ADMIN — BRIMONIDINE TARTRATE 1 DROP(S): 2 SOLUTION/ DROPS OPHTHALMIC at 05:23

## 2019-06-11 RX ADMIN — Medication 25 MILLIGRAM(S): at 17:49

## 2019-06-11 RX ADMIN — Medication 10 MILLIEQUIVALENT(S): at 15:30

## 2019-06-11 RX ADMIN — APIXABAN 2.5 MILLIGRAM(S): 2.5 TABLET, FILM COATED ORAL at 17:48

## 2019-06-11 RX ADMIN — Medication 25 MILLIGRAM(S): at 05:22

## 2019-06-11 RX ADMIN — BRIMONIDINE TARTRATE 1 DROP(S): 2 SOLUTION/ DROPS OPHTHALMIC at 17:48

## 2019-06-11 RX ADMIN — CLOPIDOGREL BISULFATE 75 MILLIGRAM(S): 75 TABLET, FILM COATED ORAL at 11:56

## 2019-06-11 RX ADMIN — Medication 20 MILLIGRAM(S): at 05:22

## 2019-06-11 RX ADMIN — APIXABAN 2.5 MILLIGRAM(S): 2.5 TABLET, FILM COATED ORAL at 05:22

## 2019-06-11 NOTE — DIETITIAN INITIAL EVALUATION ADULT. - PROBLEM SELECTOR PLAN 1
-CXR with pulmonary edema  - last TTE showing severe MR, mod-severe TR w/ grade 3 diastolic dysfunction  - BP control w/ losartan 25 mg QD  - diuresed with IV lasix in ED, now without crackles or LE edema. Will c/w lasix 40 mg IVP QD, strict I+Os  - BID lytes

## 2019-06-11 NOTE — DISCHARGE NOTE NURSING/CASE MANAGEMENT/SOCIAL WORK - NSDCDPATPORTLINK_GEN_ALL_CORE
You can access the [facility] Portal, offered by [facility], by registering with the following website: [email or web address]

## 2019-06-11 NOTE — DIETITIAN INITIAL EVALUATION ADULT. - PROBLEM SELECTOR PLAN 2
- currently afib rate control   - metoprolol 12.5 q6 hr for rate control can increased to 25 mg q6 if RVR  - c/w eliquis  - interrogation in AM  - appreciate EP recs

## 2019-06-11 NOTE — PROGRESS NOTE ADULT - PROBLEM SELECTOR PLAN 9
- on eliquis  - regular diet

## 2019-06-11 NOTE — PROGRESS NOTE ADULT - PROBLEM SELECTOR PLAN 1
Now NSR  - amio load 400 bid x 1 week, then 200 bid x 1 week, then 200 daily.  - lopressor 25 BID  - eliquis 2.5 BID  - tele

## 2019-06-11 NOTE — DIETITIAN INITIAL EVALUATION ADULT. - SIGNS/SYMPTOMS
pt with suspected dry wt loss, report of suboptimal PO intake PTA now improved diet recall with patient consuming high sodium takeout foods, using salt when cooking

## 2019-06-11 NOTE — PROGRESS NOTE ADULT - SUBJECTIVE AND OBJECTIVE BOX
Jordan Culp, PGY1  Pager [phone]    INCOMPLETE NOTE - IN PROGRESS    Patient is a 89y old  Female who presents with a chief complaint of SANCHEZ (10 Scar 2019 11:40)      SUBJECTIVE/INTERVAL EVENTS: Patient seen and examined at bedside. MICHEL o/n.    MEDICATIONS  (STANDING):  amiodarone    Tablet 400 milliGRAM(s) Oral two times a day  apixaban 2.5 milliGRAM(s) Oral every 12 hours  atorvastatin 40 milliGRAM(s) Oral at bedtime  brimonidine 0.2% Ophthalmic Solution 1 Drop(s) Both EYES two times a day  clopidogrel Tablet 75 milliGRAM(s) Oral daily  docusate sodium 100 milliGRAM(s) Oral three times a day  folic acid 1 milliGRAM(s) Oral daily  furosemide    Tablet 20 milliGRAM(s) Oral daily  metoprolol tartrate 25 milliGRAM(s) Oral two times a day  pantoprazole    Tablet 40 milliGRAM(s) Oral before breakfast  timolol 0.5% Solution 1 Drop(s) Both EYES two times a day    MEDICATIONS  (PRN):  acetaminophen   Tablet .. 650 milliGRAM(s) Oral every 6 hours PRN Moderate Pain (4 - 6)  ALBUTerol/ipratropium for Nebulization 3 milliLiter(s) Nebulizer every 6 hours PRN Shortness of Breath and/or Wheezing  hydrocortisone 1% Cream 1 Application(s) Topical daily PRN Rash and/or Itching      VITAL SIGNS:  T(F): 97.9 (06-11-19 @ 03:50), Max: 98.3 (06-10-19 @ 11:26)  HR: 63 (06-11-19 @ 05:20) (54 - 74)  BP: 136/72 (06-11-19 @ 05:20) (124/71 - 162/83)  RR: 18 (06-11-19 @ 03:50) (18 - 18)  SpO2: 99% (06-11-19 @ 03:50) (93% - 99%)    I&O's Summary    09 Jun 2019 07:01  -  10 Scar 2019 07:00  --------------------------------------------------------  IN: 460 mL / OUT: 0 mL / NET: 460 mL    10 Scar 2019 07:01  -  11 Jun 2019 06:42  --------------------------------------------------------  IN: 600 mL / OUT: 0 mL / NET: 600 mL      Daily     Daily     PHYSICAL EXAM:  Gen: Alert, well-developed, NAD  HEENT: NCAT, PERRL, EOMI, conjunctiva clear, sclera anicteric, no erythema or exudates in the oropharynx, mmm  Neck: Supple, no JVD  CV: RRR, S1S2, no m/r/g  Resp: CTAB, normal respiratory effort  Abd: Soft, nontender, nondistended, normal bowel sounds  Ext: + peripheral pulses, no edema, clubbing or cyanosis  Neuro: AOx3, no focal deficits  SKIN: No rashes or lesions    LABS:                        9.8    9.00  )-----------( 306      ( 10 Scar 2019 08:34 )             32.3     Hgb Trend: 9.8<--, 9.4<--, 8.9<--, 10.2<--, 9.5<--  06-10    139  |  99  |  25<H>  ----------------------------<  96  3.6   |  25  |  1.04    Ca    9.5      10 Scar 2019 05:25  Phos  2.5     06-10  Mg     1.9     06-10    TPro  6.8  /  Alb  3.9  /  TBili  0.6  /  DBili  x   /  AST  23  /  ALT  15  /  AlkPhos  69  06-10    Creatinine Trend: 1.04<--, 1.14<--, 1.42<--, 1.05<--, 1.08<--, 1.30<--  LIVER FUNCTIONS - ( 10 Scar 2019 05:25 )  Alb: 3.9 g/dL / Pro: 6.8 g/dL / ALK PHOS: 69 U/L / ALT: 15 U/L / AST: 23 U/L / GGT: x                     CAPILLARY BLOOD GLUCOSE          RADIOLOGY & ADDITIONAL TESTS: Reviewed    Imaging Personally Reviewed:    Consultant(s) Notes Reviewed:      Care Discussed with Consultants/Other Providers: Jordan Culp, PGY1  Pager [phone]    INCOMPLETE NOTE - IN PROGRESS    Patient is a 89y old  Female who presents with a chief complaint of SANCHEZ (10 Scar 2019 11:40)      SUBJECTIVE/INTERVAL EVENTS: Patient seen and examined at bedside. MICHEL o/n.    MEDICATIONS  (STANDING):  amiodarone    Tablet 400 milliGRAM(s) Oral two times a day  apixaban 2.5 milliGRAM(s) Oral every 12 hours  atorvastatin 40 milliGRAM(s) Oral at bedtime  brimonidine 0.2% Ophthalmic Solution 1 Drop(s) Both EYES two times a day  clopidogrel Tablet 75 milliGRAM(s) Oral daily  docusate sodium 100 milliGRAM(s) Oral three times a day  folic acid 1 milliGRAM(s) Oral daily  furosemide    Tablet 20 milliGRAM(s) Oral daily  metoprolol tartrate 25 milliGRAM(s) Oral two times a day  pantoprazole    Tablet 40 milliGRAM(s) Oral before breakfast  timolol 0.5% Solution 1 Drop(s) Both EYES two times a day    MEDICATIONS  (PRN):  acetaminophen   Tablet .. 650 milliGRAM(s) Oral every 6 hours PRN Moderate Pain (4 - 6)  ALBUTerol/ipratropium for Nebulization 3 milliLiter(s) Nebulizer every 6 hours PRN Shortness of Breath and/or Wheezing  hydrocortisone 1% Cream 1 Application(s) Topical daily PRN Rash and/or Itching      VITAL SIGNS:  T(F): 97.9 (06-11-19 @ 03:50), Max: 98.3 (06-10-19 @ 11:26)  HR: 63 (06-11-19 @ 05:20) (54 - 74)  BP: 136/72 (06-11-19 @ 05:20) (124/71 - 162/83)  RR: 18 (06-11-19 @ 03:50) (18 - 18)  SpO2: 99% (06-11-19 @ 03:50) (93% - 99%)    I&O's Summary    09 Jun 2019 07:01  -  10 Scar 2019 07:00  --------------------------------------------------------  IN: 460 mL / OUT: 0 mL / NET: 460 mL    10 Scar 2019 07:01  -  11 Jun 2019 06:42  --------------------------------------------------------  IN: 600 mL / OUT: 0 mL / NET: 600 mL      Daily     Daily     PHYSICAL EXAM:  Gen: Alert, well-developed, NAD  HEENT: NCAT, PERRL, EOMI, conjunctiva clear, sclera anicteric, no erythema or exudates in the oropharynx, mmm  Neck: Supple, no JVD  CV: RRR, S1S2, no m/r/g  Resp: CTAB, normal respiratory effort  Abd: Soft, nontender, nondistended, normal bowel sounds  Ext: + peripheral pulses, no edema, clubbing or cyanosis  Neuro: AOx3, no focal deficits  SKIN: No rashes   LABS:                        9.8    9.00  )-----------( 306      ( 10 Scar 2019 08:34 )             32.3     Hgb Trend: 9.8<--, 9.4<--, 8.9<--, 10.2<--, 9.5<--  06-10    139  |  99  |  25<H>  ----------------------------<  96  3.6   |  25  |  1.04    Ca    9.5      10 Scar 2019 05:25  Phos  2.5     06-10  Mg     1.9     06-10    TPro  6.8  /  Alb  3.9  /  TBili  0.6  /  DBili  x   /  AST  23  /  ALT  15  /  AlkPhos  69  06-10    Creatinine Trend: 1.04<--, 1.14<--, 1.42<--, 1.05<--, 1.08<--, 1.30<--  LIVER FUNCTIONS - ( 10 Scar 2019 05:25 )  Alb: 3.9 g/dL / Pro: 6.8 g/dL / ALK PHOS: 69 U/L / ALT: 15 U/L / AST: 23 U/L / GGT: x                     CAPILLARY BLOOD GLUCOSE          RADIOLOGY & ADDITIONAL TESTS: Reviewed    Imaging Personally Reviewed:    Consultant(s) Notes Reviewed:      Care Discussed with Consultants/Other Providers: Jordan Culp, PGY1  Pager [phone]    Patient is a 89y old  Female who presents with a chief complaint of SANCHEZ (10 Scar 2019 11:40)      SUBJECTIVE/INTERVAL EVENTS: Patient seen and examined at bedside. MICHEL o/n. Mild abdominal cramping this AM. Denies other symptoms.    MEDICATIONS  (STANDING):  amiodarone    Tablet 400 milliGRAM(s) Oral two times a day  apixaban 2.5 milliGRAM(s) Oral every 12 hours  atorvastatin 40 milliGRAM(s) Oral at bedtime  brimonidine 0.2% Ophthalmic Solution 1 Drop(s) Both EYES two times a day  clopidogrel Tablet 75 milliGRAM(s) Oral daily  docusate sodium 100 milliGRAM(s) Oral three times a day  folic acid 1 milliGRAM(s) Oral daily  furosemide    Tablet 20 milliGRAM(s) Oral daily  metoprolol tartrate 25 milliGRAM(s) Oral two times a day  pantoprazole    Tablet 40 milliGRAM(s) Oral before breakfast  timolol 0.5% Solution 1 Drop(s) Both EYES two times a day    MEDICATIONS  (PRN):  acetaminophen   Tablet .. 650 milliGRAM(s) Oral every 6 hours PRN Moderate Pain (4 - 6)  ALBUTerol/ipratropium for Nebulization 3 milliLiter(s) Nebulizer every 6 hours PRN Shortness of Breath and/or Wheezing  hydrocortisone 1% Cream 1 Application(s) Topical daily PRN Rash and/or Itching      VITAL SIGNS:  T(F): 97.9 (06-11-19 @ 03:50), Max: 98.3 (06-10-19 @ 11:26)  HR: 63 (06-11-19 @ 05:20) (54 - 74)  BP: 136/72 (06-11-19 @ 05:20) (124/71 - 162/83)  RR: 18 (06-11-19 @ 03:50) (18 - 18)  SpO2: 99% (06-11-19 @ 03:50) (93% - 99%)    I&O's Summary    09 Jun 2019 07:01  -  10 Scar 2019 07:00  --------------------------------------------------------  IN: 460 mL / OUT: 0 mL / NET: 460 mL    10 Scar 2019 07:01  -  11 Jun 2019 06:42  --------------------------------------------------------  IN: 600 mL / OUT: 0 mL / NET: 600 mL      Daily     Daily     PHYSICAL EXAM:  Gen: Alert, NAD  HEENT: NCAT, PERRL, EOMI, conjunctiva clear, sclera anicteric, mmm  Neck: Supple, no JVD  CV: RRR, S1S2, no m/r/g  Resp: CTAB, normal respiratory effort  Abd: Soft, nontender, nondistended, normal bowel sounds  Ext: no edema, clubbing or cyanosis  Neuro: AOx3, no focal deficits  SKIN: No rashes     LABS:                        9.8    9.00  )-----------( 306      ( 10 Scar 2019 08:34 )             32.3     Hgb Trend: 9.8<--, 9.4<--, 8.9<--, 10.2<--, 9.5<--  06-10    139  |  99  |  25<H>  ----------------------------<  96  3.6   |  25  |  1.04    Ca    9.5      10 Scar 2019 05:25  Phos  2.5     06-10  Mg     1.9     06-10    TPro  6.8  /  Alb  3.9  /  TBili  0.6  /  DBili  x   /  AST  23  /  ALT  15  /  AlkPhos  69  06-10    Creatinine Trend: 1.04<--, 1.14<--, 1.42<--, 1.05<--, 1.08<--, 1.30<--  LIVER FUNCTIONS - ( 10 Scar 2019 05:25 )  Alb: 3.9 g/dL / Pro: 6.8 g/dL / ALK PHOS: 69 U/L / ALT: 15 U/L / AST: 23 U/L / GGT: x                     CAPILLARY BLOOD GLUCOSE          RADIOLOGY & ADDITIONAL TESTS: Reviewed    Imaging Personally Reviewed:    Consultant(s) Notes Reviewed:      Care Discussed with Consultants/Other Providers:

## 2019-06-11 NOTE — PROGRESS NOTE ADULT - ASSESSMENT
89 yo woman with PMH of recent TAVR for severe AS(4/19), new LBBB s/p leadless micra PPM (4/19), afib (on eliquis), CAD s/p 1 stent (3/19), diastolic heart failure, HTN, HLD, asthma, skin BCC p/w SOB. Patient reports being in normal state of health 2 days prior, developed SOB, at rest worsened w/ exertion, no associated chest pain or palpitations, in AF with RVR:    - Converted to SR. Continue with amiodarone load 400 bid x 1 week, then 200 bid x 1 week, then 200 daily.  - continue with lopressor 25 mg po bid.   - Volume status has improved overall. Given rise in creatinine, diuretics were held. She is now on lasix 20 mg po daily. Her crackles on exam have resolved.    - Continue Eliquis 2.5 bid for stroke risk reduction  - Was on Aspirin and Plavix for PCI in March. d/c ASA to avoid triple therapy.  - Continue statin   - Monitor I, O, K, creatinine, and daily weights  - Replete K and Mg as needed  - She will follow up her primary cardiologist in 1-2 weeks.  - No cardiac contraindication to d/c home.

## 2019-06-11 NOTE — DIETITIAN INITIAL EVALUATION ADULT. - NS AS NUTRI INTERV ED CONTENT3
provided diet education for heart failure nutrition therapy. discussed importance of avoiding salt and high sodium containing foods. patient made aware of need to monitor and restrict fluid intake, and importance/purpose of daily weights. Discussed tips for reducing sodium intake when eating out. Pt encouraged to use Mrs. Cisneros seasongarth when cooking./Purpose of the nutrition education

## 2019-06-11 NOTE — PROGRESS NOTE ADULT - PROBLEM SELECTOR PLAN 2
CXR with pulmonary edema. last TTE showing severe MR, mod-severe TR w/ grade 3 diastolic dysfunction.  - Improved w diuresis  - c/w lasix 20

## 2019-06-11 NOTE — PROGRESS NOTE ADULT - ATTENDING COMMENTS
pt seen earlier. feels good. no cp, sob. pt lied flat and pulse ox was 95% RA c no sx. Denies any complaints. PE: NAD, no JVD; CTA b/l; no edema; abd benign; nonfocal    Afib- cont Amio taper. outpt f/u tfts, lfts, pfts, ophtho d/w pt    Acute on chronic diastolic heart failure- cont current tx. cards/pmd f/u this week    discharge time 38 min

## 2019-06-11 NOTE — DIETITIAN INITIAL EVALUATION ADULT. - ADHERENCE
Does not add extra salt to foods but uses it in cooking, eats out ~ 1x per week (chinese foods, Citizen of the [place] etc). Weights herself daily in the morning at home./fair

## 2019-06-11 NOTE — PROGRESS NOTE ADULT - PROBLEM SELECTOR PLAN 7
- no wheezing on exam, duonebs prn

## 2019-06-11 NOTE — DISCHARGE NOTE NURSING/CASE MANAGEMENT/SOCIAL WORK - NSSCTYPOFSERV_GEN_ALL_CORE
Visiting nurse will call 1-2 days after discharge to arrange visit. Please call agency with any questions or concerns

## 2019-06-11 NOTE — DIETITIAN INITIAL EVALUATION ADULT. - ORAL INTAKE PTA
Pt reports PO intake has been improving during admission, reports having a period of time when she was not eating well PTA for 1-2 weeks. Reports she does the majority of cooking at home which she enjoys.  Confirms NKFA. Pt denies chewing/swallowing difficulty, nausea, vomiting, diarrhea, constipation. Takes multivitamin at home./fair

## 2019-06-11 NOTE — PROGRESS NOTE ADULT - PROBLEM SELECTOR PLAN 4
- c/w plavix and statin  - holding ASA to reduce bleeding risk

## 2019-06-11 NOTE — PROGRESS NOTE ADULT - ASSESSMENT
89 yo F PMH HTN, HLD, CAD s/p stent x1 (3/19), TAVR for severe AS (4/19), diastolic CHF, new LBBB s/p leadless micra PPM (4/19), afib (on eliquis), asthma, skin BCC p/w SOB a/w afib w/ RVR and flash pulm edema. Pt now in sinus rhythm and asymptomatic.

## 2019-06-11 NOTE — DISCHARGE NOTE NURSING/CASE MANAGEMENT/SOCIAL WORK - NSDCFUADDAPPT_GEN_ALL_CORE_FT
1) You are scheduled for a follow up appointment with cardiologist Dr. James Palmer on 6/13/19 at 12:15PM.  2) You are scheduled for a follow up appointment with your PCP Dr. Layo Downey on 6/14/19 at 12:30PM. You will need to have your kidney function and potassium levels checked.  3) You are scheduled for a follow up appointment with your ophthalmologist Dr. Morales on 6/17/19 at 1:45PM.

## 2019-06-11 NOTE — DIETITIAN INITIAL EVALUATION ADULT. - OTHER INFO
Patient seen for nutrition consult for diet education prior to discharge. Pt reports UBW as ~123lbs, notes weight loss recently due to decreased PO intake as well as fluid loss.  Weight per past admissions as follows: 5/12/19: 123lbs, 4/22/19: 125lbs 3/29/19: 123lbs.  Pt report most recent lowest weight as 114lbs. Weights this admission noted as, 112.6lbs (6/8)  110.8 lbs (6/11). Since admission pt reports appetite has been improving, completing % of meals per nursing flow-sheet. Pt states she was having some abdominal pain previously, now resolved s/p BM today (6/11). Pt receptive to diet education.

## 2019-06-11 NOTE — PROGRESS NOTE ADULT - PROVIDER SPECIALTY LIST ADULT
Cardiology
Electrophysiology
Internal Medicine
Structural Heart
Cardiology
Internal Medicine
Internal Medicine

## 2019-06-11 NOTE — DIETITIAN INITIAL EVALUATION ADULT. - ENERGY NEEDS
Ht: 59inches, Wt: 110.8lbs, BMI: 22kg/m2, IBW: 85lbs +/- 10%, %IBW: 130%  Edema: none, Skin: free of pressure injuries per nursing flow sheets   Pertinent Information: This is a  89 yo F PMH HTN, HLD, CAD s/p stent x1 (3/19), TAVR for severe AS (4/19), diastolic CHF, new LBBB s/p leadless micra PPM (4/19), afib (on eliquis), asthma, skin BCC p/w SOB a/w afib w/ RVR and flash pulm edema. Pt diuresed. Plan for discharge today.

## 2019-06-11 NOTE — PROGRESS NOTE ADULT - SUBJECTIVE AND OBJECTIVE BOX
[facility]    Chandra Tomlinson, Pamela, Ezekiel, Sunil, Zeke, Marshal      [phone]    CHIEF COMPLAINT: Patient is a 89y old  Female who presents with a chief complaint of SANCHEZ (11 Jun 2019 06:41)      Follow Up: cad, tavr, af/rvr, now sr/amio    Interim history: The patient reports no new symptoms.  Denies chest discomfort and shortness of breath.  No abdominal pain.  No new neurologic symptoms.      MEDICATIONS  (STANDING):  amiodarone    Tablet 400 milliGRAM(s) Oral two times a day  apixaban 2.5 milliGRAM(s) Oral every 12 hours  atorvastatin 40 milliGRAM(s) Oral at bedtime  brimonidine 0.2% Ophthalmic Solution 1 Drop(s) Both EYES two times a day  clopidogrel Tablet 75 milliGRAM(s) Oral daily  docusate sodium 100 milliGRAM(s) Oral three times a day  folic acid 1 milliGRAM(s) Oral daily  furosemide    Tablet 20 milliGRAM(s) Oral daily  metoprolol tartrate 25 milliGRAM(s) Oral two times a day  pantoprazole    Tablet 40 milliGRAM(s) Oral before breakfast  timolol 0.5% Solution 1 Drop(s) Both EYES two times a day    MEDICATIONS  (PRN):  acetaminophen   Tablet .. 650 milliGRAM(s) Oral every 6 hours PRN Moderate Pain (4 - 6)  ALBUTerol/ipratropium for Nebulization 3 milliLiter(s) Nebulizer every 6 hours PRN Shortness of Breath and/or Wheezing  hydrocortisone 1% Cream 1 Application(s) Topical daily PRN Rash and/or Itching      REVIEW OF SYSTEMS:  eye, ent, GI, , allergic, dermatologic, musculoskeletal and neurologic are negative except as described above    Vital Signs Last 24 Hrs  T(C): 37 (11 Jun 2019 11:28), Max: 37 (11 Jun 2019 11:28)  T(F): 98.6 (11 Jun 2019 11:28), Max: 98.6 (11 Jun 2019 11:28)  HR: 61 (11 Jun 2019 11:28) (54 - 74)  BP: 111/68 (11 Jun 2019 11:28) (111/68 - 162/83)  BP(mean): --  RR: 18 (11 Jun 2019 11:28) (18 - 18)  SpO2: 96% (11 Jun 2019 11:28) (93% - 99%)    I&O's Summary    10 Scar 2019 07:01  -  11 Jun 2019 07:00  --------------------------------------------------------  IN: 600 mL / OUT: 0 mL / NET: 600 mL        Telemetry past 24h: sr    PHYSICAL EXAM:    Constitutional: well-nourished, well-developed, NAD   HEENT:  MMM, sclerae anicteric, conjunctivae clear, no oral cyanosis.  Pulmonary: Non-labored, breath sounds are clear bilaterally, No wheezing, rales or rhonchi  Cardiovascular: Regular, S1 and S2.  1-2/6 sys murmur.  No rubs, gallops or clicks  Gastrointestinal: Bowel Sounds present, soft, nontender.   Lymph: No peripheral edema.   Neurological: Alert, no focal deficits  Skin: No rashes.  Psych:  Mood & affect appropriate    LABS: All Labs Reviewed:                        9.8    9.00  )-----------( 306      ( 10 Scar 2019 08:34 )             32.3                         9.4    7.58  )-----------( 289      ( 09 Jun 2019 09:13 )             31.4     10 Scar 2019 05:25    139    |  99     |  25     ----------------------------<  96     3.6     |  25     |  1.04   09 Jun 2019 06:51    140    |  100    |  29     ----------------------------<  83     4.1     |  25     |  1.14     Ca    9.5        10 Scar 2019 05:25  Ca    9.5        09 Jun 2019 06:51  Phos  2.5       10 Scar 2019 05:25  Phos  2.9       09 Jun 2019 06:51  Mg     1.9       10 Scar 2019 05:25  Mg     1.9       09 Jun 2019 06:51    TPro  6.8    /  Alb  3.9    /  TBili  0.6    /  DBili  x      /  AST  23     /  ALT  15     /  AlkPhos  69     10 Scar 2019 05:25  TPro  6.9    /  Alb  3.8    /  TBili  0.7    /  DBili  x      /  AST  26     /  ALT  14     /  AlkPhos  67     09 Jun 2019 06:51          Blood Culture:     06-08 @ 19:22  TSH: 2.80      RADIOLOGY:    EKG:    Echo:

## 2019-06-11 NOTE — PROGRESS NOTE ADULT - REASON FOR ADMISSION
SANCHEZ

## 2019-06-13 ENCOUNTER — APPOINTMENT (OUTPATIENT)
Dept: CV DIAGNOSITCS | Facility: HOSPITAL | Age: 84
End: 2019-06-13

## 2019-06-13 ENCOUNTER — OUTPATIENT (OUTPATIENT)
Dept: OUTPATIENT SERVICES | Facility: HOSPITAL | Age: 84
LOS: 1 days | End: 2019-06-13
Payer: MEDICARE

## 2019-06-13 ENCOUNTER — APPOINTMENT (OUTPATIENT)
Dept: CARDIOTHORACIC SURGERY | Facility: CLINIC | Age: 84
End: 2019-06-13
Payer: MEDICARE

## 2019-06-13 ENCOUNTER — NON-APPOINTMENT (OUTPATIENT)
Age: 84
End: 2019-06-13

## 2019-06-13 VITALS
SYSTOLIC BLOOD PRESSURE: 143 MMHG | TEMPERATURE: 98.7 F | DIASTOLIC BLOOD PRESSURE: 73 MMHG | OXYGEN SATURATION: 98 % | HEART RATE: 55 BPM

## 2019-06-13 DIAGNOSIS — Z98.61 CORONARY ANGIOPLASTY STATUS: Chronic | ICD-10-CM

## 2019-06-13 DIAGNOSIS — Z90.721 ACQUIRED ABSENCE OF OVARIES, UNILATERAL: Chronic | ICD-10-CM

## 2019-06-13 DIAGNOSIS — Z98.890 OTHER SPECIFIED POSTPROCEDURAL STATES: Chronic | ICD-10-CM

## 2019-06-13 DIAGNOSIS — I48.91 UNSPECIFIED ATRIAL FIBRILLATION: ICD-10-CM

## 2019-06-13 DIAGNOSIS — Z95.2 PRESENCE OF PROSTHETIC HEART VALVE: Chronic | ICD-10-CM

## 2019-06-13 DIAGNOSIS — Z95.5 PRESENCE OF CORONARY ANGIOPLASTY IMPLANT AND GRAFT: Chronic | ICD-10-CM

## 2019-06-13 PROCEDURE — 93306 TTE W/DOPPLER COMPLETE: CPT

## 2019-06-13 PROCEDURE — 93306 TTE W/DOPPLER COMPLETE: CPT | Mod: 26

## 2019-06-13 PROCEDURE — 99214 OFFICE O/P EST MOD 30 MIN: CPT

## 2019-06-13 RX ORDER — ASPIRIN 81 MG
81 TABLET, DELAYED RELEASE (ENTERIC COATED) ORAL
Refills: 0 | Status: DISCONTINUED | COMMUNITY
End: 2019-06-13

## 2019-07-22 NOTE — DIETITIAN INITIAL EVALUATION ADULT. - PROBLEM/PLAN-5
[facility].  Please clarify.  
DISPLAY PLAN FREE TEXT

## 2019-08-29 ENCOUNTER — APPOINTMENT (OUTPATIENT)
Dept: ELECTROPHYSIOLOGY | Facility: CLINIC | Age: 84
End: 2019-08-29

## 2020-02-24 LAB
ALBUMIN SERPL ELPH-MCNC: 4.2 G/DL
ALP BLD-CCNC: 77 U/L
ALT SERPL-CCNC: 16 U/L
ANION GAP SERPL CALC-SCNC: 16 MMOL/L
AST SERPL-CCNC: 28 U/L
BASOPHILS # BLD AUTO: 0.05 K/UL
BASOPHILS NFR BLD AUTO: 0.6 %
BILIRUB SERPL-MCNC: 0.8 MG/DL
BUN SERPL-MCNC: 28 MG/DL
CALCIUM SERPL-MCNC: 10.1 MG/DL
CHLORIDE SERPL-SCNC: 101 MMOL/L
CO2 SERPL-SCNC: 25 MMOL/L
CREAT SERPL-MCNC: 1.19 MG/DL
EOSINOPHIL # BLD AUTO: 0.37 K/UL
EOSINOPHIL NFR BLD AUTO: 4.5 %
GLUCOSE SERPL-MCNC: 97 MG/DL
HCT VFR BLD CALC: 36.3 %
HGB BLD-MCNC: 11.1 G/DL
IMM GRANULOCYTES NFR BLD AUTO: 0.2 %
LYMPHOCYTES # BLD AUTO: 2.08 K/UL
LYMPHOCYTES NFR BLD AUTO: 25.1 %
MAN DIFF?: NORMAL
MCHC RBC-ENTMCNC: 29.6 PG
MCHC RBC-ENTMCNC: 30.6 GM/DL
MCV RBC AUTO: 96.8 FL
MONOCYTES # BLD AUTO: 0.69 K/UL
MONOCYTES NFR BLD AUTO: 8.3 %
NEUTROPHILS # BLD AUTO: 5.08 K/UL
NEUTROPHILS NFR BLD AUTO: 61.3 %
PLATELET # BLD AUTO: 334 K/UL
POTASSIUM SERPL-SCNC: 3.9 MMOL/L
PROT SERPL-MCNC: 7.2 G/DL
RBC # BLD: 3.75 M/UL
RBC # FLD: 13.6 %
SODIUM SERPL-SCNC: 142 MMOL/L
WBC # FLD AUTO: 8.29 K/UL

## 2020-02-24 NOTE — CONSULT LETTER
[Dear  ___] : Dear  [unfilled], [Please see my note below.] : Please see my note below. [Courtesy Letter:] : I had the pleasure of seeing your patient, [unfilled], in my office today. [Consult Closing:] : Thank you very much for allowing me to participate in the care of this patient.  If you have any questions, please do not hesitate to contact me. [FreeTextEntry2] : James Palmer,  [facility] [address] [place] [address] [FreeTextEntry3] : Lee Monsivais MD, FACC, [facility]\par Director Structural Heart Disease\par Cardiovascular & Thoracic Surgery\par [facility]\par \par Cardiology\par [employer] [employer]\par \par \par

## 2020-02-24 NOTE — ADDENDUM
[FreeTextEntry1] : Preliminary review of TTE with Dr. Shaikh shows good valve function with mild PVL, mild MR, good biventricular function.

## 2020-02-24 NOTE — HISTORY OF PRESENT ILLNESS
[FreeTextEntry1] : Ms. Nerissa Junior is a 89 year old female who presents for 30 day follow up s/p TF TAVR with a 26mm Corevalve Evolut, complicated by LBBB requiring pacemaker (Micra) placement.  Her history includes asthma, HTN, diastolic HF, paroxysmal afib, and CAD, requiring a stent to the RCA (and Plavix for 1year).\par She was recently admitted with rapid afib and sob.  She was started on Amiodarone and converted to SR.  She says she is feeling better each day but feels like she has very poor stamina.\par \par NYHA- II\par 5 meter gait- unable due to feeling unsteady\par

## 2020-02-24 NOTE — PHYSICAL EXAM
[PERRL With Normal Accommodation] : pupils were equal in size, round, and reactive to light [Sclera] : the sclera and conjunctiva were normal [Extraocular Movements] : extraocular movements were intact [Neck Appearance] : the appearance of the neck was normal [Jugular Venous Distention Increased] : there was no jugular-venous distention [] : no respiratory distress [Respiration, Rhythm And Depth] : normal respiratory rhythm and effort [Exaggerated Use Of Accessory Muscles For Inspiration] : no accessory muscle use [Auscultation Breath Sounds / Voice Sounds] : lungs were clear to auscultation bilaterally [Heart Rate And Rhythm] : heart rate was normal and rhythm regular [Heart Sounds] : normal S1 and S2 [Murmurs] : no murmurs [2+] : left 2+ [Bowel Sounds] : normal bowel sounds [Abdomen Soft] : soft [Abdomen Tenderness] : non-tender [No Focal Deficits] : no focal deficits [Oriented To Time, Place, And Person] : oriented to person, place, and time [Affect] : the affect was normal [FreeTextEntry1] : g

## 2020-02-24 NOTE — ASSESSMENT
[FreeTextEntry1] : Ms Junior has had an eventful postoperative course since her transfemoral TAVR (26mm Corevalve) for severe symptomatic stenosis.  She had LBBB requiring a PPM insertion.  She was recently admitted with rapid afib and shortness of breath and converted to sinus rhythm.  She is currently on lopressor, Eliquis, and an Amiodarone load for the paroxysmal afib, as well as Plavix for her RCA stent and TAVR.\par \par On exam today she is in no distress, her heart is regular rhythm, and her lungs are clear bilaterally.  Her groin incisions are well healed and she has no peripheral edema.\par EKG was done and showed sinus bradycardia with LBBB.\par Echo was also done and will be reviewed.\par \par She will see Dr. Palmer today and she is encouraged to continue to increase her activity as tolerated and continue current medication regimen.

## 2020-02-24 NOTE — REVIEW OF SYSTEMS
[Feeling Poorly] : feeling poorly [Feeling Tired] : feeling tired [Negative] : Psychiatric [Fever] : no fever [Chills] : no chills [Wheezing] : no wheezing [Cough] : no cough

## 2020-10-30 NOTE — ED ADULT NURSE NOTE - PAIN RATING/NUMBER SCALE (0-10): REST
To arrange a developmental evaluation through the [facility] [place], please contact:  [phone] (birth to 3 years)     Patient Education    [facility]  2 YEAR [facility]IS[facility]  Here are some suggestions from [employer] experts that may be of value to your family.     HOW YOUR FAMILY IS DOING  Take time for yourself and your partner.  Stay in touch with friends.  Make time for family activities. Spend time with each child.  Teach your child not to hit, bite, or hurt other people. Be a role model.  If you feel unsafe in your home or have been hurt by someone, let us know. Hotlines and community resources can also provide confidential help.  Don t smoke or use e-cigarettes. Keep your home and car smoke-free. Tobacco-free spaces keep children healthy.  Don t use alcohol or drugs.  Accept help from family and friends.  If you are worried about your living or food situation, reach out for help. Community agencies and programs such as WIC and SNAP can provide information and assistance.    YOUR CHILD S BEHAVIOR  Praise your child when he does what you ask him to do.  Listen to and respect your child. Expect others to as well.  Help your child talk about his feelings.  Watch how he responds to new people or situations.  Read, talk, sing, and explore together. These activities are the best ways to help toddlers learn.  Limit TV, tablet, or smartphone use to no more than 1 hour of high-quality programs each day.  It is better for toddlers to play than to watch TV.  Encourage your child to play for up to 60 minutes a day.  Avoid TV during meals. Talk together instead.    TALKING AND YOUR CHILD  Use clear, simple language with your child. Don t use baby talk.  Talk slowly and remember that it may take a while for your child to respond. Your child should be able to follow simple instructions.  Read to your child every day. Your child may love hearing the same story over and over.  Talk about and  describe pictures in books.  Talk about the things you see and hear when you are together.  Ask your child to point to things as you read.  Stop a story to let your child make an animal sound or finish a part of the story.    TOILET TRAINING  Begin toilet training when your child is ready. Signs of being ready for toilet training include  Staying dry for 2 hours  Knowing if she is wet or dry  Can pull pants down and up  Wanting to learn  Can tell you if she is going to have a bowel movement  Plan for toilet breaks often. Children use the toilet as many as 10 times each day.  Teach your child to wash her hands after using the toilet.  Clean potty-chairs after every use.  Take the child to choose underwear when she feels ready to do so.    SAFETY  Make sure your child s car safety seat is rear facing until he reaches the highest weight or height allowed by the car safety seat s . Once your child reaches these limits, it is time to switch the seat to the forward- facing position.  Make sure the car safety seat is installed correctly in the back seat. The harness straps should be snug against your child s chest.  Children watch what you do. Everyone should wear a lap and shoulder seat belt in the car.  Never leave your child alone in your home or yard, especially near cars or machinery, without a responsible adult in charge.  When backing out of the garage or driving in the driveway, have another adult hold your child a safe distance away so he is not in the path of your car.  Have your child wear a helmet that fits properly when riding bikes and trikes.  If it is necessary to keep a gun in your home, store it unloaded and locked with the ammunition locked separately.    WHAT TO EXPECT AT YOUR CHILD S 2  YEAR VISIT  We will talk about  Creating family routines  Supporting your talking child  Getting along with other children  Getting ready for   Keeping your child safe at home, outside, and in the  car        Helpful Resources: National Domestic Violence Hotline: 100.337.7623  Poison Help Line:  106.851.6101  Information About Car Safety Seats: www.safercar.gov/parents  Toll-free Auto Safety Hotline: 112.419.5170  Consistent with Bright Futures: Guidelines for Health Supervision of Infants, Children, and Adolescents, 4th Edition  For more information, go to https://brightfutures.aap.org.           Patient Education           2

## 2021-04-22 NOTE — H&P ADULT - PROBLEM SELECTOR PLAN 9
Spoke to [facility], Dr Nain Beasley office  They did receive ultrasound report that had been faxed on 10/29/20  They will reach out to Dr Nain Beasley to see if they can get him in for an appointment to review this study  - hold simvastatin due to elevated LFTs  - trend LFTs for now, can restart if okay by cardio

## 2022-05-11 ENCOUNTER — APPOINTMENT (OUTPATIENT)
Dept: CT IMAGING | Facility: CLINIC | Age: 87
End: 2022-05-11

## 2022-05-12 ENCOUNTER — APPOINTMENT (OUTPATIENT)
Dept: CT IMAGING | Facility: CLINIC | Age: 87
End: 2022-05-12
Payer: MEDICARE

## 2022-05-12 PROCEDURE — 71250 CT THORAX DX C-: CPT | Mod: MH

## 2022-08-31 NOTE — PROGRESS NOTE ADULT - PROBLEM SELECTOR PLAN 8
Anesthesia Evaluation     Patient summary reviewed and Nursing notes reviewed   NPO Solid Status: > 8 hours  NPO Liquid Status: > 8 hours           Airway   Mallampati: II  TM distance: >3 FB  Neck ROM: full  No difficulty expected  Dental - normal exam     Pulmonary - normal exam   (+) pneumonia worsening , pleural effusion, shortness of breath,   Cardiovascular - normal exam    (+) CHF ,       Neuro/Psych- negative ROS  GI/Hepatic/Renal/Endo - negative ROS     Musculoskeletal (-) negative ROS    Abdominal  - normal exam    Bowel sounds: normal.   Substance History - negative use     OB/GYN negative ob/gyn ROS         Other                        Anesthesia Plan    ASA 3     MAC   total IV anesthesia  intravenous induction     Anesthetic plan, risks, benefits, and alternatives have been provided, discussed and informed consent has been obtained with: patient.  Pre-procedure education provided      CODE STATUS:    Code Status (Patient has no pulse and is not breathing): CPR (Attempt to Resuscitate)  Medical Interventions (Patient has pulse or is breathing): Full Support      
- continue home combigan

## 2022-10-26 ENCOUNTER — APPOINTMENT (OUTPATIENT)
Dept: SURGICAL ONCOLOGY | Facility: CLINIC | Age: 87
End: 2022-10-26

## 2022-10-26 VITALS
BODY MASS INDEX: 22.58 KG/M2 | WEIGHT: 112 LBS | DIASTOLIC BLOOD PRESSURE: 95 MMHG | HEIGHT: 59 IN | RESPIRATION RATE: 17 BRPM | HEART RATE: 80 BPM | SYSTOLIC BLOOD PRESSURE: 151 MMHG | OXYGEN SATURATION: 95 %

## 2022-10-26 PROCEDURE — 99205 OFFICE O/P NEW HI 60 MIN: CPT

## 2022-10-26 NOTE — ADDENDUM
[FreeTextEntry1] : I, Sridevi Kulkarni, acted solely as a scribe for Dr. James Eduardo on this date 10/26/2022.\par

## 2022-10-26 NOTE — CONSULT LETTER
[Dear  ___] : Dear  [unfilled], [Consult Letter:] : I had the pleasure of evaluating your patient, [unfilled]. [Please see my note below.] : Please see my note below. [Sincerely,] : Sincerely, [FreeTextEntry3] : James Eduardo MD FACS\par

## 2022-10-26 NOTE — ASSESSMENT
[FreeTextEntry1] : Right medial tibia squamous cell carcinoma \par I had a long discussion with the pt regarding her diagnosis, prognosis and all management options. \par Recommend wide excision w/ plastic reconstruction \par Surgical procedure discussed in detail\par All questions answered\par Will need medical clearance from cardiologist regarding management of Eliquis \par

## 2022-10-26 NOTE — PHYSICAL EXAM
[Normal] : supple, no neck mass and thyroid not enlarged [Normal Neck Lymph Nodes] : normal neck lymph nodes  [Normal Supraclavicular Lymph Nodes] : normal supraclavicular lymph nodes [Normal Groin Lymph Nodes] : normal groin lymph nodes [Normal Axillary Lymph Nodes] : normal axillary lymph nodes [Normal] : oriented to person, place and time, with appropriate affect [de-identified] : 1.5 cm ulcerated lesion right medial shin.  Long incision with sutures in place left proximal leg.

## 2022-10-26 NOTE — HISTORY OF PRESENT ILLNESS
[de-identified] : Patient is a 89 y/o female who presents an initial consultation for bilateral tibia squamous cell carcinomas biopsied by Dr. Yusuf Vann. Mohs surgery was performed on 10/21/22 for the left lateral pretibia squamous cell carcinoma. She reports she is currently on antibiotics. \par \par Dermatopathology (10/3/22):\par A. Left upper lateral pretibia\par -Squamous cell carcinoma, keratocanthoma type\par B. Right lower medial pretibia\par -Squamous cell carcinoma \par \par PMHx: Asthma, She takes Eliquis s/p valve replacement 3 years ago. \par Hx of basal cell carcinoma in scalp and multiple squamous cell carcinomas in the legs and lip. \par Denies any personal history of melanomas. \par She has family history of skin cancer in her daughter who also had uterine cancer. Paternal grandmother had breast cancer. Sister and son had colon cancer.

## 2022-11-04 ENCOUNTER — RESULT REVIEW (OUTPATIENT)
Age: 87
End: 2022-11-04

## 2022-11-07 ENCOUNTER — OUTPATIENT (OUTPATIENT)
Dept: OUTPATIENT SERVICES | Facility: HOSPITAL | Age: 87
LOS: 1 days | End: 2022-11-07
Payer: MEDICARE

## 2022-11-07 DIAGNOSIS — Z90.721 ACQUIRED ABSENCE OF OVARIES, UNILATERAL: Chronic | ICD-10-CM

## 2022-11-07 DIAGNOSIS — Z95.2 PRESENCE OF PROSTHETIC HEART VALVE: Chronic | ICD-10-CM

## 2022-11-07 DIAGNOSIS — Z95.5 PRESENCE OF CORONARY ANGIOPLASTY IMPLANT AND GRAFT: Chronic | ICD-10-CM

## 2022-11-07 DIAGNOSIS — Z98.890 OTHER SPECIFIED POSTPROCEDURAL STATES: Chronic | ICD-10-CM

## 2022-11-07 DIAGNOSIS — Z98.61 CORONARY ANGIOPLASTY STATUS: Chronic | ICD-10-CM

## 2022-11-07 DIAGNOSIS — C43.9 MALIGNANT MELANOMA OF SKIN, UNSPECIFIED: ICD-10-CM

## 2022-11-07 PROCEDURE — 88321 CONSLTJ&REPRT SLD PREP ELSWR: CPT

## 2022-11-08 LAB — SURGICAL PATHOLOGY STUDY: SIGNIFICANT CHANGE UP

## 2022-11-11 NOTE — DISCHARGE NOTE PROVIDER - CARE PROVIDERS DIRECT ADDRESSES
,DirectAddress_Unknown ,DirectAddress_Unknown,[email or web address],DirectAddress_Unknown Adjacent Tissue Transfer Text: The defect edges were debeveled with a #15 scalpel blade.  Given the location of the defect and the proximity to free margins an adjacent tissue transfer was deemed most appropriate.  Using a sterile surgical marker, an appropriate flap was drawn incorporating the defect and placing the expected incisions within the relaxed skin tension lines where possible.    The area thus outlined was incised deep to adipose tissue with a #15 scalpel blade.  The skin margins were undermined to an appropriate distance in all directions utilizing iris scissors.

## 2022-11-22 ENCOUNTER — OUTPATIENT (OUTPATIENT)
Dept: OUTPATIENT SERVICES | Facility: HOSPITAL | Age: 87
LOS: 1 days | End: 2022-11-22
Payer: MEDICARE

## 2022-11-22 VITALS
HEIGHT: 59 IN | TEMPERATURE: 97 F | SYSTOLIC BLOOD PRESSURE: 138 MMHG | DIASTOLIC BLOOD PRESSURE: 87 MMHG | RESPIRATION RATE: 14 BRPM | HEART RATE: 70 BPM | OXYGEN SATURATION: 95 % | WEIGHT: 114.64 LBS

## 2022-11-22 DIAGNOSIS — H91.90 UNSPECIFIED HEARING LOSS, UNSPECIFIED EAR: ICD-10-CM

## 2022-11-22 DIAGNOSIS — Z98.890 OTHER SPECIFIED POSTPROCEDURAL STATES: Chronic | ICD-10-CM

## 2022-11-22 DIAGNOSIS — E78.5 HYPERLIPIDEMIA, UNSPECIFIED: ICD-10-CM

## 2022-11-22 DIAGNOSIS — Z95.0 PRESENCE OF CARDIAC PACEMAKER: ICD-10-CM

## 2022-11-22 DIAGNOSIS — I48.91 UNSPECIFIED ATRIAL FIBRILLATION: ICD-10-CM

## 2022-11-22 DIAGNOSIS — J45.909 UNSPECIFIED ASTHMA, UNCOMPLICATED: ICD-10-CM

## 2022-11-22 DIAGNOSIS — K21.9 GASTRO-ESOPHAGEAL REFLUX DISEASE WITHOUT ESOPHAGITIS: ICD-10-CM

## 2022-11-22 DIAGNOSIS — I10 ESSENTIAL (PRIMARY) HYPERTENSION: ICD-10-CM

## 2022-11-22 DIAGNOSIS — C44.92 SQUAMOUS CELL CARCINOMA OF SKIN, UNSPECIFIED: ICD-10-CM

## 2022-11-22 DIAGNOSIS — Z95.2 PRESENCE OF PROSTHETIC HEART VALVE: ICD-10-CM

## 2022-11-22 DIAGNOSIS — C44.722 SQUAMOUS CELL CARCINOMA OF SKIN OF RIGHT LOWER LIMB, INCLUDING HIP: ICD-10-CM

## 2022-11-22 DIAGNOSIS — Z95.2 PRESENCE OF PROSTHETIC HEART VALVE: Chronic | ICD-10-CM

## 2022-11-22 DIAGNOSIS — Z01.818 ENCOUNTER FOR OTHER PREPROCEDURAL EXAMINATION: ICD-10-CM

## 2022-11-22 DIAGNOSIS — Z95.5 PRESENCE OF CORONARY ANGIOPLASTY IMPLANT AND GRAFT: ICD-10-CM

## 2022-11-22 DIAGNOSIS — I25.10 ATHEROSCLEROTIC HEART DISEASE OF NATIVE CORONARY ARTERY WITHOUT ANGINA PECTORIS: ICD-10-CM

## 2022-11-22 DIAGNOSIS — Z95.5 PRESENCE OF CORONARY ANGIOPLASTY IMPLANT AND GRAFT: Chronic | ICD-10-CM

## 2022-11-22 DIAGNOSIS — Z90.721 ACQUIRED ABSENCE OF OVARIES, UNILATERAL: Chronic | ICD-10-CM

## 2022-11-22 DIAGNOSIS — Z98.61 CORONARY ANGIOPLASTY STATUS: Chronic | ICD-10-CM

## 2022-11-22 LAB
ANION GAP SERPL CALC-SCNC: 7 MMOL/L — SIGNIFICANT CHANGE UP (ref 5–17)
BUN SERPL-MCNC: 36 MG/DL — HIGH (ref 7–23)
CALCIUM SERPL-MCNC: 9.4 MG/DL — SIGNIFICANT CHANGE UP (ref 8.4–10.5)
CHLORIDE SERPL-SCNC: 108 MMOL/L — SIGNIFICANT CHANGE UP (ref 96–108)
CO2 SERPL-SCNC: 27 MMOL/L — SIGNIFICANT CHANGE UP (ref 22–31)
CREAT SERPL-MCNC: 1.21 MG/DL — SIGNIFICANT CHANGE UP (ref 0.5–1.3)
EGFR: 41 ML/MIN/1.73M2 — LOW
GLUCOSE SERPL-MCNC: 87 MG/DL — SIGNIFICANT CHANGE UP (ref 70–99)
HCT VFR BLD CALC: 41.5 % — SIGNIFICANT CHANGE UP (ref 34.5–45)
HGB BLD-MCNC: 13 G/DL — SIGNIFICANT CHANGE UP (ref 11.5–15.5)
MCHC RBC-ENTMCNC: 31.3 GM/DL — LOW (ref 32–36)
MCHC RBC-ENTMCNC: 33.7 PG — SIGNIFICANT CHANGE UP (ref 27–34)
MCV RBC AUTO: 107.5 FL — HIGH (ref 80–100)
NRBC # BLD: 0 /100 WBCS — SIGNIFICANT CHANGE UP (ref 0–0)
PLATELET # BLD AUTO: 157 K/UL — SIGNIFICANT CHANGE UP (ref 150–400)
POTASSIUM SERPL-MCNC: 4 MMOL/L — SIGNIFICANT CHANGE UP (ref 3.5–5.3)
POTASSIUM SERPL-SCNC: 4 MMOL/L — SIGNIFICANT CHANGE UP (ref 3.5–5.3)
RBC # BLD: 3.86 M/UL — SIGNIFICANT CHANGE UP (ref 3.8–5.2)
RBC # FLD: 15.9 % — HIGH (ref 10.3–14.5)
SODIUM SERPL-SCNC: 142 MMOL/L — SIGNIFICANT CHANGE UP (ref 135–145)
WBC # BLD: 7.83 K/UL — SIGNIFICANT CHANGE UP (ref 3.8–10.5)
WBC # FLD AUTO: 7.83 K/UL — SIGNIFICANT CHANGE UP (ref 3.8–10.5)

## 2022-11-22 PROCEDURE — 80048 BASIC METABOLIC PNL TOTAL CA: CPT

## 2022-11-22 PROCEDURE — 85027 COMPLETE CBC AUTOMATED: CPT

## 2022-11-22 PROCEDURE — G0463: CPT

## 2022-11-22 PROCEDURE — 36415 COLL VENOUS BLD VENIPUNCTURE: CPT

## 2022-11-22 RX ORDER — FLUTICASONE FUROATE AND VILANTEROL TRIFENATATE 100; 25 UG/1; UG/1
1 POWDER RESPIRATORY (INHALATION)
Qty: 0 | Refills: 0 | DISCHARGE

## 2022-11-22 RX ORDER — FUROSEMIDE 40 MG
1 TABLET ORAL
Qty: 0 | Refills: 0 | DISCHARGE

## 2022-11-22 RX ORDER — SUCRALFATE 1 G
1 TABLET ORAL
Qty: 0 | Refills: 0 | DISCHARGE

## 2022-11-22 RX ORDER — BRIMONIDINE TARTRATE, TIMOLOL MALEATE 2; 5 MG/ML; MG/ML
1 SOLUTION/ DROPS OPHTHALMIC
Qty: 0 | Refills: 0 | DISCHARGE

## 2022-11-22 NOTE — H&P PST ADULT - HISTORY OF PRESENT ILLNESS
89yo female came to dept with aid, pt alert oriented x3 with medical h/o Asthma, HTN, HLD, CAD w/stent x 1RCA placed 2019, LBBB, Afib on Eliquis + pshx TAVR, 2019 w/placement of Medtronic Micra VR TCR PPM. Pt reports Squamous cell right lower extremity and presents today for PST for scheduled Excision Malignant Lesion Right Lower leg on 12/6/2022

## 2022-11-22 NOTE — H&P PST ADULT - PROBLEM SELECTOR PLAN 1
Pre-op instructions given. Pt verbalized understanding  Chlorhexidine wash instructions given  Pt instructed to HOLD all NSAID, Herbal tea/supplements, 7 days before surgery   Pending: eliquis instructions   Pending: C/C  for abnormal ecg  Pending: Covid pcr test/results

## 2022-11-22 NOTE — H&P PST ADULT - FALL HARM RISK - UNIVERSAL INTERVENTIONS
Bed in lowest position, wheels locked, appropriate side rails in place/Call bell, personal items and telephone in reach/Instruct patient to call for assistance before getting out of bed or chair/Non-slip footwear when patient is out of bed/[place] to call system/Physically safe environment - no spills, clutter or unnecessary equipment/Purposeful Proactive Rounding/Room/bathroom lighting operational, light cord in reach

## 2022-11-22 NOTE — H&P PST ADULT - NSANTHOSAYNRD_GEN_A_CORE
neck 14inches/No. ALIA screening performed.  STOP BANG Legend: 0-2 = LOW Risk; 3-4 = INTERMEDIATE Risk; 5-8 = HIGH Risk

## 2022-11-22 NOTE — H&P PST ADULT - NSICDXPASTMEDICALHX_GEN_ALL_CORE_FT
PAST MEDICAL HISTORY:  Afib     Aortic stenosis     Aortic stenosis     Asthma     Asthma on breo for 2 yrs    Bronchitis     CAD (coronary artery disease) recent coronary stent 3/19    Cardiac pacemaker Medtronic Micra placed 4/2019    Glaucoma     Heart failure     Hiatal hernia     History of left bundle branch block (LBBB)     HLD (hyperlipidemia)     HTN (hypertension)     Hypertension     Skin cancer basal    Squamous cell skin cancer

## 2022-12-03 ENCOUNTER — LABORATORY RESULT (OUTPATIENT)
Age: 87
End: 2022-12-03

## 2022-12-06 ENCOUNTER — APPOINTMENT (OUTPATIENT)
Dept: SURGICAL ONCOLOGY | Facility: HOSPITAL | Age: 87
End: 2022-12-06

## 2022-12-06 ENCOUNTER — INPATIENT (INPATIENT)
Facility: HOSPITAL | Age: 87
LOS: 5 days | Discharge: HOME | DRG: 291 | End: 2022-12-12
Attending: FAMILY MEDICINE | Admitting: INTERNAL MEDICINE
Payer: MEDICARE

## 2022-12-06 VITALS
DIASTOLIC BLOOD PRESSURE: 72 MMHG | HEIGHT: 59 IN | OXYGEN SATURATION: 87 % | HEART RATE: 82 BPM | SYSTOLIC BLOOD PRESSURE: 128 MMHG | TEMPERATURE: 98 F | RESPIRATION RATE: 17 BRPM | WEIGHT: 111.99 LBS

## 2022-12-06 DIAGNOSIS — I50.23 ACUTE ON CHRONIC SYSTOLIC (CONGESTIVE) HEART FAILURE: ICD-10-CM

## 2022-12-06 DIAGNOSIS — Z95.2 PRESENCE OF PROSTHETIC HEART VALVE: Chronic | ICD-10-CM

## 2022-12-06 DIAGNOSIS — Z98.61 CORONARY ANGIOPLASTY STATUS: Chronic | ICD-10-CM

## 2022-12-06 DIAGNOSIS — Z95.5 PRESENCE OF CORONARY ANGIOPLASTY IMPLANT AND GRAFT: Chronic | ICD-10-CM

## 2022-12-06 DIAGNOSIS — Z90.721 ACQUIRED ABSENCE OF OVARIES, UNILATERAL: Chronic | ICD-10-CM

## 2022-12-06 DIAGNOSIS — Z98.890 OTHER SPECIFIED POSTPROCEDURAL STATES: Chronic | ICD-10-CM

## 2022-12-06 LAB
ALBUMIN SERPL ELPH-MCNC: 3.2 G/DL — LOW (ref 3.3–5)
ALP SERPL-CCNC: 128 U/L — HIGH (ref 40–120)
ALT FLD-CCNC: 19 U/L — SIGNIFICANT CHANGE UP (ref 10–45)
ANION GAP SERPL CALC-SCNC: 8 MMOL/L — SIGNIFICANT CHANGE UP (ref 5–17)
AST SERPL-CCNC: 44 U/L — HIGH (ref 10–40)
BASOPHILS # BLD AUTO: 0.05 K/UL — SIGNIFICANT CHANGE UP (ref 0–0.2)
BASOPHILS NFR BLD AUTO: 0.6 % — SIGNIFICANT CHANGE UP (ref 0–2)
BILIRUB SERPL-MCNC: 1.5 MG/DL — HIGH (ref 0.2–1.2)
BUN SERPL-MCNC: 33 MG/DL — HIGH (ref 7–23)
CALCIUM SERPL-MCNC: 10.2 MG/DL — SIGNIFICANT CHANGE UP (ref 8.4–10.5)
CHLORIDE SERPL-SCNC: 101 MMOL/L — SIGNIFICANT CHANGE UP (ref 96–108)
CO2 SERPL-SCNC: 29 MMOL/L — SIGNIFICANT CHANGE UP (ref 22–31)
CREAT SERPL-MCNC: 1.3 MG/DL — SIGNIFICANT CHANGE UP (ref 0.5–1.3)
EGFR: 38 ML/MIN/1.73M2 — LOW
EOSINOPHIL # BLD AUTO: 0.13 K/UL — SIGNIFICANT CHANGE UP (ref 0–0.5)
EOSINOPHIL NFR BLD AUTO: 1.4 % — SIGNIFICANT CHANGE UP (ref 0–6)
GLUCOSE SERPL-MCNC: 109 MG/DL — HIGH (ref 70–99)
HCT VFR BLD CALC: 47.7 % — HIGH (ref 34.5–45)
HGB BLD-MCNC: 15.3 G/DL — SIGNIFICANT CHANGE UP (ref 11.5–15.5)
IMM GRANULOCYTES NFR BLD AUTO: 0.6 % — SIGNIFICANT CHANGE UP (ref 0–0.9)
LYMPHOCYTES # BLD AUTO: 2.05 K/UL — SIGNIFICANT CHANGE UP (ref 1–3.3)
LYMPHOCYTES # BLD AUTO: 22.8 % — SIGNIFICANT CHANGE UP (ref 13–44)
MACROCYTES BLD QL: SIGNIFICANT CHANGE UP
MANUAL SMEAR VERIFICATION: SIGNIFICANT CHANGE UP
MCHC RBC-ENTMCNC: 32.1 GM/DL — SIGNIFICANT CHANGE UP (ref 32–36)
MCHC RBC-ENTMCNC: 34 PG — SIGNIFICANT CHANGE UP (ref 27–34)
MCV RBC AUTO: 106 FL — HIGH (ref 80–100)
MONOCYTES # BLD AUTO: 0.66 K/UL — SIGNIFICANT CHANGE UP (ref 0–0.9)
MONOCYTES NFR BLD AUTO: 7.3 % — SIGNIFICANT CHANGE UP (ref 2–14)
NEUTROPHILS # BLD AUTO: 6.07 K/UL — SIGNIFICANT CHANGE UP (ref 1.8–7.4)
NEUTROPHILS NFR BLD AUTO: 67.3 % — SIGNIFICANT CHANGE UP (ref 43–77)
NRBC # BLD: 0 /100 WBCS — SIGNIFICANT CHANGE UP (ref 0–0)
NT-PROBNP SERPL-SCNC: 2195 PG/ML — HIGH (ref 0–300)
PLAT MORPH BLD: NORMAL — SIGNIFICANT CHANGE UP
PLATELET # BLD AUTO: 167 K/UL — SIGNIFICANT CHANGE UP (ref 150–400)
POTASSIUM SERPL-MCNC: 4.5 MMOL/L — SIGNIFICANT CHANGE UP (ref 3.5–5.3)
POTASSIUM SERPL-SCNC: 4.5 MMOL/L — SIGNIFICANT CHANGE UP (ref 3.5–5.3)
PROT SERPL-MCNC: 8.1 G/DL — SIGNIFICANT CHANGE UP (ref 6–8.3)
RBC # BLD: 4.5 M/UL — SIGNIFICANT CHANGE UP (ref 3.8–5.2)
RBC # FLD: 15.6 % — HIGH (ref 10.3–14.5)
RBC BLD AUTO: ABNORMAL
SARS-COV-2 RNA SPEC QL NAA+PROBE: SIGNIFICANT CHANGE UP
SODIUM SERPL-SCNC: 138 MMOL/L — SIGNIFICANT CHANGE UP (ref 135–145)
TROPONIN I, HIGH SENSITIVITY RESULT: 27.1 NG/L — SIGNIFICANT CHANGE UP
WBC # BLD: 9.01 K/UL — SIGNIFICANT CHANGE UP (ref 3.8–10.5)
WBC # FLD AUTO: 9.01 K/UL — SIGNIFICANT CHANGE UP (ref 3.8–10.5)

## 2022-12-06 PROCEDURE — 99285 EMERGENCY DEPT VISIT HI MDM: CPT

## 2022-12-06 PROCEDURE — 99223 1ST HOSP IP/OBS HIGH 75: CPT

## 2022-12-06 PROCEDURE — 71045 X-RAY EXAM CHEST 1 VIEW: CPT | Mod: 26

## 2022-12-06 PROCEDURE — 99222 1ST HOSP IP/OBS MODERATE 55: CPT

## 2022-12-06 PROCEDURE — 93010 ELECTROCARDIOGRAM REPORT: CPT

## 2022-12-06 RX ORDER — SIMVASTATIN 20 MG/1
10 TABLET, FILM COATED ORAL AT BEDTIME
Refills: 0 | Status: DISCONTINUED | OUTPATIENT
Start: 2022-12-06 | End: 2022-12-12

## 2022-12-06 RX ORDER — BRIMONIDINE TARTRATE, TIMOLOL MALEATE 2; 5 MG/ML; MG/ML
1 SOLUTION/ DROPS OPHTHALMIC
Refills: 0 | Status: DISCONTINUED | OUTPATIENT
Start: 2022-12-06 | End: 2022-12-12

## 2022-12-06 RX ORDER — FUROSEMIDE 40 MG
20 TABLET ORAL DAILY
Refills: 0 | Status: DISCONTINUED | OUTPATIENT
Start: 2022-12-06 | End: 2022-12-07

## 2022-12-06 RX ORDER — FLUTICASONE FUROATE, UMECLIDINIUM BROMIDE AND VILANTEROL TRIFENATATE 200; 62.5; 25 UG/1; UG/1; UG/1
1 POWDER RESPIRATORY (INHALATION) EVERY 24 HOURS
Refills: 0 | Status: DISCONTINUED | OUTPATIENT
Start: 2022-12-06 | End: 2022-12-12

## 2022-12-06 RX ORDER — FOLIC ACID 0.8 MG
1 TABLET ORAL DAILY
Refills: 0 | Status: DISCONTINUED | OUTPATIENT
Start: 2022-12-06 | End: 2022-12-12

## 2022-12-06 RX ORDER — INFLUENZA VIRUS VACCINE 15; 15; 15; 15 UG/.5ML; UG/.5ML; UG/.5ML; UG/.5ML
0.7 SUSPENSION INTRAMUSCULAR ONCE
Refills: 0 | Status: DISCONTINUED | OUTPATIENT
Start: 2022-12-06 | End: 2022-12-12

## 2022-12-06 RX ORDER — APIXABAN 2.5 MG/1
2.5 TABLET, FILM COATED ORAL EVERY 12 HOURS
Refills: 0 | Status: DISCONTINUED | OUTPATIENT
Start: 2022-12-06 | End: 2022-12-06

## 2022-12-06 RX ORDER — FUROSEMIDE 40 MG
40 TABLET ORAL ONCE
Refills: 0 | Status: COMPLETED | OUTPATIENT
Start: 2022-12-06 | End: 2022-12-06

## 2022-12-06 RX ORDER — METOPROLOL TARTRATE 50 MG
25 TABLET ORAL
Refills: 0 | Status: DISCONTINUED | OUTPATIENT
Start: 2022-12-06 | End: 2022-12-12

## 2022-12-06 RX ORDER — FAMOTIDINE 10 MG/ML
10 INJECTION INTRAVENOUS DAILY
Refills: 0 | Status: DISCONTINUED | OUTPATIENT
Start: 2022-12-06 | End: 2022-12-07

## 2022-12-06 RX ADMIN — FLUTICASONE FUROATE, UMECLIDINIUM BROMIDE AND VILANTEROL TRIFENATATE 1 PUFF(S): 200; 62.5; 25 POWDER RESPIRATORY (INHALATION) at 18:46

## 2022-12-06 RX ADMIN — Medication 40 MILLIGRAM(S): at 10:00

## 2022-12-06 RX ADMIN — BRIMONIDINE TARTRATE, TIMOLOL MALEATE 1 DROP(S): 2; 5 SOLUTION/ DROPS OPHTHALMIC at 18:47

## 2022-12-06 RX ADMIN — SIMVASTATIN 10 MILLIGRAM(S): 20 TABLET, FILM COATED ORAL at 22:04

## 2022-12-06 RX ADMIN — Medication 25 MILLIGRAM(S): at 18:44

## 2022-12-06 RX ADMIN — Medication 1 MILLIGRAM(S): at 21:28

## 2022-12-06 RX ADMIN — FAMOTIDINE 10 MILLIGRAM(S): 10 INJECTION INTRAVENOUS at 18:43

## 2022-12-06 NOTE — PATIENT PROFILE ADULT - FALL HARM RISK - HARM RISK INTERVENTIONS

## 2022-12-06 NOTE — ED PROVIDER NOTE - CLINICAL SUMMARY MEDICAL DECISION MAKING FREE TEXT BOX
pt with a low reading on oxygen saturation monitor during anesthesia eval prior to surgery pt with a low reading on oxygen saturation monitor during anesthesia eval prior to surgery, here room air oxygen between 87-93%, cxr shows patchy infiltrates.

## 2022-12-06 NOTE — PATIENT PROFILE ADULT - PRO INTERPRETER NEED 2
Date/Time of Note


Date/Time of Note


DATE: 9/27/17 


TIME: 15:18





Assessment/Plan


VTE Prophylaxis


VTE Prophylaxis Intervention:  SCD's





Lines/Catheters


IV Catheter Type (from [facility]):  Saline Lock


Urinary Cath still in place:  No





Assessment/Plan


Chief Complaint/Hosp Course





77 yo female with NPH admitted to ARU for further rehabilitation 2/2 debility.





1.  Debility secondary to possible  normal pressure hydrocephalus. Status post 

LP


-Post Neurology/Neurosurgery eval, no immediate plans for surgery at this time-

Needs outpt NS follow-up.


--PT/OT eval & treatment





2.  Hypertension


-On Lisinopril/Amlodipine.





3.  DMII-Controlled on diet.  A1c 6.5.


-Recommend outpatient repeat A1c in 2 months.





4.  Hyperlipidemia


-On statin





5. Neutropenia,Mild- Stable


-Monitor 





Patient was seen in collaboration with .


Problems:  





Subjective


24 Hr Interval Summary


Free Text/Dictation


No acute distress. Participates in rehab activities.





Exam/Review of Systems


Vital Signs


Vitals





 Vital Signs








  Date Time  Temp Pulse Resp B/P Pulse Ox O2 Delivery O2 Flow Rate FiO2


 


9/27/17 08:16 98.2 66 18 157/81 99   


 


9/26/17 08:00      Room Air  














 Intake and Output   


 


 9/26/17 9/26/17 9/27/17





 15:00 23:00 07:00


 


Intake Total  800 ml 240 ml


 


Output Total  600 ml 


 


Balance  200 ml 240 ml











Exam


General:Elderly thin looking female, not in any acute distress .


HEENT: Normocephalic, Atraumatic, No laceration or hematoma; Eyes: PEERL, 

Conjunctiva clear, Anicteric sclera Neck: Supple without any lymphadenopathy, 

nontender, no JVD, no carotid bruits, trachea midline, no thyromegaly


Cardiac: S1, S2 auscultated, regular rhythm and rate, no mumurs or gallop


Pulmonary: Normal respiratory effort. Chest clear to auscultation bilaterally, 

no adventitious breath sounds


GI: Abdomen normal to inspection. Soft,  non- distended, no masses, no rebound 

tenderness or guarding. Bowel sounds active on all four quadrants


Genitourinary: Deferred


Extremities:Weakness LLE+ No cyanosis, clubbing, or edema. Pulses [2+] 

bilaterally. Full ROM on all four extremities. No focal weakness appreciated.


Neurologic: Mild cognitive impairment+. Alert to person, place, time, and 

situation. Affect appropriate, intact sensation.


Skin: Clean,dry, and intact. No ecchymosis, no rashes, or lesions





Medications


Medications





 Current Medications


Amlodipine Besylate (Norvasc) 5 mg DAILY PO  Last administered on 9/27/17at 09:

37; Admin Dose 5 MG;  Start 9/20/17 at 09:00


Lisinopril (Zestril) 40 mg DAILY PO  Last administered on 9/27/17at 09:36; 

Admin Dose 40 MG;  Start 9/20/17 at 09:00


Clonidine (Catapres) 0.2 mg Q8H  PRN PO SBP ABOVE 170mmHg;  Start 9/19/17 at 23:

45


Magnesium Hydroxide (Milk Of Mag) 30 ml DAILY  PRN PO CONSTIPATION Last 

administered on 9/23/17at 17:17; Admin Dose 30 ML;  Start 9/19/17 at 23:45


Miscellaneous Information 1 ea NOTE XX ;  Start 9/19/17 at 23:45


Glucose (Glutose) 15 gm Q15M  PRN PO DECREASED GLUCOSE;  Start 9/19/17 at 23:45


Glucose (Glutose) 22.5 gm Q15M  PRN PO DECREASED GLUCOSE;  Start 9/19/17 at 23:

45


Dextrose (D50w Syringe) 25 ml Q15M  PRN IV DECREASED GLUCOSE;  Start 9/19/17 at 

23:45


Dextrose (D50w Syringe) 50 ml Q15M  PRN IV DECREASED GLUCOSE;  Start 9/19/17 at 

23:45


Glucagon (Glucagen) 1 mg Q15M  PRN IM DECREASED GLUCOSE;  Start 9/19/17 at 23:45


Glucose (Glutose) 15 gm Q15M  PRN BUCCAL DECREASED GLUCOSE;  Start 9/19/17 at 23

:45


Acetaminophen (Tylenol Tab) 650 mg Q6H  PRN PO PAIN AND OR ELEVATED TEMP;  

Start 9/19/17 at 23:45


Atorvastatin Calcium (Lipitor) 10 mg DAILY@21 PO  Last administered on 9/26/ 17at 20:13; Admin Dose 10 MG;  Start 9/20/17 at 21:00


Docusate Sodium (Colace) 100 mg BID PO  Last administered on 9/27/17at 09:35; 

Admin Dose 100 MG;  Start 9/20/17 at 09:00


Senna (Senokot) 1 tab HS PO  Last administered on 9/26/17at 20:13; Admin Dose 1 

TAB;  Start 9/20/17 at 21:00


Magnesium Hydroxide (Milk Of Mag) 30 ml BID  PRN PO CONSTIPATION;  Start 9/20/ 17 at 00:00


Lactulose (Enulose) 20 gm DAILY  PRN PO CONSTIPATION Last administered on 9/24/ 17at 14:48; Admin Dose 20 GM;  Start 9/20/17 at 00:00


Bisacodyl (Dulcolax Supp) 10 mg DAILY  PRN AL CONSTIPATION;  Start 9/20/17 at 00

:00


Acetaminophen (Tylenol Tab) 650 mg Q4H  PRN PO PAIN;  Start 9/20/17 at 00:00











JESS BARONE NP Sep 27, 2017 15:19 English

## 2022-12-06 NOTE — H&P ADULT - NSHPPHYSICALEXAM_GEN_ALL_CORE
Vital Signs Last 24 Hrs  T(F): 97.5 (06 Dec 2022 09:13), Max: 97.5 (06 Dec 2022 09:13)  HR: 82 (06 Dec 2022 09:13) (82 - 99)  BP: 128/72 (06 Dec 2022 09:13) (125/72 - 128/72)  RR: 17 (06 Dec 2022 09:13) (17 - 18)  SpO2: 87% (06 Dec 2022 09:13) (87% - 91%)    PHYSICAL EXAM:  GENERAL: NAD, well-groomed, well-developed  HEAD:  Atraumatic, Normocephalic  EYES: EOMI, conjunctiva and sclera clear  ENMT: Moist mucous membranes, Good dentition, no thrush  NECK: Supple, No JVD  CHEST/LUNG: Clear to auscultation bilaterally, good air entry, non-labored breathing  HEART: RRR; S1/S2, No murmur  ABDOMEN: Soft, Nontender, Nondistended; Bowel sounds present  VASCULAR: Normal pulses, Normal capillary refill  EXTREMITIES: No calf tenderness, No cyanosis, No edema  LYMPH: Normal; No lymphadenopathy noted  SKIN: Warm, Intact  PSYCH: Normal mood, Normal affect  NERVOUS SYSTEM:  A/O x3, Good concentration; CN 2-12 intact, No focal deficits Vital Signs Last 24 Hrs  T(F): 97.5 (06 Dec 2022 09:13), Max: 97.5 (06 Dec 2022 09:13)  HR: 82 (06 Dec 2022 09:13) (82 - 99)  BP: 128/72 (06 Dec 2022 09:13) (125/72 - 128/72)  RR: 17 (06 Dec 2022 09:13) (17 - 18)  SpO2: 87% (06 Dec 2022 09:13) (87% - 91%)    PHYSICAL EXAM:  GENERAL: NAD, AAOx3, hard of hearing, speaking in full sentences, on room air, frail appearing   well-groomed, well-developed  HEAD:  Atraumatic, Normocephalic  EYES: EOMI, conjunctiva and sclera clear  ENMT: Moist mucous membranes, Good dentition, no thrush  NECK: Supple, No JVD  CHEST/LUNG: Clear to auscultation bilaterally, good air entry, non-labored breathing  HEART: RRR; S1/S2, No murmur  ABDOMEN: Soft, Nontender, Nondistended; Bowel sounds present  VASCULAR: Normal pulses, Normal capillary refill  EXTREMITIES: RLE with necrotic lesion in medial leg; No calf tenderness, No cyanosis, No edema  LYMPH: Normal; No lymphadenopathy noted  SKIN: Warm, Intact  PSYCH: Normal mood, Normal affect  NERVOUS SYSTEM:  A/O x3, Good concentration; CN 2-12 intact, No focal deficits

## 2022-12-06 NOTE — ED PROVIDER NOTE - CARE PLAN
Principal Discharge DX:	Encounter for medical screening examination   1 Principal Discharge DX:	Acute on chronic systolic congestive heart failure

## 2022-12-06 NOTE — ED PROVIDER NOTE - OBJECTIVE STATEMENT
Patient came to the hospital today for surgery on her leg.  She was getting a lesion removed but during anesthesia's assessment patient had a room air saturation of 85% so the case was canceled and patient was brought down to the ED.  Patient states that she has no shortness of breath and has no difficulty breathing and feels fine.  Patient has no symptoms or complaints whatsoever.  As per daughter-in-law patient has CHF and was recently admitted to [facility] and was diuresed and discharged yesterday with a room air saturation in the mid 90s.  Patient does have a bit of a cough but no other symptoms.

## 2022-12-06 NOTE — ED PROVIDER NOTE - PHYSICAL EXAMINATION
Gen: alert, NAD  HEENT:  NC/AT, PERR, no exophthalmos  CV:  well perfused, rrr   Pulm:  normal RR, breathing comfortably, CTA b/l  Abd: s/nt/nd  MSK: moving all extremities  Neuro:  non-focal  Skin:  visualized areas intact  Psych: AOx3

## 2022-12-06 NOTE — PROGRESS NOTE ADULT - SUBJECTIVE AND OBJECTIVE BOX
Pt. scheduled for wide excision right leg SCC today but was d/c'd from the hospital yest after an admission for CHF  SaO2 today 88%  Right leg tumor stable    Case d/w anesthesia and we agree to have the pt. admitted to medicine for diuresis  Will try and get surgery done this admission, maybe Thurs

## 2022-12-06 NOTE — PATIENT PROFILE ADULT - DO YOU FEEL UNSAFE AT HOME, WORK, OR SCHOOL?
"Anesthesia Transfer of Care Note    Patient: Steff Johnson    Procedure(s) Performed: Procedure(s) (LRB):  COLONOSCOPY (N/A)    Patient location: PACU    Anesthesia Type: general    Transport from OR: Transported from OR on room air with adequate spontaneous ventilation    Post pain: adequate analgesia    Post assessment: no apparent anesthetic complications and tolerated procedure well    Post vital signs: stable    Level of consciousness: awake    Nausea/Vomiting: no nausea/vomiting    Complications: none          Last vitals:   Visit Vitals    BP (!) 126/59    Pulse 65    Temp 36.8 °C (98.2 °F) (Oral)    Resp 16    Ht 4' 11" (1.499 m)    Wt 61.7 kg (136 lb)    SpO2 100%    Breastfeeding No    BMI 27.47 kg/m2     "
no

## 2022-12-06 NOTE — H&P ADULT - NSHPSOCIALHISTORY_GEN_ALL_CORE
Social History:    Marital Status: (  ) , (  ) Single, (  ) , (  ) , (  )   # of Children:   Lives with: (  ) alone, (  ) children, (  ) spouse, (  ) parents, (  ) siblings, (  ) friends, (  ) other:   Occupation:     Substance Use/Illicit Drugs: (  ) never used vs other:   Tobacco Usage: (  ) never smoked, (  ) former smoker, (  ) current smoker and Total Pack-Years:   Last Alcohol Usage/Frequency/Amount/Withdrawal/Hx of Abuse:    Foreign travel:   Animal exposure: Social History:    Marital Status: (  ) , ( x ) Single, (  ) , (  ) , (  )   Lives with: ( x ) alone, (  ) children, (  ) spouse, (  ) parents, (  ) siblings, (  ) friends, (  ) other:   Has aides 2x/week; three hours a week    Substance Use/Illicit Drugs: ( x ) never used vs other:   Tobacco Usage: ( x ) never smoked, (  ) former smoker, (  ) current smoker and Total Pack-Years:   Last Alcohol Usage/Frequency/Amount/Withdrawal/Hx of Abuse:  Denies  Foreign travel: Denies  Animal exposure:Denies

## 2022-12-06 NOTE — H&P ADULT - HISTORY OF PRESENT ILLNESS
89yo female came to dept with aid, pt alert oriented x3 with medical h/o Asthma, HTN, HLD, CAD w/stent x 1RCA placed 2019, LBBB, Afib on Eliquis + pshx TAVR, 2019 w/placement of Medtronic Micra VR TCR PPM,  right lower extremity lesion presenting today for elective scheduled excision of lesion found to have hypoxia to 87% on room air.  Of note, she was recently hospitalized at [facility]   89yo female came to dept with aid, pt alert oriented x3 with medical h/o Asthma, HTN, HLD, CAD w/stent x 1RCA placed 2019, LBBB, Afib on Eliquis + pshx TAVR, 2019 w/placement of Medtronic Micra VR TCR PPM,  right lower extremity lesion presenting today for elective scheduled excision of lesion found to have hypoxia to 87% on room air.  Of note, she was recently hospitalized at [facility] on Saturday 12/3 for resp failure due to CHF exacerbation; she was discharged last night on room air and was told to f/u her cardiologist.  She denies having fever, cough, runny nose, N/V, abd pain, diarrhea, urinary symptoms.    SHe went to pre-op and was found to have oxgyen sat 87% on room air; they were planning to give general anesthesia; they cancelled the procedure and sent her ot the ER;   In the ER, she was given lasix 40mg IVP    She again denies SOB, cough, chest pain; she has not taken her eliquis since last night for the procedure today.

## 2022-12-06 NOTE — PATIENT PROFILE ADULT - FUNCTIONAL ASSESSMENT - BASIC MOBILITY 6.
3-calculated by average/Not able to assess (calculate score using [facility] averaging method)

## 2022-12-06 NOTE — PATIENT PROFILE ADULT - DO YOU LACK THE NECESSARY SUPPORT TO HELP YOU COPE WITH LIFE CHALLENGES?
Health Maintenance Due   Topic Date Due   • COVID-19 Vaccine (1) Never done   • Influenza Vaccine (1 of 2) 09/01/2022   • Annual Physical (ages 3-18)  09/14/2022       Patient is due for topics as listed above but is not proceeding with Immunization(s) COVID-19 and Influenza at this time. Mom refused.   no

## 2022-12-06 NOTE — H&P ADULT - NSHPLABSRESULTS_GEN_ALL_CORE
15.3   9.01  )-----------( 167      ( 06 Dec 2022 10:05 )             47.7       12-06    138  |  101  |  33  ----------------------------<  109  4.5   |  29  |  1.30    Ca    10.2      06 Dec 2022 10:05    TPro  8.1  /  Alb  3.2  /  TBili  1.5  /  DBili  x   /  AST  44  /  ALT  19  /  AlkPhos  128  12-06      CARDIAC MARKERS ( 06 Dec 2022 10:05 )  x     / 27.1 ng/L / x     / x     / x          Serum Pro-Brain Natriuretic Peptide: 2195 pg/mL (12-06-22 @ 10:05)    RADIOLOGY:    CXR    Consultant(s) Notes Reveiwed [ ] Yes     Care Discussed with [ ] Consultants  [ ] Patient  [ ] Family  [ ] /   [ ] Other; RN 15.3   9.01  )-----------( 167      ( 06 Dec 2022 10:05 )             47.7       12-06    138  |  101  |  33  ----------------------------<  109  4.5   |  29  |  1.30    Ca    10.2      06 Dec 2022 10:05    TPro  8.1  /  Alb  3.2  /  TBili  1.5  /  DBili  x   /  AST  44  /  ALT  19  /  AlkPhos  128  12-06      CARDIAC MARKERS ( 06 Dec 2022 10:05 )  x     / 27.1 ng/L / x     / x     / x          Serum Pro-Brain Natriuretic Peptide: 2195 pg/mL (12-06-22 @ 10:05)    RADIOLOGY:    CXR    Consultant(s) Notes Reveiwed [ ] Yes   Cardiology Dr Sarina Eduardo  Care Discussed with [ ] Consultants  [ ] Patient  [ ] Family  [ ] /   [ ] Other; RN

## 2022-12-06 NOTE — ED ADULT NURSE NOTE - OBJECTIVE STATEMENT
Pt sent from [facility] for evaluation for low O2 sat. Pt O2 sat in low 90s in ED. Pt with no acute complaints. No respiratory distress. Pt with h/o CHF

## 2022-12-06 NOTE — CONSULT NOTE ADULT - ASSESSMENT
Assessment:  Nerissa Junior is a 89 year old woman with past medical history of Severe aortic valve stenosis (s/p TAVR), Atrial fibrillation (on Eliquis), Coronary artery disease (s/p PCI 2019), HFpEF, Hypertension, LBBB, Micra PPM presented for elective right lower extremity lesion excision, found to be hypoxic on room air.    Chart review indicates that patient was recently hospitalized at an outside hospital this past weekend for CHF and respiratory failure and was discharged the night before admission.  Assessment:  Nerissa Junior is a 89 year old woman with past medical history of Severe aortic valve stenosis (s/p TAVR), Atrial fibrillation (on Eliquis), Coronary artery disease (s/p PCI 2019), HFpEF, Hypertension, LBBB, Micra PPM presented for elective right lower extremity lesion excision, found to be hypoxic on room air, likely with mild CHF exacerbation.     Chart review indicates that patient was recently hospitalized at an outside hospital this past weekend for CHF and respiratory failure and was discharged the night before admission. The patient admits to not taking her lasix prior to her scheduled surgery. Denies chest discomfort, has mild shortness of breath. ECG consistent with atrial fibrillation and LBBB which is old. Troponin negative, no signs of an acute coronary syndrome. Pro BNP elevated.     Recommendations:  [] Congestive heart failure: Dose Lasix 40 mg IVP daily, monitor renal function. Obtain echo report from recent outside hospital. Continue to monitor on telemetry.   [] Atrial fibrillation: Rate controlled, continue home Eliquis  [] Recommend optimization of volume status prior to lower extremity lesion excision    We will continue to follow along.    Michael Branch MD  Cardiology

## 2022-12-06 NOTE — H&P ADULT - ASSESSMENT
89yo female PMH of asthma, HTN, HLD, CAD w/stent x 1RCA placed 2019, LBBB, Afib on Eliquis + pshx TAVR, 2019 w/placement of Medtronic Micra VR TCR PPM,  right lower extremity lesion presenting today for elective scheduled excision of lesion found to have hypoxia to 87% on room air.     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

## 2022-12-06 NOTE — CONSULT NOTE ADULT - SUBJECTIVE AND OBJECTIVE BOX
BLANCA BENSON  [id number]      HPI:    Blanca Benson is a 89 year old woman with past medical history of Severe aortic valve stenosis (s/p TAVR), Atrial fibrillation (on Eliquis), Coronary artery disease (s/p PCI 2019), HFpEF, Hypertension, LBBB, Micra PPM presented for elective right lower extremity lesion excision, found to be hypoxic on room air.    Chart review indicates that patient was recently hospitalized at an outside hospital this past weekend for CHF and respiratory failure and was discharged the night before admission.         ALLERGIES:  adhesives (Rash)  amoxicillin (Rash)  losartan (Rash)  penicillin (Unknown)      PAST MEDICAL & SURGICAL HISTORY:  Severe aortic valve stenosis (s/p TAVR)  Atrial fibrillation (on Eliquis)  Coronary artery disease (s/p PCI 2019)  HFpEF  Hypertension  LBBB  Micra PPM   Skin cancer  H/O unilateral oophorectomy  H/O eye surgery      CURRENT MEDICATIONS:  apixaban 2.5 milliGRAM(s) Oral every 12 hours  famotidine    Tablet 10 milliGRAM(s) Oral daily  folic acid 1 milliGRAM(s) Oral daily  furosemide   Injectable 20 milliGRAM(s) IV Push daily  metoprolol tartrate 25 milliGRAM(s) Oral two times a day  simvastatin 10 milliGRAM(s) Oral at bedtime      ROS:  All 10 systems reviewed and positives noted in HPI    OBJECTIVE:    VITAL SIGNS:  Vital Signs Last 24 Hrs  T(C): 36.4 (06 Dec 2022 09:13), Max: 36.4 (06 Dec 2022 09:13)  T(F): 97.5 (06 Dec 2022 09:13), Max: 97.5 (06 Dec 2022 09:13)  HR: 95 (06 Dec 2022 12:15) (82 - 99)  BP: 134/92 (06 Dec 2022 12:15) (125/72 - 145/106)  BP(mean): 102 (06 Dec 2022 12:15) (102 - 120)  RR: 18 (06 Dec 2022 12:15) (16 - 18)  SpO2: 99% (06 Dec 2022 12:15) (87% - 99%)    Parameters below as of 06 Dec 2022 12:15  Patient On (Oxygen Delivery Method): room air        PHYSICAL EXAM:  General: well appearing, no distress  HEENT: sclera anicteric  Neck: supple, no carotid bruits b/l  CVS: JVP ~ 7 cm H20, RRR, s1, s2, no murmurs/rubs/gallops  Chest: unlabored respirations, clear to auscultation b/l  Abdomen: non-distended  Extremities: no lower extremity edema b/l  Neuro: awake, alert & oriented x 3  Psych: normal affect      LABS:                        15.3   9.01  )-----------( 167      ( 06 Dec 2022 10:05 )             47.7     12-06    138  |  101  |  33<H>  ----------------------------<  109<H>  4.5   |  29  |  1.30    Ca    10.2      06 Dec 2022 10:05    TPro  8.1  /  Alb  3.2<L>  /  TBili  1.5<H>  /  DBili  x   /  AST  44<H>  /  ALT  19  /  AlkPhos  128<H>  12-06      TTE (2019):  LVEF 64%  Normal RV systolic function  Mild-moderate MR, Mild TR  TAVR, mild paravalvular AR    ECG (12/5/22): atrial fibrillation, LBBB (similar to outpatient ECG)     BLANCA BENSON  [id number]      HPI:    Blanca Benson is a 89 year old woman with past medical history of Severe aortic valve stenosis (s/p TAVR), Atrial fibrillation (on Eliquis), Coronary artery disease (s/p PCI 2019), HFpEF, Hypertension, LBBB, Micra PPM presented for elective right lower extremity lesion excision, found to be hypoxic on room air.    Chart review indicates that patient was recently hospitalized at an outside hospital this past weekend for CHF and respiratory failure and was discharged the night before admission. She reports that she was discharged on lasix but was not taking it prior to her scheduled surgery. Denies chest pain. Has mild shortness of breath.       ALLERGIES:  adhesives (Rash)  amoxicillin (Rash)  losartan (Rash)  penicillin (Unknown)      PAST MEDICAL & SURGICAL HISTORY:  Severe aortic valve stenosis (s/p TAVR)  Atrial fibrillation (on Eliquis)  Coronary artery disease (s/p PCI 2019)  HFpEF  Hypertension  LBBB  Micra PPM   Skin cancer  H/O unilateral oophorectomy  H/O eye surgery      CURRENT MEDICATIONS:  apixaban 2.5 milliGRAM(s) Oral every 12 hours  famotidine    Tablet 10 milliGRAM(s) Oral daily  folic acid 1 milliGRAM(s) Oral daily  furosemide   Injectable 20 milliGRAM(s) IV Push daily  metoprolol tartrate 25 milliGRAM(s) Oral two times a day  simvastatin 10 milliGRAM(s) Oral at bedtime      ROS:  All 10 systems reviewed and positives noted in HPI    OBJECTIVE:    VITAL SIGNS:  Vital Signs Last 24 Hrs  T(C): 36.4 (06 Dec 2022 09:13), Max: 36.4 (06 Dec 2022 09:13)  T(F): 97.5 (06 Dec 2022 09:13), Max: 97.5 (06 Dec 2022 09:13)  HR: 95 (06 Dec 2022 12:15) (82 - 99)  BP: 134/92 (06 Dec 2022 12:15) (125/72 - 145/106)  BP(mean): 102 (06 Dec 2022 12:15) (102 - 120)  RR: 18 (06 Dec 2022 12:15) (16 - 18)  SpO2: 99% (06 Dec 2022 12:15) (87% - 99%)    Parameters below as of 06 Dec 2022 12:15  Patient On (Oxygen Delivery Method): room air        PHYSICAL EXAM:  General: no acute distress  HEENT: sclera anicteric  Neck: supple  CVS: JVP ~ 9 cm H20, irregularly irregular, s1, s2, no murmurs  Chest: decreased breath sounds   Abdomen: non-distended  Extremities: no lower extremity edema b/l  Neuro: awake, alert & oriented x 3  Psych: normal affect      LABS:                        15.3   9.01  )-----------( 167      ( 06 Dec 2022 10:05 )             47.7     12-06    138  |  101  |  33<H>  ----------------------------<  109<H>  4.5   |  29  |  1.30    Ca    10.2      06 Dec 2022 10:05    TPro  8.1  /  Alb  3.2<L>  /  TBili  1.5<H>  /  DBili  x   /  AST  44<H>  /  ALT  19  /  AlkPhos  128<H>  12-06      TTE (2019):  LVEF 64%  Normal RV systolic function  Mild-moderate MR, Mild TR  TAVR, mild paravalvular AR    ECG (12/5/22): atrial fibrillation, LBBB (similar to outpatient ECG)

## 2022-12-06 NOTE — GOALS OF CARE CONVERSATION - ADVANCED CARE PLANNING - CONVERSATION DETAILS
Met with patient at bedside. She is A&Ox3. Daughter in law Radha also at bedside. Pt. sent to ED wfrom derm. procedure with SOB, low 02 sat. Pt. was discharged from another hospital with CHF exac. day prior. Pts son Yris is HCP. Discussed GOC, and patient signed a MOLST: DNR/I, limited medical intervention, send to hospital, allow trial of feeding tube, allow trial of IV fluids, use abx. as necessary.

## 2022-12-07 PROBLEM — C44.92 SQUAMOUS CELL CARCINOMA OF SKIN, UNSPECIFIED: Chronic | Status: ACTIVE | Noted: 2022-11-22

## 2022-12-07 PROBLEM — Z95.0 PRESENCE OF CARDIAC PACEMAKER: Chronic | Status: ACTIVE | Noted: 2022-11-22

## 2022-12-07 PROBLEM — Z86.79 PERSONAL HISTORY OF OTHER DISEASES OF THE CIRCULATORY SYSTEM: Chronic | Status: ACTIVE | Noted: 2022-11-22

## 2022-12-07 LAB
ANION GAP SERPL CALC-SCNC: 7 MMOL/L — SIGNIFICANT CHANGE UP (ref 5–17)
BUN SERPL-MCNC: 48 MG/DL — HIGH (ref 7–23)
CALCIUM SERPL-MCNC: 9.5 MG/DL — SIGNIFICANT CHANGE UP (ref 8.4–10.5)
CHLORIDE SERPL-SCNC: 101 MMOL/L — SIGNIFICANT CHANGE UP (ref 96–108)
CO2 SERPL-SCNC: 34 MMOL/L — HIGH (ref 22–31)
CREAT SERPL-MCNC: 1.68 MG/DL — HIGH (ref 0.5–1.3)
EGFR: 28 ML/MIN/1.73M2 — LOW
GLUCOSE SERPL-MCNC: 105 MG/DL — HIGH (ref 70–99)
HCT VFR BLD CALC: 45.1 % — HIGH (ref 34.5–45)
HGB BLD-MCNC: 14.3 G/DL — SIGNIFICANT CHANGE UP (ref 11.5–15.5)
MAGNESIUM SERPL-MCNC: 1.9 MG/DL — SIGNIFICANT CHANGE UP (ref 1.6–2.6)
MCHC RBC-ENTMCNC: 31.7 GM/DL — LOW (ref 32–36)
MCHC RBC-ENTMCNC: 32.8 PG — SIGNIFICANT CHANGE UP (ref 27–34)
MCV RBC AUTO: 103.4 FL — HIGH (ref 80–100)
NRBC # BLD: 0 /100 WBCS — SIGNIFICANT CHANGE UP (ref 0–0)
PLATELET # BLD AUTO: 188 K/UL — SIGNIFICANT CHANGE UP (ref 150–400)
POTASSIUM SERPL-MCNC: 3.7 MMOL/L — SIGNIFICANT CHANGE UP (ref 3.5–5.3)
POTASSIUM SERPL-SCNC: 3.7 MMOL/L — SIGNIFICANT CHANGE UP (ref 3.5–5.3)
RBC # BLD: 4.36 M/UL — SIGNIFICANT CHANGE UP (ref 3.8–5.2)
RBC # FLD: 15.1 % — HIGH (ref 10.3–14.5)
SODIUM SERPL-SCNC: 142 MMOL/L — SIGNIFICANT CHANGE UP (ref 135–145)
WBC # BLD: 7.54 K/UL — SIGNIFICANT CHANGE UP (ref 3.8–10.5)
WBC # FLD AUTO: 7.54 K/UL — SIGNIFICANT CHANGE UP (ref 3.8–10.5)

## 2022-12-07 PROCEDURE — 93306 TTE W/DOPPLER COMPLETE: CPT | Mod: 26

## 2022-12-07 PROCEDURE — 99232 SBSQ HOSP IP/OBS MODERATE 35: CPT

## 2022-12-07 PROCEDURE — 99233 SBSQ HOSP IP/OBS HIGH 50: CPT

## 2022-12-07 RX ORDER — FUROSEMIDE 40 MG
40 TABLET ORAL DAILY
Refills: 0 | Status: DISCONTINUED | OUTPATIENT
Start: 2022-12-07 | End: 2022-12-10

## 2022-12-07 RX ORDER — PANTOPRAZOLE SODIUM 20 MG/1
40 TABLET, DELAYED RELEASE ORAL
Refills: 0 | Status: DISCONTINUED | OUTPATIENT
Start: 2022-12-08 | End: 2022-12-12

## 2022-12-07 RX ADMIN — Medication 20 MILLIGRAM(S): at 06:01

## 2022-12-07 RX ADMIN — Medication 25 MILLIGRAM(S): at 17:16

## 2022-12-07 RX ADMIN — BRIMONIDINE TARTRATE, TIMOLOL MALEATE 1 DROP(S): 2; 5 SOLUTION/ DROPS OPHTHALMIC at 17:17

## 2022-12-07 RX ADMIN — BRIMONIDINE TARTRATE, TIMOLOL MALEATE 1 DROP(S): 2; 5 SOLUTION/ DROPS OPHTHALMIC at 05:28

## 2022-12-07 RX ADMIN — Medication 1 MILLIGRAM(S): at 12:31

## 2022-12-07 RX ADMIN — SIMVASTATIN 10 MILLIGRAM(S): 20 TABLET, FILM COATED ORAL at 21:06

## 2022-12-07 RX ADMIN — Medication 25 MILLIGRAM(S): at 05:29

## 2022-12-07 RX ADMIN — FLUTICASONE FUROATE, UMECLIDINIUM BROMIDE AND VILANTEROL TRIFENATATE 1 PUFF(S): 200; 62.5; 25 POWDER RESPIRATORY (INHALATION) at 16:10

## 2022-12-07 NOTE — PROGRESS NOTE ADULT - SUBJECTIVE AND OBJECTIVE BOX
BLANCA BENSON  [id number]      Chief Complaint: Lower extremity lesion/CHF    Interval History: The patient reports that breathing is stable.     Tele: atrial fibrillation 80s BPM      Current meds:   brimonidine 0.2%/ timolol 0.5% Ophthalmic Solution 1 Drop(s) Both EYES two times a day  famotidine    Tablet 10 milliGRAM(s) Oral daily  fluticasone furoate/umeclidinium/vilanterol 100-62.5-25 MICROgram(s) Inhaler 1 Puff(s) Inhalation every 24 hours  folic acid 1 milliGRAM(s) Oral daily  furosemide   Injectable 20 milliGRAM(s) IV Push daily  influenza  Vaccine (HIGH DOSE) 0.7 milliLiter(s) IntraMuscular once  metoprolol tartrate 25 milliGRAM(s) Oral two times a day  simvastatin 10 milliGRAM(s) Oral at bedtime      Objective:     Vital Signs:   T(C): 36.5 (12-07-22 @ 05:18), Max: 36.5 (12-06-22 @ 17:12)  HR: 79 (12-07-22 @ 05:18) (79 - 110)  BP: 119/81 (12-07-22 @ 05:18) (119/81 - 145/106)  RR: 18 (12-07-22 @ 05:18) (16 - 18)  SpO2: 98% (12-07-22 @ 05:18) (90% - 99%)  Wt(kg): --    PHYSICAL EXAM:  General: no acute distress  HEENT: sclera anicteric  Neck: supple  CVS: JVP ~ 9 cm H20, irregularly irregular, s1, s2, no murmurs  Chest: decreased breath sounds   Abdomen: non-distended  Extremities: no lower extremity edema b/l  Neuro: awake, alert & oriented x 3  Psych: normal affect        Labs:   07 Dec 2022 06:40    142    |  101    |  48     ----------------------------<  105    3.7     |  34     |  1.68     Ca    9.5        07 Dec 2022 06:40  Mg     1.9       07 Dec 2022 06:40    TPro  8.1    /  Alb  3.2    /  TBili  1.5    /  DBili  x      /  AST  44     /  ALT  19     /  AlkPhos  128    06 Dec 2022 10:05                          14.3   7.54  )-----------( 188      ( 07 Dec 2022 06:40 )             45.1         TTE (2019):  LVEF 64%  Normal RV systolic function  Mild-moderate MR, Mild TR  TAVR, mild paravalvular AR    ECG (12/5/22): atrial fibrillation, LBBB (similar to outpatient ECG)

## 2022-12-07 NOTE — PROGRESS NOTE ADULT - NS ATTEND AMEND GEN_ALL_CORE FT
89F with PMH asthma, HTN, HLD, CAD w/stent x 1RCA (2019), a fib (on Eliquis), s/p TAVR (2019) with placement of Medtronic Micra VR TCR PPM,  right lower extremity lesion presenting today for elective scheduled excision of lesion found to have hypoxia to 87% on room air.  Admitted for acute hypoxic respiratory failure 2/2 diastolic CHF. Cardio Dr Branch consulted, recommendations appreciated. Transitioned from IV Lasix to PO Lasix. CHASITY on CKD Stage3, Cr increasing likely diuretic use.   ?Plan for excision of RLE squamous cell cancer lesion tmw - not medically cleared at this time as patient remains on oxygen, will re-evaluate in AM.

## 2022-12-07 NOTE — PROGRESS NOTE ADULT - ASSESSMENT
89yo female PMH of asthma, HTN, HLD, CAD w/stent x 1RCA placed 2019, LBBB, Afib on Eliquis + pshx TAVR, 2019 w/placement of Medtronic Micra VR TCR PPM,  right lower extremity lesion presenting today for elective scheduled excision of lesion found to have hypoxia to 87% on room air.     *Acute hypoxic respiratory failure due to acute on chronic systolic heart failure exacerbation  TTE 2019 : EF 69%  Obtain repeat TTE  Recent hospitalization at [facility] for CHF  Monitor O2 sat--currently on 2 L--desatted to 86% at rest on room air  Cardio following--rec to dc IV lasix and transition to PO lasix 40 mg daily  Monitor I/Os, daily weights     *Chronic afib on long term anticoagulation eliquis, PPM:  Monitor on tele: no acute events  Hold AC for possible OR tomorrow  Rate control- on lopressor 25 mg BID  t-ransition to Toprol XL at dc    *HTN, HLD, CAD, LBBB:  Cont lopressor and statin     *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     *Acute hypoxic respiratory failure due to acute on chronic diastolic heart failure exacerbation  TTE 2019 : EF 69%  Obtain repeat TTE  Recent hospitalization at [facility] for CHF  Monitor O2 sat--currently on 2 L--desatted to 86% at rest on room air  Cardio following--rec to dc IV lasix and transition to PO lasix 40 mg daily  Monitor I/Os, daily weights     *Chronic afib on long term anticoagulation eliquis, PPM:  Monitor on tele: no acute events  Hold AC for possible OR tomorrow  Rate control- on lopressor 25 mg BID  transition to Toprol XL at dc    *CHASITY on CKD Stage 3  Cr baseline per chart review ~1.2-1.3  Likely from Lasix use  Avoid nephrotoxic medications, continue PO Lasix  Follow up AM BMP    *HTN  *CAD  -Continue Lopressor    *HLD  -Continue Simvastatin    *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

## 2022-12-07 NOTE — PROGRESS NOTE ADULT - SUBJECTIVE AND OBJECTIVE BOX
Patient is a 89y old  Female who presents with a chief complaint of Hypoxia (07 Dec 2022 09:57)  No acute issues overnight   Desatted 86% room air     Patient seen and examined at bedside.    ALLERGIES:  adhesives (Rash)  amoxicillin (Rash)  lidocaine (Rash)  losartan (Rash)  penicillin (Unknown)    MEDICATIONS  (STANDING):  brimonidine 0.2%/ timolol 0.5% Ophthalmic Solution 1 Drop(s) Both EYES two times a day  famotidine    Tablet 10 milliGRAM(s) Oral daily  fluticasone furoate/umeclidinium/vilanterol 100-62.5-25 MICROgram(s) Inhaler 1 Puff(s) Inhalation every 24 hours  folic acid 1 milliGRAM(s) Oral daily  furosemide   Injectable 20 milliGRAM(s) IV Push daily  influenza  Vaccine (HIGH DOSE) 0.7 milliLiter(s) IntraMuscular once  metoprolol tartrate 25 milliGRAM(s) Oral two times a day  simvastatin 10 milliGRAM(s) Oral at bedtime    MEDICATIONS  (PRN):    Vital Signs Last 24 Hrs  T(F): 97.7 (07 Dec 2022 05:18), Max: 97.7 (06 Dec 2022 17:12)  HR: 79 (07 Dec 2022 05:18) (79 - 110)  BP: 119/81 (07 Dec 2022 05:18) (119/81 - 137/76)  RR: 18 (07 Dec 2022 05:18) (16 - 18)  SpO2: 98% (07 Dec 2022 05:18) (90% - 99%)  I&O's Summary    06 Dec 2022 07:01  -  07 Dec 2022 07:00  --------------------------------------------------------  IN: 120 mL / OUT: 200 mL / NET: -80 mL      BMI (kg/m2): 22.6 (12-06-22 @ 17:12), 23.1 (12-06-22 @ 09:13), 23.1 (12-06-22 @ 05:30)  PHYSICAL EXAM:  General: NAD, A/O x 3, elderly , [place]  ENT: MMM, no thrush  Neck: Supple, No JVD  Lungs: Non labored breathing,  Clear to auscultation bilaterally,   Cardio: irregularly irregular, S1/S2, no pitting edema bilaterally  Abdomen: Soft, Nontender, Nondistended; Bowel sounds present  Extremities: No calf tenderness, RLE nectrotic wound medial leg    LABS:                        14.3   7.54  )-----------( 188      ( 07 Dec 2022 06:40 )             45.1       12-07    142  |  101  |  48  ----------------------------<  105  3.7   |  34  |  1.68    Ca    9.5      07 Dec 2022 06:40  Mg     1.9     12-07    TPro  8.1  /  Alb  3.2  /  TBili  1.5  /  DBili  x   /  AST  44  /  ALT  19  /  AlkPhos  128  12-06            CARDIAC MARKERS ( 06 Dec 2022 10:05 )  x     / 27.1 ng/L / x     / x     / x                                COVID-19 PCR: NotDetec (12-06-22 @ 10:05)      RADIOLOGY & ADDITIONAL TESTS:    Care Discussed with Consultants/Other Providers:    Patient is a 89y old  Female who presents with a chief complaint of Hypoxia (07 Dec 2022 09:57)    Patient seen and examined at bedside.  No acute issues overnight   Desatted 86% room air, currently on 2L NC    ALLERGIES:  adhesives (Rash)  amoxicillin (Rash)  lidocaine (Rash)  losartan (Rash)  penicillin (Unknown)    MEDICATIONS  (STANDING):  brimonidine 0.2%/ timolol 0.5% Ophthalmic Solution 1 Drop(s) Both EYES two times a day  famotidine    Tablet 10 milliGRAM(s) Oral daily  fluticasone furoate/umeclidinium/vilanterol 100-62.5-25 MICROgram(s) Inhaler 1 Puff(s) Inhalation every 24 hours  folic acid 1 milliGRAM(s) Oral daily  furosemide   Injectable 20 milliGRAM(s) IV Push daily  influenza  Vaccine (HIGH DOSE) 0.7 milliLiter(s) IntraMuscular once  metoprolol tartrate 25 milliGRAM(s) Oral two times a day  simvastatin 10 milliGRAM(s) Oral at bedtime    MEDICATIONS  (PRN):    Vital Signs Last 24 Hrs  T(F): 97.7 (07 Dec 2022 05:18), Max: 97.7 (06 Dec 2022 17:12)  HR: 79 (07 Dec 2022 05:18) (79 - 110)  BP: 119/81 (07 Dec 2022 05:18) (119/81 - 137/76)  RR: 18 (07 Dec 2022 05:18) (16 - 18)  SpO2: 98% (07 Dec 2022 05:18) (90% - 99%)  I&O's Summary    06 Dec 2022 07:01  -  07 Dec 2022 07:00  --------------------------------------------------------  IN: 120 mL / OUT: 200 mL / NET: -80 mL      BMI (kg/m2): 22.6 (22 @ 17:12), 23.1 (22 @ 09:13), 23.1 (22 @ 05:30)  PHYSICAL EXAM:  General: NAD, A/O x 3, elderly , [place]  ENT: MMM, no thrush  Neck: Supple, No JVD  Lungs: Non labored breathing,  Clear to auscultation bilaterally,   Cardio: irregularly irregular, S1/S2, no pitting edema bilaterally  Abdomen: Soft, Nontender, Nondistended; Bowel sounds present  Extremities: No calf tenderness, RLE necrotic wound medial leg; moves all extremities     LABS:                        14.3   7.54  )-----------( 188      ( 07 Dec 2022 06:40 )             45.1           142  |  101  |  48  ----------------------------<  105  3.7   |  34  |  1.68    Ca    9.5      07 Dec 2022 06:40  Mg     1.9         TPro  8.1  /  Alb  3.2  /  TBili  1.5  /  DBili  x   /  AST  44  /  ALT  19  /  AlkPhos  128              CARDIAC MARKERS ( 06 Dec 2022 10:05 )  x     / 27.1 ng/L / x     / x     / x                    COVID-19 PCR: NotDetec (22 @ 10:05)      RADIOLOGY & ADDITIONAL TESTS:< from: TTE Echo Complete w/o Contrast w/ Doppler (22 @ 13:00) >    ACC: [id number] EXAM:  ECHO TTE WO CON COMP W DOPP                          PROCEDURE DATE:  2022          INTERPRETATION:  TRANSTHORACIC ECHOCARDIOGRAM REPORT        Patient Name:   BLANCA BENSON Patient Location: [address] Rec #:  [id number]       Accession #:      [id number]  Account #:      [id number]        Height:           58.3 in 148.0 cm  YOB: 1927      Weight:           112.0 lb 50.80 kg  Patient Age:    89 years       BSA:              1.43 m²  Patient Gender:F              BP:               119/81 mmHg      Date of Exam:        2022 11:28:36 AM  Sonographer:         JUAN  Referring Physician: CLIF    Procedure:     2D Echo/Doppler/Color Doppler Complete.  Indications:   Cardiac murmur, unspecified- R01.1  Diagnosis:     Mitral Regurgitation - I34.0  Study Details: Technically adequate study.        2D AND M-MODE MEASUREMENTS (normal ranges within parentheses):  Left Ventricle:                  Normal   Aorta/Left Atrium:               Normal  IVSd (2D):              0.67 cm (0.7-1.1) Aortic Root (Mmode): 1.68 cm   (2.4-3.7)  LVPWd (2D):             0.67 cm (0.7-1.1) AoV Cusp Separation: 0.88 cm   (1.5-2.6)  LVIDd (2D):             3.54 cm (3.4-5.7) Left Atrium (Mmode): 1.84 cm   (1.9-4.0)  LVIDs (2D):             2.32 cm  LV FS (2D):             34.5 %   (>25%)  LV EF (2D):              65 %    (>55%)  Relative Wall Thickness  0.38    (<0.42)    LV DIASTOLIC FUNCTION:  Decel Time: 262 msec    SPECTRAL DOPPLER ANALYSIS (where applicable):  Mitral Valve:  MV P1/2 Time: 75.92 msec  MV Area, PHT: 2.90 cm²    Aortic Valve: AoV Max Vega: 1.10 m/s AoV Peak P.9 mmHg AoV Mean P.9 mmHg    LVOT Vmax: 0.79 m/s LVOT VTI: 0.091 m LVOT Diameter: 1.70 cm    AoV Area, Vmax: 1.63 cm² AoV Area, VTI: 1.60 cm² AoV Area, Vmn: 1.60 cm²  Ao VTI: 0.129  Tricuspid Valve and PA/RV Systolic Pressure: TR Max Velocity: 2.29 m/s RA   Pressure: 3 mmHg RVSP/PASP: 24.0 mmHg      PHYSICIAN INTERPRETATION:  Left Ventricle: The left ventricular internal cavity size is normal. Left   ventricular wall thickness is normal.  Global LV systolic function was mildly decreased. Left ventricular   ejection fraction, by visual estimation, is 45 to 50%. The mitral in-flow   pattern reveals no discernable A-wave, therefore no comment on diastolic   function can be made.  Abnormal septal motion which may be due to conduction delay. Endocardium   not well visualized, consider use of IV echo contrast to better evaluate   endocardium and LVEF, if clinically indicated.  Right Ventricle: The right ventricular size is mildly enlarged. RV   systolic function is low normal.  Left Atrium: Left atrial enlargement.  Right Atrium: Right atrial enlargement.  Pericardium: There is no evidence of pericardial effusion.  Mitral Valve: Mild thickening and calcification of the anterior and   posterior mitral valve leaflets. There is mild mitral annular   calcification. Mild mitral valve regurgitation is seen.  Tricuspid Valve: The tricuspid valve is not well visualized.Mild   tricuspid regurgitation is visualized.  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  Pulmonic Valve: The pulmonic valve was not well visualized.  Venous: The inferior vena cava was normal sized, with respiratory size   variation greater than 50%.      Summary:   1. Technically difficult study with poor endocardial visualization.   2. Mildly decreased global left ventricular systolic function.   3. Left ventricular ejection fraction, by visual estimation, is 45 to   50%.   4. Abnormal septal motion which may be due to conduction delay.   Endocardium not well visualized, consider use of IV echo contrast to   better evaluate endocardium and LVEF, if clinically indicated.   5. Normal left ventricular internal cavity size.   6. The mitral in-flow pattern reveals no discernable A-wave, therefore   no comment on diastolic function can be made.   7. Mildly enlarged right ventricle with low normal right ventricle   systolic function.   8. Left atrial enlargement.   9. Right atrial enlargement.  10. Mild mitral annular calcification.  11. Mild thickening and calcification of the anterior and posterior   mitral valve leaflets.  12. Mild mitral valve regurgitation.  13. 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  14. Mild tricuspid regurgitation.  15. There is no evidence of pericardial effusion.    [id number] Michael Branch MD Electronically signed on   2022 at 2:38:45 PM            *** Final ***    < end of copied text >      Care Discussed with Consultants/Other Providers:

## 2022-12-08 LAB
ALBUMIN SERPL ELPH-MCNC: 2.9 G/DL — LOW (ref 3.3–5)
ALP SERPL-CCNC: 128 U/L — HIGH (ref 40–120)
ALT FLD-CCNC: 18 U/L — SIGNIFICANT CHANGE UP (ref 10–45)
ANION GAP SERPL CALC-SCNC: 10 MMOL/L — SIGNIFICANT CHANGE UP (ref 5–17)
AST SERPL-CCNC: 30 U/L — SIGNIFICANT CHANGE UP (ref 10–40)
BILIRUB SERPL-MCNC: 0.9 MG/DL — SIGNIFICANT CHANGE UP (ref 0.2–1.2)
BUN SERPL-MCNC: 44 MG/DL — HIGH (ref 7–23)
CALCIUM SERPL-MCNC: 9.7 MG/DL — SIGNIFICANT CHANGE UP (ref 8.4–10.5)
CHLORIDE SERPL-SCNC: 100 MMOL/L — SIGNIFICANT CHANGE UP (ref 96–108)
CO2 SERPL-SCNC: 29 MMOL/L — SIGNIFICANT CHANGE UP (ref 22–31)
CREAT SERPL-MCNC: 1.54 MG/DL — HIGH (ref 0.5–1.3)
D DIMER BLD IA.RAPID-MCNC: 609 NG/ML DDU — HIGH
EGFR: 31 ML/MIN/1.73M2 — LOW
GLUCOSE SERPL-MCNC: 94 MG/DL — SIGNIFICANT CHANGE UP (ref 70–99)
HCT VFR BLD CALC: 44.8 % — SIGNIFICANT CHANGE UP (ref 34.5–45)
HGB BLD-MCNC: 14.1 G/DL — SIGNIFICANT CHANGE UP (ref 11.5–15.5)
MCHC RBC-ENTMCNC: 31.5 GM/DL — LOW (ref 32–36)
MCHC RBC-ENTMCNC: 33.1 PG — SIGNIFICANT CHANGE UP (ref 27–34)
MCV RBC AUTO: 105.2 FL — HIGH (ref 80–100)
NRBC # BLD: 0 /100 WBCS — SIGNIFICANT CHANGE UP (ref 0–0)
NT-PROBNP SERPL-SCNC: 891 PG/ML — HIGH (ref 0–300)
PLATELET # BLD AUTO: 176 K/UL — SIGNIFICANT CHANGE UP (ref 150–400)
POTASSIUM SERPL-MCNC: 3.9 MMOL/L — SIGNIFICANT CHANGE UP (ref 3.5–5.3)
POTASSIUM SERPL-SCNC: 3.9 MMOL/L — SIGNIFICANT CHANGE UP (ref 3.5–5.3)
PROT SERPL-MCNC: 7.6 G/DL — SIGNIFICANT CHANGE UP (ref 6–8.3)
RBC # BLD: 4.26 M/UL — SIGNIFICANT CHANGE UP (ref 3.8–5.2)
RBC # FLD: 14.6 % — HIGH (ref 10.3–14.5)
SODIUM SERPL-SCNC: 139 MMOL/L — SIGNIFICANT CHANGE UP (ref 135–145)
WBC # BLD: 6.97 K/UL — SIGNIFICANT CHANGE UP (ref 3.8–10.5)
WBC # FLD AUTO: 6.97 K/UL — SIGNIFICANT CHANGE UP (ref 3.8–10.5)

## 2022-12-08 PROCEDURE — 71250 CT THORAX DX C-: CPT | Mod: 26

## 2022-12-08 PROCEDURE — 93970 EXTREMITY STUDY: CPT | Mod: 26

## 2022-12-08 PROCEDURE — 99233 SBSQ HOSP IP/OBS HIGH 50: CPT

## 2022-12-08 PROCEDURE — 71045 X-RAY EXAM CHEST 1 VIEW: CPT | Mod: 26

## 2022-12-08 PROCEDURE — 99232 SBSQ HOSP IP/OBS MODERATE 35: CPT

## 2022-12-08 PROCEDURE — 78582 LUNG VENTILAT&PERFUS IMAGING: CPT | Mod: 26

## 2022-12-08 RX ORDER — APIXABAN 2.5 MG/1
2.5 TABLET, FILM COATED ORAL ONCE
Refills: 0 | Status: COMPLETED | OUTPATIENT
Start: 2022-12-08 | End: 2022-12-08

## 2022-12-08 RX ORDER — APIXABAN 2.5 MG/1
2.5 TABLET, FILM COATED ORAL EVERY 12 HOURS
Refills: 0 | Status: DISCONTINUED | OUTPATIENT
Start: 2022-12-08 | End: 2022-12-12

## 2022-12-08 RX ADMIN — PANTOPRAZOLE SODIUM 40 MILLIGRAM(S): 20 TABLET, DELAYED RELEASE ORAL at 12:12

## 2022-12-08 RX ADMIN — APIXABAN 2.5 MILLIGRAM(S): 2.5 TABLET, FILM COATED ORAL at 12:12

## 2022-12-08 RX ADMIN — Medication 40 MILLIGRAM(S): at 05:12

## 2022-12-08 RX ADMIN — Medication 1 MILLIGRAM(S): at 12:12

## 2022-12-08 RX ADMIN — Medication 25 MILLIGRAM(S): at 17:48

## 2022-12-08 RX ADMIN — BRIMONIDINE TARTRATE, TIMOLOL MALEATE 1 DROP(S): 2; 5 SOLUTION/ DROPS OPHTHALMIC at 17:49

## 2022-12-08 RX ADMIN — SIMVASTATIN 10 MILLIGRAM(S): 20 TABLET, FILM COATED ORAL at 21:41

## 2022-12-08 RX ADMIN — Medication 25 MILLIGRAM(S): at 05:12

## 2022-12-08 RX ADMIN — BRIMONIDINE TARTRATE, TIMOLOL MALEATE 1 DROP(S): 2; 5 SOLUTION/ DROPS OPHTHALMIC at 05:12

## 2022-12-08 RX ADMIN — APIXABAN 2.5 MILLIGRAM(S): 2.5 TABLET, FILM COATED ORAL at 17:48

## 2022-12-08 NOTE — PROGRESS NOTE ADULT - ASSESSMENT
89yo female PMH of asthma, HTN, HLD, CAD w/stent x 1RCA placed 2019, LBBB, Afib on Eliquis + pshx TAVR, 2019 w/placement of Medtronic Micra VR TCR PPM,  right lower extremity lesion presenting today for elective scheduled excision of lesion found to have hypoxia to 87% on room air.     *Acute hypoxic respiratory failure due to acute on chronic combined heart failure exacerbation  TTE 2019 : EF 69%  Repeat TTE EF 45-50%, conduction delay, mildly enlarged RV  Recent hospitalization at [facility] for CHF  Monitor O2 sat--currently on room air--perform ambulatory O2 sats  Cardio following- transition to PO lasix 40 mg daily   No candidate for ACE/ ARB/ARNI due to renal function  Cont BB --will transition to XL at discharge   Monitor I/Os, daily weights     *Chronic afib on long term anticoagulation eliquis, PPM:  Monitor on tele: no acute events  Hold AC for possible OR tomorrow  Rate control- on lopressor 25 mg BID  transition to Toprol XL at dc    *CHASITY on CKD Stage 3  Cr baseline per chart review ~1.2-1.3  Likely from Lasix use  Avoid nephrotoxic medications, continue PO Lasix  Follow up AM BMP    *HTN  *CAD  -Continue Lopressor    *HLD  -Continue Simvastatin    *Right lower extremity squamous cell cancer lesion  Scheduled for OR w Dr Eduardo 12/20    *Hx of asthma  Stable    DVT PPX: eliquis      DISP: Pending ambulatory O2 sat and PT eval. Initially refused PT eval. Will reattempt today   Code Status: DNR  12/8:   Daughter in law at bedside, all questions answered, in  agreement with care plan    *Case tj Rasmussen, surgical PA    89yo female PMH of asthma, HTN, HLD, CAD w/stent x 1RCA placed 2019, LBBB, Afib on Eliquis + pshx TAVR, 2019 w/placement of Medtronic Micra VR TCR PPM,  right lower extremity lesion presenting  for elective scheduled excision of lesion found to be hypoxic admitted with acute on chronic CHF exacerbation and acute respiratory failure with hypoxia 2.2 to combine CHF,     *Acute hypoxic respiratory failure due to acute on chronic combined heart failure exacerbation  TTE 2019 : EF 69%  Repeat TTE EF 45-50%, conduction delay, mildly enlarged RV  [facility]  Monitor O2 sat--currently on room air--perform ambulatory O2 sats  Cardio following- transition to PO lasix 40 mg daily   No candidate for ACE/ ARB/ARNI due to renal function  Cont BB --will transition to XL at discharge   apprec pulm recs, will order cxr and abg  Monitor I/Os, daily weights     *Chronic afib on long term anticoagulation eliquis, PPM:  Monitor on tele: no acute events  restarted eliqius as OR scheduled for 10/20, will need to start 12/17, pt hypoxic and not medically optimized, may stop sooner if optimizaiton can be sought  Rate control- on lopressor 25 mg BID  transition to Toprol XL at dc    *CHASITY on CKD Stage 3  Cr baseline per chart review ~1.2-1.3  Likely from Lasix use  Avoid nephrotoxic medications, continue PO Lasix  Follow up AM BMP    *HTN  *CAD  -Continue Lopressor    *HLD  -Continue Simvastatin    *Right lower extremity squamous cell cancer lesion  Scheduled for OR w Dr Eduardo 12/20    *Hx of asthma  Stable    DVT PPX: eliquis      DISP: PT recommends home pt., pt desatted on ambulation to 83% on room, will likely need home O2    Code Status: DNR      *Case tj Rasmussen, surgical PA    89yo female PMH of asthma, HTN, HLD, CAD w/stent x 1RCA placed 2019, LBBB, Afib on Eliquis + pshx TAVR, 2019 w/placement of Medtronic Micra VR TCR PPM,  right lower extremity lesion presenting  for elective scheduled excision of lesion found to be hypoxic admitted with acute on chronic CHF exacerbation and acute respiratory failure with hypoxia 2.2 to combine CHF,     *Acute hypoxic respiratory failure   SUspect due acute on chronic diastolic heart failure exacerbation however need to rule out PE or DVT given level of hypoxia with ambulation   TTE 2019 : EF 69%  Repeat TTE EF 45-50%, conduction delay, mildly enlarged RV  Recent hospitalization at [facility] for CHF  Monitor O2 sat--currently on room air- ;however desats to 83% on room with with ambulation  Cardio following- transition to PO lasix 40 mg daily--prBNP downtrended to 800   Pulm consult appreciated  Obtain repeat CXR  Obtain LE duplex and VQ scan   Cont BB --will transition to XL at discharge   apprec pulm recs, will order cxr and abg  Monitor I/Os, daily weights     *Chronic afib on long term anticoagulation eliquis, PPM:  Monitor on tele: no acute events  restarted eliqius as OR scheduled for 10/20, will need to start 12/17, pt hypoxic and not medically optimized, may stop sooner if optimizaiton can be sought  Rate control- on lopressor 25 mg BID  transition to Toprol XL at dc    *CHASITY on CKD Stage 3  Cr baseline per chart review ~1.2-1.3  Likely from Lasix use  Avoid nephrotoxic medications, continue PO Lasix  Follow up AM BMP    *HTN  *CAD  -Continue Lopressor    *HLD  -Continue Simvastatin    *Right lower extremity squamous cell cancer lesion  Scheduled for OR w Dr Eduardo 12/20    *Hx of asthma  Stable    DVT PPX: eliquis      DISP: PT recommends home pt., pt desatted on ambulation to 83% on room,. Ongoing pulm workup    Code Status: DNR    12/8: Called daughter in law Valencia at [phone] left w callback # at 11:30 AM     *Case tj Rasmussen, surgical PA    89yo female PMH of asthma, HTN, HLD, CAD w/stent x 1RCA placed 2019, LBBB, Afib on Eliquis + pshx TAVR, 2019 w/placement of Medtronic Micra VR TCR PPM,  right lower extremity lesion presenting  for elective scheduled excision of lesion found to be hypoxic admitted with acute on chronic systolic CHF exacerbation complicated by acute respiratory failure with hypoxia    *Acute hypoxic respiratory failure   SUspect due acute on chronic systolic EF 45% heart failure exacerbation however need to rule out PE or DVT given level of hypoxia with ambulation   TTE 2019 : EF 69%  Repeat TTE EF 45-50%, conduction delay, mildly enlarged RV  Recent hospitalization at [facility] for CHF  Monitor O2 sat--currently on room air- ;however desats to 83% on room with with ambulation  Cardio following- transition to PO lasix 40 mg daily--prBNP downtrended to 800   Pulm consult appreciated  Obtain repeat CXR, abg  Obtain LE duplex and VQ scan   Cont BB --will transition to XL at discharge   apprec pulm recs, will order cxr and abg  Monitor I/Os, daily weights     *Chronic afib on long term anticoagulation eliquis, PPM:  Monitor on tele: no acute events  restarted eliqius as OR scheduled for 10/20, will need to start 12/17, pt hypoxic and not medically optimized, may stop sooner if optimizaiton can be sought  Rate control- on lopressor 25 mg BID  transition to Toprol XL at dc    *CHASITY on CKD Stage 3  Cr baseline per chart review ~1.2-1.3  Likely from Lasix use  Avoid nephrotoxic medications, continue PO Lasix  Follow up AM BMP    *HTN  *CAD  -Continue Lopressor    *HLD  -Continue Simvastatin    *Right lower extremity squamous cell cancer lesion  Scheduled for OR w Dr Eduardo 12/20    *Hx of asthma  Stable    DVT PPX: eliquis      DISP: PT recommends home pt., pt desatted on ambulation to 83% on room,. Ongoing pulm workup    Code Status: DNR    12/8: Called daughter in law Valencia at [phone] left w callback # at 11:30 AM     *Case tj Rasmussen, surgical PA    89yo female PMH of asthma, HTN, HLD, CAD w/stent x 1RCA placed 2019, LBBB, Afib on Eliquis + pshx TAVR, 2019 w/placement of Medtronic Micra VR TCR PPM,  right lower extremity lesion presenting  for elective scheduled excision of lesion found to be hypoxic admitted with acute on chronic systolic CHF exacerbation complicated by acute respiratory failure with hypoxia    *Acute hypoxic respiratory failure   SUspect due acute on chronic systolic EF 45% heart failure exacerbation however need to rule out PE or DVT given level of hypoxia with ambulation   TTE 2019 : EF 69%  Repeat TTE EF 45-50%, conduction delay, mildly enlarged RV  Recent hospitalization at [facility] for CHF  Monitor O2 sat--currently on room air- ;however desats to 83% on room with with ambulation  Cardio following- transition to PO lasix 40 mg daily--prBNP downtrended to 800   Pulm consult appreciated  Obtain repeat CXR, abg  LE duplex 12/9 negative for DVT  Obtain CT chest to eval for ILD  Obtain VQ scan rule out PE   Cont BB --will transition to XL at discharge   apprec pulm recs, will order cxr and abg  Monitor I/Os, daily weights     *Chronic afib on long term anticoagulation eliquis, PPM:  Monitor on tele: no acute events  restarted eliqius as OR scheduled for 10/20, will need to start 12/17, pt hypoxic and not medically optimized, may stop sooner if optimizaiton can be sought  Rate control- on lopressor 25 mg BID  transition to Toprol XL at dc    *CHASITY on CKD Stage 3  Cr baseline per chart review ~1.2-1.3  Likely from Lasix use  Avoid nephrotoxic medications, continue PO Lasix  Follow up AM BMP    *HTN  *CAD  -Continue Lopressor    *HLD  -Continue Simvastatin    *Right lower extremity squamous cell cancer lesion  Scheduled for OR w Dr Eduardo 12/20    *Hx of asthma  Stable    DVT PPX: eliquis      DISP: PT recommends home pt., pt desatted on ambulation to 83% on room,. Ongoing pulm workup    Code Status: DNR    12/8: Called daughter in law Valencia at [phone] left w callback # at 11:30 AM     *Case tj Rasmussen, surgical PA    89yo female PMH of asthma, HTN, HLD, CAD w/stent x 1RCA placed 2019, LBBB, Afib on Eliquis + pshx TAVR, 2019 w/placement of Medtronic Micra VR TCR PPM,  right lower extremity lesion presenting  for elective scheduled excision of lesion found to be hypoxic admitted with acute on chronic systolic CHF exacerbation complicated by acute respiratory failure with hypoxia    *Acute hypoxic respiratory failure   SUspect due acute on chronic systolic EF 45% heart failure exacerbation however need to rule out PE or DVT given level of hypoxia with ambulation   TTE 2019 : EF 69%  Repeat TTE EF 45-50%, conduction delay, mildly enlarged RV  Recent hospitalization at [facility] for CHF  Monitor O2 sat--currently on room air- ;however desats to 83% on room with with ambulation  Cardio following- transition to PO lasix 40 mg daily--prBNP downtrended to 800   Pulm consult appreciated  Obtain repeat CXR, abg  LE duplex 12/9 negative for DVT  Obtain CT chest to eval for ILD  VQ scan low proability for PE   Cont BB --will transition to XL at discharge   apprec pulm recs, will order cxr and abg  Monitor I/Os, daily weights     *Chronic afib on long term anticoagulation eliquis, PPM:  Monitor on tele: no acute events  restarted eliqius as OR scheduled for 10/20, will need to start 12/17, pt hypoxic and not medically optimized, may stop sooner if optimizaiton can be sought  Rate control- on lopressor 25 mg BID  transition to Toprol XL at dc    *CHASITY on CKD Stage 3  Cr baseline per chart review ~1.2-1.3  Likely from Lasix use  Avoid nephrotoxic medications, continue PO Lasix  Follow up AM BMP    *HTN  *CAD  -Continue Lopressor    *HLD  -Continue Simvastatin    *Right lower extremity squamous cell cancer lesion  Scheduled for OR w Dr Eduardo 12/20    *Hx of asthma  Stable    DVT PPX: eliquis      DISP: PT recommends home pt., pt desatted on ambulation to 83% on room,. Ongoing pulm workup    Code Status: DNR    12/8: Called daughter in law Valencia at [phone] left w callback # at 11:30 AM     *Case tj Rasmussen, surgical PA

## 2022-12-08 NOTE — PHYSICAL THERAPY INITIAL EVALUATION ADULT - ADDITIONAL COMMENTS
pt lives alone in private house, 2 [address] w/rail, 1st floor setup. pt independent w/ambulation in house, uses RW in community. pt has [facility] 2 days/wk for housekeeping and shopping assistance, pt independent w/most ADL's but does not drive

## 2022-12-08 NOTE — PHYSICAL THERAPY INITIAL EVALUATION ADULT - GENERAL OBSERVATIONS, REHAB EVAL
pt in chair at bedside, sleeping but easliy arousable, oriented x3, no c/o pain, vss per tele, sp02 92% RA at rest

## 2022-12-08 NOTE — PHYSICAL THERAPY INITIAL EVALUATION ADULT - SITTING BALANCE: DYNAMIC
Problem: Patient Care Overview  Goal: Interprofessional Rounds/Family Conf  Outcome: Ongoing (interventions implemented as appropriate)   09/04/19 1110   Interdisciplinary Rounds/Family Conf   Summary Treatment team staffing    Interdisciplinary Rounds/Family Conf   Participants psychiatrist;social work;patient;milieu/psych techs;family;nursing       1100:     Therapist staffed case with Dr. Lopez.  Dr. Lopez requesting therapist contact patient's father, Aric, to discuss discharge planning and aftercare.  Therapist made 5x phone calls to number provided on chart [phone] and [phone]. No answer. Patient reports that this is his father's number and that he should be back in town today. Therapist left voicemail for call back.  Patient's father is listed as guardian.  Will need to speak with him before moving forward with discharge planning and aftercare.  Anticipate [facility] referral.     Therapist attempted x5 more phone calls. No answer this date.        good balance

## 2022-12-08 NOTE — PROGRESS NOTE ADULT - ASSESSMENT
imp    chf with mildly reduced ef in pt who is 89 yrs of age with renal insufficiency      suggest  continue diuretic as tolerated  repeat cxr  consider assessment for pulm embolus as drop in o2 sat appears out of proportion to pts chf

## 2022-12-08 NOTE — DIETITIAN INITIAL EVALUATION ADULT - PERTINENT MEDS FT
MEDICATIONS  (STANDING):  apixaban 2.5 milliGRAM(s) Oral every 12 hours  apixaban 2.5 milliGRAM(s) Oral once  brimonidine 0.2%/ timolol 0.5% Ophthalmic Solution 1 Drop(s) Both EYES two times a day  fluticasone furoate/umeclidinium/vilanterol 100-62.5-25 MICROgram(s) Inhaler 1 Puff(s) Inhalation every 24 hours  folic acid 1 milliGRAM(s) Oral daily  furosemide    Tablet 40 milliGRAM(s) Oral daily  influenza  Vaccine (HIGH DOSE) 0.7 milliLiter(s) IntraMuscular once  metoprolol tartrate 25 milliGRAM(s) Oral two times a day  pantoprazole    Tablet 40 milliGRAM(s) Oral before breakfast  simvastatin 10 milliGRAM(s) Oral at bedtime    MEDICATIONS  (PRN):

## 2022-12-08 NOTE — PHYSICAL THERAPY INITIAL EVALUATION ADULT - REFERRING PHYSICIAN, REHAB EVAL
Spoke with mother to let her know that I would send over test results to  to review. Mother wants results faxed to Dr. Negra Deluna office at [phone]. Please advise. Dr. Licha Sexton please review test results. Dr Perez

## 2022-12-08 NOTE — PROGRESS NOTE ADULT - NS ATTEND AMEND GEN_ALL_CORE FT
89yo female PMH of asthma, HTN, HLD, CAD w/stent x 1RCA placed 2019, LBBB, Afib on Eliquis + pshx TAVR, 2019 w/placement of Medtronic Micra VR TCR PPM,  right lower extremity lesion presenting  for elective scheduled excision of lesion found to be hypoxic admitted with acute on chronic CHF exacerbation and acute respiratory failure with hypoxia 2.2 to combine CHF, Plan: cont diuresis as per cardio, probnp improving, eliquis restarted as not optimized for surgical intervention at this time with acute hypoxia, apprec pulm recs, check vq scan, abg and repeat cxr, dc plannign underway as surgery not scheduled until 12/20 given acute events, if confirmed stop eliquis on 12/17 for 12/20 OR and if medically optiimzed by then 89yo female PMH of asthma, HTN, HLD, CAD w/stent x 1RCA placed 2019, LBBB, Afib on Eliquis + pshx TAVR, 2019 w/placement of Medtronic Micra VR TCR PPM,  right lower extremity lesion presenting  for elective scheduled excision of lesion found to be hypoxic admitted with acute on chronic CHF exacerbation and acute respiratory failure with hypoxia 2.2 to acute on chronic systolic exacerbation of  CHF, Plan: cont diuresis as per cardio, probnp improving, eliquis restarted as not optimized for surgical intervention at this time with acute hypoxia, apprec pulm recs, check vq scan, abg and repeat cxr, dc plannign underway as surgery not scheduled until 12/20 given acute events, if confirmed stop eliquis on 12/17 for 12/20 OR and if medically optiimzed by then

## 2022-12-08 NOTE — PROGRESS NOTE ADULT - SUBJECTIVE AND OBJECTIVE BOX
Patient is a 89y old  Female who presents with a chief complaint of Hypoxia (07 Dec 2022 10:29)    No acute issues overnight  Patient seen and examined at bedside.    ALLERGIES:  adhesives (Rash)  amoxicillin (Rash)  lidocaine (Rash)  losartan (Rash)  penicillin (Unknown)    MEDICATIONS  (STANDING):  apixaban 2.5 milliGRAM(s) Oral every 12 hours  brimonidine 0.2%/ timolol 0.5% Ophthalmic Solution 1 Drop(s) Both EYES two times a day  fluticasone furoate/umeclidinium/vilanterol 100-62.5-25 MICROgram(s) Inhaler 1 Puff(s) Inhalation every 24 hours  folic acid 1 milliGRAM(s) Oral daily  furosemide    Tablet 40 milliGRAM(s) Oral daily  influenza  Vaccine (HIGH DOSE) 0.7 milliLiter(s) IntraMuscular once  metoprolol tartrate 25 milliGRAM(s) Oral two times a day  pantoprazole    Tablet 40 milliGRAM(s) Oral before breakfast  simvastatin 10 milliGRAM(s) Oral at bedtime    MEDICATIONS  (PRN):    Vital Signs Last 24 Hrs  T(F): 98.2 (08 Dec 2022 05:10), Max: 98.2 (07 Dec 2022 12:33)  HR: 94 (08 Dec 2022 05:10) (85 - 94)  BP: 138/85 (08 Dec 2022 05:10) (112/71 - 138/85)  RR: 18 (08 Dec 2022 05:10) (17 - 18)  SpO2: 97% (08 Dec 2022 05:10) (92% - 98%)  I&O's Summary    07 Dec 2022 07:01  -  08 Dec 2022 07:00  --------------------------------------------------------  IN: 0 mL / OUT: 700 mL / NET: -700 mL      BMI (kg/m2): 22.6 (12-06-22 @ 17:12), 23.1 (12-06-22 @ 09:13), 23.1 (12-06-22 @ 05:30)  PHYSICAL EXAM:  General: NAD, A/O x 3, elderly , [place]  ENT: MMM, no thrush  Neck: Supple, No JVD  Lungs: Non labored breathing,  Clear to auscultation bilaterally,   Cardio: irregularly irregular,  S1/S2, , no pitting edema bilaterally  Abdomen: Soft, Nontender, Nondistended; Bowel sounds present  Extremities: No calf tenderness, moves all extremities, necrotic lesion RLE    LABS:                        14.1   6.97  )-----------( 176      ( 08 Dec 2022 06:14 )             44.8       12-08    139  |  100  |  44  ----------------------------<  94  3.9   |  29  |  1.54    Ca    9.7      08 Dec 2022 06:14  Mg     1.9     12-07    TPro  7.6  /  Alb  2.9  /  TBili  0.9  /  DBili  x   /  AST  30  /  ALT  18  /  AlkPhos  128  12-08            CARDIAC MARKERS ( 06 Dec 2022 10:05 )  x     / 27.1 ng/L / x     / x     / x                                COVID-19 PCR: NotDetec (12-06-22 @ 10:05)      RADIOLOGY & ADDITIONAL TESTS:    Care Discussed with Consultants/Other Providers:

## 2022-12-08 NOTE — CONSULT NOTE ADULT - ASSESSMENT
89yo female PMH of asthma, HTN, HLD, CAD w/stent x 1RCA placed 2019, LBBB, Afib on Eliquis + pshx TAVR, 2019 w/placement of Medtronic Micra VR TCR PPM,  right lower extremity lesion presenting  for elective scheduled excision of lesion found to be hypoxic admitted with acute hypoxic respiratory failure. Thought to be due to acute decompensation of heart failure given elevated Pro-BNP. Pulmonary consulted as patient remains hypoxic with exertion despite being euvolemic. Previous radiology reviewed, patient with bronchiectasis may have component of ILD as evidence of reticulations on previous CT scan from  May 2022.     Assessment:  1. Acute hypoxia  2. Bronchiectasis  3. Chronic heart failure with reduced EF  4. Chronic Afib   5. Chronic asthma   6. CAD  7. HTN  8. HLD    Plan:  - Recommend to obtain CT scan of the chest to evaluate lung parenchyma  - Cont. oxygen support to maintain saturation > 92%  - On Anticoagulation for Afib   - S/p VQ scan per primary team  - On [facility] for asthma, no evidence of exacerbation at this time  - No obvious signs of acute infection   - Maintain euvolemic volume status  - Called Dr. Lemons ([phone]) - message left for call back   - Discussed with primary team

## 2022-12-08 NOTE — DIETITIAN INITIAL EVALUATION ADULT - PERTINENT LABORATORY DATA
12-08    139  |  100  |  44<H>  ----------------------------<  94  3.9   |  29  |  1.54<H>    Ca    9.7      08 Dec 2022 06:14  Mg     1.9     12-07    TPro  7.6  /  Alb  2.9<L>  /  TBili  0.9  /  DBili  x   /  AST  30  /  ALT  18  /  AlkPhos  128<H>  12-08

## 2022-12-08 NOTE — PHYSICAL THERAPY INITIAL EVALUATION ADULT - PERTINENT HX OF CURRENT PROBLEM, REHAB EVAL
89yo female came to dept with aid, pt alert oriented x3 with medical h/o Asthma, HTN, HLD, CAD w/stent x 1RCA placed 2019, LBBB, Afib on Eliquis + pshx TAVR, 2019 w/placement of Medtronic Micra VR TCR PPM,  right lower extremity lesion presenting today for elective scheduled excision of lesion found to have hypoxia to 87% on room air.  Of note, she was recently hospitalized at [facility] on Saturday 12/3 for resp failure due to CHF exacerbation; she was discharged last night on room air and was told to f/u her cardiologist.  She denies having fever, cough, runny nose, N/V, abd pain, diarrhea, urinary symptoms.

## 2022-12-08 NOTE — PROGRESS NOTE ADULT - SUBJECTIVE AND OBJECTIVE BOX
Follow up for af/chf  SUBJ: Patient feeling well at rest. however O2 level dropped with exertion.  PMH  Asthma    Aortic stenosis    HTN (hypertension)    HLD (hyperlipidemia)    Hiatal hernia    Skin cancer    Heart failure    CAD (coronary artery disease)    Afib    Bronchitis    Hypertension    Glaucoma    Pacemaker    Heart failure    Aortic stenosis    HLD (hyperlipidemia)    Asthma    Squamous cell carcinoma of colon    Squamous cell skin cancer    History of left bundle branch block (LBBB)    Cardiac pacemaker        MEDICATIONS  (STANDING):  apixaban 2.5 milliGRAM(s) Oral every 12 hours  apixaban 2.5 milliGRAM(s) Oral once  brimonidine 0.2%/ timolol 0.5% Ophthalmic Solution 1 Drop(s) Both EYES two times a day  fluticasone furoate/umeclidinium/vilanterol 100-62.5-25 MICROgram(s) Inhaler 1 Puff(s) Inhalation every 24 hours  folic acid 1 milliGRAM(s) Oral daily  furosemide    Tablet 40 milliGRAM(s) Oral daily  influenza  Vaccine (HIGH DOSE) 0.7 milliLiter(s) IntraMuscular once  metoprolol tartrate 25 milliGRAM(s) Oral two times a day  pantoprazole    Tablet 40 milliGRAM(s) Oral before breakfast  simvastatin 10 milliGRAM(s) Oral at bedtime    MEDICATIONS  (PRN):        PHYSICAL EXAM:  Vital Signs Last 24 Hrs  T(C): 36.8 (08 Dec 2022 05:10), Max: 36.8 (07 Dec 2022 12:33)  T(F): 98.2 (08 Dec 2022 05:10), Max: 98.2 (07 Dec 2022 12:33)  HR: 95 (08 Dec 2022 10:53) (85 - 95)  BP: 138/85 (08 Dec 2022 05:10) (112/71 - 138/85)  BP(mean): --  RR: 18 (08 Dec 2022 05:10) (17 - 18)  SpO2: 95% (08 Dec 2022 10:53) (83% - 98%)    Parameters below as of 08 Dec 2022 10:53  Patient On (Oxygen Delivery Method): nasal cannula  O2 Flow (L/min): 2      GENERAL: NAD, well-groomed, well-developed  HEAD:  Atraumatic, Normocephalic  EYES: EOMI, PERRLA, conjunctiva and sclera clear  ENT: Moist mucous membranes,  NECK: Supple, No JVD, no bruits  CHEST/LUNG: Clear to percussion bilaterally; No rales, rhonchi, wheezing, or rubs  HEART: irregularly irregular  ABDOMEN: Soft, Nontender, Nondistended; Bowel sounds present  EXTREMITIES:  2+ Peripheral Pulses, No clubbing, cyanosis, or edema  SKIN: No rashes or lesions  NERVOUS SYSTEM:  Alert & Oriented X3, Good concentration; Motor Strength 5/5 B/L upper and lower extremities; DTRs 2+ intact and symmetric      TELEMETRY: af with moderate response hr 85    ECG:    LABS:                        14.1   6.97  )-----------( 176      ( 08 Dec 2022 06:14 )             44.8     12-08    139  |  100  |  44<H>  ----------------------------<  94  3.9   |  29  |  1.54<H>    Ca    9.7      08 Dec 2022 06:14  Mg     1.9     12-07    TPro  7.6  /  Alb  2.9<L>  /  TBili  0.9  /  DBili  x   /  AST  30  /  ALT  18  /  AlkPhos  128<H>  12-08            I&O's Summary    07 Dec 2022 07:01  -  08 Dec 2022 07:00  --------------------------------------------------------  IN: 0 mL / OUT: 700 mL / NET: -700 mL      BNPSerum Pro-Brain Natriuretic Peptide: 891 pg/mL (12-08 @ 06:14)      RADIOLOGY & ADDITIONAL STUDIES:    ECHO:

## 2022-12-08 NOTE — CONSULT NOTE ADULT - SUBJECTIVE AND OBJECTIVE BOX
Initial HPI on admission:  HPI:  89yo female came to dept with aid, pt alert oriented x3 with medical h/o Asthma, HTN, HLD, CAD w/stent x 1RCA placed 2019, LBBB, Afib on Eliquis + pshx TAVR, 2019 w/placement of Medtronic Micra VR TCR PPM,  right lower extremity lesion presenting today for elective scheduled excision of lesion found to have hypoxia to 87% on room air.  Of note, she was recently hospitalized at [facility] on Saturday 12/3 for resp failure due to CHF exacerbation; she was discharged last night on room air and was told to f/u her cardiologist.  She denies having fever, cough, runny nose, N/V, abd pain, diarrhea, urinary symptoms.    She went to pre-op and was found to have oxgyen sat 87% on room air; they were planning to give general anesthesia; they cancelled the procedure and sent her ot the ER;   In the ER, she was given lasix 40mg IVP    She again denies SOB, cough, chest pain; she has not taken her eliquis since last night for the procedure today.     (06 Dec 2022 10:32)      BRIEF HOSPITAL COURSE: Patient seen at bedside with Grand-daughter in law. Patient is awake, states feels fine. Reports history of Asthma and bronchiectasis. She is former smoker and quit about 40 years ago. Reports previous admissions for CHF. On this admission s/p diuresis though remains hypoxic with exertion. Denies any chest pain or shortness of breath. No reported coughing or wheezing reported by the patient.     PAST MEDICAL & SURGICAL HISTORY:  Asthma  on breo for 2 yrs      Aortic stenosis      HTN (hypertension)      Hiatal hernia      Skin cancer  basal      Heart failure      CAD (coronary artery disease)  recent coronary stent 3/19      Afib      Bronchitis      Hypertension      Glaucoma      Aortic stenosis      HLD (hyperlipidemia)      Asthma      Squamous cell skin cancer      History of left bundle branch block (LBBB)      Cardiac pacemaker  Medtronic Micra placed 4/2019      H/O unilateral oophorectomy      H/O eye surgery  bilateral cataracts      S/P PTCA (percutaneous transluminal coronary angioplasty)  coronary stent 3/19      S/P TAVR (transcatheter aortic valve replacement)      S/P coronary artery stent placement        Allergies    adhesives (Rash)  amoxicillin (Rash)  lidocaine (Rash)  losartan (Rash)  penicillin (Unknown)    Intolerances      FAMILY HISTORY:  Family history of heart failure      Social history:      Smoking: Former smoker      Review of Systems:  Negative except for above     Medications:  apixaban 2.5 milliGRAM(s) Oral every 12 hours  brimonidine 0.2%/ timolol 0.5% Ophthalmic Solution 1 Drop(s) Both EYES two times a day  fluticasone furoate/umeclidinium/vilanterol 100-62.5-25 MICROgram(s) Inhaler 1 Puff(s) Inhalation every 24 hours  folic acid 1 milliGRAM(s) Oral daily  furosemide    Tablet 40 milliGRAM(s) Oral daily  influenza  Vaccine (HIGH DOSE) 0.7 milliLiter(s) IntraMuscular once  metoprolol tartrate 25 milliGRAM(s) Oral two times a day  pantoprazole    Tablet 40 milliGRAM(s) Oral before breakfast  simvastatin 10 milliGRAM(s) Oral at bedtime    MEDICATIONS  (STANDING):  apixaban 2.5 milliGRAM(s) Oral every 12 hours  brimonidine 0.2%/ timolol 0.5% Ophthalmic Solution 1 Drop(s) Both EYES two times a day  fluticasone furoate/umeclidinium/vilanterol 100-62.5-25 MICROgram(s) Inhaler 1 Puff(s) Inhalation every 24 hours  folic acid 1 milliGRAM(s) Oral daily  furosemide    Tablet 40 milliGRAM(s) Oral daily  influenza  Vaccine (HIGH DOSE) 0.7 milliLiter(s) IntraMuscular once  metoprolol tartrate 25 milliGRAM(s) Oral two times a day  pantoprazole    Tablet 40 milliGRAM(s) Oral before breakfast  simvastatin 10 milliGRAM(s) Oral at bedtime    MEDICATIONS  (PRN):      Antibiotics History      Heme Medications   apixaban 2.5 milliGRAM(s) Oral every 12 hours, 12-08-22 @ 08:19      GI Medications  pantoprazole    Tablet 40 milliGRAM(s) Oral before breakfast, 12-08-22 @ 00:00, Routine        Home Medications:  Last Order Reconciliation Date: Not Done  acetaminophen 325 mg oral tablet: 2 tab(s) orally every 6 hours, As needed, Mild Pain (1 - 3) (11-22-22 @ 09:25)  acetaminophen 325 mg oral tablet: 2 tab(s) orally every 6 hours, As needed, Mild Pain (1 - 3) (06-11-19 @ 14:06)  Aleve 220 mg oral tablet: 1 tab(s) orally every 8 hours, As Needed (05-14-19 @ 11:08)  amiodarone 200 mg oral tablet: Take 2 tabs twice a day until the end of 6/14/19. Take 1 tab twice a day from 6/15/19 until the end of 6/21/19. Then take 1 tab daily. (11-22-22 @ 09:25)  aspirin 81 mg oral tablet: 1 tab(s) orally once a day (04-27-19 @ 10:10)  Aspirin Enteric Coated 81 mg oral delayed release tablet: 1 tab(s) orally once a day MDD:1 (06-11-19 @ 13:08)  atorvastatin 40 mg oral tablet: 1 tab(s) orally once a day (at bedtime) (11-22-22 @ 09:25)  Breo Ellipta 200 mcg-25 mcg/inh inhalation powder: 1 puff(s) inhaled once a day (06-07-19 @ 02:54)  Breo Ellipta 200 mcg-25 mcg/inh inhalation powder: 1 puff(s) inhaled once a day (11-22-22 @ 09:25)  clopidogrel 75 mg oral tablet: 1 tab(s) orally once a day (04-27-19 @ 10:10)  Combigan 0.2%-0.5% ophthalmic solution: 1 drop(s) to each affected eye every 12 hours (11-22-22 @ 09:25)  Combigan 0.2%-0.5% ophthalmic solution: 1 drop(s) to each affected eye every 12 hours (12-07-22 @ 14:07)  Cozaar 25 mg oral tablet: 1 tab(s) orally once a day -for bladder spasms MDD:1 (06-07-19 @ 02:54)  docusate sodium 100 mg oral capsule: 1 cap(s) orally 3 times a day (11-22-22 @ 09:25)  Eliquis 2.5 mg oral tablet: 1 tab(s) orally 2 times a day (12-07-22 @ 10:41)  Eliquis 2.5 mg oral tablet: 1 tab(s) orally 2 times a day (12-07-22 @ 14:07)  Eliquis 5 mg oral tablet: 1 tab(s) orally 2 times a day (05-14-19 @ 11:08)  folic acid 1 mg oral tablet: 1 tab(s) orally once a day (12-07-22 @ 10:41)  furosemide 20 mg oral tablet: 1 tab(s) orally once a day (11-22-22 @ 09:25)  furosemide 40 mg oral tablet: 1 tab(s) orally once a day (12-07-22 @ 14:07)  losartan: 75 milligram(s) orally once a day (05-14-19 @ 11:08)  losartan 25 mg oral tablet: 1 tab(s) orally once a day (06-11-19 @ 13:29)  losartan 50 mg oral tablet: 1 tab(s) orally once a day (04-27-19 @ 10:10)  Metoprolol Tartrate 25 mg oral tablet: 1 tab(s) orally 2 times a day (12-07-22 @ 14:07)  Multiple Vitamins oral tablet: 1 tab(s) orally once a day (06-07-19 @ 02:54)  omeprazole 20 mg oral delayed release capsule: 1 cap(s) orally once a day (06-07-19 @ 02:54)  omeprazole 20 mg oral delayed release capsule: 1 cap(s) orally once a day (12-07-22 @ 14:07)  omeprazole 40 mg oral delayed release capsule: 1 cap(s) orally once a day (05-14-19 @ 11:08)  Pepcid 20 mg oral tablet: 1 tab(s) orally once a day (at bedtime) (12-07-22 @ 14:07)  Plavix 75 mg oral tablet: 1 tab(s) orally once a day (06-07-19 @ 02:54)  Plavix 75 mg oral tablet: 1 tab(s) orally once a day MDD:1 (11-22-22 @ 09:25)  potassium chloride 20 mEq oral tablet, extended release: 1 tab(s) orally once a day (12-07-22 @ 14:07)  Proventil 90 mcg/inh inhalation aerosol: 1 puff(s) inhaled every 6 hours, As Needed (12-07-22 @ 14:07)  simvastatin 10 mg oral tablet: 1 tab(s) orally once a day (at bedtime) (12-07-22 @ 14:07)  simvastatin 10 mg oral tablet: 1 tab(s) orally once a day (at bedtime) (03-30-19 @ 03:33)  simvastatin 40 mg oral tablet: 1 tab(s) orally once a day (at bedtime) (04-27-19 @ 10:10)  simvastatin 40 mg oral tablet: 1 tab(s) orally once a day (at bedtime) (06-11-19 @ 13:29)  sucralfate 1 g oral tablet: 1 tab(s) orally once a day (11-22-22 @ 09:25)  traMADol 50 mg oral tablet: 0.5 tab(s) orally every 8 hours, As needed, Moderate Pain (4 - 6) MDD:3 tabs (06-11-19 @ 13:29)  Trelegy Ellipta 100 mcg-62.5 mcg-25 mcg/inh inhalation powder: 1 puff(s) inhaled once a day (12-07-22 @ 14:07)  Zocor 40 mg oral tablet: 1 tab(s) orally once a day (at bedtime) MDD:1 (06-07-19 @ 02:54)    LABS:                      14.1   6.97  )-----------( 176      ( 08 Dec 2022 06:14 )             44.8     12-08    139  |  100  |  44<H>  ----------------------------<  94  3.9   |  29  |  1.54<H>    Ca    9.7      08 Dec 2022 06:14  Mg     1.9     12-07    TPro  7.6  /  Alb  2.9<L>  /  TBili  0.9  /  DBili  x   /  AST  30  /  ALT  18  /  AlkPhos  128<H>  12-08    Serum Pro-Brain Natriuretic Peptide: 891 pg/mL (12-08-22 @ 06:14)  Serum Pro-Brain Natriuretic Peptide: 2195 pg/mL (12-06-22 @ 10:05)    VITALS:  T(C): 36.8 (12-08-22 @ 05:10), Max: 36.8 (12-08-22 @ 05:10)  T(F): 98.2 (12-08-22 @ 05:10), Max: 98.2 (12-08-22 @ 05:10)  HR: 95 (12-08-22 @ 10:53) (85 - 95)  BP: 138/85 (12-08-22 @ 05:10) (115/74 - 138/85)  BP(mean): --  ABP: --  ABP(mean): --  RR: 18 (12-08-22 @ 05:10) (17 - 18)  SpO2: 95% (12-08-22 @ 10:53) (83% - 98%)  CVP(mm Hg): --  CVP(cm H2O): --    Ins and Outs     12-07-22 @ 07:01  -  12-08-22 @ 07:00  --------------------------------------------------------  IN: 0 mL / OUT: 700 mL / NET: -700 mL        Height (cm): 149.9 (12-06-22 @ 09:13), 149.9 (12-06-22 @ 05:30)  Weight (kg): 50.8 (12-06-22 @ 17:12), 52 (12-06-22 @ 05:30)  BMI (kg/m2): 22.6 (12-06-22 @ 17:12), 23.1 (12-06-22 @ 09:13), 23.1 (12-06-22 @ 05:30)        I&O's Detail    07 Dec 2022 07:01  -  08 Dec 2022 07:00  --------------------------------------------------------  IN:  Total IN: 0 mL    OUT:    Blood Loss (mL): 0 mL    Voided (mL): 700 mL  Total OUT: 700 mL    Total NET: -700 mL          Physical Examination:  GENERAL:               Alert, Oriented, No acute distress.    HEENT:                    Pupils equal, reactive to light.  Symmetric. No JVD, Moist MM  PULM:                     Bilateral air entry, Basilar crackles, No Rhonchi, No Wheezing  CVS:                         S1, S2,  No Murmur  ABD:                        Soft, nondistended, nontender, normoactive bowel sounds,   EXT:                         No edema, nontender, No Cyanosis or Clubbing   Vascular:                 Warm Extremities, Normal Capillary refill, Normal Distal Pulses  SKIN:                       Warm and well perfused,  necrotic lesion RLE  NEURO:                  Alert, oriented, interactive, nonfocal, follows commands  PSYC:                      Calm, + Insight.

## 2022-12-08 NOTE — CHART NOTE - NSCHARTNOTEFT_GEN_A_CORE
89 yof PMH of asthma, HTN, HLD, CAD w/stent x 1RCA placed 2019, LBBB, Afib on Eliquis + pshx TAVR, 2019 w/placement of Medtronic Micra VR TCR PPM,  right lower extremity lesion was scheduled for  elective scheduled excision of lesion found to have hypoxia to 87% on room air. Not medically or cardiology cleared for surgery.   Plan to schedule this case as a outpt likely on 12/20 if cleared.  can start AC and be discharged if stable as per medicine.  discussed with Dr. Eduardo

## 2022-12-08 NOTE — DIETITIAN INITIAL EVALUATION ADULT - OTHER INFO
89yo female came to dept with aid, pt alert oriented x3 with medical h/o Asthma, HTN, HLD, CAD w/stent x 1RCA placed 2019, LBBB, Afib on Eliquis + pshx TAVR, 2019 w/placement of Medtronic Micra VR TCR PPM,  right lower extremity lesion presenting today for elective scheduled excision of lesion found to have hypoxia to 87% on room air. patient admitted with hypoxia, currently patient noted NPO for OR however as per surgical PA procedure can be done as a outpatient , diet to be advanced to DASH/TLC , Appetite is reported usually good as per patient , cooks her own meals , no issue chewing /swallowing reported last BM today !2/8. (R) LE skin lesion noted, no edema , Patient unaware of UBW , NFPE conducted patient with evidence of severe protein calorie malnutrition , (+) muscle wasting & loss  of body fat observed, spoke with patient regarding increasing protein rich foods in diet , not receptive to nutrition supplements

## 2022-12-09 LAB
ALBUMIN SERPL ELPH-MCNC: 3 G/DL — LOW (ref 3.3–5)
ALP SERPL-CCNC: 129 U/L — HIGH (ref 40–120)
ALT FLD-CCNC: 15 U/L — SIGNIFICANT CHANGE UP (ref 10–45)
ANION GAP SERPL CALC-SCNC: 8 MMOL/L — SIGNIFICANT CHANGE UP (ref 5–17)
AST SERPL-CCNC: 32 U/L — SIGNIFICANT CHANGE UP (ref 10–40)
BILIRUB SERPL-MCNC: 0.9 MG/DL — SIGNIFICANT CHANGE UP (ref 0.2–1.2)
BUN SERPL-MCNC: 38 MG/DL — HIGH (ref 7–23)
CALCIUM SERPL-MCNC: 10 MG/DL — SIGNIFICANT CHANGE UP (ref 8.4–10.5)
CHLORIDE SERPL-SCNC: 101 MMOL/L — SIGNIFICANT CHANGE UP (ref 96–108)
CO2 SERPL-SCNC: 30 MMOL/L — SIGNIFICANT CHANGE UP (ref 22–31)
CREAT SERPL-MCNC: 1.2 MG/DL — SIGNIFICANT CHANGE UP (ref 0.5–1.3)
EGFR: 42 ML/MIN/1.73M2 — LOW
FOLATE SERPL-MCNC: >20 NG/ML — SIGNIFICANT CHANGE UP
GLUCOSE SERPL-MCNC: 104 MG/DL — HIGH (ref 70–99)
HCT VFR BLD CALC: 48.1 % — HIGH (ref 34.5–45)
HGB BLD-MCNC: 15.1 G/DL — SIGNIFICANT CHANGE UP (ref 11.5–15.5)
MCHC RBC-ENTMCNC: 31.4 GM/DL — LOW (ref 32–36)
MCHC RBC-ENTMCNC: 33.5 PG — SIGNIFICANT CHANGE UP (ref 27–34)
MCV RBC AUTO: 106.7 FL — HIGH (ref 80–100)
NRBC # BLD: 0 /100 WBCS — SIGNIFICANT CHANGE UP (ref 0–0)
PLATELET # BLD AUTO: 181 K/UL — SIGNIFICANT CHANGE UP (ref 150–400)
POTASSIUM SERPL-MCNC: 4.1 MMOL/L — SIGNIFICANT CHANGE UP (ref 3.5–5.3)
POTASSIUM SERPL-SCNC: 4.1 MMOL/L — SIGNIFICANT CHANGE UP (ref 3.5–5.3)
PROT SERPL-MCNC: 7.7 G/DL — SIGNIFICANT CHANGE UP (ref 6–8.3)
RBC # BLD: 4.51 M/UL — SIGNIFICANT CHANGE UP (ref 3.8–5.2)
RBC # FLD: 14.7 % — HIGH (ref 10.3–14.5)
SODIUM SERPL-SCNC: 139 MMOL/L — SIGNIFICANT CHANGE UP (ref 135–145)
T3 SERPL-MCNC: 96 NG/DL — SIGNIFICANT CHANGE UP (ref 80–200)
T4 AB SER-ACNC: 10.3 UG/DL — SIGNIFICANT CHANGE UP (ref 4.6–12)
TSH SERPL-MCNC: 3.8 UIU/ML — HIGH (ref 0.36–3.74)
VIT B12 SERPL-MCNC: 934 PG/ML — SIGNIFICANT CHANGE UP (ref 232–1245)
WBC # BLD: 6.79 K/UL — SIGNIFICANT CHANGE UP (ref 3.8–10.5)
WBC # FLD AUTO: 6.79 K/UL — SIGNIFICANT CHANGE UP (ref 3.8–10.5)

## 2022-12-09 PROCEDURE — 99232 SBSQ HOSP IP/OBS MODERATE 35: CPT

## 2022-12-09 PROCEDURE — 99233 SBSQ HOSP IP/OBS HIGH 50: CPT

## 2022-12-09 RX ADMIN — Medication 25 MILLIGRAM(S): at 05:57

## 2022-12-09 RX ADMIN — BRIMONIDINE TARTRATE, TIMOLOL MALEATE 1 DROP(S): 2; 5 SOLUTION/ DROPS OPHTHALMIC at 05:54

## 2022-12-09 RX ADMIN — Medication 40 MILLIGRAM(S): at 05:53

## 2022-12-09 RX ADMIN — BRIMONIDINE TARTRATE, TIMOLOL MALEATE 1 DROP(S): 2; 5 SOLUTION/ DROPS OPHTHALMIC at 17:52

## 2022-12-09 RX ADMIN — APIXABAN 2.5 MILLIGRAM(S): 2.5 TABLET, FILM COATED ORAL at 05:53

## 2022-12-09 RX ADMIN — APIXABAN 2.5 MILLIGRAM(S): 2.5 TABLET, FILM COATED ORAL at 17:52

## 2022-12-09 RX ADMIN — Medication 25 MILLIGRAM(S): at 17:52

## 2022-12-09 RX ADMIN — PANTOPRAZOLE SODIUM 40 MILLIGRAM(S): 20 TABLET, DELAYED RELEASE ORAL at 05:53

## 2022-12-09 RX ADMIN — Medication 1 MILLIGRAM(S): at 12:00

## 2022-12-09 RX ADMIN — SIMVASTATIN 10 MILLIGRAM(S): 20 TABLET, FILM COATED ORAL at 22:04

## 2022-12-09 RX ADMIN — FLUTICASONE FUROATE, UMECLIDINIUM BROMIDE AND VILANTEROL TRIFENATATE 1 PUFF(S): 200; 62.5; 25 POWDER RESPIRATORY (INHALATION) at 15:37

## 2022-12-09 NOTE — PROGRESS NOTE ADULT - ASSESSMENT
Physical Examination:  GENERAL:               Alert, Oriented, No acute distress.    HEENT:                    Pupils equal, reactive to light.  Symmetric. No JVD, Moist MM  PULM:                     Bilateral air entry, Basilar crackles, No Rhonchi, No Wheezing  CVS:                         S1, S2,  No Murmur  ABD:                        Soft, nondistended, nontender, normoactive bowel sounds,   EXT:                         No edema, nontender, No Cyanosis or Clubbing   Vascular:                 Warm Extremities, Normal Capillary refill, Normal Distal Pulses  SKIN:                       Warm and well perfused,  necrotic lesion RLE  NEURO:                  Alert, oriented, interactive, nonfocal, follows commands  PSYC:                      Calm, + Insight.    89yo female PMH of asthma, HTN, HLD, CAD w/stent x 1RCA placed 2019, LBBB, Afib on Eliquis + pshx TAVR, 2019 w/placement of Medtronic Micra VR TCR PPM,  right lower extremity lesion presenting  for elective scheduled excision of lesion found to be hypoxic admitted with acute hypoxic respiratory failure. Thought to be due to acute decompensation of heart failure given elevated Pro-BNP. Pulmonary consulted as patient remains hypoxic with exertion despite being euvolemic. Previous radiology reviewed, patient with bronchiectasis may have component of ILD as evidence of reticulations on previous CT scan from  May 2022.     Assessment:  1. Acute hypoxia  2. Bronchiectasis  3. Chronic heart failure with reduced EF  4. Chronic Afib   5. Chronic asthma   6. CAD  7. HTN  8. HLD    Plan:  - CT scan of the chest showing bilateral ground glass reticulations and bronchiectasis similar to CT scan from 6 months prior   - Suspect these findings are suggestive of underlying ILD  - Cont. oxygen support to maintain saturation > 92%  - On Anticoagulation for Afib  - VQ scan with low probability of PE, also patient is already anticoagulated  - On Trellegy for asthma, no evidence of exacerbation at this time  - No obvious signs of acute infection   - Maintain euvolemic volume status  - Called Dr. Lemons ([phone])- no call back - called [phone] - awaiting further information regarding work up and history  - Discussed with primary team

## 2022-12-09 NOTE — PROGRESS NOTE ADULT - SUBJECTIVE AND OBJECTIVE BOX
Patient is a 89y old  Female who presents with a chief complaint of Hypoxia       Patient seen and examined at bedside. Pt states they feel well, denies overnight events or current complaints including chest pain, shortness of breath, dizziness, nausea, vomiting, diarrhea, fever or chills.      ALLERGIES:  adhesives (Rash)  amoxicillin (Rash)  lidocaine (Rash)  losartan (Rash)  penicillin (Unknown)    MEDICATIONS  (STANDING):  apixaban 2.5 milliGRAM(s) Oral every 12 hours  brimonidine 0.2%/ timolol 0.5% Ophthalmic Solution 1 Drop(s) Both EYES two times a day  fluticasone furoate/umeclidinium/vilanterol 100-62.5-25 MICROgram(s) Inhaler 1 Puff(s) Inhalation every 24 hours  folic acid 1 milliGRAM(s) Oral daily  furosemide    Tablet 40 milliGRAM(s) Oral daily  influenza  Vaccine (HIGH DOSE) 0.7 milliLiter(s) IntraMuscular once  metoprolol tartrate 25 milliGRAM(s) Oral two times a day  pantoprazole    Tablet 40 milliGRAM(s) Oral before breakfast  simvastatin 10 milliGRAM(s) Oral at bedtime    MEDICATIONS  (PRN):    Vital Signs Last 24 Hrs  T(F): 97.5 (09 Dec 2022 12:40), Max: 98.3 (08 Dec 2022 17:40)  HR: 80 (09 Dec 2022 12:40) (80 - 95)  BP: 113/80 (09 Dec 2022 12:40) (113/80 - 140/79)  RR: 18 (09 Dec 2022 12:40) (18 - 18)  SpO2: 99% (09 Dec 2022 12:40) (97% - 99%)  I&O's Summary    PHYSICAL EXAM:  General: NAD, A/O x 3  ENT: MMM, no oral thrush   Neck: Supple, No JVD  Lungs: Clear to auscultation bilaterally, non labored breathing  Cardio: IRIR, S1/S2, No murmurs  Abdomen: Soft, Nontender, Nondistended; Bowel sounds present  Extremities: No calf tenderness, No pitting edema    LABS:                        15.1   6.79  )-----------( 181      ( 09 Dec 2022 06:30 )             48.1     12-09    139  |  101  |  38  ----------------------------<  104  4.1   |  30  |  1.20    Ca    10.0      09 Dec 2022 06:30  Mg     1.9     12-07    TPro  7.7  /  Alb  3.0  /  TBili  0.9  /  DBili  x   /  AST  32  /  ALT  15  /  AlkPhos  129  12-09                TSH 3.802   TSH with FT4 reflex --  Total T3 96                      COVID-19 PCR: NotDetec (12-06-22 @ 10:05)      RADIOLOGY & ADDITIONAL TESTS:    < from: CT Chest No Cont (12.08.22 @ 15:02) >  IMPRESSION:  Patchy opacities containing dilated airways in the perihilar region of   both lungs are unchanged when compared to study of 5/12/2022. Exact   etiology is unclear.    --- End of Report ---           RANDOLPH JOSUE MD; Resident Radiologist  This document has been electronically signed.  DAVID GIMENEZ MD; Attending Radiologist  This document has been electronically signed. Dec  8 2022  4:38PM    < end of copied text >  < from: NM Pulmonary Ventilation/Perfusion Scan (12.08.22 @ 13:40) >  IMPRESSION:    Very low probability of pulmonary embolus.    --- End of Report ---            RADHA MAIER MD; Attending Nuclear Medicine  This document has been electronically signed. Dec  8 2022  3:44PM    < end of copied text >  < from: US Duplex Venous Lower Ext Complete, Bilateral (12.08.22 @ 12:18) >    IMPRESSION:  No evidence of deep venous thrombosis in either lower extremity.          --- End of Report ---            ANGELITO BOWER MD; Attending Radiologist  This document has been electronically signed. Dec  8 2022  1:30PM    < end of copied text >    Care Discussed with Consultants/Other Providers:

## 2022-12-09 NOTE — PROGRESS NOTE ADULT - NS ATTEND AMEND GEN_ALL_CORE FT
89yo female PMH of asthma, HTN, HLD, CAD w/stent x 1RCA placed 2019, LBBB, Afib on Eliquis + pshx TAVR, 2019 w/placement of Medtronic Micra VR TCR PPM,  right lower extremity lesion presenting  for elective scheduled excision of lesion found to be hypoxic admitted with acute on chronic systolic CHF exacerbation complicated by acute respiratory failure with hypoxia Plan:

## 2022-12-09 NOTE — PROGRESS NOTE ADULT - ASSESSMENT
imp  chf due to probably a combination of diastolic and mild systolic heart failure with an element of renal insufficiency    suggest  would decrease Lasix to 20 mg per day  continue metoprolol  for now would defer ace/arb/entresto pending further outpatient evaluation

## 2022-12-09 NOTE — PROGRESS NOTE ADULT - ASSESSMENT
89yo female PMH of asthma, HTN, HLD, CAD w/stent x 1RCA placed 2019, LBBB, Afib on Eliquis + pshx TAVR, 2019 w/placement of Medtronic Micra VR TCR PPM,  right lower extremity lesion presenting  for elective scheduled excision of lesion found to be hypoxic admitted with acute on chronic systolic CHF exacerbation complicated by acute respiratory failure with hypoxia    *Acute hypoxic respiratory failure   Suspect due acute on chronic systolic EF 45% heart failure exacerbation   TTE 2019 : EF 69%  Repeat TTE EF 45-50%, conduction delay, mildly enlarged RV  Recent hospitalization at [facility] for CHF  VQ scan very low probability of clot  DVT LE negative   Monitor O2 sat, maintain spO2 >   Cardio following- transitioned to PO lasix 40 mg daily--prBNP downtrended to 800   Pulm consult appreciated  CT chest noted above   Cont BB --will transition to XL at discharge   apprec pulm recs, will order cxr and abg  Monitor I/Os, daily weights     *Chronic afib on long term anticoagulation eliquis, PPM:  Monitor on tele: no acute events  restarted eliqius as OR scheduled for 10/20, will need to start 12/17, pt hypoxic and not medically optimized, may stop sooner if optimizaiton can be sought  Rate control- on lopressor 25 mg BID  transition to Toprol XL at dc    *CHASITY on CKD Stage 3  Cr baseline per chart review ~1.2-1.3  Likely from Lasix use  Avoid nephrotoxic medications, continue PO Lasix  Follow up AM BMP    *HTN  *CAD  -Continue Lopressor    *HLD  -Continue Simvastatin    *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 Ongoing pulm workup    Code Status: DNR    12/8: Called daughter in law Annie at [phone] left w callback # at 11:30 AM     *Case tj Rasmussen, surgical PA    89yo female PMH of asthma, HTN, HLD, CAD w/stent x 1RCA placed 2019, LBBB, Afib on Eliquis + pshx TAVR, 2019 w/placement of Medtronic Micra VR TCR PPM,  right lower extremity lesion presenting  for elective scheduled excision of lesion found to be hypoxic admitted with acute on chronic systolic CHF exacerbation complicated by acute respiratory failure with hypoxia    *Acute hypoxic respiratory failure   Suspect due acute on chronic systolic EF 45% heart failure exacerbation   TTE 2019 : EF 69%  Repeat TTE EF 45-50%, conduction delay, mildly enlarged RV  Recent hospitalization at [facility] for CHF  VQ scan very low probability of clot  DVT LE negative   Monitor O2 sat, maintain spO2 >   Cardio following- transitioned to PO lasix 40 mg daily--prBNP downtrended to 800   Pulm consult appreciated  CT chest noted above   Cont BB --will transition to XL at discharge   apprec pulm recs, will order cxr and abg  Monitor I/Os, daily weights     *Chronic afib on long term anticoagulation eliquis, PPM:  Eliquis   Rate control- on lopressor 25 mg BID  transition to Toprol XL at dc    *CHASITY on CKD Stage 3  Resolved  Cr baseline per chart review ~1.2-1.3  Likely from Lasix use  Avoid nephrotoxic medications, continue PO Lasix  monitor     *HTN  *CAD  -Continue Lopressor    *HLD  -Continue Simvastatin    *Right lower extremity squamous cell cancer lesion  Scheduled for OR w Dr Eduardo 12/20  FU with surgical team     *Hx of asthma  Stable    DVT PPX: eliquis      DISP: PT recommends home pt., pt prior desatted on ambulation to 83% on room,. Ongoing pulm workup    Code Status: DNR    12/9: Called daughter in law and son [phone]

## 2022-12-09 NOTE — PROGRESS NOTE ADULT - SUBJECTIVE AND OBJECTIVE BOX
Follow up for af/chf  SUBJ: patient doing better today . ambulated in the butler without difficulty according to her and her family. ACCORDING TO THE PATIENT AND HER FAMILY she was not prescribed Lasix on a prior admission and it was only the Eliquis she stopped in preparation for surgery  PMH  Asthma    Aortic stenosis    HTN (hypertension)    HLD (hyperlipidemia)    Hiatal hernia    Skin cancer    Heart failure    CAD (coronary artery disease)    Afib    Bronchitis    Hypertension    Glaucoma    Pacemaker    Heart failure    Aortic stenosis    HLD (hyperlipidemia)    Asthma    Squamous cell carcinoma of colon    Squamous cell skin cancer    History of left bundle branch block (LBBB)    Cardiac pacemaker        MEDICATIONS  (STANDING):  apixaban 2.5 milliGRAM(s) Oral every 12 hours  brimonidine 0.2%/ timolol 0.5% Ophthalmic Solution 1 Drop(s) Both EYES two times a day  fluticasone furoate/umeclidinium/vilanterol 100-62.5-25 MICROgram(s) Inhaler 1 Puff(s) Inhalation every 24 hours  folic acid 1 milliGRAM(s) Oral daily  furosemide    Tablet 40 milliGRAM(s) Oral daily  influenza  Vaccine (HIGH DOSE) 0.7 milliLiter(s) IntraMuscular once  metoprolol tartrate 25 milliGRAM(s) Oral two times a day  pantoprazole    Tablet 40 milliGRAM(s) Oral before breakfast  simvastatin 10 milliGRAM(s) Oral at bedtime    MEDICATIONS  (PRN):        PHYSICAL EXAM:  Vital Signs Last 24 Hrs  T(C): 36.4 (09 Dec 2022 12:40), Max: 36.8 (08 Dec 2022 17:40)  T(F): 97.5 (09 Dec 2022 12:40), Max: 98.3 (08 Dec 2022 17:40)  HR: 80 (09 Dec 2022 12:40) (80 - 95)  BP: 113/80 (09 Dec 2022 12:40) (113/80 - 140/79)  BP(mean): --  RR: 18 (09 Dec 2022 12:40) (18 - 18)  SpO2: 92% (09 Dec 2022 14:39) (92% - 99%)    Parameters below as of 09 Dec 2022 14:39  Patient On (Oxygen Delivery Method): room air        GENERAL: NAD, well-groomed, well-developed  HEAD:  Atraumatic, Normocephalic  EYES: EOMI, PERRLA, conjunctiva and sclera clear  ENT: Moist mucous membranes,  NECK: Supple, No JVD, no bruits  CHEST/LUNG: faint right base rales  HEART: irregularly irregular  ABDOMEN: Soft, Nontender, Nondistended; Bowel sounds present  EXTREMITIES:  2+ Peripheral Pulses, No clubbing, cyanosis, or edema  SKIN: No rashes or lesions  NERVOUS SYSTEM:  Alert & Oriented X3, Good concentration; Motor Strength 5/5 B/L upper and lower extremities; DTRs 2+ intact and symmetric      TELEMETRY:af with moderate response        LABS:                        15.1   6.79  )-----------( 181      ( 09 Dec 2022 06:30 )             48.1     12-09    139  |  101  |  38<H>  ----------------------------<  104<H>  4.1   |  30  |  1.20    Ca    10.0      09 Dec 2022 06:30    TPro  7.7  /  Alb  3.0<L>  /  TBili  0.9  /  DBili  x   /  AST  32  /  ALT  15  /  AlkPhos  129<H>  12-09            I&O's Summary    BNP    RADIOLOGY & ADDITIONAL STUDIES:    ECHO:

## 2022-12-09 NOTE — PROGRESS NOTE ADULT - SUBJECTIVE AND OBJECTIVE BOX
Follow-up Pulmonary Progress Note  Chief Complaint : Acute on chronic systolic congestive heart failure    Comfortable, denies any chest pain or palpitations. Remains with hypoxia on exertion.     Allergies :adhesives (Rash)  amoxicillin (Rash)  lidocaine (Rash)  losartan (Rash)  penicillin (Unknown)    PAST MEDICAL & SURGICAL HISTORY:  Asthma  on breo for 2 yrs    Aortic stenosis    HTN (hypertension)    Hiatal hernia    Skin cancer  basal    Heart failure    CAD (coronary artery disease)  recent coronary stent 3/19    Afib    Bronchitis    Hypertension    Glaucoma    Aortic stenosis    HLD (hyperlipidemia)    Asthma    Squamous cell skin cancer    History of left bundle branch block (LBBB)    Cardiac pacemaker  Medtronic Micra placed 4/2019    H/O unilateral oophorectomy    H/O eye surgery  bilateral cataracts    S/P PTCA (percutaneous transluminal coronary angioplasty)  coronary stent 3/19    S/P TAVR (transcatheter aortic valve replacement)    S/P coronary artery stent placement    Medications:  MEDICATIONS  (STANDING):  apixaban 2.5 milliGRAM(s) Oral every 12 hours  brimonidine 0.2%/ timolol 0.5% Ophthalmic Solution 1 Drop(s) Both EYES two times a day  fluticasone furoate/umeclidinium/vilanterol 100-62.5-25 MICROgram(s) Inhaler 1 Puff(s) Inhalation every 24 hours  folic acid 1 milliGRAM(s) Oral daily  furosemide    Tablet 40 milliGRAM(s) Oral daily  influenza  Vaccine (HIGH DOSE) 0.7 milliLiter(s) IntraMuscular once  metoprolol tartrate 25 milliGRAM(s) Oral two times a day  pantoprazole    Tablet 40 milliGRAM(s) Oral before breakfast  simvastatin 10 milliGRAM(s) Oral at bedtime    MEDICATIONS  (PRN):      Antibiotics History      Heme Medications   apixaban 2.5 milliGRAM(s) Oral every 12 hours, 12-08-22 @ 08:19      GI Medications  pantoprazole    Tablet 40 milliGRAM(s) Oral before breakfast, 12-08-22 @ 00:00, Routine        LABS:                        15.1   6.79  )-----------( 181      ( 09 Dec 2022 06:30 )             48.1     12-09    139  |  101  |  38<H>  ----------------------------<  104<H>  4.1   |  30  |  1.20    Ca    10.0      09 Dec 2022 06:30    TPro  7.7  /  Alb  3.0<L>  /  TBili  0.9  /  DBili  x   /  AST  32  /  ALT  15  /  AlkPhos  129<H>  12-09    HIT ab -- 12-08 @ 14:07  HIT ab EIA --  D Dimer -609    Serum Pro-Brain Natriuretic Peptide: 891 pg/mL (12-08-22 @ 06:14)    VITALS:  T(C): 36.4 (12-09-22 @ 12:40), Max: 36.8 (12-08-22 @ 17:40)  T(F): 97.5 (12-09-22 @ 12:40), Max: 98.3 (12-08-22 @ 17:40)  HR: 80 (12-09-22 @ 12:40) (80 - 95)  BP: 113/80 (12-09-22 @ 12:40) (113/80 - 140/79)  BP(mean): --  ABP: --  ABP(mean): --  RR: 18 (12-09-22 @ 12:40) (18 - 18)  SpO2: 92% (12-09-22 @ 14:39) (92% - 99%)  CVP(mm Hg): --  CVP(cm H2O): --    Ins and Outs     Weight (kg): 50.8 (12-06-22 @ 17:12)    I&O's Detail

## 2022-12-10 ENCOUNTER — TRANSCRIPTION ENCOUNTER (OUTPATIENT)
Age: 87
End: 2022-12-10

## 2022-12-10 PROCEDURE — 99233 SBSQ HOSP IP/OBS HIGH 50: CPT

## 2022-12-10 RX ORDER — METOPROLOL TARTRATE 50 MG
1 TABLET ORAL
Qty: 30 | Refills: 0
Start: 2022-12-10 | End: 2023-01-08

## 2022-12-10 RX ORDER — FUROSEMIDE 40 MG
20 TABLET ORAL DAILY
Refills: 0 | Status: DISCONTINUED | OUTPATIENT
Start: 2022-12-11 | End: 2022-12-12

## 2022-12-10 RX ORDER — FUROSEMIDE 40 MG
1 TABLET ORAL
Qty: 0 | Refills: 0 | DISCHARGE

## 2022-12-10 RX ORDER — METOPROLOL TARTRATE 50 MG
1 TABLET ORAL
Qty: 0 | Refills: 0 | DISCHARGE

## 2022-12-10 RX ORDER — FUROSEMIDE 40 MG
1 TABLET ORAL
Qty: 30 | Refills: 0
Start: 2022-12-10 | End: 2023-01-08

## 2022-12-10 RX ADMIN — PANTOPRAZOLE SODIUM 40 MILLIGRAM(S): 20 TABLET, DELAYED RELEASE ORAL at 05:35

## 2022-12-10 RX ADMIN — BRIMONIDINE TARTRATE, TIMOLOL MALEATE 1 DROP(S): 2; 5 SOLUTION/ DROPS OPHTHALMIC at 05:34

## 2022-12-10 RX ADMIN — Medication 1 MILLIGRAM(S): at 11:30

## 2022-12-10 RX ADMIN — Medication 25 MILLIGRAM(S): at 17:30

## 2022-12-10 RX ADMIN — BRIMONIDINE TARTRATE, TIMOLOL MALEATE 1 DROP(S): 2; 5 SOLUTION/ DROPS OPHTHALMIC at 17:29

## 2022-12-10 RX ADMIN — Medication 25 MILLIGRAM(S): at 05:35

## 2022-12-10 RX ADMIN — APIXABAN 2.5 MILLIGRAM(S): 2.5 TABLET, FILM COATED ORAL at 05:36

## 2022-12-10 RX ADMIN — Medication 40 MILLIGRAM(S): at 05:34

## 2022-12-10 RX ADMIN — SIMVASTATIN 10 MILLIGRAM(S): 20 TABLET, FILM COATED ORAL at 21:15

## 2022-12-10 RX ADMIN — APIXABAN 2.5 MILLIGRAM(S): 2.5 TABLET, FILM COATED ORAL at 17:28

## 2022-12-10 RX ADMIN — FLUTICASONE FUROATE, UMECLIDINIUM BROMIDE AND VILANTEROL TRIFENATATE 1 PUFF(S): 200; 62.5; 25 POWDER RESPIRATORY (INHALATION) at 15:40

## 2022-12-10 NOTE — DISCHARGE NOTE PROVIDER - HOSPITAL COURSE
89 y/o female PMH of asthma, HTN, HLD, CHF, CAD w/stent x 1RCA placed 2019, LBBB, Afib on Eliquis + pshx TAVR, 2019 w/placement of Medtronic Micra VR TCR PPM,  sent from [facility], was there for elective surgery scheduled for excision of squamous cell cancer lesion on right lower extremity, was found to be hypoxic with SPO2 in mid 80s by anesthesia, admitted with acute on chronic systolic CHF exacerbation complicated by acute respiratory failure with hypoxia.  Pt is recent discharge from [facility] for CHF exacerbation on 12/5. During [facility] course included TTE with EF of 45-50%, conduction delay, mildly enlarged RV, pt with EF of 69% in 2019.  CT Chest findings suggestive of ILD. VQ scan and doppler studies of b/l lower ext r/o DVT/PE as etiology of hypoxia. Pt was on supplemental oxygen at 2lpm, weaned off with SPO2 94% on RA at rest, however, SPO2 drops to 84% on ambulation. Cardiology decreased Lasix dose to 20mg daily. 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 Eduardo  scheduled for for  12/20. Dr. Reese discussed with surgery team to have cardiac optimization 1-2 prior to scheduled lesion excision on 12/20. Will need to DC Eliquis prior to scheduled procedure. Patient evaluated by PT, recommends home PT. Pt likely to be discharged home on supplemental oxygen.    89 y/o female PMH of asthma, HTN, HLD, CHF, CAD w/stent x 1RCA placed 2019, LBBB, Afib on Eliquis + pshx TAVR, 2019 w/placement of Medtronic Micra VR TCR PPM,  sent from [facility], was there for elective surgery scheduled for excision of squamous cell cancer lesion on right lower extremity, was found to be hypoxic with SPO2 in mid 80s by anesthesia, admitted with acute on chronic systolic CHF exacerbation complicated by acute respiratory failure with hypoxia.  Pt is recent discharge from [facility] for CHF exacerbation on 12/5. During [facility] course included TTE with EF of 45-50%, conduction delay, mildly enlarged RV, pt with EF of 69% in 2019.  CT Chest findings suggestive of ILD. VQ scan and doppler studies of b/l lower ext r/o DVT/PE as etiology of hypoxia. Pt was on supplemental oxygen at 2lpm, weaned off with SPO2 94% on RA at rest, however, SPO2 drops to 84% on ambulation. Cardiology decreased Lasix dose to 20mg daily. 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 Eduardo  scheduled for for  12/20. Dr. Reese discussed with surgery team to have cardiac and medical optimization inpt vs outot 1-2 prior to scheduled lesion excision on 12/20. Will need to DC Eliquis prior to scheduled procedure. Patient evaluated by PT, recommends home PT. Pt likely to be discharged home on supplemental oxygen.    89 y/o female PMH of asthma, HTN, HLD, CHF, CAD w/stent x 1RCA placed 2019, LBBB, Afib on Eliquis + pshx TAVR, 2019 w/placement of Medtronic Micra VR TCR PPM,  sent from [facility], was there for elective surgery scheduled for excision of squamous cell cancer lesion on right lower extremity, was found to be hypoxic with SPO2 in mid 80s by anesthesia, admitted with acute on chronic systolic CHF exacerbation complicated by acute respiratory failure with hypoxia.  Pt is recent discharge from [facility] for CHF exacerbation on 12/5. During [facility] course included TTE with EF of 45-50%, conduction delay, mildly enlarged RV, pt with EF of 69% in 2019.  CT Chest findings suggestive of ILD. VQ scan and doppler studies of b/l lower ext r/o DVT/PE as etiology of hypoxia. Pt was on supplemental oxygen at 2lpm, weaned off with SPO2 94% on RA at rest, however, SPO2 drops to 84% on ambulation. Cardiology decreased Lasix dose to 20mg daily. 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 Eduardo  scheduled for for  12/20. Dr. Reese discussed with surgery team to have cardiac and medical optimization inpt vs outot 1-2 prior to scheduled lesion excision on 12/20. Will need to DC Eliquis prior to scheduled procedure. Patient evaluated by PT, recommends home PT. Pt should be discharged home on supplemental oxygen. Pt is medically stable for discharge with close follow up for pre op re-optimization and care management.

## 2022-12-10 NOTE — PROGRESS NOTE ADULT - ASSESSMENT
89 y/o female PMH of asthma, HTN, HLD, CHF, CAD w/stent x 1RCA placed 2019, LBBB, Afib on Eliquis + pshx TAVR, 2019 w/placement of Medtronic Micra VR TCR PPM,  right lower extreity squamous cell cancer lesion, sent from elective surgery scheduled for excision of lesion, was found to be hypoxic with SPO2 in mid 80s, admitted with acute on chronic systolic CHF exacerbation complicated by acute respiratory failure with hypoxia    *Acute hypoxic respiratory failure   Suspect due acute on chronic systolic EF 45% heart failure exacerbation   -TTE w/ EF 45-50%, conduction delay, mildly enlarged RV (2019 : EF 69%)   -recent discharge from [facility] for CHF exacerbation on 12/5  -VQ scan very low probability of clot  -CT chest noted above, findings suggestive of ILD  - Cont. oxygen support to -DVT LE negative   -Monitor O2 sat, wean off supplemental oxygen, on NC at 2lpm, maintain spo2 > 92%  -Cardiology Dr. 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 Reese discussed with surgery team to have cardiac optimization 1-2 prior to scheduled lesion excision on 12/20  -FU with surgical team outpatient    *Hx of asthma  Stable    DVT PPX: eliquis      DISP: PT recommends home pt. Per pt she lives independently and walks around in the house w/o any assistive devices. Will monitor ambulatory SPO2 level on RA, if maintains > 92% and no resp distress, likely d/c home today    Code Status: DNR    12/9: Called daughter in law and son [phone]

## 2022-12-10 NOTE — DISCHARGE NOTE PROVIDER - PREFACE STATEMENT FOR MINUTES SPENT
A&OX4, however forgetful.  Needing reminding to call the nurse light to get out of bed.  VSS.  Denies pain.  Abdominal incision CD&I with staples.  Having multiple loose tan/yellow BMs this shift.  Passing gas. Denies nausea.  Up to BR with SBA, walker and gait belt.  Packet made for transfer to [facility].  Pt given AVS Discharge papers for review.  Will transfer to TCU when wheelchair arrives.    I personally spent

## 2022-12-10 NOTE — PROGRESS NOTE ADULT - SUBJECTIVE AND OBJECTIVE BOX
Patient is a 89y old  Female who presents with a chief complaint of Hypoxia   Patient seen and examined at bedside. Pt AA&OX3, in no apparent acute distress, lying comfortably in bed. Denies any c/o chest pain, dyspnea, SOB, abdominal pain, N/V/D or any urinary sx.     ALLERGIES:  adhesives (Rash)  amoxicillin (Rash)  lidocaine (Rash)  losartan (Rash)  penicillin (Unknown)    MEDICATIONS  (STANDING):  apixaban 2.5 milliGRAM(s) Oral every 12 hours  brimonidine 0.2%/ timolol 0.5% Ophthalmic Solution 1 Drop(s) Both EYES two times a day  fluticasone furoate/umeclidinium/vilanterol 100-62.5-25 MICROgram(s) Inhaler 1 Puff(s) Inhalation every 24 hours  folic acid 1 milliGRAM(s) Oral daily  furosemide    Tablet 40 milliGRAM(s) Oral daily  influenza  Vaccine (HIGH DOSE) 0.7 milliLiter(s) IntraMuscular once  metoprolol tartrate 25 milliGRAM(s) Oral two times a day  pantoprazole    Tablet 40 milliGRAM(s) Oral before breakfast  simvastatin 10 milliGRAM(s) Oral at bedtime    MEDICATIONS  (PRN):    Vital Signs Last 24 Hrs  T(F): 97.5 (10 Dec 2022 05:14), Max: 97.5 (09 Dec 2022 12:40)  HR: 83 (10 Dec 2022 05:14) (70 - 89)  BP: 126/81 (10 Dec 2022 05:14) (93/64 - 126/81)  RR: 18 (10 Dec 2022 05:14) (18 - 18)  SpO2: 95% (10 Dec 2022 05:14) (92% - 99%)  I&O's Summary    09 Dec 2022 07:01  -  10 Dec 2022 07:00  --------------------------------------------------------  IN: 0 mL / OUT: 400 mL / NET: -400 mL    10 Dec 2022 07:01  -  10 Dec 2022 11:26  --------------------------------------------------------  IN: 100 mL / OUT: 0 mL / NET: 100 mL      PHYSICAL EXAM:  General: NAD, A/O x 3  ENT: MMM  Neck: Supple, No JVD  Lungs: b/l lower lobe fine crackles, pt in no apparent acute distress, speaking in full sentences w/o pausing for breath  Cardio: RRR, S1/S2, No murmurs  Abdomen: Soft, Nontender, Nondistended; Bowel sounds present  Extremities: Right leg- 2cm lesion on  right distal medial aspect, no notable drainage, surrounding erythema edema, or tenderness. No calf tenderness, No pitting edema    LABS:                        15.1   6.79  )-----------( 181      ( 09 Dec 2022 06:30 )             48.1     12-09    139  |  101  |  38  ----------------------------<  104  4.1   |  30  |  1.20    Ca    10.0      09 Dec 2022 06:30    TPro  7.7  /  Alb  3.0  /  TBili  0.9  /  DBili  x   /  AST  32  /  ALT  15  /  AlkPhos  129  12-09                TSH 3.802   TSH with FT4 reflex --  Total T3 96                      COVID-19 PCR: Richyteyomaira (12-06-22 @ 10:05)      RADIOLOGY & ADDITIONAL TESTS:    Care Discussed with Consultants/Other Providers:     Surgery DEDE Kaplan

## 2022-12-10 NOTE — DISCHARGE NOTE PROVIDER - CARE PROVIDER_API CALL
Rosa Muñoz  CRITICAL CARE MEDICINE  [address]  [place] [address]  Phone: [phone]  Fax: [phone]  Follow Up Time:    Rosa Muñoz  CRITICAL CARE MEDICINE  [address]  [place] [address]  Phone: [phone]  Fax: [phone]  Follow Up Time:     Layo Downey)  Internal Medicine  [address] [place] [address]  Phone: [phone]  Fax: [phone]  Established Patient  Follow Up Time: 1-3 days    Jmaes Eduardo)  Surgery  [address] [place] [address]  Phone: [phone]  Fax: [phone]  Established Patient  Follow Up Time: 1-3 days

## 2022-12-10 NOTE — DISCHARGE NOTE PROVIDER - NSDCHHNEEDSERVICE_GEN_ALL_CORE
Rehabilitation services/Other, specify... Medication teaching and assessment/Observation and assessment/Rehabilitation services/Other, specify...

## 2022-12-10 NOTE — DISCHARGE NOTE PROVIDER - NSDCMRMEDTOKEN_GEN_ALL_CORE_FT
Combigan 0.2%-0.5% ophthalmic solution: 1 drop(s) to each affected eye every 12 hours  Eliquis 2.5 mg oral tablet: 1 tab(s) orally 2 times a day  furosemide 20 mg oral tablet: 1 tab(s) orally once a day  metoprolol succinate 50 mg oral capsule, extended release: 1 cap(s) orally once a day  omeprazole 20 mg oral delayed release capsule: 1 cap(s) orally once a day  Pepcid 20 mg oral tablet: 1 tab(s) orally once a day (at bedtime)  potassium chloride 20 mEq oral tablet, extended release: 1 tab(s) orally once a day  Proventil 90 mcg/inh inhalation aerosol: 1 puff(s) inhaled every 6 hours, As Needed  simvastatin 10 mg oral tablet: 1 tab(s) orally once a day (at bedtime)  Trelegy Ellipta 100 mcg-62.5 mcg-25 mcg/inh inhalation powder: 1 puff(s) inhaled once a day

## 2022-12-10 NOTE — DISCHARGE NOTE PROVIDER - PROVIDER TOKENS
PROVIDER:[TOKEN:[[id number]]] PROVIDER:[TOKEN:[[id number]]],PROVIDER:[TOKEN:[[id number]],FOLLOWUP:[1-3 days],ESTABLISHEDPATIENT:[T]],PROVIDER:[TOKEN:[[id number]],FOLLOWUP:[1-3 days],ESTABLISHEDPATIENT:[T]]

## 2022-12-10 NOTE — DISCHARGE NOTE PROVIDER - NSDCCPCAREPLAN_GEN_ALL_CORE_FT
PRINCIPAL DISCHARGE DIAGNOSIS  Diagnosis: Acute on chronic systolic congestive heart failure  Assessment and Plan of Treatment: Pt with EF of 45-50%. Plaese be advised Lasix dose has been decreased to 20mg daily. Please use supplemental oxygen as instructed, keep a pulse oximeter at home and maintain SPO2 level > 92%      SECONDARY DISCHARGE DIAGNOSES  Diagnosis: Chronic a-fib  Assessment and Plan of Treatment:     Diagnosis: Stage 3 chronic kidney disease  Assessment and Plan of Treatment:     Diagnosis: Malignant melanoma of skin  Assessment and Plan of Treatment: Please follow up with Dr. Eduardo, scheduled for OR on 12/20/22    Diagnosis: Benign essential HTN  Assessment and Plan of Treatment:      PRINCIPAL DISCHARGE DIAGNOSIS  Diagnosis: Acute on chronic systolic congestive heart failure  Assessment and Plan of Treatment: Pt with EF of 45-50%. Plaese be advised Lasix dose has been decreased to 20mg daily. Please use supplemental oxygen as instructed, keep a pulse oximeter at home and maintain SPO2 level > 92%; prior to scc surgical intervention you should acquire medical and cardiological optimization as an inpt vs outpt based on your surgeon preference, also stop eliqui three days prior to surgery and see your surgeon within1-3 days of discharge as well as your primary medical provider      SECONDARY DISCHARGE DIAGNOSES  Diagnosis: Chronic a-fib  Assessment and Plan of Treatment:     Diagnosis: Stage 3 chronic kidney disease  Assessment and Plan of Treatment:     Diagnosis: Malignant melanoma of skin  Assessment and Plan of Treatment: Please follow up with Dr. Eduardo, scheduled for OR on 12/20/22    Diagnosis: Benign essential HTN  Assessment and Plan of Treatment:

## 2022-12-10 NOTE — DISCHARGE NOTE PROVIDER - CARE PROVIDERS DIRECT ADDRESSES
,DirectAddress_Unknown ,DirectAddress_Unknown,DirectAddress_Unknown,[email or web address]

## 2022-12-10 NOTE — PROGRESS NOTE ADULT - SUBJECTIVE AND OBJECTIVE BOX
Follow-up Pulmonary Progress Note  Chief Complaint : Acute on chronic systolic congestive heart failure      pt comfortable on RA  no cp, sob, palp, wants to go home  As per Floor team noted hypoxia on exertion to 84%    no cough, cp, sob, palp, n/v      Allergies :adhesives (Rash)  amoxicillin (Rash)  lidocaine (Rash)  losartan (Rash)  penicillin (Unknown)      PAST MEDICAL & SURGICAL HISTORY:  Asthma  on breo for 2 yrs    Aortic stenosis    HTN (hypertension)    Hiatal hernia    Skin cancer  basal    Heart failure    CAD (coronary artery disease)  recent coronary stent 3/19    Afib    Bronchitis    Hypertension    Glaucoma    Aortic stenosis    HLD (hyperlipidemia)    Asthma    Squamous cell skin cancer    History of left bundle branch block (LBBB)    Cardiac pacemaker  Medtronic Micra placed 4/2019    H/O unilateral oophorectomy    H/O eye surgery  bilateral cataracts    S/P PTCA (percutaneous transluminal coronary angioplasty)  coronary stent 3/19    S/P TAVR (transcatheter aortic valve replacement)    S/P coronary artery stent placement        Medications:  MEDICATIONS  (STANDING):  apixaban 2.5 milliGRAM(s) Oral every 12 hours  brimonidine 0.2%/ timolol 0.5% Ophthalmic Solution 1 Drop(s) Both EYES two times a day  fluticasone furoate/umeclidinium/vilanterol 100-62.5-25 MICROgram(s) Inhaler 1 Puff(s) Inhalation every 24 hours  folic acid 1 milliGRAM(s) Oral daily  influenza  Vaccine (HIGH DOSE) 0.7 milliLiter(s) IntraMuscular once  metoprolol tartrate 25 milliGRAM(s) Oral two times a day  pantoprazole    Tablet 40 milliGRAM(s) Oral before breakfast  simvastatin 10 milliGRAM(s) Oral at bedtime    MEDICATIONS  (PRN):      Antibiotics History      Heme Medications   apixaban 2.5 milliGRAM(s) Oral every 12 hours, 12-08-22 @ 08:19      GI Medications  pantoprazole    Tablet 40 milliGRAM(s) Oral before breakfast, 12-08-22 @ 00:00, Routine        LABS:                        15.1   6.79  )-----------( 181      ( 09 Dec 2022 06:30 )             48.1     12-09    139  |  101  |  38<H>  ----------------------------<  104<H>  4.1   |  30  |  1.20    Ca    10.0      09 Dec 2022 06:30    TPro  7.7  /  Alb  3.0<L>  /  TBili  0.9  /  DBili  x   /  AST  32  /  ALT  15  /  AlkPhos  129<H>  12-09        RADIOLOGY  CXR:      CT:  < from: CT Chest No Cont (12.08.22 @ 15:02) >    ACC: [id number] EXAM:  CT CHEST                          PROCEDURE DATE:  12/08/2022          INTERPRETATION:  CLINICAL INFORMATION: Hypoxia.    COMPARISON: CT chest from 5/12/2022, 5/11/2019.    CONTRAST/COMPLICATIONS:  IV Contrast: None.  Oral Contrast: None.  Complications: None documented.    PROCEDURE:  CT scan of the chest was obtained without intravenous contrast.    FINDINGS:    LYMPH NODES: Calcified subcarinal and right hilar lymph nodes.    HEART/VASCULATURE: The heart is enlarged in size. No pericardial   effusion. There are aortic and coronary artery calcifications. Status   post TAVR and Micra pacemaker within the right ventricle..    AIRWAYS/LUNGS/PLEURA: Patchy opacities containing dilated airways are   present in the perihilar region of both lungs. The findings have not   significantly changed when compared to study of 5/12/2022. Left lung   calcified granulomas. No pleural effusion.    UPPER ABDOMEN: Large hiatal hernia. Coarse hepatic calcifications are   unchanged. Left renal cyst. Colonic diverticuli.    BONES/SOFT TISSUES: Degenerative changes.    IMPRESSION:  Patchy opacities containing dilated airways in the perihilar region of   both lungs are unchanged when compared to study of 5/12/2022. Exact   etiology is unclear.    --- End of Report ---    < end of copied text >    ECHO:      VITALS:  T(C): 36.6 (12-10-22 @ 12:24), Max: 36.6 (12-10-22 @ 12:24)  T(F): 97.9 (12-10-22 @ 12:24), Max: 97.9 (12-10-22 @ 12:24)  HR: 69 (12-10-22 @ 12:24) (69 - 89)  BP: 112/76 (12-10-22 @ 12:24) (93/64 - 126/81)  BP(mean): --  ABP: --  ABP(mean): --  RR: 18 (12-10-22 @ 12:24) (18 - 18)  SpO2: 95% (12-10-22 @ 14:28) (86% - 95%)  CVP(mm Hg): --  CVP(cm H2O): --    Ins and Outs     12-09-22 @ 07:01  -  12-10-22 @ 07:00  --------------------------------------------------------  IN: 0 mL / OUT: 400 mL / NET: -400 mL    12-10-22 @ 07:01  -  12-10-22 @ 14:52  --------------------------------------------------------  IN: 100 mL / OUT: 0 mL / NET: 100 mL                I&O's Detail    09 Dec 2022 07:01  -  10 Dec 2022 07:00  --------------------------------------------------------  IN:  Total IN: 0 mL    OUT:    Voided (mL): 400 mL  Total OUT: 400 mL    Total NET: -400 mL      10 Dec 2022 07:01  -  10 Dec 2022 14:52  --------------------------------------------------------  IN:    Oral Fluid: 100 mL  Total IN: 100 mL    OUT:  Total OUT: 0 mL    Total NET: 100 mL

## 2022-12-10 NOTE — PROGRESS NOTE ADULT - SUBJECTIVE AND OBJECTIVE BOX
Patient is a 89y old  Female who presents with a chief complaint of Hypoxia   Patient seen and examined at bedside. Pt AA&OX3, in no apparent acute distress, lying comfortably in bed. Denies any c/o chest pain, dyspnea, SOB, abdominal pain, N/V/D or any urinary sx.     ALLERGIES:  adhesives (Rash)  amoxicillin (Rash)  lidocaine (Rash)  losartan (Rash)  penicillin (Unknown)    MEDICATIONS  (STANDING):  apixaban 2.5 milliGRAM(s) Oral every 12 hours  brimonidine 0.2%/ timolol 0.5% Ophthalmic Solution 1 Drop(s) Both EYES two times a day  fluticasone furoate/umeclidinium/vilanterol 100-62.5-25 MICROgram(s) Inhaler 1 Puff(s) Inhalation every 24 hours  folic acid 1 milliGRAM(s) Oral daily  furosemide    Tablet 40 milliGRAM(s) Oral daily  influenza  Vaccine (HIGH DOSE) 0.7 milliLiter(s) IntraMuscular once  metoprolol tartrate 25 milliGRAM(s) Oral two times a day  pantoprazole    Tablet 40 milliGRAM(s) Oral before breakfast  simvastatin 10 milliGRAM(s) Oral at bedtime    MEDICATIONS  (PRN):    Vital Signs Last 24 Hrs  T(F): 97.5 (10 Dec 2022 05:14), Max: 97.5 (09 Dec 2022 12:40)  HR: 83 (10 Dec 2022 05:14) (70 - 89)  BP: 126/81 (10 Dec 2022 05:14) (93/64 - 126/81)  RR: 18 (10 Dec 2022 05:14) (18 - 18)  SpO2: 95% (10 Dec 2022 05:14) (92% - 99%)  I&O's Summary    09 Dec 2022 07:01  -  10 Dec 2022 07:00  --------------------------------------------------------  IN: 0 mL / OUT: 400 mL / NET: -400 mL    10 Dec 2022 07:01  -  10 Dec 2022 11:26  --------------------------------------------------------  IN: 100 mL / OUT: 0 mL / NET: 100 mL      PHYSICAL EXAM:  General: NAD, A/O x 3  ENT: MMM  Neck: Supple, No JVD  Lungs: b/l lower lobe fine crackles, pt in no apparent acute distress, speaking in full sentences w/o pausing for breath  Cardio: RRR, S1/S2, No murmurs  Abdomen: Soft, Nontender, Nondistended; Bowel sounds present  Extremities: Right leg- 2cm lesion on  right distal medial aspect, no notable drainage, surrounding erythema edema, or tenderness. No calf tenderness, No pitting edema    LABS:                        15.1   6.79  )-----------( 181      ( 09 Dec 2022 06:30 )             48.1     12-09    139  |  101  |  38  ----------------------------<  104  4.1   |  30  |  1.20    Ca    10.0      09 Dec 2022 06:30    TPro  7.7  /  Alb  3.0  /  TBili  0.9  /  DBili  x   /  AST  32  /  ALT  15  /  AlkPhos  129  12-09                TSH 3.802   TSH with FT4 reflex --  Total T3 96                      COVID-19 PCR: Qiana (12-06-22 @ 10:05)      RADIOLOGY & ADDITIONAL TESTS:    Care Discussed with Consultants/Other Providers:

## 2022-12-10 NOTE — PROGRESS NOTE ADULT - NS ATTEND AMEND GEN_ALL_CORE FT
89 y/o female PMH of asthma, HTN, HLD, CHF, CAD w/stent x 1RCA placed 2019, LBBB, Afib on Eliquis + pshx TAVR, 2019 w/placement of Medtronic Micra VR TCR PPM,  right lower extreity squamous cell cancer lesion, sent from elective surgery scheduled for excision of lesion, was found to be hypoxic with SPO2 in mid 80s, admitted with acute on chronic systolic CHF exacerbation complicated by acute respiratory failure with hypoxia PLan: dc planning, cardio volume optimization and interstital disease, apprec pulm recs, reinforced pt may need to be admitted 1-2 days prior to surgery to assure optimzation for OR, will send on home O2 as still desats on room air, dc planning underway

## 2022-12-10 NOTE — DISCHARGE NOTE PROVIDER - ATTENDING DISCHARGE PHYSICAL EXAMINATION:
General: NAD, A/O x 3  ENT: MMM  Neck: Supple, No JVD  Lungs: decreased air to b/l lower lobes fine crackles, pt in no apparent acute distress, speaking in full sentences w/o pausing for breath, SPO2 93% RA at rest  Cardio: RRR, S1/S2, No murmurs  Abdomen: Soft, Nontender, Nondistended; Bowel sounds present  Extremities: 2cm scabbed lesion to Right distal medial aspect, no drainage No calf tenderness, No pitting edema. Moving all extremities

## 2022-12-10 NOTE — PROGRESS NOTE ADULT - ASSESSMENT
Physical Examination:  GENERAL:               Alert, Oriented, No acute distress.    HEENT:                    Pupils equal, reactive to light.  Symmetric. No JVD, Moist MM  PULM:                     Bilateral air entry, Basilar crackles, No Rhonchi, No Wheezing  CVS:                         S1, S2,  No Murmur  SKIN:                       Warm and well perfused,  necrotic lesion RLE  NEURO:                  Alert, oriented, interactive, nonfocal, follows commands  PSYC:                      Calm, + Insight.    89yo female PMH of asthma, HTN, HLD, CAD w/stent x 1RCA placed 2019, LBBB, Afib on Eliquis + pshx TAVR, 2019 w/placement of Medtronic Micra VR TCR PPM,  right lower extremity lesion presenting  for elective scheduled excision of lesion found to be hypoxic admitted with acute hypoxic respiratory failure. Thought to be due to acute decompensation of heart failure given elevated Pro-BNP. Pulmonary consulted as patient remains hypoxic with exertion despite being euvolemic. Previous radiology reviewed, patient with bronchiectasis may have component of ILD as evidence of reticulations on previous CT scan from  May 2022.     Assessment:  1. Acute hypoxia  2. Bronchiectasis  3. Chronic heart failure with reduced EF  4. Chronic Afib   5. Chronic asthma   6. CAD  7. HTN  8. HLD    Plan:  - CT scan of the chest showing bilateral ground glass reticulations and bronchiectasis similar to CT scan from 6 months prior suggesting chronic ILD findings.   - Cont. oxygen support to maintain saturation > 92%, likely will need alisa eo2     - On Anticoagulation for Afib  - VQ scan with low probability of PE, also patient is already anticoagulated  - On Trellegy for asthma, no evidence of exacerbation at this time  - No obvious signs of acute infection   - Maintain euvolemic volume status  - Dr Carrasco stated he spoke with Primary Pulm MD Dr. Muñoz and patient has underlying ILD  - will need out patient f/u with her    - Discussed with primary team

## 2022-12-11 PROCEDURE — 99233 SBSQ HOSP IP/OBS HIGH 50: CPT

## 2022-12-11 RX ADMIN — Medication 25 MILLIGRAM(S): at 17:49

## 2022-12-11 RX ADMIN — APIXABAN 2.5 MILLIGRAM(S): 2.5 TABLET, FILM COATED ORAL at 05:34

## 2022-12-11 RX ADMIN — Medication 1 MILLIGRAM(S): at 11:13

## 2022-12-11 RX ADMIN — PANTOPRAZOLE SODIUM 40 MILLIGRAM(S): 20 TABLET, DELAYED RELEASE ORAL at 05:34

## 2022-12-11 RX ADMIN — APIXABAN 2.5 MILLIGRAM(S): 2.5 TABLET, FILM COATED ORAL at 17:49

## 2022-12-11 RX ADMIN — SIMVASTATIN 10 MILLIGRAM(S): 20 TABLET, FILM COATED ORAL at 21:08

## 2022-12-11 RX ADMIN — BRIMONIDINE TARTRATE, TIMOLOL MALEATE 1 DROP(S): 2; 5 SOLUTION/ DROPS OPHTHALMIC at 05:39

## 2022-12-11 RX ADMIN — FLUTICASONE FUROATE, UMECLIDINIUM BROMIDE AND VILANTEROL TRIFENATATE 1 PUFF(S): 200; 62.5; 25 POWDER RESPIRATORY (INHALATION) at 15:50

## 2022-12-11 RX ADMIN — Medication 25 MILLIGRAM(S): at 05:34

## 2022-12-11 RX ADMIN — BRIMONIDINE TARTRATE, TIMOLOL MALEATE 1 DROP(S): 2; 5 SOLUTION/ DROPS OPHTHALMIC at 17:50

## 2022-12-11 RX ADMIN — Medication 20 MILLIGRAM(S): at 05:34

## 2022-12-11 NOTE — PROGRESS NOTE ADULT - TIME BILLING
care coordination, plan of care discussed with patient face to face, tele IDR team
care coordiantion, plan of care discussed with patient face to face, tele IDR team
care coordination, plan of care discussed with patient face to face, tele IDR team
care coordination, plan of care discussed with patient face to face, tele IDR

## 2022-12-11 NOTE — PROGRESS NOTE ADULT - SUBJECTIVE AND OBJECTIVE BOX
Patient is a 89y old  Female who presents with a chief complaint of Hypoxia      Patient seen and examined at bedside. Patient seen and examined at bedside. Pt AA&OX3, in no apparent acute distress, lying comfortably in bed. Denies any c/o chest pain, dyspnea, SOB, abdominal pain, N/V/D or any urinary sx. Requesting to be discharged home.      ALLERGIES:  adhesives (Rash)  amoxicillin (Rash)  lidocaine (Rash)  losartan (Rash)  penicillin (Unknown)    MEDICATIONS  (STANDING):  apixaban 2.5 milliGRAM(s) Oral every 12 hours  brimonidine 0.2%/ timolol 0.5% Ophthalmic Solution 1 Drop(s) Both EYES two times a day  fluticasone furoate/umeclidinium/vilanterol 100-62.5-25 MICROgram(s) Inhaler 1 Puff(s) Inhalation every 24 hours  folic acid 1 milliGRAM(s) Oral daily  furosemide    Tablet 20 milliGRAM(s) Oral daily  influenza  Vaccine (HIGH DOSE) 0.7 milliLiter(s) IntraMuscular once  metoprolol tartrate 25 milliGRAM(s) Oral two times a day  pantoprazole    Tablet 40 milliGRAM(s) Oral before breakfast  simvastatin 10 milliGRAM(s) Oral at bedtime    MEDICATIONS  (PRN):    Vital Signs Last 24 Hrs  T(F): 97.4 (11 Dec 2022 05:28), Max: 97.9 (10 Dec 2022 12:24)  HR: 69 (11 Dec 2022 05:28) (69 - 85)  BP: 127/90 (11 Dec 2022 05:28) (110/63 - 127/90)  RR: 18 (11 Dec 2022 05:28) (17 - 18)  SpO2: 94% (11 Dec 2022 05:28) (86% - 98%)  I&O's Summary    10 Dec 2022 07:01  -  11 Dec 2022 07:00  --------------------------------------------------------  IN: 100 mL / OUT: 0 mL / NET: 100 mL      PHYSICAL EXAM:  General: NAD, A/O x 3  ENT: MMM  Neck: Supple, No JVD  Lungs: decreased air to b/l lower lobes fine crackles, pt in no apparent acute distress, speaking in full sentences w/o pausing for breath, SPO2 93% RA at rest  Cardio: RRR, S1/S2, No murmurs  Abdomen: Soft, Nontender, Nondistended; Bowel sounds present  Extremities: 2cm scabbed lesion to Right distal medial aspect, no drainage No calf tenderness, No pitting edema. Moving all extremities     LABS:                        15.1   6.79  )-----------( 181      ( 09 Dec 2022 06:30 )             48.1     12-09    139  |  101  |  38  ----------------------------<  104  4.1   |  30  |  1.20    Ca    10.0      09 Dec 2022 06:30    TPro  7.7  /  Alb  3.0  /  TBili  0.9  /  DBili  x   /  AST  32  /  ALT  15  /  AlkPhos  129  12-09        TSH 3.802   TSH with FT4 reflex --  Total T3 96                      COVID-19 PCR: NotDetec (12-06-22 @ 10:05)      RADIOLOGY & ADDITIONAL TESTS:    Care Discussed with Consultants/Other Providers:

## 2022-12-11 NOTE — PROGRESS NOTE ADULT - SUBJECTIVE AND OBJECTIVE BOX
Patient is a 89y old  Female who presents with a chief complaint of Hypoxia      Patient seen and examined at bedside. Patient seen and examined at bedside. Pt AA&OX3, in no apparent acute distress, lying comfortably in bed. Denies any c/o chest pain, dyspnea, SOB, abdominal pain, N/V/D or any urinary sx. Requesting to be discharged home.      ALLERGIES:  adhesives (Rash)  amoxicillin (Rash)  lidocaine (Rash)  losartan (Rash)  penicillin (Unknown)    MEDICATIONS  (STANDING):  apixaban 2.5 milliGRAM(s) Oral every 12 hours  brimonidine 0.2%/ timolol 0.5% Ophthalmic Solution 1 Drop(s) Both EYES two times a day  fluticasone furoate/umeclidinium/vilanterol 100-62.5-25 MICROgram(s) Inhaler 1 Puff(s) Inhalation every 24 hours  folic acid 1 milliGRAM(s) Oral daily  furosemide    Tablet 20 milliGRAM(s) Oral daily  influenza  Vaccine (HIGH DOSE) 0.7 milliLiter(s) IntraMuscular once  metoprolol tartrate 25 milliGRAM(s) Oral two times a day  pantoprazole    Tablet 40 milliGRAM(s) Oral before breakfast  simvastatin 10 milliGRAM(s) Oral at bedtime    MEDICATIONS  (PRN):    Vital Signs Last 24 Hrs  T(F): 97.4 (11 Dec 2022 05:28), Max: 97.9 (10 Dec 2022 12:24)  HR: 69 (11 Dec 2022 05:28) (69 - 85)  BP: 127/90 (11 Dec 2022 05:28) (110/63 - 127/90)  RR: 18 (11 Dec 2022 05:28) (17 - 18)  SpO2: 94% (11 Dec 2022 05:28) (86% - 98%)  I&O's Summary    10 Dec 2022 07:01  -  11 Dec 2022 07:00  --------------------------------------------------------  IN: 100 mL / OUT: 0 mL / NET: 100 mL      PHYSICAL EXAM:  General: NAD, A/O x 3  ENT: MMM  Neck: Supple, No JVD  Lungs: decreased air to b/l lower lobes fine crackles, pt in no apparent acute distress, speaking in full sentences w/o pausing for breath, SPO2 93% RA at rest  Cardio: RRR, S1/S2, No murmurs  Abdomen: Soft, Nontender, Nondistended; Bowel sounds present  Extremities: 2cm scabbed lesion to Right distal medial aspect, no drainge. No calf tenderness, No pitting edema. Moving all extremities     LABS:                        15.1   6.79  )-----------( 181      ( 09 Dec 2022 06:30 )             48.1     12-09    139  |  101  |  38  ----------------------------<  104  4.1   |  30  |  1.20    Ca    10.0      09 Dec 2022 06:30    TPro  7.7  /  Alb  3.0  /  TBili  0.9  /  DBili  x   /  AST  32  /  ALT  15  /  AlkPhos  129  12-09        TSH 3.802   TSH with FT4 reflex --  Total T3 96                      COVID-19 PCR: NotDetec (12-06-22 @ 10:05)      RADIOLOGY & ADDITIONAL TESTS:    Care Discussed with Consultants/Other Providers:

## 2022-12-11 NOTE — PROGRESS NOTE ADULT - ASSESSMENT
89 y/o female PMH of asthma, HTN, HLD, CHF, CAD w/stent x 1RCA placed 2019, LBBB, Afib on Eliquis + pshx TAVR, 2019 w/placement of Medtronic Micra VR TCR PPM,  right lower extreity squamous cell cancer lesion, sent from elective surgery scheduled for excision of lesion, was found to be hypoxic with SPO2 in mid 80s, admitted with acute on chronic systolic CHF exacerbation complicated by acute respiratory failure with hypoxia    *Acute hypoxic respiratory failure   Suspect due acute on chronic systolic EF 45% heart failure exacerbation   -TTE w/ EF 45-50%, conduction delay, mildly enlarged RV (2019 : EF 69%)   -recent discharge from [facility] for CHF exacerbation on 12/5  -Pt euvolemic  -VQ scan very low probability of clot  -CT chest noted above, findings suggestive of ILD  - Cont. oxygen support to -DVT LE negative   -Monitor O2 sat, wean off supplemental oxygen, maintain spo2 > 92%  -Cardiology following, recommending decrease Lasix to 20mg daily   - Pulm consult appreciated  -Cont BB --will transition to XL at discharge   - Pulmonology consult  appreciated-Dr. Law  -Monitor I/Os, daily weights   -Pt with SPO2 at 93% on RA at rest, 85% RA upon exertion  -will d/c home on supplemental oxygen as pt mobile/lives independently at home    *Chronic afib on long term anticoagulation Eliquis PPM:  -c/w Eliquis   -Rate control- on lopressor 25 mg BID transition to Toprol XL at dc    *CHASITY on CKD Stage 3  -Resolved, CR 1.20  -Cr baseline per chart review ~1.2-1.3  -Avoid nephrotoxic medications, continue PO Lasix    *HTN/*CAD  -Continue Lopressor    *HLD  -Continue Simvastatin    *Right lower extremity squamous cell cancer lesion  -Scheduled for OR w Dr Eduardo 12/20  -Dr. Reese discussed with surgery team to have cardiac optimization 1-2 prior to scheduled lesion excision on 12/20. Will need to DC Eliquis prior   -FU with surgical team outpatient    *Hx of asthma  Stable    DVT PPX: eliquis      DISP: PT recommends home pt. Per pt she lives independently and walks around in the house w/o any assistive devices. Pt with SPO2 at 93% on RA at rest, however, 85% RA upon exertion. Will d/c home on supplemental oxygen as pt mobile/lives independently at home. Oxygen form completed and order placed. Likely d/c home today if supplemental oxygen is arranged.     Code Status: DNR    12/11: Called daughter in law and son [phone]

## 2022-12-11 NOTE — PROGRESS NOTE ADULT - ASSESSMENT
89 y/o female PMH of asthma, HTN, HLD, CHF, CAD w/stent x 1RCA placed 2019, LBBB, Afib on Eliquis + pshx TAVR, 2019 w/placement of Medtronic Micra VR TCR PPM,  right lower extreity squamous cell cancer lesion, sent from elective surgery scheduled for excision of lesion, was found to be hypoxic with SPO2 in mid 80s, admitted with acute on chronic systolic CHF exacerbation complicated by acute respiratory failure with hypoxia    *Acute hypoxic respiratory failure   Suspect due acute on chronic systolic EF 45% heart failure exacerbation   -TTE w/ EF 45-50%, conduction delay, mildly enlarged RV (2019 : EF 69%)   -recent discharge from [facility] for CHF exacerbation on 12/5  -Pt euvolemic  -VQ scan very low probability of clot  -CT chest noted above, findings suggestive of ILD  - Cont. oxygen support to -DVT LE negative   -Monitor O2 sat, wean off supplemental oxygen, maintain spo2 > 92%  -Cardiology following, recommending decrease Lasix to 20mg daily   - Pulm consult appreciated  -Cont BB --will transition to XL at discharge   - Pulmonology consult  appreciated-Dr. Law  -Monitor I/Os, daily weights   -Pt with SPO2 at 93% on RA at rest, 85% RA upon exertion  -will d/c home on supplemental oxygen as pt mobile/lives independently at home    *Chronic afib on long term anticoagulation Eliquis PPM:  -c/w Eliquis   -Rate control- on lopressor 25 mg BID transition to Toprol XL at dc    *CHASITY on CKD Stage 3  -Resolved, CR 1.20  -Cr baseline per chart review ~1.2-1.3  -Avoid nephrotoxic medications, continue PO Lasix    *HTN/*CAD  -Continue Lopressor    *HLD  -Continue Simvastatin    *Right lower extremity squamous cell cancer lesion  -Scheduled for OR w Dr Eduardo 12/20  -Dr. Reese discussed with surgery team to have cardiac optimization 1-2 prior to scheduled lesion excision on 12/20. Will need to DC Eliquis prior   -FU with surgical team outpatient    *Hx of asthma  Stable    DVT PPX: eliquis      DISP: PT recommends home pt. Per pt she lives independently and walks around in the house w/o any assistive devices. Pt with SPO2 at 93% on RA at rest, however, 85% RA upon exertion. Will d/c home on supplemental oxygen as pt mobile/lives independently at home. Oxygen form completed and order placed. Likely d/c home today if supplemental oxygen is arranged.     Code Status: DNR    12/11: Called daughter in law and son [phone]

## 2022-12-11 NOTE — PROGRESS NOTE ADULT - NS ATTEND AMEND GEN_ALL_CORE FT
89 y/o female PMH of asthma, HTN, HLD, CHF, CAD w/stent x 1RCA placed 2019, LBBB, Afib on Eliquis + pshx TAVR, 2019 w/placement of Medtronic Micra VR TCR PPM,  right lower extreity squamous cell cancer lesion, sent from elective surgery scheduled for excision of lesion, was found to be hypoxic with SPO2 in mid 80s, admitted with acute on chronic systolic CHF exacerbation complicated by acute respiratory failure with hypoxia Plan: dc with home O2 tomrorow as equipment to be delivered to patients home with aide, reinforced importance of optimization 2-3 days prior to planned procedure on 12/20, will need to stop eliquis 3 days before planned procedure, pt is stable for dc

## 2022-12-12 ENCOUNTER — TRANSCRIPTION ENCOUNTER (OUTPATIENT)
Age: 87
End: 2022-12-12

## 2022-12-12 VITALS
RESPIRATION RATE: 18 BRPM | HEART RATE: 74 BPM | DIASTOLIC BLOOD PRESSURE: 71 MMHG | TEMPERATURE: 98 F | OXYGEN SATURATION: 96 % | SYSTOLIC BLOOD PRESSURE: 96 MMHG

## 2022-12-12 PROCEDURE — 85379 FIBRIN DEGRADATION QUANT: CPT

## 2022-12-12 PROCEDURE — 80048 BASIC METABOLIC PNL TOTAL CA: CPT

## 2022-12-12 PROCEDURE — 85025 COMPLETE CBC W/AUTO DIFF WBC: CPT

## 2022-12-12 PROCEDURE — 94640 AIRWAY INHALATION TREATMENT: CPT

## 2022-12-12 PROCEDURE — 96374 THER/PROPH/DIAG INJ IV PUSH: CPT

## 2022-12-12 PROCEDURE — 71045 X-RAY EXAM CHEST 1 VIEW: CPT

## 2022-12-12 PROCEDURE — 93306 TTE W/DOPPLER COMPLETE: CPT

## 2022-12-12 PROCEDURE — 82607 VITAMIN B-12: CPT

## 2022-12-12 PROCEDURE — A9539: CPT

## 2022-12-12 PROCEDURE — A9540: CPT

## 2022-12-12 PROCEDURE — 87635 SARS-COV-2 COVID-19 AMP PRB: CPT

## 2022-12-12 PROCEDURE — 99285 EMERGENCY DEPT VISIT HI MDM: CPT

## 2022-12-12 PROCEDURE — 82746 ASSAY OF FOLIC ACID SERUM: CPT

## 2022-12-12 PROCEDURE — 97116 GAIT TRAINING THERAPY: CPT

## 2022-12-12 PROCEDURE — 84436 ASSAY OF TOTAL THYROXINE: CPT

## 2022-12-12 PROCEDURE — 84443 ASSAY THYROID STIM HORMONE: CPT

## 2022-12-12 PROCEDURE — 97162 PT EVAL MOD COMPLEX 30 MIN: CPT

## 2022-12-12 PROCEDURE — 84484 ASSAY OF TROPONIN QUANT: CPT

## 2022-12-12 PROCEDURE — 93970 EXTREMITY STUDY: CPT

## 2022-12-12 PROCEDURE — 85027 COMPLETE CBC AUTOMATED: CPT

## 2022-12-12 PROCEDURE — 83735 ASSAY OF MAGNESIUM: CPT

## 2022-12-12 PROCEDURE — 80053 COMPREHEN METABOLIC PANEL: CPT

## 2022-12-12 PROCEDURE — 84480 ASSAY TRIIODOTHYRONINE (T3): CPT

## 2022-12-12 PROCEDURE — 78582 LUNG VENTILAT&PERFUS IMAGING: CPT

## 2022-12-12 PROCEDURE — 93005 ELECTROCARDIOGRAM TRACING: CPT

## 2022-12-12 PROCEDURE — 99239 HOSP IP/OBS DSCHRG MGMT >30: CPT

## 2022-12-12 PROCEDURE — 71250 CT THORAX DX C-: CPT

## 2022-12-12 PROCEDURE — 36415 COLL VENOUS BLD VENIPUNCTURE: CPT

## 2022-12-12 PROCEDURE — 83880 ASSAY OF NATRIURETIC PEPTIDE: CPT

## 2022-12-12 RX ADMIN — APIXABAN 2.5 MILLIGRAM(S): 2.5 TABLET, FILM COATED ORAL at 05:33

## 2022-12-12 RX ADMIN — Medication 25 MILLIGRAM(S): at 05:33

## 2022-12-12 RX ADMIN — Medication 1 MILLIGRAM(S): at 11:30

## 2022-12-12 RX ADMIN — PANTOPRAZOLE SODIUM 40 MILLIGRAM(S): 20 TABLET, DELAYED RELEASE ORAL at 05:33

## 2022-12-12 RX ADMIN — BRIMONIDINE TARTRATE, TIMOLOL MALEATE 1 DROP(S): 2; 5 SOLUTION/ DROPS OPHTHALMIC at 05:40

## 2022-12-12 RX ADMIN — Medication 20 MILLIGRAM(S): at 05:33

## 2022-12-12 NOTE — PROGRESS NOTE ADULT - ASSESSMENT
89 y/o female PMH of asthma, HTN, HLD, CHF, CAD w/stent x 1RCA placed 2019, LBBB, Afib on Eliquis + pshx TAVR, 2019 w/placement of Medtronic Micra VR TCR PPM,  right lower extreity squamous cell cancer lesion, sent from elective surgery scheduled for excision of lesion, was found to be hypoxic with SPO2 in mid 80s, admitted with acute on chronic systolic CHF exacerbation complicated by acute respiratory failure with hypoxia    *Acute hypoxic respiratory failure   Suspect due acute on chronic systolic EF 45% heart failure exacerbation   -TTE w/ EF 45-50%, conduction delay, mildly enlarged RV (2019 : EF 69%)   -recent discharge from [facility] for CHF exacerbation on 12/5  -Pt euvolemic  -VQ scan very low probability of clot  -CT chest noted above, findings suggestive of ILD  - Cont. oxygen support to -DVT LE negative   -Monitor O2 sat, wean off supplemental oxygen, maintain spo2 > 92%  -Cardiology following, recommending decrease Lasix to 20mg daily   - Pulm consult appreciated  -Cont BB --will transition to XL at discharge   - Pulmonology consult  appreciated-Dr. Law  -Monitor I/Os, daily weights   -Pt with SPO2 at 93% on RA at rest, 85% RA upon exertion  -will d/c home on supplemental oxygen as pt mobile/lives independently at home    *Chronic afib on long term anticoagulation Eliquis PPM:  -c/w Eliquis   -Rate control- on lopressor 25 mg BID transition to Toprol XL at dc    *CHASITY on CKD Stage 3  -Resolved, CR 1.20  -Cr baseline per chart review ~1.2-1.3  -Avoid nephrotoxic medications, continue PO Lasix    *HTN/*CAD  -Continue Lopressor    *HLD  -Continue Simvastatin    *Right lower extremity squamous cell cancer lesion  -Scheduled for OR w Dr Eduardo 12/20  -Dr. Reese discussed with surgery team to have cardiac optimization 1-2 prior to scheduled lesion excision on 12/20. Will need to DC Eliquis prior   -FU with surgical team outpatient    *Hx of asthma  Stable    DVT PPX: eliquis      DISP: PT recommends home pt. Per pt she lives independently and walks around in the house w/o any assistive devices. Pt with SPO2 at 93% on RA at rest, however, 85% RA upon exertion. Will d/c home on supplemental oxygen as pt mobile/lives independently at home. Oxygen form completed and order placed. Likely d/c home today if supplemental oxygen is arranged.     Code Status: DNR    12/11: Called daughter in law and son [phone]     89 y/o female PMH of asthma, HTN, HLD, CHF, CAD w/stent x 1RCA placed 2019, LBBB, Afib on Eliquis + pshx TAVR, 2019 w/placement of Medtronic Micra VR TCR PPM,  right lower extreity squamous cell cancer lesion, sent from elective surgery scheduled for excision of lesion, was found to be hypoxic with SPO2 in mid 80s, admitted with acute on chronic systolic CHF exacerbation complicated by acute respiratory failure with hypoxia    *Acute hypoxic respiratory failure   Suspect due acute on chronic systolic EF 45% heart failure exacerbation   -TTE w/ EF 45-50%, conduction delay, mildly enlarged RV (2019 : EF 69%)   -recent discharge from [facility] for CHF exacerbation on 12/5  -Pt euvolemic  -VQ scan very low probability of clot  -CT chest noted above, findings suggestive of ILD  -Cont. oxygen support to   -DVT LE negative   -Monitor O2 sat, wean off supplemental oxygen, maintain spo2 > 92%  -Cardiology following, recommending decrease Lasix to 20mg daily   - Pulm rec appreciated   -Cont BB - transition to XL at discharge   -Pt with SPO2 at 93% on RA at rest, 85% RA upon exertion  -will d/c home on supplemental oxygen as pt mobile/lives independently at home    *Chronic afib on long term anticoagulation Eliquis PPM:  -c/w Eliquis   -Rate control- on lopressor 25 mg BID transition to Toprol XL at dc    *CHASITY on CKD Stage 3  -Resolved, CR 1.20  -Cr baseline per chart review ~1.2-1.3  -Avoid nephrotoxic medications, continue PO Lasix    *HTN/*CAD  -Continue Lopressor    *HLD  -Continue Simvastatin    *Right lower extremity squamous cell cancer lesion  -Scheduled for OR w Dr Eduardo 12/20  -Dr. Reese discussed with surgery team to have cardiac optimization 1-2 prior to scheduled lesion excision on 12/20. Will need to DC Eliquis prior   -FU with surgical team outpatient    *Hx of asthma  Stable    DVT PPX: eliquis      DISP: PT recommends home pt. Per pt she lives independently and walks around in the house w/o any assistive devices. Pt with SPO2 at 93% on RA at rest, however, 85% RA upon exertion. Will d/c home on supplemental oxygen as pt mobile/lives independently at home. Oxygen form completed and order placed. Likely d/c home today if supplemental oxygen is arranged.     Code Status: DNR    12/12: Called daughter in law and son [phone] updated on plan to discharge home

## 2022-12-12 NOTE — DISCHARGE NOTE NURSING/CASE MANAGEMENT/SOCIAL WORK - PATIENT PORTAL LINK FT
You can access the FollowMyHealth Patient Portal offered by [facility] by registering at the following website: [email or web address]. By joining [facility]’s FollowMyHealth portal, you will also be able to view your health information using other applications (apps) compatible with our system.

## 2022-12-12 NOTE — PROGRESS NOTE ADULT - NUTRITIONAL ASSESSMENT
This patient has been assessed with a concern for Malnutrition and has been determined to have a diagnosis/diagnoses of Severe protein-calorie malnutrition.    This patient is being managed with:   Diet DASH/TLC-  Sodium & Cholesterol Restricted  Entered: Dec  6 2022 10:41AM    

## 2022-12-12 NOTE — PROGRESS NOTE ADULT - PROVIDER SPECIALTY LIST ADULT
Cardiology
Surgery
Hospitalist
Hospitalist
Cardiology
Cardiology
Hospitalist
Hospitalist
Pulmonology
Hospitalist
Pulmonology

## 2022-12-12 NOTE — PROGRESS NOTE ADULT - SUBJECTIVE AND OBJECTIVE BOX
Patient is a 89y old  Female who presents with a chief complaint of Hypoxia (11 Dec 2022 12:27)      Patient seen and examined at bedside.    ALLERGIES:  adhesives (Rash)  amoxicillin (Rash)  lidocaine (Rash)  losartan (Rash)  penicillin (Unknown)    MEDICATIONS  (STANDING):  apixaban 2.5 milliGRAM(s) Oral every 12 hours  brimonidine 0.2%/ timolol 0.5% Ophthalmic Solution 1 Drop(s) Both EYES two times a day  fluticasone furoate/umeclidinium/vilanterol 100-62.5-25 MICROgram(s) Inhaler 1 Puff(s) Inhalation every 24 hours  folic acid 1 milliGRAM(s) Oral daily  furosemide    Tablet 20 milliGRAM(s) Oral daily  influenza  Vaccine (HIGH DOSE) 0.7 milliLiter(s) IntraMuscular once  metoprolol tartrate 25 milliGRAM(s) Oral two times a day  pantoprazole    Tablet 40 milliGRAM(s) Oral before breakfast  simvastatin 10 milliGRAM(s) Oral at bedtime    MEDICATIONS  (PRN):    Vital Signs Last 24 Hrs  T(F): 97.4 (12 Dec 2022 05:38), Max: 97.5 (11 Dec 2022 12:20)  HR: 79 (12 Dec 2022 05:38) (79 - 101)  BP: 137/100 (12 Dec 2022 05:38) (115/85 - 137/100)  RR: 18 (12 Dec 2022 05:38) (18 - 18)  SpO2: 96% (12 Dec 2022 05:38) (95% - 96%)  I&O's Summary    11 Dec 2022 07:01  -  12 Dec 2022 07:00  --------------------------------------------------------  IN: 300 mL / OUT: 500 mL / NET: -200 mL      PHYSICAL EXAM:  General: NAD, A/O x 3  ENT: MMM  Neck: Supple, No JVD  Lungs: Clear to auscultation bilaterally, Non labored breathing   Cardio: RRR, S1/S2, No murmurs  Abdomen: Soft, Nontender, Nondistended; Bowel sounds present  Extremities: No calf tenderness, No pitting edema    LABS:                                          COVID-19 PCR: NotDetec (12-06-22 @ 10:05)    RADIOLOGY & ADDITIONAL TESTS:    Care Discussed with Consultants/Other Providers:    Patient is a 89y old  Female who presents with a chief complaint of Hypoxia (11 Dec 2022 12:27)      Patient seen and examined at bedside.  Pt without complaints , no overnight events.  Pt eager to go home     ALLERGIES:  adhesives (Rash)  amoxicillin (Rash)  lidocaine (Rash)  losartan (Rash)  penicillin (Unknown)    MEDICATIONS  (STANDING):  apixaban 2.5 milliGRAM(s) Oral every 12 hours  brimonidine 0.2%/ timolol 0.5% Ophthalmic Solution 1 Drop(s) Both EYES two times a day  fluticasone furoate/umeclidinium/vilanterol 100-62.5-25 MICROgram(s) Inhaler 1 Puff(s) Inhalation every 24 hours  folic acid 1 milliGRAM(s) Oral daily  furosemide    Tablet 20 milliGRAM(s) Oral daily  influenza  Vaccine (HIGH DOSE) 0.7 milliLiter(s) IntraMuscular once  metoprolol tartrate 25 milliGRAM(s) Oral two times a day  pantoprazole    Tablet 40 milliGRAM(s) Oral before breakfast  simvastatin 10 milliGRAM(s) Oral at bedtime    MEDICATIONS  (PRN):    Vital Signs Last 24 Hrs  T(F): 97.4 (12 Dec 2022 05:38), Max: 97.5 (11 Dec 2022 12:20)  HR: 79 (12 Dec 2022 05:38) (79 - 101)  BP: 137/100 (12 Dec 2022 05:38) (115/85 - 137/100)  RR: 18 (12 Dec 2022 05:38) (18 - 18)  SpO2: 96% (12 Dec 2022 05:38) (95% - 96%)  I&O's Summary    11 Dec 2022 07:01  -  12 Dec 2022 07:00  --------------------------------------------------------  IN: 300 mL / OUT: 500 mL / NET: -200 mL      PHYSICAL EXAM:  General: 89 y/o female in  NAD, A/O x 3  2l Nc o2 at 96%  ENT: MMM  Neck: Supple, No JVD  Lungs: Clear to auscultation bilaterally, Non labored breathing   Cardio: RRR, S1/S2, No murmurs  Abdomen: Soft, Nontender, Nondistended; Bowel sounds present  Extremities: No calf tenderness, No pitting edema    LABS:                                          COVID-19 PCR: NotDetec (12-06-22 @ 10:05)    RADIOLOGY & ADDITIONAL TESTS:    Care Discussed with Consultants/Other Providers:

## 2022-12-13 ENCOUNTER — TRANSCRIPTION ENCOUNTER (OUTPATIENT)
Age: 87
End: 2022-12-13

## 2022-12-14 ENCOUNTER — APPOINTMENT (OUTPATIENT)
Dept: CARE COORDINATION | Facility: HOME HEALTH | Age: 87
End: 2022-12-14
Payer: MEDICARE

## 2022-12-14 VITALS
DIASTOLIC BLOOD PRESSURE: 78 MMHG | SYSTOLIC BLOOD PRESSURE: 129 MMHG | WEIGHT: 112 LBS | TEMPERATURE: 97.9 F | HEART RATE: 83 BPM | HEIGHT: 59 IN | BODY MASS INDEX: 22.58 KG/M2 | RESPIRATION RATE: 18 BRPM | OXYGEN SATURATION: 99 %

## 2022-12-14 DIAGNOSIS — J45.909 UNSPECIFIED ASTHMA, UNCOMPLICATED: ICD-10-CM

## 2022-12-14 PROCEDURE — 99495 TRANSJ CARE MGMT MOD F2F 14D: CPT

## 2022-12-19 ENCOUNTER — TRANSCRIPTION ENCOUNTER (OUTPATIENT)
Age: 87
End: 2022-12-19

## 2022-12-20 ENCOUNTER — APPOINTMENT (OUTPATIENT)
Dept: SURGICAL ONCOLOGY | Facility: HOSPITAL | Age: 87
End: 2022-12-20

## 2022-12-20 ENCOUNTER — OUTPATIENT (OUTPATIENT)
Dept: OUTPATIENT SERVICES | Facility: HOSPITAL | Age: 87
LOS: 1 days | End: 2022-12-20
Payer: MEDICARE

## 2022-12-20 ENCOUNTER — TRANSCRIPTION ENCOUNTER (OUTPATIENT)
Age: 87
End: 2022-12-20

## 2022-12-20 ENCOUNTER — RESULT REVIEW (OUTPATIENT)
Age: 87
End: 2022-12-20

## 2022-12-20 VITALS
DIASTOLIC BLOOD PRESSURE: 68 MMHG | HEIGHT: 59 IN | OXYGEN SATURATION: 96 % | HEART RATE: 75 BPM | RESPIRATION RATE: 17 BRPM | WEIGHT: 114.64 LBS | SYSTOLIC BLOOD PRESSURE: 117 MMHG | TEMPERATURE: 97 F

## 2022-12-20 DIAGNOSIS — Z98.890 OTHER SPECIFIED POSTPROCEDURAL STATES: Chronic | ICD-10-CM

## 2022-12-20 DIAGNOSIS — C44.721 SQUAMOUS CELL CARCINOMA OF SKIN OF UNSPECIFIED LOWER LIMB, INCLUDING HIP: ICD-10-CM

## 2022-12-20 DIAGNOSIS — Z95.5 PRESENCE OF CORONARY ANGIOPLASTY IMPLANT AND GRAFT: Chronic | ICD-10-CM

## 2022-12-20 DIAGNOSIS — Z98.61 CORONARY ANGIOPLASTY STATUS: Chronic | ICD-10-CM

## 2022-12-20 DIAGNOSIS — Z90.721 ACQUIRED ABSENCE OF OVARIES, UNILATERAL: Chronic | ICD-10-CM

## 2022-12-20 DIAGNOSIS — Z95.2 PRESENCE OF PROSTHETIC HEART VALVE: Chronic | ICD-10-CM

## 2022-12-20 LAB — SARS-COV-2 RNA SPEC QL NAA+PROBE: SIGNIFICANT CHANGE UP

## 2022-12-20 PROCEDURE — 93010 ELECTROCARDIOGRAM REPORT: CPT

## 2022-12-20 PROCEDURE — 71045 X-RAY EXAM CHEST 1 VIEW: CPT | Mod: 26

## 2022-12-20 PROCEDURE — ZZZZZ: CPT

## 2022-12-20 PROCEDURE — 27615 RESECT LEG/ANKLE TUM < 5 CM: CPT

## 2022-12-20 PROCEDURE — 99205 OFFICE O/P NEW HI 60 MIN: CPT

## 2022-12-20 PROCEDURE — 88305 TISSUE EXAM BY PATHOLOGIST: CPT | Mod: 26

## 2022-12-20 RX ORDER — GLUCOSAMINE/CHONDROITIN/C/MANG 500-400 MG
1 CAPSULE ORAL
Qty: 0 | Refills: 0 | DISCHARGE

## 2022-12-20 RX ORDER — POTASSIUM CHLORIDE 20 MEQ
20 PACKET (EA) ORAL DAILY
Refills: 0 | Status: DISCONTINUED | OUTPATIENT
Start: 2022-12-20 | End: 2023-01-03

## 2022-12-20 RX ORDER — BUDESONIDE AND FORMOTEROL FUMARATE DIHYDRATE 160; 4.5 UG/1; UG/1
2 AEROSOL RESPIRATORY (INHALATION) DAILY
Refills: 0 | Status: DISCONTINUED | OUTPATIENT
Start: 2022-12-20 | End: 2022-12-20

## 2022-12-20 RX ORDER — SODIUM CHLORIDE 9 MG/ML
1000 INJECTION, SOLUTION INTRAVENOUS
Refills: 0 | Status: DISCONTINUED | OUTPATIENT
Start: 2022-12-20 | End: 2022-12-20

## 2022-12-20 RX ORDER — PANTOPRAZOLE SODIUM 20 MG/1
40 TABLET, DELAYED RELEASE ORAL
Refills: 0 | Status: DISCONTINUED | OUTPATIENT
Start: 2022-12-20 | End: 2023-01-03

## 2022-12-20 RX ORDER — FLUTICASONE FUROATE, UMECLIDINIUM BROMIDE AND VILANTEROL TRIFENATATE 200; 62.5; 25 UG/1; UG/1; UG/1
1 POWDER RESPIRATORY (INHALATION) DAILY
Refills: 0 | Status: DISCONTINUED | OUTPATIENT
Start: 2022-12-20 | End: 2023-01-03

## 2022-12-20 RX ORDER — ONDANSETRON 8 MG/1
4 TABLET, FILM COATED ORAL ONCE
Refills: 0 | Status: DISCONTINUED | OUTPATIENT
Start: 2022-12-20 | End: 2022-12-20

## 2022-12-20 RX ORDER — ALBUTEROL 90 UG/1
1 AEROSOL, METERED ORAL
Qty: 0 | Refills: 0 | DISCHARGE

## 2022-12-20 RX ORDER — HYDROMORPHONE HYDROCHLORIDE 2 MG/ML
0.25 INJECTION INTRAMUSCULAR; INTRAVENOUS; SUBCUTANEOUS
Refills: 0 | Status: DISCONTINUED | OUTPATIENT
Start: 2022-12-20 | End: 2022-12-20

## 2022-12-20 RX ORDER — METOPROLOL TARTRATE 50 MG
50 TABLET ORAL DAILY
Refills: 0 | Status: DISCONTINUED | OUTPATIENT
Start: 2022-12-20 | End: 2023-01-03

## 2022-12-20 RX ORDER — ACETAMINOPHEN 500 MG
650 TABLET ORAL ONCE
Refills: 0 | Status: DISCONTINUED | OUTPATIENT
Start: 2022-12-20 | End: 2023-01-03

## 2022-12-20 RX ORDER — TIOTROPIUM BROMIDE 18 UG/1
2 CAPSULE ORAL; RESPIRATORY (INHALATION) DAILY
Refills: 0 | Status: DISCONTINUED | OUTPATIENT
Start: 2022-12-20 | End: 2022-12-20

## 2022-12-20 RX ORDER — SODIUM CHLORIDE 9 MG/ML
3 INJECTION INTRAMUSCULAR; INTRAVENOUS; SUBCUTANEOUS EVERY 8 HOURS
Refills: 0 | Status: DISCONTINUED | OUTPATIENT
Start: 2022-12-20 | End: 2023-01-03

## 2022-12-20 RX ORDER — FUROSEMIDE 40 MG
20 TABLET ORAL DAILY
Refills: 0 | Status: DISCONTINUED | OUTPATIENT
Start: 2022-12-20 | End: 2023-01-03

## 2022-12-20 RX ORDER — SIMVASTATIN 20 MG/1
10 TABLET, FILM COATED ORAL AT BEDTIME
Refills: 0 | Status: DISCONTINUED | OUTPATIENT
Start: 2022-12-20 | End: 2023-01-03

## 2022-12-20 RX ORDER — HYDROMORPHONE HYDROCHLORIDE 2 MG/ML
0.5 INJECTION INTRAMUSCULAR; INTRAVENOUS; SUBCUTANEOUS
Refills: 0 | Status: DISCONTINUED | OUTPATIENT
Start: 2022-12-20 | End: 2022-12-20

## 2022-12-20 RX ORDER — FAMOTIDINE 10 MG/ML
20 INJECTION INTRAVENOUS AT BEDTIME
Refills: 0 | Status: DISCONTINUED | OUTPATIENT
Start: 2022-12-20 | End: 2023-01-03

## 2022-12-20 RX ORDER — INFLUENZA VIRUS VACCINE 15; 15; 15; 15 UG/.5ML; UG/.5ML; UG/.5ML; UG/.5ML
0.7 SUSPENSION INTRAMUSCULAR ONCE
Refills: 0 | Status: DISCONTINUED | OUTPATIENT
Start: 2022-12-20 | End: 2023-01-03

## 2022-12-20 RX ORDER — APIXABAN 2.5 MG/1
1 TABLET, FILM COATED ORAL
Qty: 0 | Refills: 0 | DISCHARGE

## 2022-12-20 RX ORDER — BRIMONIDINE TARTRATE, TIMOLOL MALEATE 2; 5 MG/ML; MG/ML
1 SOLUTION/ DROPS OPHTHALMIC
Qty: 0 | Refills: 0 | DISCHARGE

## 2022-12-20 RX ORDER — APIXABAN 2.5 MG/1
2.5 TABLET, FILM COATED ORAL
Refills: 0 | Status: DISCONTINUED | OUTPATIENT
Start: 2022-12-20 | End: 2023-01-03

## 2022-12-20 RX ORDER — BRIMONIDINE TARTRATE, TIMOLOL MALEATE 2; 5 MG/ML; MG/ML
1 SOLUTION/ DROPS OPHTHALMIC
Refills: 0 | Status: DISCONTINUED | OUTPATIENT
Start: 2022-12-20 | End: 2023-01-03

## 2022-12-20 RX ADMIN — SODIUM CHLORIDE 3 MILLILITER(S): 9 INJECTION INTRAMUSCULAR; INTRAVENOUS; SUBCUTANEOUS at 18:02

## 2022-12-20 RX ADMIN — FAMOTIDINE 20 MILLIGRAM(S): 10 INJECTION INTRAVENOUS at 23:13

## 2022-12-20 RX ADMIN — APIXABAN 2.5 MILLIGRAM(S): 2.5 TABLET, FILM COATED ORAL at 18:00

## 2022-12-20 RX ADMIN — BRIMONIDINE TARTRATE, TIMOLOL MALEATE 1 DROP(S): 2; 5 SOLUTION/ DROPS OPHTHALMIC at 22:58

## 2022-12-20 RX ADMIN — SIMVASTATIN 10 MILLIGRAM(S): 20 TABLET, FILM COATED ORAL at 22:58

## 2022-12-20 RX ADMIN — SODIUM CHLORIDE 50 MILLILITER(S): 9 INJECTION, SOLUTION INTRAVENOUS at 07:28

## 2022-12-20 RX ADMIN — SODIUM CHLORIDE 3 MILLILITER(S): 9 INJECTION INTRAMUSCULAR; INTRAVENOUS; SUBCUTANEOUS at 21:40

## 2022-12-20 NOTE — ASU PATIENT PROFILE, ADULT - MEDICATION HERBAL REMEDIES, PROFILE
[facility] Followup Visit Note    Referring Physician : Jewels Alexis MD  [id number] [facility] RECORD NUMBER:  [id number]  AGE: 76 y.o. GENDER: female  : 1947  EPISODE DATE:  2021    Subjective:     Chief Complaint   Patient presents with    Wound Check     Left leg      HISTORY of PRESENT ILLNESS HPI   Audrey Bynum is a 76 y.o. female who presents today in regards to follow up evaluation and treatment of wound/ulcer. That patient's past medical, family and social hx were reviewed and changes were made if present. History of Wound Context:  Patient has had left calf wound since ~ 2021. She has been putting a dressing on it. The wound has not been improving. She has a hx significant for venous stasis ulcerations of bilateral LE. She first was seen by myself in regards to these issues 2015. She eventually healed these wounds 2016. I last saw her 2021 at which time I recommended lymphedema therapy. She states she went and said it caused her to much pain and stopped going. She has chronic issues with bilateral calf pain, swelling and edema.            She admits to not wearing her stockings because of the pain.     She has used knee high 20-30 mm hg stockings in the past.       She is still seeing pain management and is down to percocet /325 mg daily.       21  · aquacell  · Double tubigrip  · Culture done  · Emphasized importance of getting in to more significant compression in the future  12/15/21  · Culture reviewed - light growth, no tx  · Wound slightly improved  · Still significant drainage  · Plan on compression wrap next week  21  · Stable wound  · Drainage slightly better  · Pt would like to wait till next week because of holidays to start wrap    Wound/Ulcer Pain Timing/Severity: waxing and waning, mild  Quality of pain: aching, throbbing, pressure  Severity:  2 / 10   Modifying Factors: Pain worsens with debridement, dressing changes  Associated Signs/Symptoms: edema, drainage and pain    Ulcer Identification:  Ulcer Type: venous and lymphedema  Contributing Factors: edema, venous stasis and lymphedema    Diabetic/Pressure/Non Pressure Ulcers only:  Ulcer: Non-Pressure ulcer, fat layer exposed    Wound: N/A        PAST MEDICAL HISTORY      Diagnosis Date    Bursitis     CAD (coronary artery disease)     heart attack    COPD (chronic obstructive pulmonary disease) ([address] 2013    Emphysema     slight    GERD (gastroesophageal reflux disease)     Hiatal hernia     Hip pain     Hyperlipidemia     Hypertension     Lymphedema of both lower extremities 2018    Venous insufficiency of both lower extremities 2018    Venous stasis ulcer of left calf with fat layer exposed without varicose veins ([address] 2021    Venous ulcer with fat layer exposed ([address] 10/28/2015     Past Surgical History:   Procedure Laterality Date    APPENDECTOMY      BREAST ENHANCEMENT SURGERY      BREAST REDUCTION SURGERY      CHOLECYSTECTOMY      COLONOSCOPY      ECHO COMPL W DOP COLOR FLOW  3/11/2013         ENDOSCOPY, COLON, DIAGNOSTIC      HERNIA REPAIR N/A 2021    LAPAROSCOPIC ROBOTIC ASSISTED INGUINAL HERNIA REPAIR performed by Aida Levin MD at [address]  2009    l knee r hip    LEG DEBRIDEMENT Left 2015    LEG DEBRIDEMENT Left 2016     History reviewed. No pertinent family history.   Social History     Tobacco Use    Smoking status: Former Smoker     Packs/day: 1.00     Years: 20.00     Pack years: 20.00     Types: Cigarettes     Quit date: 1995     Years since quittin.1    Smokeless tobacco: Never Used   Vaping Use    Vaping Use: Never used   Substance Use Topics    Alcohol use: No    Drug use: No     Allergies   Allergen Reactions    Codeine Nausea And Vomiting and Other (See Comments)     hallucinate    Dilaudid current facility-administered medications on file prior to encounter.        REVIEW OF SYSTEMS See HPI    Objective:    BP (!) 150/86   Pulse 86   Temp 96.7 °F (35.9 °C) (Tympanic)   Resp 20   Wt 300 lb (136.1 kg)   BMI 46.99 kg/m²   Wt Readings from Last 3 Encounters:   12/22/21 300 lb (136.1 kg)   12/15/21 300 lb (136.1 kg)   12/08/21 300 lb (136.1 kg)     PHYSICAL EXAM  CONSTITUTIONAL:   Awake, alert, cooperative   EYES:  lids and lashes normal   ENT: external ears and nose without lesions   NECK:  supple, symmetrical, trachea midline   SKIN:  Open wound Present    Assessment:     Problem List Items Addressed This Visit     Venous stasis ulcer of left calf with fat layer exposed without varicose veins ([address] - Primary (Chronic)    Relevant Orders    Initiate Outpatient Wound Care Protocol          Pre Debridement Measurements:  Are located in the [place]  Documentation Flow Sheet  Post Debridement Measurements:  Wound/Ulcer Descriptions are Pre Debridement except measurements:     Incision 04/20/16 Leg Left (Active)   Number of days: 2072       Incision 08/03/16 Leg Left (Active)   Number of days: 1967       Wound 12/08/21 Pretibial Left #1 (Active)   Wound Image   12/08/21 1443   Dressing Status New dressing applied 12/22/21 1657   Wound Cleansed Cleansed with saline 12/22/21 1657   Dressing/Treatment Alginate;Dry dressing 12/22/21 1657   Offloading for Diabetic Foot Ulcers No offloading required 12/22/21 1657   Wound Length (cm) 1.2 cm 12/22/21 1600   Wound Width (cm) 0.8 cm 12/22/21 1600   Wound Depth (cm) 0.1 cm 12/22/21 1600   Wound Surface Area (cm^2) 0.96 cm^2 12/22/21 1600   Change in Wound Size % (l*w) -37.14 12/22/21 1600   Wound Volume (cm^3) 0.096 cm^3 12/22/21 1600   Wound Healing % -37 12/22/21 1600   Post-Procedure Length (cm) 1.4 cm 12/22/21 1643   Post-Procedure Width (cm) 1 cm 12/22/21 1643   Post-Procedure Depth (cm) 0.1 cm 12/22/21 1643   Post-Procedure Surface Area (cm^2) 1.4 cm^2 12/22/21 1643   Post-Procedure Volume (cm^3) 0.14 cm^3 12/22/21 1643   Wound Assessment Fibrin;Pink/red 12/22/21 1600   Drainage Amount Small 12/22/21 1600   Drainage Description Yellow 12/22/21 1600   Odor None 12/22/21 1600   Kori-wound Assessment Hyperpigmented 12/22/21 1600   Number of days: 14     Incision 04/16/21 Abdomen (Active)   Number of days: 250       Procedure Note  Indications:  Based on my examination of this patient's wound(s)/ulcer(s) today, debridement is required to promote healing and evaluate the wound base. Performed by: Cammy Combs MD    Consent obtained:  Yes    Time out taken:  Yes    Pain Control: Anesthetic  Anesthetic: 4% Lidocaine Liquid Topical     Debridement:Excisional Debridement    Using curette the wound(s)/ulcer(s) was/were sharply debrided down through and including the removal of epidermis, dermis and subcutaneous tissue. Devitalized Tissue Debrided:  fibrin, biofilm, slough and exudate to stimulate bleeding to promote healing, post debridement good bleeding base and wound edges noted    Wound/Ulcer #: 1    Percent of Wound/Ulcer Debrided: 100%    Total Surface Area Debrided:  0.96 sq cm     Estimated Blood Loss:  Minimal  Hemostasis Achieved:  by pressure    Procedural Pain:  6  / 10   Post Procedural Pain:  5 / 10     Response to treatment:  Well tolerated by patient. Plan:   Treatment Note please see attached Discharge Instructions    Written patient dismissal instructions given to patient and signed by patient or POA. Discharge Instructions       Visit Discharge/Physician Orders     Discharge condition: Stable     Assessment of pain at discharge: mild     Anesthetic used: lido 4%     Discharge to: Home     Left via:Private automobile     Accompanied by: accompanied by self     ECF/HHA: [facility]     Dressing Orders: To left pretib wound: cleanse with normal saline, apply aquacel, cover and secure with dry dressing.    Apply Spandigrip    Treatment Orders:Eat a diet high in protein and vitamin C. Take a multiple vitamin daily unless contraindicated.     [facility] followup visit 1 week____________________________  (Please note your next appointment above and if you are unable to keep, kindly give a 24 hour notice. Thank you.)     Physician signature:__________________________        If you experience any of the following, please call the [facility] during business hours:     * Increase in Pain  * Temperature over 101  * Increase in drainage from your wound  * Drainage with a foul odor  * Bleeding  * Increase in swelling  * Need for compression bandage changes due to slippage, breakthrough drainage.     If you need medical attention outside of the business hours of the [facility] please contact your PCP or go to the nearest emergency room.         Electronically signed by Cammy Combs MD on 12/22/2021 at 11:15 PM no

## 2022-12-20 NOTE — ASU PATIENT PROFILE, ADULT - FALL HARM RISK - HARM RISK INTERVENTIONS

## 2022-12-20 NOTE — ASU PATIENT PROFILE, ADULT - ABILITY TO HEAR (WITH HEARING AID OR HEARING APPLIANCE IF NORMALLY USED):
hearing aids bilaterally/Mildly to Moderately Impaired: difficulty hearing in some environments or speaker may need to increase volume or speak distinctly hearing aids bilaterally/Adequate: hears normal conversation without difficulty

## 2022-12-20 NOTE — ASU PREOP CHECKLIST - NS PREOP CHK MONITOR ANESTHESIA CONSENT
HEENT: NC/AT, pupils nondilated, trachea midline Neck: supple, NT, no LAD Chest: Symmetrical, no labored respirations, no audible wheezing Heart: Regular rate and rhythm Abd: Soft NT/ND, no organomegaly, distention, masses Ext: no c/c/e Neuro: no focal defects, alert and oriented done

## 2022-12-20 NOTE — CONSULT NOTE ADULT - ASSESSMENT
89 y/o, pleasant female. 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 Patient was admitted to [facility] on Dec 6 for acute hypoxia, Acute Decompensated CHF, CHASITY on CKD 3. Echo showed EF 45-50%. CXR with CHF and ILD. cardio and pulmonary was consulted. patient was stabilized and discharged on Dec 12. patient was seen and examined at bedside. no complaints.     Medical consult was called for pre-op medical clearance for elective RLE SCC resection under LA and  Sedation. no GA.  patient ideally would need cadio clearance before medical clearance. however, patient is clinically stable. no complaints. ROS neg. exam none acute, euvolemic. VS normal on RA. EKG today same as previous  last echo reviewed EF 45-50% on Dec 7.   CXR showed stable fibrosis. no CHF. discussed with radiologist Dr. Abbott  patient can undergo planned procedure with higher than average risk. suggest post-op cardio eval before DC. if GA is planned, needs cardiology and pulmonary clearance pre-op.   ordered O2 PRN to keep sat>94%  patient needs to see PCP in 2-3 days and cardiology in 1 week of DC.       d/w bedside RN and Dr. tong pre-op.

## 2022-12-20 NOTE — CONSULT NOTE ADULT - SUBJECTIVE AND OBJECTIVE BOX
patient was seen and examined at bedside. no complaints.     CONSTITUTIONAL:  No distress.no fever/chills/fatigue/weight loss  HEENT:  Eyes:  No diplopia or blurred vision.   CARDIOVASCULAR:  No pressure, squeezing, tightness, heaviness or aching about the chest; no palpitations.no leg swelling, no orthopnea or PND  RESPIRATORY:  no SOB. no wheezing.no cough or sputum.  No hemoptysis  GI: no abd pain, no nausea, no vomiting, no diarrhea, no constipation. No hematochezia or melena  EXT:No joint pain or joint swelling or redness  SKIN: no skin break or ulcer. No cellulitis.   CNS: No headaches. No weakness.no numbness. No depression or anxiety. No SI     GENERAL: Not in distress. Alert    HEENT: AT/NC. clear conjuctiva, MMM.   no pallor or icterus  CARDIOVASCULAR: RRR S1, S2. No murmur/rubs/gallop  LUNGS: BLAE+, no rales, no wheezing, no rhonchi.    ABDOMEN: ND. Soft,  NT, no guarding / rebound / rigidity. BS normoactive. No CVA tenderness.    BACK: No spine tenderness.  EXTREMITIES: no edema. no leg or calf TP.  SKIN: no skin break or ulcer on exposed skin. No cellulitis. RLE tumor with some scattered rash  NEUROLOGIC: AAO*3.strength is symmetric, sensation intact, speech fluent.    PSYCHIATRIC: Calm.  No agitation.    recent labs on dec 16 reviewed    < from: TTE Echo Complete w/o Contrast w/ Doppler (12.07.22 @ 13:00) >  Summary:   1. Technically difficult study with poor endocardial visualization.   2. Mildly decreased global left ventricular systolic function.   3. Left ventricular ejection fraction, by visual estimation, is 45 to   50%.   4. Abnormal septal motion which may be due to conduction delay.   Endocardium not well visualized, consider use of IV echo contrast to   better evaluate endocardium and LVEF, if clinically indicated.   5. Normal left ventricular internal cavity size.   6. The mitral in-flow pattern reveals no discernable A-wave, therefore   no comment on diastolic function can be made.   7. Mildly enlarged right ventricle with low normal right ventricle   systolic function.   8. Left atrial enlargement.   9. Right atrial enlargement.  10. Mild mitral annular calcification.  11. Mild thickening and calcification of the anterior and posterior   mitral valve leaflets.  12. Mild mitral valve regurgitation.  13. 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  14. Mild tricuspid regurgitation.  15. There is no evidence of pericardial effusion.    [id number] Michael Branch MD Electronically signed on   12/7/2022 at 2:38:45 PM    < end of copied text >      a/p:    patient has h/o CHF, with recent exacerbation, CAD s/p stent. asked for pre-op medical clearance for elective RLE SCC resection under LA and  Sedation. no GA.  patient ideally would need cadio clearance before medical clearance. however, patient is clinically stable. no complaints. ROS neg. exam none acute, euvolemic. VS normal on RA. EKG today same as previous  last echo reviewed EF 45-50% on Dec 7. CXR pending.   patient can undergo planned procedure with higher than average risk pending CXR. patient needs post-op cardio eval. if GA is planned, needs cardiology clearance pre-op.   full cx note to follow.  d/w bedside RN and Dr. tong.              patient was seen and examined at bedside. no complaints.     CONSTITUTIONAL:  No distress.no fever/chills/fatigue/weight loss  HEENT:  Eyes:  No diplopia or blurred vision.   CARDIOVASCULAR:  No pressure, squeezing, tightness, heaviness or aching about the chest; no palpitations.no leg swelling, no orthopnea or PND  RESPIRATORY:  no SOB. no wheezing.no cough or sputum.  No hemoptysis  GI: no abd pain, no nausea, no vomiting, no diarrhea, no constipation. No hematochezia or melena  EXT:No joint pain or joint swelling or redness  SKIN: no skin break or ulcer. No cellulitis.   CNS: No headaches. No weakness.no numbness. No depression or anxiety. No SI     GENERAL: Not in distress. Alert    HEENT: AT/NC. clear conjuctiva, MMM.   no pallor or icterus  CARDIOVASCULAR: RRR S1, S2. No murmur/rubs/gallop  LUNGS: BLAE+, no rales, no wheezing, no rhonchi.    ABDOMEN: ND. Soft,  NT, no guarding / rebound / rigidity. BS normoactive. No CVA tenderness.    BACK: No spine tenderness.  EXTREMITIES: no edema. no leg or calf TP.  SKIN: no skin break or ulcer on exposed skin. No cellulitis. RLE tumor with some scattered rash  NEUROLOGIC: AAO*3.strength is symmetric, sensation intact, speech fluent.    PSYCHIATRIC: Calm.  No agitation.    recent labs on dec 16 reviewed    < from: TTE Echo Complete w/o Contrast w/ Doppler (12.07.22 @ 13:00) >  Summary:   1. Technically difficult study with poor endocardial visualization.   2. Mildly decreased global left ventricular systolic function.   3. Left ventricular ejection fraction, by visual estimation, is 45 to   50%.   4. Abnormal septal motion which may be due to conduction delay.   Endocardium not well visualized, consider use of IV echo contrast to   better evaluate endocardium and LVEF, if clinically indicated.   5. Normal left ventricular internal cavity size.   6. The mitral in-flow pattern reveals no discernable A-wave, therefore   no comment on diastolic function can be made.   7. Mildly enlarged right ventricle with low normal right ventricle   systolic function.   8. Left atrial enlargement.   9. Right atrial enlargement.  10. Mild mitral annular calcification.  11. Mild thickening and calcification of the anterior and posterior   mitral valve leaflets.  12. Mild mitral valve regurgitation.  13. 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  14. Mild tricuspid regurgitation.  15. There is no evidence of pericardial effusion.    [id number] Michael Branch MD Electronically signed on   12/7/2022 at 2:38:45 PM    < end of copied text >      a/p:    patient has h/o CHF, with recent exacerbation, CAD s/p stent. asked for pre-op medical clearance for elective RLE SCC resection under LA and  Sedation. no GA.  patient ideally would need cadio clearance before medical clearance. however, patient is clinically stable. no complaints. ROS neg. exam none acute, euvolemic. VS normal on RA. EKG today same as previous  last echo reviewed EF 45-50% on Dec 7. CXR showed stable ILD. no CHF. discussed with radiologist Dr. Abbott  patient can undergo planned procedure with higher than average risk pending CXR. patient needs post-op cardio eval. if GA is planned, needs cardiology and pulmonary clearance pre-op.   full cx note to follow.  d/w bedside RN and Dr. tong.          89 y/o, pleasant female. 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 Patient was admitted to [facility] on Dec 6 for acute hypoxia, Acute Decompensated CHF, CHASITY on CKD 3. Echo showed EF 45-50%. CXR with CHF and ILD. cardio and pulmonary was consulted. patient was stabilized and discharged on Dec 12. patient was seen and examined at bedside. no complaints.     ROS:    CONSTITUTIONAL:  No distress. no fever/chills/fatigue/weight loss  HEENT:  Eyes:  No diplopia or blurred vision.   CARDIOVASCULAR:  No pressure, squeezing, tightness, heaviness or aching about the chest; no palpitations.no leg swelling, no orthopnea or PND  RESPIRATORY:  no SOB. no wheezing.no cough or sputum.  No hemoptysis  GI: no abd pain, no nausea, no vomiting, no diarrhea, no constipation. No hematochezia or melena  EXT:No joint pain or joint swelling or redness. no leg or calf pain  SKIN: no skin break or ulcer. No cellulitis.   CNS: No headaches. No weakness. no numbness. No depression or anxiety.     PMHx  PAST MEDICAL HISTORY:  Afib     Aortic stenosis     Aortic stenosis     Asthma     Asthma on breo for 2 yrs    Bronchitis     CAD (coronary artery disease) recent coronary stent 3/19    Cardiac pacemaker Medtronic Micra placed 4/2019    Glaucoma     Heart failure     Hiatal hernia     History of left bundle branch block (LBBB)     HLD (hyperlipidemia)     HTN (hypertension)     Skin cancer basal    Squamous cell skin cancer.     PAST SURGICAL HISTORY:  H/O eye surgery bilateral cataracts    H/O unilateral oophorectomy     S/P coronary artery stent placement     S/P PTCA (percutaneous transluminal coronary angioplasty) coronary stent 3/19    S/P TAVR (transcatheter aortic valve replacement).     FAMILY HISTORY:  Family history of heart failure  Past surgery:    Social; lives alone, denied smoking/ETOH      Allergies and Intolerances:        Allergies:  	amoxicillin: Drug, Rash  	losartan: Drug, Rash  	adhesives: Miscellaneous, Rash  	penicillin: Drug, Unknown    MEDICATIONS  (STANDING):  budesonide  80 MICROgram(s)/formoterol 4.5 MICROgram(s) Inhaler 2 Puff(s) Inhalation daily  famotidine    Tablet 20 milliGRAM(s) Oral daily  furosemide    Tablet 20 milliGRAM(s) Oral daily  metoprolol succinate ER 50 milliGRAM(s) Oral daily  pantoprazole    Tablet 40 milliGRAM(s) Oral before breakfast  potassium chloride    Tablet ER 20 milliEquivalent(s) Oral daily  simvastatin 10 milliGRAM(s) Oral at bedtime  sodium chloride 0.9% lock flush 3 milliLiter(s) IV Push every 8 hours  tiotropium 2.5 MICROgram(s) Inhaler 2 Puff(s) Inhalation daily    MEDICATIONS  (PRN):      PHYSICAL EXAm:    Vital Signs Last 24 Hrs  T(C): 35.8 (20 Dec 2022 08:16), Max: 36.3 (20 Dec 2022 05:35)  T(F): 96.4 (20 Dec 2022 08:16), Max: 97.4 (20 Dec 2022 05:35)  HR: 67 (20 Dec 2022 08:16) (67 - 75)  BP: 123/83 (20 Dec 2022 08:16) (117/68 - 123/83)  BP(mean): --  RR: 17 (20 Dec 2022 05:35) (17 - 17)  SpO2: 97% (20 Dec 2022 08:16) (96% - 97%)      GENERAL: Not in distress. Alert    HEENT: AT/NC. clear conjuctiva, MMM.   no pallor or icterus  CARDIOVASCULAR: RRR S1, S2. No murmur/rubs/gallop  LUNGS: BLAE+, no rales, no wheezing, no rhonchi.    ABDOMEN: ND. Soft,  NT, no guarding / rebound / rigidity. BS normoactive. No CVA tenderness.    BACK: No spine tenderness.  EXTREMITIES: no edema. no leg or calf TP.  SKIN: no skin break or ulcer on exposed skin. No cellulitis. RLE necrotic lesion, with some scattered rash  NEUROLOGIC: AAO*3.strength is symmetric, sensation intact, speech fluent.    PSYCHIATRIC: Calm.  No agitation.    recent labs on dec 16 reviewed    EKG: same as previous. reviewed by me    < from: Xray Chest 1 View- PORTABLE-Urgent (Xray Chest 1 View- PORTABLE-Urgent .) (12.20.22 @ 08:41) >    Heart is magnified by technique. TAVR aortic valve and Micra pacemaker   again noted.    Again noted is increased interstitial pattern in the lungs suggesting   chronic interstitial lung disease.    Linear scar right upper outer lung again noted.    To-and-fro thoracolumbar curve again notedand chronic bilateral apical   thickening is again seen.    Chest is similar to December 8. CAT scan of December a better   demonstrated a large hiatal hernia.    IMPRESSION: Unchanged chest suggesting chronic interstitial lung disease.    < end of copied text >      < from: TTE Echo Complete w/o Contrast w/ Doppler (12.07.22 @ 13:00) >  Summary:   1. Technically difficult study with poor endocardial visualization.   2. Mildly decreased global left ventricular systolic function.   3. Left ventricular ejection fraction, by visual estimation, is 45 to   50%.   4. Abnormal septal motion which may be due to conduction delay.   Endocardium not well visualized, consider use of IV echo contrast to   better evaluate endocardium and LVEF, if clinically indicated.   5. Normal left ventricular internal cavity size.   6. The mitral in-flow pattern reveals no discernable A-wave, therefore   no comment on diastolic function can be made.   7. Mildly enlarged right ventricle with low normal right ventricle   systolic function.   8. Left atrial enlargement.   9. Right atrial enlargement.  10. Mild mitral annular calcification.  11. Mild thickening and calcification of the anterior and posterior   mitral valve leaflets.  12. Mild mitral valve regurgitation.  13. 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  14. Mild tricuspid regurgitation.  15. There is no evidence of pericardial effusion.    [id number] Michael Branch MD Electronically signed on   12/7/2022 at 2:38:45 PM    < end of copied text >

## 2022-12-20 NOTE — BRIEF OPERATIVE NOTE - NSICDXBRIEFPROCEDURE_GEN_ALL_CORE_FT
PROCEDURES:  Excision of mass of right tibia 20-Dec-2022 10:28:02 squamous cell carcinoma Grecia Rios

## 2022-12-20 NOTE — PATIENT PROFILE ADULT - FALL HARM RISK - HARM RISK INTERVENTIONS

## 2022-12-20 NOTE — PATIENT PROFILE ADULT - FALL HARM RISK - FALL HARM RISK
Medical Week 1 Survey      Responses   Facility patient discharged from?  [facility]   Does the patient have one of the following disease processes/diagnoses(primary or secondary)?  Other   Is there a successful TCM telephone encounter documented?  Yes          Ignacia Kraus RN   Age/Surgery

## 2022-12-20 NOTE — ASU PREOP CHECKLIST - MUPIRONCIN COMMENTS
patient was given chlorhexidine soap but she stated she was afraid to use it because of her psoriasis

## 2022-12-21 ENCOUNTER — TRANSCRIPTION ENCOUNTER (OUTPATIENT)
Age: 87
End: 2022-12-21

## 2022-12-21 VITALS — OXYGEN SATURATION: 94 %

## 2022-12-21 PROCEDURE — 99226: CPT

## 2022-12-21 PROCEDURE — 88305 TISSUE EXAM BY PATHOLOGIST: CPT

## 2022-12-21 PROCEDURE — 94640 AIRWAY INHALATION TREATMENT: CPT

## 2022-12-21 PROCEDURE — 71045 X-RAY EXAM CHEST 1 VIEW: CPT

## 2022-12-21 PROCEDURE — 93005 ELECTROCARDIOGRAM TRACING: CPT

## 2022-12-21 PROCEDURE — 87635 SARS-COV-2 COVID-19 AMP PRB: CPT

## 2022-12-21 PROCEDURE — 15738 MUSCLE-SKIN GRAFT LEG: CPT | Mod: XP

## 2022-12-21 PROCEDURE — 27619 EXC LEG/ANKLE TUM DEEP <5 CM: CPT | Mod: RT

## 2022-12-21 RX ORDER — PANTOPRAZOLE SODIUM 20 MG/1
40 TABLET, DELAYED RELEASE ORAL
Refills: 0 | Status: COMPLETED | OUTPATIENT
Start: 2022-12-21 | End: 2022-12-21

## 2022-12-21 RX ORDER — OMEPRAZOLE 10 MG/1
1 CAPSULE, DELAYED RELEASE ORAL
Qty: 0 | Refills: 0 | DISCHARGE

## 2022-12-21 RX ORDER — SIMVASTATIN 20 MG/1
1 TABLET, FILM COATED ORAL
Qty: 0 | Refills: 0 | DISCHARGE

## 2022-12-21 RX ADMIN — BRIMONIDINE TARTRATE, TIMOLOL MALEATE 1 DROP(S): 2; 5 SOLUTION/ DROPS OPHTHALMIC at 05:37

## 2022-12-21 RX ADMIN — Medication 50 MILLIGRAM(S): at 05:33

## 2022-12-21 RX ADMIN — Medication 20 MILLIEQUIVALENT(S): at 11:08

## 2022-12-21 RX ADMIN — PANTOPRAZOLE SODIUM 40 MILLIGRAM(S): 20 TABLET, DELAYED RELEASE ORAL at 12:05

## 2022-12-21 RX ADMIN — APIXABAN 2.5 MILLIGRAM(S): 2.5 TABLET, FILM COATED ORAL at 05:33

## 2022-12-21 RX ADMIN — Medication 20 MILLIGRAM(S): at 05:34

## 2022-12-21 RX ADMIN — FLUTICASONE FUROATE, UMECLIDINIUM BROMIDE AND VILANTEROL TRIFENATATE 1 PUFF(S): 200; 62.5; 25 POWDER RESPIRATORY (INHALATION) at 10:16

## 2022-12-21 RX ADMIN — SODIUM CHLORIDE 3 MILLILITER(S): 9 INJECTION INTRAMUSCULAR; INTRAVENOUS; SUBCUTANEOUS at 05:30

## 2022-12-21 NOTE — PROGRESS NOTE ADULT - ASSESSMENT
89 y/o, pleasant female. 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 Patient was admitted to [facility] on Dec 6 for acute hypoxia, Acute Decompensated CHF, CHASITY on CKD 3. Echo showed EF 45-50%. CXR with CHF and ILD. cardio and pulmonary was consulted. patient was stabilized and discharged on Dec 12. patient was seen and examined at bedside. no complaints.     S/P excision of lesion on RLE  Medical consult was called for pre-op medical clearance for elective RLE SCC resection under LA and Sedation. no GA.    Chronic A. fib  - Continue eliquis and metoprolol    Chronic HFrEF  - Pt euvolemic  - Continue lasix and oral potassium supplement    Glaucoma  - Continue brimonidine/ timolol    Asthma  - Continue Trelegy   famotidine    Tablet 20 milliGRAM(s) Oral at bedtime    HLD  - Continue simvastatin    Medically stable
A/P  89 y/o female POD # 1 s/p excision of right lower extremity squamous cell carcinoma   Reassuring patient status     Discharge home via ambulette this am   see all discharge instructions   plan as per Dr Eduardo
PLAN:    -serial exams, any changes in exam to be escelated to surgical team immedietly  -pain control  -finish post op anbx  -IVFs until taking adequate PO  -advance diet per surgical team  -dressing changes per surgical team  -DVT prophylaxis, eliquis  -AM labs

## 2022-12-21 NOTE — DISCHARGE NOTE NURSING/CASE MANAGEMENT/SOCIAL WORK - NSDCFUADDAPPT_GEN_ALL_CORE_FT
Follow up with Dr Doll in one week , call office for appointment date and time  Follow up with Dr Eduardo in approximately 10 days call office for appointment

## 2022-12-21 NOTE — ASU DISCHARGE PLAN (ADULT/PEDIATRIC) - CARE PROVIDER_API CALL
James Eduardo)  Surgery  [address] [place] [address]  Phone: [phone]  Fax: [phone]  Follow Up Time:

## 2022-12-21 NOTE — COUNSELING
[Fall prevention counseling provided] : Fall prevention counseling provided [Adequate lighting] : Adequate lighting [No throw rugs] : No throw rugs [Use proper foot wear] : Use proper foot wear [Use recommended devices] : Use recommended devices [None] : None [de-identified] : 1. CHF\par    - low sodium diet\par    - 1-1.2L fluid restriction (including soup, tea, juice and waster)\par    - Daily weight monitor; if wt gain >1-2lb in 2-3 days, > 3-5lbs in a week, please call CCC/CN \par    - Daily BP monitor, if SBP<100, do not give BP meds, wait 1 hrs to recheck BP, if SBP<100 again, call CCC/CN\par \par 2. Pulmonary health\par    - Deep breathing exercise in sitting up tall or standing position, 10 times hourly\par  [Good understanding] : Patient has a good understanding of lifestyle changes and steps needed to achieve self management goal

## 2022-12-21 NOTE — ASSESSMENT
[FreeTextEntry1] : [] CHF\par - euvolemic on exam\par - continue Furosemide, + KCl\par - continue low sodium diet, <48oz fluid restriction\par - continue daily weight and BP monitoring\par - Follow-up with cardiologist as scheduled\par \par [] Afib\par - rate controlled\par - continue Toprol XL and Elquis\par \par [] Asthma\par - stable on exam\par - continue albuterol, Symbicort and Trelegy\par - Follow-up with Pulmoology\par \par Reviewed all medications at length with patient, All questions addressed. Worsening symptoms discussed with pt understanding. No issues or concerns at this time. Pt encouraged to call CCC/KELSEY at [phone] w/any questions, concerns or issues. Will continue to follow up with patient status\par \par

## 2022-12-21 NOTE — ASU DISCHARGE PLAN (ADULT/PEDIATRIC) - ASU DC SPECIAL INSTRUCTIONSFT
This patient may be discharged from the unit when criteria is met , she is being transported home via ambulette as per conversation with her daughter yesterday.  Patient is to follow up with Dr Doll in one week , call office for appointment date and time  Follow up with Dr Eduardo in approximately 10 days call office for appointment   keep leg elevated as much as possible with minimal weight bearing on right lower extremity   ice, rest   take tylenol ,motrin as needed for relief of pain   leave bandages in place , do not get them wet, do not take them off no bleeding/no loss of consciousness/no vomiting/no tingling

## 2022-12-21 NOTE — PHYSICAL EXAM
[No Acute Distress] : no acute distress [Well Nourished] : well nourished [Well Developed] : well developed [Well-Appearing] : well-appearing [Normal Voice/Communication] : normal voice/communication [Normal Sclera/Conjunctiva] : normal sclera/conjunctiva [PERRL] : pupils equal round and reactive to light [EOMI] : extraocular movements intact [Normal Outer Ear/Nose] : the outer ears and nose were normal in appearance [Normal Oropharynx] : the oropharynx was normal [No JVD] : no jugular venous distention [Supple] : supple [Thyroid Normal, No Nodules] : the thyroid was normal and there were no nodules present [No Respiratory Distress] : no respiratory distress  [Clear to Auscultation] : lungs were clear to auscultation bilaterally [No Accessory Muscle Use] : no accessory muscle use [Normal Rate] : normal rate  [Regular Rhythm] : with a regular rhythm [Normal S1, S2] : normal S1 and S2 [No Murmur] : no murmur heard [No Varicosities] : no varicosities [Pedal Pulses Present] : the pedal pulses are present [No Edema] : there was no peripheral edema [No Extremity Clubbing/Cyanosis] : no extremity clubbing/cyanosis [Soft] : abdomen soft [Non Tender] : non-tender [Non-distended] : non-distended [Normal Bowel Sounds] : normal bowel sounds [Normal Posterior Cervical Nodes] : no posterior cervical lymphadenopathy [Normal Anterior Cervical Nodes] : no anterior cervical lymphadenopathy [No CVA Tenderness] : no CVA  tenderness [No Spinal Tenderness] : no spinal tenderness [No Joint Swelling] : no joint swelling [Grossly Normal Strength/Tone] : grossly normal strength/tone [No Rash] : no rash [Coordination Grossly Intact] : coordination grossly intact [No Focal Deficits] : no focal deficits [Deep Tendon Reflexes (DTR)] : deep tendon reflexes were 2+ and symmetric [Speech Grossly Normal] : speech grossly normal [Memory Grossly Normal] : memory grossly normal [Normal Affect] : the affect was normal [Alert and Oriented x3] : oriented to person, place, and time [Normal Mood] : the mood was normal [Normal Insight/Judgement] : insight and judgment were intact [de-identified] : elderly well-groomed female, looking younger than stated age [de-identified] : Rt LE Rt shin with 2.5cm radius ulceration, serosanguineous drainage,

## 2022-12-21 NOTE — DISCHARGE NOTE NURSING/CASE MANAGEMENT/SOCIAL WORK - PATIENT PORTAL LINK FT
You can access the FollowMyHealth Patient Portal offered by [facility] by registering at the following website: [email or web address]. By joining [facility]’s FollowMyHealth portal, you will also be able to view your health information using other applications (apps) compatible with our system.

## 2022-12-21 NOTE — DISCHARGE NOTE NURSING/CASE MANAGEMENT/SOCIAL WORK - NSDCPEFALRISK_GEN_ALL_CORE
For information on Fall & Injury Prevention, visit: [email or web address] OR  [email or web address] OR  https://www.cdc.gov/steadi/patient.html

## 2022-12-21 NOTE — HISTORY OF PRESENT ILLNESS
[Post-hospitalization from ___ Hospital] : Post-hospitalization from [unfilled] Hospital [Admitted on: ___] : The patient was admitted on [unfilled] [Discharged on ___] : discharged on [unfilled] [Discharge Summary] : discharge summary [Pertinent Labs] : pertinent labs [Radiology Findings] : radiology findings [Discharge Med List] : discharge medication list [Med Reconciliation] : medication reconciliation has been completed [Patient Contacted By: ____] : and contacted by [unfilled] [FreeTextEntry2] : Copied from [employer]\par '89 y/o female PMH of asthma, HTN, HLD, CHF, CAD w/stent x 1RCA placed 2019, LBBB, Afib on Eliquis + pshx TAVR, 2019 w/placement of Medtronic Micra VR TCR PPM,  sent from [facility], was there for elective surgery scheduled for excision of squamous cell cancer lesion on right lower extremity, was found to be hypoxic with SPO2 in mid 80s by anesthesia, admitted with acute on chronic systolic CHF exacerbation complicated by acute respiratory failure with hypoxia.  Pt is recent discharge from [facility] for CHF exacerbation on 12/5. During [facility] course included TTE with EF of 45-50%, conduction delay, mildly enlarged RV, pt with EF of 69% in 2019.  CT Chest findings suggestive of ILD. VQ scan and doppler studies of b/l lower ext r/o DVT/PE as etiology of hypoxia. Pt was on supplemental oxygen at 2lpm, weaned off with SPO2 94% on RA at rest, however, SPO2 drops to 84% on ambulation. Cardiology decreased Lasix dose to 20mg daily. 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 Eduardo  scheduled for for  12/20. Dr. Reese discussed with surgery team to have cardiac and medical optimization inpt vs outot 1-2 prior to scheduled lesion excision on 12/20. Will need to DC Eliquis prior to scheduled procedure. Patient evaluated by PT, recommends home PT. Pt should be discharged home on supplemental oxygen. Pt is medically stable for discharge with close follow up for pre op re-optimization and care management.'\par \par Patient seen at home for post discharge follow-up. Patient AOx3, denies SOB, cP or dizziness. currently using O2 intermittently. discharge instructions reviewed and discharge medication reconciled. \par Patient denies any acute changes in condition. living alone, has a maid, ambulating with RW, assistance needed to navigate the stairs. \par  \par

## 2022-12-21 NOTE — ASU DISCHARGE PLAN (ADULT/PEDIATRIC) - NS MD DC FALL RISK RISK
For information on Fall & Injury Prevention, visit: [email or web address] OR  [email or web address] OR  https://www.cdc.gov/steadi/patient.html

## 2022-12-21 NOTE — PROGRESS NOTE ADULT - SUBJECTIVE AND OBJECTIVE BOX
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 no GA (20 Dec 2022 08:26)      Interval History:  Patient seen and examined at bedside.  No overnight events  No complaints this morning    ROS:  CONSTITUTIONAL: No fever, weight loss, or fatigue  RESPIRATORY: No cough, wheezing, chills or hemoptysis; No shortness of breath  CARDIOVASCULAR: No chest pain, palpitations, dizziness, or leg swelling    ALLERGIES:  adhesives (Rash)  amoxicillin (Rash)  latex (Pruritus)  lidocaine (Rash)  losartan (Rash)  penicillin (Unknown)    MEDICATIONS  (STANDING):  apixaban 2.5 milliGRAM(s) Oral two times a day  brimonidine 0.2%/ timolol 0.5% Ophthalmic Solution 1 Drop(s) Both EYES two times a day  famotidine    Tablet 20 milliGRAM(s) Oral at bedtime  fluticasone furoate/umeclidinium/vilanterol 100-62.5-25 MICROgram(s) Inhaler 1 Puff(s) Inhalation daily  furosemide    Tablet 20 milliGRAM(s) Oral daily  influenza  Vaccine (HIGH DOSE) 0.7 milliLiter(s) IntraMuscular once  metoprolol succinate ER 50 milliGRAM(s) Oral daily  pantoprazole    Tablet 40 milliGRAM(s) Oral before breakfast  potassium chloride    Tablet ER 20 milliEquivalent(s) Oral daily  simvastatin 10 milliGRAM(s) Oral at bedtime  sodium chloride 0.9% lock flush 3 milliLiter(s) IV Push every 8 hours    MEDICATIONS  (PRN):  acetaminophen     Tablet .. 650 milliGRAM(s) Oral Once PRN Mild Pain (1 - 3)    Vital Signs Last 24 Hrs  T(F): 97.5 (21 Dec 2022 05:05), Max: 98 (20 Dec 2022 14:32)  HR: 74 (21 Dec 2022 05:05) (56 - 75)  BP: 142/82 (21 Dec 2022 05:05) (109/75 - 168/88)  RR: 17 (21 Dec 2022 05:05) (12 - 19)  SpO2: 94% (21 Dec 2022 05:05) (94% - 100%)  I&O's Summary    20 Dec 2022 07:01  -  21 Dec 2022 07:00  --------------------------------------------------------  IN: 0 mL / OUT: 175 mL / NET: -175 mL    BMI (kg/m2): 23.1 (12-20-22 @ 05:35)    PHYSICAL EXAM:  GENERAL: NAD  HENT:  Atraumatic, Normocephalic; No tonsillar erythema, exudates, or enlargement; Moist mucous membranes  EYES: EOMI, PERRLA, conjunctiva and sclera clear, no lid-lag; moist conjunctivae  NECK: Supple, No JVD, Normal thyroid, FROM of neck  NERVOUS SYSTEM:  CN II - XII intact; Sensation intact; follows commands  CHEST/LUNG: Clear to percussion bilaterally; No rales, rhonchi, wheezing, or rubs; normal respiratory effort, no intercostal retractions  HEART: Regular rate and rhythm; No murmurs, rubs, or gallops; No pitting edema  ABDOMEN: Soft, Nontender, Nondistended; Bowel sounds present; No HSM or masses  MUSCULOSKELETAL/EXTREMITIES:  2+ Peripheral Pulses, No clubbing or digital cyanosis  PSYCH: Appropriate affect, Alert & wake    LABS:      COVID-19 PCR: NotDetec (12-20-22 @ 06:05)  COVID-19 PCR: NotDetec (12-06-22 @ 10:05)      Care Discussed with Consultants/Other Providers: Yes  
F/U Note:    89y Female admitted POD # 0 from Excision of mass of right tibia        Vital Signs Last 24 Hrs  T(C): 36.3 (20 Dec 2022 20:05), Max: 36.7 (20 Dec 2022 14:32)  T(F): 97.3 (20 Dec 2022 20:05), Max: 98 (20 Dec 2022 14:32)  HR: 75 (20 Dec 2022 20:05) (56 - 75)  BP: 109/75 (20 Dec 2022 20:05) (109/75 - 168/88)  BP(mean): --  RR: 16 (20 Dec 2022 20:05) (12 - 19)  SpO2: 96% (20 Dec 2022 20:05) (95% - 100%)    Parameters below as of 20 Dec 2022 20:05  Patient On (Oxygen Delivery Method): room air                                  NEURO: awake and alert  CV: (+) S1/S2, irregularly irregular, no MRG   RESP: CTA b/l  GI: soft, non tender            
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  Patient is found resting in bed and is without complaints today,  she had an unremarkable night with no events.  She states she just had her morning medication and is looking forward to eating breakfast and being discharged home.  She states she has her daughter at home to help her and is being taken home by ambulette today/  No other complaints.  No shortness of breath no chest pain no fever no chills no severe pain.          ALLERGIES:  adhesives (Rash)  amoxicillin (Rash)  latex (Pruritus)  lidocaine (Rash)  losartan (Rash)  penicillin (Unknown)    MEDICATIONS  (STANDING):  apixaban 2.5 milliGRAM(s) Oral two times a day  brimonidine 0.2%/ timolol 0.5% Ophthalmic Solution 1 Drop(s) Both EYES two times a day  famotidine    Tablet 20 milliGRAM(s) Oral at bedtime  fluticasone furoate/umeclidinium/vilanterol 100-62.5-25 MICROgram(s) Inhaler 1 Puff(s) Inhalation daily  furosemide    Tablet 20 milliGRAM(s) Oral daily  influenza  Vaccine (HIGH DOSE) 0.7 milliLiter(s) IntraMuscular once  metoprolol succinate ER 50 milliGRAM(s) Oral daily  pantoprazole    Tablet 40 milliGRAM(s) Oral before breakfast  potassium chloride    Tablet ER 20 milliEquivalent(s) Oral daily  simvastatin 10 milliGRAM(s) Oral at bedtime  sodium chloride 0.9% lock flush 3 milliLiter(s) IV Push every 8 hours    MEDICATIONS  (PRN):  acetaminophen     Tablet .. 650 milliGRAM(s) Oral Once PRN Mild Pain (1 - 3)    Vital Signs Last 24 Hrs  T(F): 97.5 (21 Dec 2022 05:05), Max: 98 (20 Dec 2022 14:32)  HR: 74 (21 Dec 2022 05:05) (56 - 75)  BP: 142/82 (21 Dec 2022 05:05) (109/75 - 168/88)  RR: 17 (21 Dec 2022 05:05) (12 - 19)  SpO2: 94% (21 Dec 2022 05:05) (94% - 100%)  I&O's Summary    20 Dec 2022 07:01  -  21 Dec 2022 07:00  --------------------------------------------------------  IN: 0 mL / OUT: 175 mL / NET: -175 mL      PHYSICAL EXAM:  General: NAD, A/O x 3  ENT: MMM  Right lower extremity :   ace bandage in place , clean and dry no oozing throught the dressing, minimal edema , minimal discomfort on palpation ,, able to wiggle toes, sensation intact , pulses strong     LABS:                                          COVID-19 PCR: NotDetec (12-20-22 @ 06:05)  COVID-19 PCR: NotDetec (12-06-22 @ 10:05)      RADIOLOGY & ADDITIONAL TESTS:    Care Discussed with Consultants/Other Providers:

## 2022-12-21 NOTE — REVIEW OF SYSTEMS
[Fatigue] : fatigue [Dyspnea on Exertion] : dyspnea on exertion [Unsteady Walking] : ataxia [Negative] : Heme/Lymph [Fever] : no fever [Chills] : no chills [Earache] : no earache [Hoarseness] : no hoarseness [Nasal Discharge] : no nasal discharge [Chest Pain] : no chest pain [Palpitations] : no palpitations [Leg Claudication] : no leg claudication [Lower Ext Edema] : no lower extremity edema [Paroxysmal Nocturnal Dyspnea] : no paroxysmal nocturnal dyspnea [Shortness Of Breath] : no shortness of breath [Wheezing] : no wheezing [Cough] : no cough [Abdominal Pain] : no abdominal pain [Nausea] : no nausea [Constipation] : no constipation [Diarrhea] : diarrhea [Vomiting] : no vomiting [Heartburn] : no heartburn [Melena] : no melena [Dysuria] : no dysuria [Incontinence] : no incontinence [Nocturia] : no nocturia [Hematuria] : no hematuria [Frequency] : no frequency [Joint Pain] : no joint pain [Joint Stiffness] : no joint stiffness [Joint Swelling] : no joint swelling [Muscle Weakness] : no muscle weakness [Muscle Pain] : no muscle pain [Back Pain] : no back pain [Headache] : no headache [Dizziness] : no dizziness [Fainting] : no fainting [Confusion] : no confusion [Memory Loss] : no memory loss [Insomnia] : no insomnia [Anxiety] : no anxiety [Depression] : no depression [de-identified] : RLE with SCC needs a surgery

## 2022-12-27 ENCOUNTER — TRANSCRIPTION ENCOUNTER (OUTPATIENT)
Age: 87
End: 2022-12-27

## 2022-12-27 LAB — SURGICAL PATHOLOGY STUDY: SIGNIFICANT CHANGE UP

## 2023-01-05 ENCOUNTER — APPOINTMENT (OUTPATIENT)
Dept: ORTHOPEDIC SURGERY | Facility: CLINIC | Age: 88
End: 2023-01-05
Payer: MEDICARE

## 2023-01-05 VITALS — HEIGHT: 59 IN | WEIGHT: 112 LBS | BODY MASS INDEX: 22.58 KG/M2

## 2023-01-05 DIAGNOSIS — M54.6 PAIN IN THORACIC SPINE: ICD-10-CM

## 2023-01-05 PROCEDURE — 71100 X-RAY EXAM RIBS UNI 2 VIEWS: CPT | Mod: LT

## 2023-01-05 PROCEDURE — 99213 OFFICE O/P EST LOW 20 MIN: CPT

## 2023-01-05 PROCEDURE — 99203 OFFICE O/P NEW LOW 30 MIN: CPT

## 2023-01-05 PROCEDURE — 72070 X-RAY EXAM THORAC SPINE 2VWS: CPT

## 2023-01-05 NOTE — HISTORY OF PRESENT ILLNESS
[Sudden] : sudden [Localized] : localized [Sharp] : sharp [Constant] : constant [Sleep] : sleep [de-identified] : Injured left back when in [facility], was on a bed getting a portable chest xray, was forced back into the xray and has had back pain since. NO shortness of breath [] : no [FreeTextEntry1] : back  [FreeTextEntry3] : 12/21/22 [FreeTextEntry5] : patient was injured at [facility] while she was being positioned to take an xray. Patient feels that the pain has been getting worse

## 2023-01-05 NOTE — PHYSICAL EXAM
[] : no swelling [Left] : left rib [No bony abnormalities] : No bony abnormalities [FreeTextEntry8] : tender posterior axillary line approx 9th rib, no rib crepitus, good excursion of chest to inspiration [FreeTextEntry1] : scoliosis, ddd, no compression fractures,

## 2023-01-05 NOTE — DISCUSSION/SUMMARY
[de-identified] : She may have non displaced fx left lower rib. Will try Tramadol for pain, activity modifications: no lifting, limit bending and twisting

## 2023-01-06 ENCOUNTER — APPOINTMENT (OUTPATIENT)
Dept: SURGICAL ONCOLOGY | Facility: CLINIC | Age: 88
End: 2023-01-06
Payer: MEDICARE

## 2023-01-06 VITALS
DIASTOLIC BLOOD PRESSURE: 101 MMHG | BODY MASS INDEX: 20.16 KG/M2 | OXYGEN SATURATION: 97 % | HEIGHT: 59 IN | WEIGHT: 100 LBS | SYSTOLIC BLOOD PRESSURE: 135 MMHG | HEART RATE: 88 BPM | RESPIRATION RATE: 16 BRPM

## 2023-01-06 PROCEDURE — 99024 POSTOP FOLLOW-UP VISIT: CPT

## 2023-01-06 NOTE — HISTORY OF PRESENT ILLNESS
[de-identified] : Patient is a 89 y/o female who presents an initial post op for bilateral tibia squamous cell carcinomas biopsied by Dr. Yusuf Vann. Mohs surgery was performed on 10/21/22 for the left lateral pretibia squamous cell carcinoma. \par Today she is status post wide excision on 12/20/22. No complaints postop. \par \par Final pathology (12/20/22):\par 1. Right leg squamous cell carcinoma, wide excision\par - Squamous cell carcinoma, well to moderately differentiated\par - Approximately 2.1 cm in greatest dimension (microscopic estimation)\par - Reaching superficial subcutis\par - No perineural or vascular invasion seen\par - Margins negative\par -  Actinic keratosis , hyperplastic, background\par 2. Proximal margin right leg, excision\par -  Actinic keratosis , hyperplastic\par 3. Distal margin right leg, excision\par - Squamous cell carcinoma in situ, tissue edges not involved\par - Associated with hyperplastic actinic keratosis\par \par \par Dermatopathology (10/3/22):\par A. Left upper lateral pretibia\par -Squamous cell carcinoma, keratocanthoma type\par B. Right lower medial pretibia\par -Squamous cell carcinoma \par \par PMHx: Asthma, She takes Eliquis s/p valve replacement 3 years ago. \par Hx of basal cell carcinoma in scalp and multiple squamous cell carcinomas in the legs and lip. \par Denies any personal history of melanomas. \par She has family history of skin cancer in her daughter who also had uterine cancer. Paternal grandmother had breast cancer. Sister and son had colon cancer.

## 2023-01-06 NOTE — PHYSICAL EXAM
[Normal] : supple, no neck mass and thyroid not enlarged [Normal Neck Lymph Nodes] : normal neck lymph nodes  [Normal Supraclavicular Lymph Nodes] : normal supraclavicular lymph nodes [Normal Groin Lymph Nodes] : normal groin lymph nodes [Normal Axillary Lymph Nodes] : normal axillary lymph nodes [Normal] : oriented to person, place and time, with appropriate affect [de-identified] : right leg incision healing well. sutures in place. dressing re-applied.

## 2023-01-06 NOTE — ASSESSMENT
[FreeTextEntry1] : Right medial leg squamous cell carcinoma \par S/p wide excision w/ plastic reconstruction - margins negative on final path \par Normal postoperative course\par Continue follow up with Dr. Nino of plastic surgery and Dr. Yusuf Vann of dermatology \par RTO 3 months\par \par \par \par \par \par Enclosed pathology report\par \par

## 2023-01-06 NOTE — ADDENDUM
[FreeTextEntry1] : I, Sridevi Kulkarni, acted solely as a scribe for Dr. James Eduardo on this date 01/04/2023.\par

## 2023-01-11 ENCOUNTER — LABORATORY RESULT (OUTPATIENT)
Age: 88
End: 2023-01-11

## 2023-01-11 ENCOUNTER — APPOINTMENT (OUTPATIENT)
Dept: PULMONOLOGY | Facility: CLINIC | Age: 88
End: 2023-01-11
Payer: MEDICARE

## 2023-01-11 DIAGNOSIS — Z95.3 PRESENCE OF XENOGENIC HEART VALVE: ICD-10-CM

## 2023-01-11 DIAGNOSIS — I35.0 NONRHEUMATIC AORTIC (VALVE) STENOSIS: ICD-10-CM

## 2023-01-11 PROCEDURE — 94010 BREATHING CAPACITY TEST: CPT

## 2023-01-11 PROCEDURE — ZZZZZ: CPT

## 2023-01-11 PROCEDURE — 99204 OFFICE O/P NEW MOD 45 MIN: CPT | Mod: 25

## 2023-01-11 PROCEDURE — 94727 GAS DIL/WSHOT DETER LNG VOL: CPT

## 2023-01-11 PROCEDURE — 94729 DIFFUSING CAPACITY: CPT

## 2023-01-11 PROCEDURE — 71046 X-RAY EXAM CHEST 2 VIEWS: CPT

## 2023-01-11 NOTE — HISTORY OF PRESENT ILLNESS
[TextBox_4] : Patient referred for evaluation of interstitial lung disease and shortness of breath.  Longstanding history of shortness of breath progressive over the last several years.  Recently hospitalized for decompensated heart failure and treated with diuretic therapy with improvement still however very short of breath.  Known aortic stenosis status post TAVR.  Shortness of breath persisted after TAVR.  Treated for COPD with inhalers without significant benefit.  No history of autoimmune disease.  Denies occupational exposure to dust fumes or toxins.

## 2023-01-11 NOTE — ASSESSMENT
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  Has both anatomical reasons for aspiration large hiatal hernia and esophageal dysmotility on prior imaging, as well as likely functional aspiration issue related to dysphagia.  Patient herself reports dysphagia to liquids.  Advised mindfulness while swallowing.  Should have formal modified barium swallow for assessment and management.\par \par Difficult to sort out relative components of heart failure and pulmonary fibrosis will obtain proBNP for further management.

## 2023-01-11 NOTE — PHYSICAL EXAM
[No Acute Distress] : no acute distress [Normal Oropharynx] : normal oropharynx [Normal Appearance] : normal appearance [No Neck Mass] : no neck mass [Normal Rate/Rhythm] : normal rate/rhythm [Normal S1, S2] : normal s1, s2 [No Murmurs] : no murmurs [No Resp Distress] : no resp distress [Clear to Auscultation Bilaterally] : clear to auscultation bilaterally [No Abnormalities] : no abnormalities [Benign] : benign [Normal Gait] : normal gait [No Clubbing] : no clubbing [No Cyanosis] : no cyanosis [No Edema] : no edema [FROM] : FROM [Normal Color/ Pigmentation] : normal color/ pigmentation [No Focal Deficits] : no focal deficits [Oriented x3] : oriented x3 [Normal Affect] : normal affect [TextBox_2] : Elderly female

## 2023-01-11 NOTE — PROCEDURE
[FreeTextEntry1] : ACC: [id number] EXAM: CT CHEST\par \par PROCEDURE DATE: 12/08/2022\par \par \par \par INTERPRETATION: CLINICAL INFORMATION: Hypoxia.\par \par COMPARISON: CT chest from 5/12/2022, 5/11/2019.\par \par CONTRAST/COMPLICATIONS:\par IV Contrast: None.\par Oral Contrast: None.\par Complications: None documented.\par \par PROCEDURE:\par CT scan of the chest was obtained without intravenous contrast.\par \par FINDINGS:\par \par LYMPH NODES: Calcified subcarinal and right hilar lymph nodes.\par \par HEART/VASCULATURE: The heart is enlarged in size. No pericardial effusion. There are aortic and coronary artery calcifications. Status post TAVR and Micra pacemaker within the right ventricle..\par \par AIRWAYS/LUNGS/PLEURA: Patchy opacities containing dilated airways are present in the perihilar region of both lungs. The findings have not significantly changed when compared to study of 5/12/2022. Left lung calcified granulomas. No pleural effusion.\par \par UPPER ABDOMEN: Large hiatal hernia. Coarse hepatic calcifications are unchanged. Left renal cyst. Colonic diverticuli.\par \par BONES/SOFT TISSUES: Degenerative changes.\par \par IMPRESSION:\par Patchy opacities containing dilated airways in the perihilar region of both lungs are unchanged when compared to study of 5/12/2022. Exact etiology is unclear.\par \par Esophagram-[facility]-esophageal dysmotility and hiatal hernia\par \par PFT demonstrates preserved spirometry and severe diffusion impairment

## 2023-01-20 ENCOUNTER — TRANSCRIPTION ENCOUNTER (OUTPATIENT)
Age: 88
End: 2023-01-20

## 2023-01-24 ENCOUNTER — APPOINTMENT (OUTPATIENT)
Dept: PULMONOLOGY | Facility: CLINIC | Age: 88
End: 2023-01-24
Payer: MEDICARE

## 2023-01-24 LAB
ALBUMIN SERPL ELPH-MCNC: 4.1 G/DL
ALP BLD-CCNC: 143 U/L
ALT SERPL-CCNC: 21 U/L
ANA PAT FLD IF-IMP: ABNORMAL
ANACR T: ABNORMAL
ANION GAP SERPL CALC-SCNC: 13 MMOL/L
AST SERPL-CCNC: 29 U/L
BASOPHILS # BLD AUTO: 0.03 K/UL
BASOPHILS NFR BLD AUTO: 0.4 %
BILIRUB SERPL-MCNC: 0.9 MG/DL
BUN SERPL-MCNC: 32 MG/DL
CALCIUM SERPL-MCNC: 10 MG/DL
CHLORIDE SERPL-SCNC: 102 MMOL/L
CO2 SERPL-SCNC: 24 MMOL/L
CREAT SERPL-MCNC: 1.32 MG/DL
CRP SERPL-MCNC: 18 MG/L
EGFR: 37 ML/MIN/1.73M2
EOSINOPHIL # BLD AUTO: 0.24 K/UL
EOSINOPHIL NFR BLD AUTO: 3.1 %
ERYTHROCYTE [SEDIMENTATION RATE] IN BLOOD BY WESTERGREN METHOD: 63 MM/HR
GLUCOSE SERPL-MCNC: 98 MG/DL
HCT VFR BLD CALC: 45.2 %
HGB BLD-MCNC: 14.4 G/DL
IMM GRANULOCYTES NFR BLD AUTO: 0.4 %
LYMPHOCYTES # BLD AUTO: 2.2 K/UL
LYMPHOCYTES NFR BLD AUTO: 28.2 %
MAN DIFF?: NORMAL
MCHC RBC-ENTMCNC: 31.9 GM/DL
MCHC RBC-ENTMCNC: 33.4 PG
MCV RBC AUTO: 104.9 FL
MONOCYTES # BLD AUTO: 0.54 K/UL
MONOCYTES NFR BLD AUTO: 6.9 %
NEUTROPHILS # BLD AUTO: 4.77 K/UL
NEUTROPHILS NFR BLD AUTO: 61 %
NT-PROBNP SERPL-MCNC: 1947 PG/ML
PLATELET # BLD AUTO: 203 K/UL
POTASSIUM SERPL-SCNC: 5.5 MMOL/L
PROT SERPL-MCNC: 7.4 G/DL
RBC # BLD: 4.31 M/UL
RBC # FLD: 14.7 %
RHEUMATOID FACT SER QL: 11 IU/ML
SODIUM SERPL-SCNC: 140 MMOL/L
WBC # FLD AUTO: 7.81 K/UL

## 2023-01-24 PROCEDURE — 99442: CPT | Mod: 95

## 2023-01-26 NOTE — HISTORY OF PRESENT ILLNESS
[TextBox_4] : Telephone visit to review laboratory results and recommendations\par \par 1) patient was confused about why I recommended a swallowing study.  She was resistant to doing a swallowing study because she says she had done one in the past.  I reviewed with her that her previous swallow study was an anatomical study-an upper GI series to do an anatomic evaluation of her hiatal hernia.  I explained to her that that is not what we are looking for now rather we are looking to evaluate for dysphagia and that is a modified barium swallow.  Once I explained this to her she agreed to go for the test.  I explained to her that we need to make sure that she is not aspirating as a cause of ongoing pulmonary issues\par \par 2) reviewed lab work with patient.  Laboratory findings performed to evaluate for underlying cause for interstitial lung disease.  She did have evidence of autoimmune positive serology which suggests some form of autoimmune disease as etiology for her lung problem.  From a prognostic perspective this is actually better for her as she likely does not have idiopathic pulmonary fibrosis.  Whether this will yet lead to a treatment is not clear.  She also has elevated proBNP which would indicate a cardiac component to her problem which she should review with her cardiologist\par \par Time 15 minutes

## 2023-01-26 NOTE — REASON FOR VISIT
[Home] : at home, [unfilled] , at the time of the visit. [Medical Office: ([facility])___] : at the medical office located in  [Verbal consent obtained from patient] : the patient, [unfilled]

## 2023-01-30 ENCOUNTER — APPOINTMENT (OUTPATIENT)
Dept: ORTHOPEDIC SURGERY | Facility: CLINIC | Age: 88
End: 2023-01-30
Payer: MEDICARE

## 2023-01-30 VITALS — BODY MASS INDEX: 20.16 KG/M2 | WEIGHT: 100 LBS | HEIGHT: 59 IN

## 2023-01-30 DIAGNOSIS — S22.31XA FRACTURE OF ONE RIB, RIGHT SIDE, INITIAL ENCOUNTER FOR CLOSED FRACTURE: ICD-10-CM

## 2023-01-30 DIAGNOSIS — S29.9XXA UNSPECIFIED INJURY OF THORAX, INITIAL ENCOUNTER: ICD-10-CM

## 2023-01-30 PROCEDURE — 99213 OFFICE O/P EST LOW 20 MIN: CPT

## 2023-01-30 PROCEDURE — 72080 X-RAY EXAM THORACOLMB 2/> VW: CPT

## 2023-01-30 PROCEDURE — 71100 X-RAY EXAM RIBS UNI 2 VIEWS: CPT | Mod: RT

## 2023-01-30 NOTE — HISTORY OF PRESENT ILLNESS
[Sitting] : sitting [Lying in bed] : lying in bed [Sudden] : sudden [Localized] : localized [Sharp] : sharp [Constant] : constant [Sleep] : sleep [de-identified] : 1/30/23- Had another fall, has pain right side, no shortness of breath\par 1/5/23- Injured left back when in [facility], was on a bed getting a portable chest xray, was forced back into the xray and has had back pain since. NO shortness of breath [] : no [FreeTextEntry3] : 12/21/22 [FreeTextEntry1] : back  [FreeTextEntry5] : patient was injured at [facility] while she was being positioned to take an xray. Patient feels that the pain has been getting worse

## 2023-01-30 NOTE — PHYSICAL EXAM
[Right] : right rib [Fracture] : Fracture [] : no swelling [FreeTextEntry8] : tender posterior axillary line approx 9th rib, no rib crepitus, good excursion of chest to inspiration [FreeTextEntry2] : no compression fractures [FreeTextEntry5] : 8th rib

## 2023-02-02 ENCOUNTER — APPOINTMENT (OUTPATIENT)
Dept: ORTHOPEDIC SURGERY | Facility: CLINIC | Age: 88
End: 2023-02-02

## 2023-02-23 ENCOUNTER — OFFICE (OUTPATIENT)
Dept: URBAN - METROPOLITAN AREA CLINIC 109 | Facility: CLINIC | Age: 88
Setting detail: OPHTHALMOLOGY
End: 2023-02-23
Payer: MEDICARE

## 2023-02-23 DIAGNOSIS — H40.1112: ICD-10-CM

## 2023-02-23 DIAGNOSIS — H40.1121: ICD-10-CM

## 2023-02-23 PROCEDURE — 92083 EXTENDED VISUAL FIELD XM: CPT | Performed by: OPHTHALMOLOGY

## 2023-02-23 PROCEDURE — 92133 CPTRZD OPH DX IMG PST SGM ON: CPT | Performed by: OPHTHALMOLOGY

## 2023-02-23 PROCEDURE — 92014 COMPRE OPH EXAM EST PT 1/>: CPT | Performed by: OPHTHALMOLOGY

## 2023-02-23 ASSESSMENT — REFRACTION_MANIFEST
OS_CYLINDER: -0.75
OD_CYLINDER: -0.50
OD_ADD: +2.50
OS_AXIS: 62
OD_AXIS: 120
OD_SPHERE: +0.25
OS_SPHERE: +0.50
OS_ADD: +2.50

## 2023-02-23 ASSESSMENT — KERATOMETRY
OS_K1POWER_DIOPTERS: 42.62
OS_AXISANGLE_DEGREES: 156
OD_K2POWER_DIOPTERS: 44.37
OD_K1POWER_DIOPTERS: 43.12
METHOD_AUTO_MANUAL: AUTO
OS_K2POWER_DIOPTERS: 43.75
OD_AXISANGLE_DEGREES: 024

## 2023-02-23 ASSESSMENT — AXIALLENGTH_DERIVED
OS_AL: 23.5615
OS_AL: 23.6591
OD_AL: 23.454
OD_AL: 23.5023

## 2023-02-23 ASSESSMENT — REFRACTION_AUTOREFRACTION
OS_CYLINDER: -0.75
OD_AXIS: 112
OD_CYLINDER: -0.75
OD_SPHERE: +0.50
OS_SPHERE: +0.75
OS_AXIS: 64

## 2023-02-23 ASSESSMENT — REFRACTION_CURRENTRX
OD_SPHERE: +2.75
OS_OVR_VA: 20/
OD_OVR_VA: 20/
OS_SPHERE: +2.75

## 2023-02-23 ASSESSMENT — VISUAL ACUITY
OD_BCVA: 20/30-2
OS_BCVA: 20/20-2

## 2023-02-23 ASSESSMENT — CONFRONTATIONAL VISUAL FIELD TEST (CVF)
OD_FINDINGS: FULL
OS_FINDINGS: FULL

## 2023-02-23 ASSESSMENT — LID POSITION - ENTROPION
OD_ENTROPION: RUL 2+
OS_ENTROPION: LUL

## 2023-02-23 ASSESSMENT — LID POSITION - COMMENTS
OD_COMMENTS: OS&GT
OD_COMMENTS: OD
OS_COMMENTS: OS&GT
OS_COMMENTS: OD

## 2023-02-23 ASSESSMENT — SPHEQUIV_DERIVED
OS_SPHEQUIV: 0.375
OD_SPHEQUIV: 0
OS_SPHEQUIV: 0.125
OD_SPHEQUIV: 0.125

## 2023-02-27 ENCOUNTER — APPOINTMENT (OUTPATIENT)
Dept: ORTHOPEDIC SURGERY | Facility: CLINIC | Age: 88
End: 2023-02-27
Payer: MEDICARE

## 2023-02-27 VITALS — BODY MASS INDEX: 20.16 KG/M2 | WEIGHT: 100 LBS | HEIGHT: 59 IN

## 2023-02-27 DIAGNOSIS — S22.31XD FRACTURE OF ONE RIB, RIGHT SIDE, SUBSEQUENT ENCOUNTER FOR FRACTURE WITH ROUTINE HEALING: ICD-10-CM

## 2023-02-27 PROCEDURE — 71100 X-RAY EXAM RIBS UNI 2 VIEWS: CPT | Mod: RT

## 2023-02-27 PROCEDURE — 99213 OFFICE O/P EST LOW 20 MIN: CPT

## 2023-02-27 NOTE — HISTORY OF PRESENT ILLNESS
[de-identified] : 1/30/23- Had another fall, has pain right side, no shortness of breath\par 1/5/23- Injured left back when in [facility], was on a bed getting a portable chest xray, was forced back into the xray and has had back pain since. NO shortness of breath [Sudden] : sudden [Localized] : localized [Sharp] : sharp [Constant] : constant [Sleep] : sleep [Sitting] : sitting [Lying in bed] : lying in bed [] : no [FreeTextEntry1] : back  [FreeTextEntry3] : 12/21/22 [FreeTextEntry5] : patient was injured at [facility] while she was being positioned to take an xray. Patient feels that the pain has been getting worse

## 2023-02-27 NOTE — PHYSICAL EXAM
[] : no swelling [Right] : right rib [Fracture] : Fracture [FreeTextEntry8] : right posterior rib tenderness improved [FreeTextEntry2] : no compression fractures [FreeTextEntry5] : 8th rib

## 2023-02-28 ENCOUNTER — OUTPATIENT (OUTPATIENT)
Dept: OUTPATIENT SERVICES | Facility: HOSPITAL | Age: 88
LOS: 1 days | Discharge: HOME | End: 2023-02-28
Payer: MEDICARE

## 2023-02-28 ENCOUNTER — APPOINTMENT (OUTPATIENT)
Dept: WOUND CARE | Facility: HOSPITAL | Age: 88
End: 2023-02-28
Payer: MEDICARE

## 2023-02-28 ENCOUNTER — APPOINTMENT (OUTPATIENT)
Dept: WOUND CARE | Facility: HOSPITAL | Age: 88
End: 2023-02-28

## 2023-02-28 ENCOUNTER — RESULT REVIEW (OUTPATIENT)
Age: 88
End: 2023-02-28

## 2023-02-28 VITALS
RESPIRATION RATE: 18 BRPM | WEIGHT: 103 LBS | DIASTOLIC BLOOD PRESSURE: 95 MMHG | BODY MASS INDEX: 20.22 KG/M2 | TEMPERATURE: 97.8 F | SYSTOLIC BLOOD PRESSURE: 148 MMHG | OXYGEN SATURATION: 95 % | HEART RATE: 84 BPM | HEIGHT: 60 IN

## 2023-02-28 DIAGNOSIS — Z98.61 CORONARY ANGIOPLASTY STATUS: Chronic | ICD-10-CM

## 2023-02-28 DIAGNOSIS — L97.801 NON-PRESSURE CHRONIC ULCER OF OTHER PART OF UNSPECIFIED LOWER LEG LIMITED TO BREAKDOWN OF SKIN: ICD-10-CM

## 2023-02-28 DIAGNOSIS — L97.404: ICD-10-CM

## 2023-02-28 DIAGNOSIS — S81.809A UNSPECIFIED OPEN WOUND, UNSPECIFIED LOWER LEG, INITIAL ENCOUNTER: ICD-10-CM

## 2023-02-28 DIAGNOSIS — Z98.890 OTHER SPECIFIED POSTPROCEDURAL STATES: Chronic | ICD-10-CM

## 2023-02-28 DIAGNOSIS — Z95.5 PRESENCE OF CORONARY ANGIOPLASTY IMPLANT AND GRAFT: Chronic | ICD-10-CM

## 2023-02-28 DIAGNOSIS — Z95.2 PRESENCE OF PROSTHETIC HEART VALVE: Chronic | ICD-10-CM

## 2023-02-28 DIAGNOSIS — Z90.721 ACQUIRED ABSENCE OF OVARIES, UNILATERAL: Chronic | ICD-10-CM

## 2023-02-28 DIAGNOSIS — C44.722 SQUAMOUS CELL CARCINOMA OF SKIN OF RIGHT LOWER LIMB, INCLUDING HIP: ICD-10-CM

## 2023-02-28 PROCEDURE — 99203 OFFICE O/P NEW LOW 30 MIN: CPT

## 2023-02-28 PROCEDURE — G0463: CPT

## 2023-02-28 PROCEDURE — 73630 X-RAY EXAM OF FOOT: CPT | Mod: 26,50

## 2023-02-28 PROCEDURE — 99213 OFFICE O/P EST LOW 20 MIN: CPT

## 2023-02-28 PROCEDURE — 73630 X-RAY EXAM OF FOOT: CPT

## 2023-02-28 RX ORDER — TRAMADOL HYDROCHLORIDE 50 MG/1
50 TABLET, COATED ORAL
Qty: 20 | Refills: 0 | Status: DISCONTINUED | COMMUNITY
Start: 2023-01-05 | End: 2023-02-28

## 2023-02-28 RX ORDER — LIDOCAINE 5 G/100G
5 OINTMENT TOPICAL
Qty: 1 | Refills: 1 | Status: DISCONTINUED | COMMUNITY
Start: 2019-05-02 | End: 2023-02-28

## 2023-02-28 RX ORDER — LOSARTAN POTASSIUM 50 MG/1
50 TABLET, FILM COATED ORAL DAILY
Qty: 90 | Refills: 0 | Status: DISCONTINUED | COMMUNITY
End: 2023-02-28

## 2023-02-28 RX ORDER — MUPIROCIN 20 MG/G
2 OINTMENT TOPICAL TWICE DAILY
Qty: 1 | Refills: 5 | Status: ACTIVE | COMMUNITY
Start: 2023-02-28 | End: 1900-01-01

## 2023-02-28 RX ORDER — CLOPIDOGREL 75 MG/1
TABLET, FILM COATED ORAL
Refills: 0 | Status: DISCONTINUED | COMMUNITY
End: 2023-02-28

## 2023-02-28 RX ORDER — TRAMADOL HYDROCHLORIDE 50 MG/1
50 TABLET, COATED ORAL
Qty: 20 | Refills: 0 | Status: DISCONTINUED | COMMUNITY
Start: 2023-01-30 | End: 2023-02-28

## 2023-03-01 PROBLEM — C44.722 SQUAMOUS CELL CARCINOMA OF SKIN OF RIGHT LOWER EXTREMITY: Status: ACTIVE | Noted: 2022-10-26

## 2023-03-01 PROBLEM — S81.809A NON-HEALING WOUND OF LOWER EXTREMITY: Status: ACTIVE | Noted: 2023-02-28

## 2023-03-01 NOTE — VITALS
[Pain related to present condition?] : The patient's  pain is related to present condition. [Aching] : aching [] : Yes [de-identified] : 6/10 [FreeTextEntry3] : right lower extremity wound.  [FreeTextEntry1] : relieving pressure from the area.  [FreeTextEntry2] : touching the wound. [FreeTextEntry4] : offloaded wound while in office.

## 2023-03-01 NOTE — PHYSICAL EXAM
[4 x 4] : 4 x 4  [0] : right 0 [1+] : right 1+ [Ankle Swelling (On Exam)] : not present [Ankle Swelling On The Right] : mild [Alert] : alert [Oriented to Person] : oriented to person [Oriented to Place] : oriented to place [Oriented to Time] : oriented to time [Calm] : calm [de-identified] : WD WN 89 Y/O white female in NAD [de-identified] : A Fib  [de-identified] : right shin wound [FreeTextEntry1] : lower extremity  [FreeTextEntry2] : 7 [FreeTextEntry3] : 2.2 [FreeTextEntry4] : 0.3 [de-identified] : serous  [de-identified] : <0.5 cm [de-identified] : 100% [de-identified] : medi honey  [de-identified] : Mechanically cleansed with 0.9% Normal saline and 4 x 4 sterile gauze. \par \par CIRCULATION\par \par Dorsalis Pedis: present on doppler bilaterally\par Posterior Tibialis: not present on doppler\par Extremity color: pigmented bilaterally\par Extremity Temperature: warm bilaterally \par Capillary Refill: <3 seconds bilaterally\par YENI: ordered by MD [de-identified] : wound bed covered with dry eschar  [FreeTextEntry7] : heel - dry eschar  [FreeTextEntry8] : 1.4 [FreeTextEntry9] : 0.6 [de-identified] : 0.1 [de-identified] : mupirocin  [de-identified] : Mechanically cleansed with 0.9% Normal saline and 4 x 4 sterile gauze. \par  [TWNoteComboBox1] : Right [TWNoteComboBox4] : Moderate [TWNoteComboBox5] : No [TWNoteComboBox6] : Surgical [de-identified] : No [de-identified] : Erythema [de-identified] : None [de-identified] : None [de-identified] : None [de-identified] : Yes [de-identified] : Every other day [de-identified] : Primary Dressing [TWNoteComboBox9] : Right [de-identified] : None [de-identified] : No [de-identified] : Other [de-identified] : No [de-identified] : Normal [de-identified] : None [de-identified] : None [de-identified] : None [de-identified] : No [de-identified] : Daily

## 2023-03-01 NOTE — REVIEW OF SYSTEMS
[Negative] : Heme/Lymph [FreeTextEntry5] : aortic stenosis  has had valve replacement,Atrial fib,Hypertension,hyperlipidemia [FreeTextEntry6] : asthma,

## 2023-03-01 NOTE — PLAN
[FreeTextEntry1] : post op wound right shin\par medihoney QOD with DD\par stressed elevation of lower leg\par Arterial vascular study\par vascular consult\par return 2 weeks \par 30 minutes spent in review,evaluation and treatment planning

## 2023-03-01 NOTE — REASON FOR VISIT
[Consultation] : a consultation visit [FreeTextEntry1] : right leg wound and right heel callus with pain

## 2023-03-01 NOTE — ASSESSMENT
[Verbal] : Verbal [Written] : Written [Demo] : Demo [Patient] : Patient [Home Health Provider] : Home Health Provider [Good - alert, interested, motivated] : Good - alert, interested, motivated [Demonstrates independently] : demonstrates independently [Dressing changes] : dressing changes [] : Yes [Stable] : stable [Foot Care] : foot care [Skin Care] : skin care [Pressure relief] : pressure relief [Signs and symptoms of infection] : sign and symptoms of infection [Venous Disease] : venous disease [Nutrition] : nutrition [Arterial Disease] : arterial disease [How and When to Call] : how and when to call [Labs and Tests] : labs and tests [Pain Management] : pain management [Off-loading] : off-loading [Discharge Planning] : discharge planning [Home Health] : home health [Other: ____] : [unfilled] [Wheelchair] : Wheelchair [FreeTextEntry2] : impaired skin integrity.\par offload pressure to affected area.\par foot care and inspections.  [FreeTextEntry4] : Xray ordered and to be completed today.\par Vascular studies and vascular consult ordered by Dr. Rodriguez. Vascular studies preauth submitted / vascular consult request sheet submitted. \par follow up in two weeks.

## 2023-03-01 NOTE — H&P ADULT - PROBLEM SELECTOR PLAN 6
Daily Note     Today's date: 3/1/2023  Patient name: Tima Mon  : 1957  MRN: [id number]  Referring provider: Sophia Reina DO  Dx:   Encounter Diagnosis     ICD-10-CM    1  Primary osteoarthritis of right knee  M17 11                      Subjective: Cannot sleep at night due to throbbing  Feels really tight in the back of her knee  Got staples removed and feels so much better  Objective: See treatment diary below      Assessment: Focused on knee extension exercises today since she demo significant limited knee ext rotation  She was challenged with sidelying hip abd, requiring frequent VC to minimize hip flex compensation due to weakness  Patient would benefit from continued PT      Plan: Progress treatment as tolerated         Re-eval Date: 3/17    Date 3/1    2/27   Visit Count 6    5   FOTO        Pain In     R TKR 4   Pain Out     better        Precautions none        Manuals        Knee ext/flex Knee flex mobs, knee ext s 10 min     HS, gastroc stretch  Pt place tubigrip                             Neuro Re-Ed         L LE step taps 20x, 6" no UE                                                        Ther Ex        Nustep  RCB L1, 10 min for CV endurance and knee ROM             L3, 10 min for CV endurance and knee ROM    Quad set        R heel slides with strap      10x 10"   Bridges     2x15   Hooklying clamshells with TB        Standing HR/TR     30x ea   Quad stretch        STS        Toe taps        Standing marches      20x   Gastroc S        R SLR Flex 2 x 15  Abd 2 x 10      Flex supine  2x10  Abd SL  2x10   Standing SLR - hip abd/ext         Squats At stairs, B UE 20x        Stairs R LE Step ups 1 UE, 6" fw/lat 2 x 15     2x 2 HR step over step   HS and gastroc S  30" x 2 incline board gastroc    30" x 2 with strap supine        Ther Activity                        Gait Training                        Modalities - recent TAVR in 4/2019 for severe AS  - continue home med plavix

## 2023-03-02 ENCOUNTER — APPOINTMENT (OUTPATIENT)
Dept: ORTHOPEDIC SURGERY | Facility: CLINIC | Age: 88
End: 2023-03-02

## 2023-03-03 ENCOUNTER — APPOINTMENT (OUTPATIENT)
Dept: RADIOLOGY | Facility: HOSPITAL | Age: 88
End: 2023-03-03
Payer: MEDICARE

## 2023-03-03 ENCOUNTER — OUTPATIENT (OUTPATIENT)
Dept: OUTPATIENT SERVICES | Facility: HOSPITAL | Age: 88
LOS: 1 days | Discharge: HOME | End: 2023-03-03

## 2023-03-03 ENCOUNTER — OUTPATIENT (OUTPATIENT)
Dept: OUTPATIENT SERVICES | Facility: HOSPITAL | Age: 88
LOS: 1 days | End: 2023-03-03

## 2023-03-03 ENCOUNTER — RESULT REVIEW (OUTPATIENT)
Age: 88
End: 2023-03-03

## 2023-03-03 ENCOUNTER — APPOINTMENT (OUTPATIENT)
Dept: SPEECH THERAPY | Facility: HOSPITAL | Age: 88
End: 2023-03-03
Payer: MEDICARE

## 2023-03-03 DIAGNOSIS — J84.9 INTERSTITIAL PULMONARY DISEASE, UNSPECIFIED: ICD-10-CM

## 2023-03-03 DIAGNOSIS — Z90.721 ACQUIRED ABSENCE OF OVARIES, UNILATERAL: Chronic | ICD-10-CM

## 2023-03-03 DIAGNOSIS — Z98.61 CORONARY ANGIOPLASTY STATUS: Chronic | ICD-10-CM

## 2023-03-03 DIAGNOSIS — Z95.2 PRESENCE OF PROSTHETIC HEART VALVE: Chronic | ICD-10-CM

## 2023-03-03 DIAGNOSIS — Z95.5 PRESENCE OF CORONARY ANGIOPLASTY IMPLANT AND GRAFT: Chronic | ICD-10-CM

## 2023-03-03 DIAGNOSIS — Z98.890 OTHER SPECIFIED POSTPROCEDURAL STATES: Chronic | ICD-10-CM

## 2023-03-03 PROCEDURE — 71046 X-RAY EXAM CHEST 2 VIEWS: CPT | Mod: 26

## 2023-03-03 PROCEDURE — 74230 X-RAY XM SWLNG FUNCJ C+: CPT | Mod: 26

## 2023-03-03 NOTE — HISTORY OF PRESENT ILLNESS
[FreeTextEntry1] : Patient arrived to Radiology for an OutPatient Modified Barium Swallow Study. Patient was accompanied by her son (Yris). 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 Has both anatomical reasons for aspiration large hiatal hernia and esophageal dysmotility on prior imaging, as well as likely functional aspiration issue related to dysphagia. Patient herself reports dysphagia to liquids. Advised mindfulness while swallowing. Should have formal modified barium swallow for assessment and management. (See Note). \par \par \par Today, Patient offers no difficulty swallowing and agrees to the swallow study to rule out dysphagia/Aspiration given concerns for pulmonary issues.  She reports eating Regular and Thin Liquids.   This Modified Barium Swallow Study is to objectively assess the Physiology of the Oral and Pharyngeal Stage swallowing mechanism for treatment plan for least restrictive diet. \par \par \par Referring MD: Dr. Art Sotelo\par

## 2023-03-03 NOTE — ASSESSMENT
[FreeTextEntry1] : Patient presents with Functional Oral and Mild Pharyngeal Stage Dysphagia. The Oral Stage is characterized by adequate oral containment, adequate chewing for solid, adequate bolus manipulation, adequate tongue motion with adequate anterior to posterior transfer of the bolus for puree/solids; with adequate oral clearance.  The Pharyngeal Stage is characterized by adequate initiation of the pharyngeal swallow, adequate laryngeal elevation, reduced base of tongue retraction and reduced pharyngeal constriction. There is trace/mild pharyngeal clearance deficit located along base of tongue/vallecular for puree/solids and vallecular/pyriforms for thin liquids post primary swallow. A reswallow improves pharyngeal clearance.   There was Trace Laryngeal Penetration during the swallow for Thin Liquids with retrieval and airway protection maintained. There was No Aspiration observed before, during or after the swallow for puree, solids, mildly thick liquids and thin liquids. \par \par RESULTS; \par No Aspiration observed before, during or after the swallow for puree, solids, mildly thick liquids and thin liquids.\par Trace Laryngeal Penetration during the swallow for Thin Liquids with retrieval and airway protection maintained. \par \par \par

## 2023-03-03 NOTE — PLAN
[FreeTextEntry2] : \par 1.) Continue with current diet regimen of Regular and Thin Liquids\par 2.) Feeding/Swallowing Guidelines: Upright position, small bites, chew solids well, single cup sip of thin liquids; 2 swallows per bite/sip; alternate with a liquid wash after every 2 to 3 bites\par 3.) Aspiration Precautions\par 4.) Reflux Precautions\par 5.) Maintain Good Oral Hygiene Care\par 6.) Follow up with Physician\par 7.) Swallow Therapy to maximize swallow mechanism \par \par SLP provided Patient/Son education regarding preliminary results. Both verbalized understanding and will follow up with Physician. \par

## 2023-03-05 ENCOUNTER — NON-APPOINTMENT (OUTPATIENT)
Age: 88
End: 2023-03-05

## 2023-03-06 DIAGNOSIS — R13.12 DYSPHAGIA, OROPHARYNGEAL PHASE: ICD-10-CM

## 2023-03-06 NOTE — ASSESSMENT
[FreeTextEntry2] : impaired skin integrity.\par offload pressure to affected area.\par foot care and inspections.  [FreeTextEntry4] : Xray ordered and to be completed today.\par Vascular studies and vascular consult ordered by Dr. Rodriguez. Vascular studies preauth submitted / vascular consult request sheet submitted. \par follow up in two weeks.

## 2023-03-06 NOTE — PHYSICAL EXAM
[0] : right 0 [1+] : right 1+ [Ankle Swelling (On Exam)] : not present [Ankle Swelling On The Right] : mild [Purpura] : no purpura  [Petechiae] : no petechiae [Skin Ulcer] : ulcer [Alert] : alert [Oriented to Person] : oriented to person [Oriented to Place] : oriented to place [Oriented to Time] : oriented to time [Calm] : calm [de-identified] : WD WN 89 Y/O white female in NAD [de-identified] : A Fib , HTN, HLD [de-identified] : Posterior right heel , dry stable eschar , unstagable pressure ulcer  [de-identified] : wnl [FreeTextEntry1] : lower extremity  [FreeTextEntry2] : 7 [FreeTextEntry3] : 2.2 [FreeTextEntry4] : 0.3 [de-identified] : serous  [de-identified] : <0.5 cm [de-identified] : 100% [de-identified] : medi honey  [de-identified] : Mechanically cleansed with 0.9% Normal saline and 4 x 4 sterile gauze. \par \par CIRCULATION\par \par Dorsalis Pedis: present on doppler bilaterally\par Posterior Tibialis: not present on doppler\par Extremity color: pigmented bilaterally\par Extremity Temperature: warm bilaterally \par Capillary Refill: <3 seconds bilaterally\par YENI: ordered by MD [de-identified] : wound bed covered with dry eschar  [FreeTextEntry7] : heel - dry eschar  [FreeTextEntry8] : 1.4 [FreeTextEntry9] : 0.6 [de-identified] : 0.1 [de-identified] : mupirocin  [de-identified] : Mechanically cleansed with 0.9% Normal saline and 4 x 4 sterile gauze. \par  [TWNoteComboBox1] : Right [TWNoteComboBox4] : Moderate [TWNoteComboBox5] : No [TWNoteComboBox6] : Surgical [de-identified] : No [de-identified] : Erythema [de-identified] : None [de-identified] : None [de-identified] : None [de-identified] : Yes [de-identified] : Every other day [de-identified] : Primary Dressing [TWNoteComboBox9] : Right [de-identified] : None [de-identified] : No [de-identified] : Other [de-identified] : No [de-identified] : Normal [de-identified] : None [de-identified] : None [de-identified] : None [de-identified] : No [de-identified] : Daily

## 2023-03-06 NOTE — VITALS
[de-identified] : 6/10 [FreeTextEntry3] : right lower extremity wound and heel. [FreeTextEntry1] : relieving pressure from the area.  [FreeTextEntry2] : touching the wound. [FreeTextEntry4] : offloaded wound while in office.

## 2023-03-06 NOTE — REVIEW OF SYSTEMS
[Fever] : no fever [Chills] : no chills [Eye Pain] : no eye pain [Loss Of Hearing] : no hearing loss [Shortness Of Breath] : no shortness of breath [Abdominal Pain] : no abdominal pain [Vomiting] : no vomiting [Joint Stiffness] : joint stiffness [Skin Wound] : skin wound [Confused] : no confusion [Difficulty Walking] : difficulty walking [Anxiety] : no anxiety [Negative] : Heme/Lymph [FreeTextEntry5] : aortic stenosis  has had valve replacement,Atrial fib,Hypertension,hyperlipidemia [FreeTextEntry6] : asthma, [de-identified] : Posterior right heel unstagable pressure ulcer

## 2023-03-06 NOTE — PLAN
[FreeTextEntry1] : continue off loading to the right heel and protective dressing  Spent 20 minutes for patient care and medical decision making.\par

## 2023-03-06 NOTE — HISTORY OF PRESENT ILLNESS
[FreeTextEntry1] : patient presents to wound care clinic for evaluation of wound on her right leg, she states she has had the wound for four months. wound originates from a surgical incision. And dry eschar to the right heel \par  \par

## 2023-03-07 DIAGNOSIS — T81.89XD OTHER COMPLICATIONS OF PROCEDURES, NOT ELSEWHERE CLASSIFIED, SUBSEQUENT ENCOUNTER: ICD-10-CM

## 2023-03-07 DIAGNOSIS — Z98.890 OTHER SPECIFIED POSTPROCEDURAL STATES: ICD-10-CM

## 2023-03-07 DIAGNOSIS — E78.00 PURE HYPERCHOLESTEROLEMIA, UNSPECIFIED: ICD-10-CM

## 2023-03-07 DIAGNOSIS — Z85.828 PERSONAL HISTORY OF OTHER MALIGNANT NEOPLASM OF SKIN: ICD-10-CM

## 2023-03-07 DIAGNOSIS — Z82.49 FAMILY HISTORY OF ISCHEMIC HEART DISEASE AND OTHER DISEASES OF THE CIRCULATORY SYSTEM: ICD-10-CM

## 2023-03-07 DIAGNOSIS — Z79.899 OTHER LONG TERM (CURRENT) DRUG THERAPY: ICD-10-CM

## 2023-03-07 DIAGNOSIS — I50.9 HEART FAILURE, UNSPECIFIED: ICD-10-CM

## 2023-03-07 DIAGNOSIS — J45.909 UNSPECIFIED ASTHMA, UNCOMPLICATED: ICD-10-CM

## 2023-03-07 DIAGNOSIS — I35.0 NONRHEUMATIC AORTIC (VALVE) STENOSIS: ICD-10-CM
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
## 2023-03-07 DIAGNOSIS — I11.0 HYPERTENSIVE HEART DISEASE WITH HEART FAILURE: ICD-10-CM

## 2023-03-07 DIAGNOSIS — J84.9 INTERSTITIAL PULMONARY DISEASE, UNSPECIFIED: ICD-10-CM

## 2023-03-07 DIAGNOSIS — Z95.3 PRESENCE OF XENOGENIC HEART VALVE: ICD-10-CM
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
## 2023-03-07 DIAGNOSIS — L97.421 NON-PRESSURE CHRONIC ULCER OF LEFT HEEL AND MIDFOOT LIMITED TO BREAKDOWN OF SKIN: ICD-10-CM

## 2023-03-07 DIAGNOSIS — I48.91 UNSPECIFIED ATRIAL FIBRILLATION: ICD-10-CM

## 2023-03-08 ENCOUNTER — APPOINTMENT (OUTPATIENT)
Dept: PULMONOLOGY | Facility: CLINIC | Age: 88
End: 2023-03-08
Payer: MEDICARE

## 2023-03-08 VITALS
SYSTOLIC BLOOD PRESSURE: 142 MMHG | WEIGHT: 103 LBS | HEART RATE: 102 BPM | BODY MASS INDEX: 20.22 KG/M2 | OXYGEN SATURATION: 93 % | DIASTOLIC BLOOD PRESSURE: 85 MMHG | HEIGHT: 60 IN

## 2023-03-08 PROCEDURE — 99214 OFFICE O/P EST MOD 30 MIN: CPT

## 2023-03-08 NOTE — ASSESSMENT
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  Has both anatomical reasons for aspiration large hiatal hernia and esophageal dysmotility on prior imaging, as well as likely functional aspiration issue related to dysphagia.  Swallow study does demonstrate dysphagia with thin liquids.  This degree of impairment is actually less than I expected and might be a common finding just considering the patient's age.  For now I am not recommending significant change in diet other than suggested by speech therapy.\par \par Difficult to sort out relative components of heart failure and pulmonary fibrosis has mildly elevated proBNP but is not edematous.  Will defer diuretic management to cardiology.  Cardiology did start patient on low-dose of thyroid medication thus far patient notes no change in her symptoms\par \par I recommend a follow-up chest CT to be certain there is no rapid progression of disease otherwise it is hard to determine specifically what 1 would do here.  While she does have positive autoimmune serology I think the risks of steroids at her age are great.  Similarly medication such as Esbriet or Ofev have great potential to cause harm as well as good.  If there is clear CT progression of disease will consider these medications otherwise recommend a conservative approach.  Patient does have oxygen and I strongly encouraged her to use it especially at night

## 2023-03-08 NOTE — HISTORY OF PRESENT ILLNESS
[Never] : never [TextBox_4] : Prior: Patient referred for evaluation of interstitial lung disease and shortness of breath.  Longstanding history of shortness of breath progressive over the last several years.  Recently hospitalized for decompensated heart failure and treated with diuretic therapy with improvement still however very short of breath.  Known aortic stenosis status post TAVR.  Shortness of breath persisted after TAVR.  Treated for COPD with inhalers without significant benefit.  No history of autoimmune disease.  Denies occupational exposure to dust fumes or toxins.\par \par Current: No interval change still with shortness of breath.  Has oxygen at home does not use.  Went for swallow study here for review and discussion.  Taking diuretics intermittently based on weight

## 2023-03-08 NOTE — PROCEDURE
[FreeTextEntry1] : ACC: [id number] EXAM: CT CHEST\par \par PROCEDURE DATE: 12/08/2022\par \par \par \par INTERPRETATION: CLINICAL INFORMATION: Hypoxia.\par \par COMPARISON: CT chest from 5/12/2022, 5/11/2019.\par \par CONTRAST/COMPLICATIONS:\par IV Contrast: None.\par Oral Contrast: None.\par Complications: None documented.\par \par PROCEDURE:\par CT scan of the chest was obtained without intravenous contrast.\par \par FINDINGS:\par \par LYMPH NODES: Calcified subcarinal and right hilar lymph nodes.\par \par HEART/VASCULATURE: The heart is enlarged in size. No pericardial effusion. There are aortic and coronary artery calcifications. Status post TAVR and Micra pacemaker within the right ventricle..\par \par AIRWAYS/LUNGS/PLEURA: Patchy opacities containing dilated airways are present in the perihilar region of both lungs. The findings have not significantly changed when compared to study of 5/12/2022. Left lung calcified granulomas. No pleural effusion.\par \par UPPER ABDOMEN: Large hiatal hernia. Coarse hepatic calcifications are unchanged. Left renal cyst. Colonic diverticuli.\par \par BONES/SOFT TISSUES: Degenerative changes.\par \par IMPRESSION:\par Patchy opacities containing dilated airways in the perihilar region of both lungs are unchanged when compared to study of 5/12/2022. Exact etiology is unclear.\par \par Esophagram-[facility]-esophageal dysmotility and hiatal hernia\par \par PFT demonstrates preserved spirometry and severe diffusion impairment

## 2023-03-09 ENCOUNTER — OUTPATIENT (OUTPATIENT)
Dept: OUTPATIENT SERVICES | Facility: HOSPITAL | Age: 88
LOS: 1 days | End: 2023-03-09
Payer: MEDICARE

## 2023-03-09 ENCOUNTER — NON-APPOINTMENT (OUTPATIENT)
Age: 88
End: 2023-03-09

## 2023-03-09 DIAGNOSIS — Z90.721 ACQUIRED ABSENCE OF OVARIES, UNILATERAL: Chronic | ICD-10-CM

## 2023-03-09 DIAGNOSIS — Z98.890 OTHER SPECIFIED POSTPROCEDURAL STATES: Chronic | ICD-10-CM
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
## 2023-03-09 DIAGNOSIS — Z95.5 PRESENCE OF CORONARY ANGIOPLASTY IMPLANT AND GRAFT: Chronic | ICD-10-CM

## 2023-03-09 DIAGNOSIS — S81.809A UNSPECIFIED OPEN WOUND, UNSPECIFIED LOWER LEG, INITIAL ENCOUNTER: ICD-10-CM

## 2023-03-09 DIAGNOSIS — Z95.2 PRESENCE OF PROSTHETIC HEART VALVE: Chronic | ICD-10-CM

## 2023-03-09 DIAGNOSIS — Z98.61 CORONARY ANGIOPLASTY STATUS: Chronic | ICD-10-CM
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
## 2023-03-09 PROCEDURE — 93923 UPR/LXTR ART STDY 3+ LVLS: CPT

## 2023-03-09 PROCEDURE — 93923 UPR/LXTR ART STDY 3+ LVLS: CPT | Mod: 26

## 2023-03-12 ENCOUNTER — NON-APPOINTMENT (OUTPATIENT)
Age: 88
End: 2023-03-12

## 2023-03-13 ENCOUNTER — APPOINTMENT (OUTPATIENT)
Dept: WOUND CARE | Facility: HOSPITAL | Age: 88
End: 2023-03-13
Payer: MEDICARE

## 2023-03-13 ENCOUNTER — OUTPATIENT (OUTPATIENT)
Dept: OUTPATIENT SERVICES | Facility: HOSPITAL | Age: 88
LOS: 1 days | Discharge: HOME | End: 2023-03-13
Payer: MEDICARE

## 2023-03-13 VITALS
OXYGEN SATURATION: 95 % | HEART RATE: 80 BPM | DIASTOLIC BLOOD PRESSURE: 89 MMHG | TEMPERATURE: 98.2 F | BODY MASS INDEX: 20.22 KG/M2 | SYSTOLIC BLOOD PRESSURE: 132 MMHG | RESPIRATION RATE: 18 BRPM | WEIGHT: 103 LBS | HEIGHT: 60 IN

## 2023-03-13 DIAGNOSIS — Z98.61 CORONARY ANGIOPLASTY STATUS: Chronic | ICD-10-CM

## 2023-03-13 DIAGNOSIS — Z98.890 OTHER SPECIFIED POSTPROCEDURAL STATES: Chronic | ICD-10-CM

## 2023-03-13 DIAGNOSIS — L97.421 NON-PRESSURE CHRONIC ULCER OF LEFT HEEL AND MIDFOOT LIMITED TO BREAKDOWN OF SKIN: ICD-10-CM

## 2023-03-13 DIAGNOSIS — Z90.721 ACQUIRED ABSENCE OF OVARIES, UNILATERAL: Chronic | ICD-10-CM

## 2023-03-13 DIAGNOSIS — Z95.5 PRESENCE OF CORONARY ANGIOPLASTY IMPLANT AND GRAFT: Chronic | ICD-10-CM

## 2023-03-13 DIAGNOSIS — S91.309D UNSPECIFIED OPEN WOUND, UNSPECIFIED FOOT, SUBSEQUENT ENCOUNTER: ICD-10-CM

## 2023-03-13 DIAGNOSIS — Z95.2 PRESENCE OF PROSTHETIC HEART VALVE: Chronic | ICD-10-CM

## 2023-03-13 PROCEDURE — G0463: CPT

## 2023-03-13 PROCEDURE — 99213 OFFICE O/P EST LOW 20 MIN: CPT

## 2023-03-13 NOTE — HISTORY OF PRESENT ILLNESS
[FreeTextEntry1] : patient presents to wound care clinic for evaluation of wound on her right leg, she states she has had the wound for four months. wound originates from a surgical incision.\par 3/13/23 wound has a few spots of granulation formation\par  \par

## 2023-03-13 NOTE — ASSESSMENT
[Verbal] : Verbal [Demo] : Demo [Patient] : Patient [Caregiver] : Caregiver [Good - alert, interested, motivated] : Good - alert, interested, motivated [Demonstrates independently] : demonstrates independently [Dressing changes] : dressing changes [Foot Care] : foot care [Skin Care] : skin care [Pressure relief] : pressure relief [Signs and symptoms of infection] : sign and symptoms of infection [Nutrition] : nutrition [Pain Management] : pain management [How and When to Call] : how and when to call [Patient responsibility to plan of care] : patient responsibility to plan of care [] : Yes [Stable] : stable [Home] : Home [Walker] : Walker [Not Applicable - Long Term Care/Home Health Agency] : Long Term Care/Home Health Agency: Not Applicable [FreeTextEntry2] : Infection Prevention\par Maintain skin integrity\par Protect/ Guard wound site\par Off loading/ Low sodium diet and Lower Legs Elevation\par Compression therapy\par Proper Diet and Nutriton for wound healing\par Use of pharmacological and nonpharmacological techniques for pain prevention\par  [FreeTextEntry4] : F/U IN 2 WEEKS \par MD CASTILLO discussed with Pt the result of vascular studies.\rommel MADRIGAL advised Pt to set up an appointment for next monday to see MD Whitten

## 2023-03-13 NOTE — VITALS
[Sharp] : sharp [Aching] : aching [] : No [de-identified] : 6/10 [FreeTextEntry3] : Right Heel [FreeTextEntry1] : Pain medication, rest and lower legs elevation [FreeTextEntry2] : walking and standing for long period

## 2023-03-13 NOTE — PLAN
[FreeTextEntry1] : post op wound right shin\par medihoney QOD with CaAlginate DD/PRN drainage\par stressed elevation of lower leg\par Arterial vascular study done and shows YENI on right .68 and unobtainable left side\par vascular consult arranged. Dr Platt aware. Stressed need for vascular evaluation\par return 2 weeks \par recommended pain management for her discomfort which may be arterial\par 20 minutes spent in review,evaluation and treatment planning

## 2023-03-13 NOTE — PHYSICAL EXAM
[0] : right 0 [1+] : right 1+ [Ankle Swelling On The Right] : mild [Alert] : alert [Oriented to Person] : oriented to person [Oriented to Place] : oriented to place [Oriented to Time] : oriented to time [Calm] : calm [4 x 4] : 4 x 4  [Ankle Swelling (On Exam)] : not present [de-identified] : WD WN 89 Y/O white female in NAD [de-identified] : A Fib  [de-identified] : right shin wound [FreeTextEntry1] : Right Leg Medial [FreeTextEntry2] : 7.0 [FreeTextEntry3] : 2.2 [FreeTextEntry4] : 0.3 [de-identified] : serosanguineous [de-identified] : > 80% [de-identified] : Medihoney [FreeTextEntry7] : Right Heel- Eschar [de-identified] : Cleansed with normal saline, sterile gauze\par  \par DD, Kerlix [FreeTextEntry8] : 1.0 [FreeTextEntry9] : 0.4 [de-identified] : 0.1 [de-identified] : intact [de-identified] : Mupirocin, Allevyn foam border [TWNoteComboBox4] : Moderate [de-identified] : Cleansed with normal saline, sterile gauze\par  [TWNoteComboBox5] : No [TWNoteComboBox6] : Venous [de-identified] : No [de-identified] : Normal [de-identified] : None [de-identified] : None [de-identified] : <20% [de-identified] : Yes [de-identified] : Every other day [de-identified] : Primary Dressing [de-identified] : None [de-identified] : No [de-identified] : Other [de-identified] : No [de-identified] : other [de-identified] : None [de-identified] : None [de-identified] : None [de-identified] : No [de-identified] : Daily [de-identified] : Primary Dressing

## 2023-03-14 PROBLEM — L97.421 SKIN ULCER OF LEFT HEEL, LIMITED TO BREAKDOWN OF SKIN: Status: ACTIVE | Noted: 2023-02-28

## 2023-03-14 NOTE — ASSESSMENT
[Verbal] : Verbal [Patient] : Patient [Demo] : Demo [Caregiver] : Caregiver [Good - alert, interested, motivated] : Good - alert, interested, motivated [Demonstrates independently] : demonstrates independently [Dressing changes] : dressing changes [Foot Care] : foot care [Skin Care] : skin care [Signs and symptoms of infection] : sign and symptoms of infection [Nutrition] : nutrition [How and When to Call] : how and when to call [Pain Management] : pain management [Patient responsibility to plan of care] : patient responsibility to plan of care [Off-loading] : off-loading [] : Yes [Home] : Home [Stable] : stable [Walker] : Walker [Not Applicable - Long Term Care/Home Health Agency] : Long Term Care/Home Health Agency: Not Applicable [FreeTextEntry2] : Infection Prevention\par Maintain skin integrity\par Protect/ Guard wound site\par Off loading/ Low sodium diet and Lower Legs Elevation\par Compression therapy\par Proper Diet and Nutriton for wound healing\par Use of pharmacological and nonpharmacological techniques for pain prevention\par  [FreeTextEntry4] : F/U in 2 weeks

## 2023-03-14 NOTE — PHYSICAL EXAM
[4 x 4] : 4 x 4  [0] : right 0 [1+] : right 1+ [Ankle Swelling On The Right] : mild [Skin Ulcer] : ulcer [Alert] : alert [Oriented to Person] : oriented to person [Oriented to Place] : oriented to place [Oriented to Time] : oriented to time [Calm] : calm [Ankle Swelling (On Exam)] : not present [Purpura] : no purpura  [Petechiae] : no petechiae [de-identified] : WD WN 89 Y/O white female in NAD [de-identified] : A Fib , HTN, HLD [de-identified] : Posterior right heel , dry stable eschar , unstagable pressure ulcer  [de-identified] : wnl [FreeTextEntry1] : Right Leg Medial [FreeTextEntry2] : 7.0 [FreeTextEntry3] : 2.2 [FreeTextEntry4] : 0.3 [de-identified] : serosanguineous [de-identified] : >80% [de-identified] : Medihoney [de-identified] : Cleansed with normal saline, sterile gauze\par  [FreeTextEntry7] : Right Heel- Eschar [FreeTextEntry8] : 1.0 [FreeTextEntry9] : 0.4 [de-identified] : 0.1 [de-identified] : s [de-identified] : intact [de-identified] : Mupirocin, Allevyn Foam border [de-identified] : Cleansed with normal saline, sterile gauze\par  [TWNoteComboBox4] : Moderate [TWNoteComboBox5] : No [TWNoteComboBox6] : Venous [de-identified] : No [de-identified] : Normal [de-identified] : None [de-identified] : None [de-identified] : <20% [de-identified] : Yes [de-identified] : Every other day [de-identified] : Primary Dressing [de-identified] : None [de-identified] : No [de-identified] : Other [de-identified] : No [de-identified] : other [de-identified] : None [de-identified] : None [de-identified] : None [de-identified] : No [de-identified] : Daily [de-identified] : Primary Dressing

## 2023-03-14 NOTE — PLAN
[FreeTextEntry1] : Continue off loading and wound care . PTR 2 weeks  Spent 20 minutes for patient care and medical decision making.\par

## 2023-03-14 NOTE — VITALS
[Sharp] : sharp [Aching] : aching [] : No [de-identified] : 6/10 [FreeTextEntry3] : Right Heel [FreeTextEntry1] : Pain medication, rest and Lower Legs Elevation [FreeTextEntry2] : Walking and standing for long period  [FreeTextEntry4] : Lower Legs Elevation

## 2023-03-14 NOTE — REVIEW OF SYSTEMS
[Joint Stiffness] : joint stiffness [Skin Wound] : skin wound [Difficulty Walking] : difficulty walking [Negative] : Heme/Lymph [Fever] : no fever [Chills] : no chills [Eye Pain] : no eye pain [Loss Of Hearing] : no hearing loss [Shortness Of Breath] : no shortness of breath [Abdominal Pain] : no abdominal pain [Vomiting] : no vomiting [Confused] : no confusion [Anxiety] : no anxiety [FreeTextEntry5] : aortic stenosis  has had valve replacement,Atrial fib,Hypertension,hyperlipidemia [FreeTextEntry6] : asthma, [de-identified] : Posterior right heel unstagable pressure ulcer

## 2023-03-19 DIAGNOSIS — Z95.3 PRESENCE OF XENOGENIC HEART VALVE: ICD-10-CM

## 2023-03-19 DIAGNOSIS — E78.00 PURE HYPERCHOLESTEROLEMIA, UNSPECIFIED: ICD-10-CM

## 2023-03-19 DIAGNOSIS — L89.612 PRESSURE ULCER OF RIGHT HEEL, STAGE 2: ICD-10-CM
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
## 2023-03-19 DIAGNOSIS — Z82.49 FAMILY HISTORY OF ISCHEMIC HEART DISEASE AND OTHER DISEASES OF THE CIRCULATORY SYSTEM: ICD-10-CM
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
## 2023-03-19 DIAGNOSIS — Z98.890 OTHER SPECIFIED POSTPROCEDURAL STATES: ICD-10-CM

## 2023-03-19 DIAGNOSIS — T81.89XD OTHER COMPLICATIONS OF PROCEDURES, NOT ELSEWHERE CLASSIFIED, SUBSEQUENT ENCOUNTER: ICD-10-CM

## 2023-03-19 DIAGNOSIS — Z85.828 PERSONAL HISTORY OF OTHER MALIGNANT NEOPLASM OF SKIN: ICD-10-CM

## 2023-03-19 DIAGNOSIS — I48.91 UNSPECIFIED ATRIAL FIBRILLATION: ICD-10-CM

## 2023-03-19 DIAGNOSIS — Z79.899 OTHER LONG TERM (CURRENT) DRUG THERAPY: ICD-10-CM

## 2023-03-19 DIAGNOSIS — J84.9 INTERSTITIAL PULMONARY DISEASE, UNSPECIFIED: ICD-10-CM

## 2023-03-19 DIAGNOSIS — I50.9 HEART FAILURE, UNSPECIFIED: ICD-10-CM

## 2023-03-19 DIAGNOSIS — J45.909 UNSPECIFIED ASTHMA, UNCOMPLICATED: ICD-10-CM

## 2023-03-19 DIAGNOSIS — I11.0 HYPERTENSIVE HEART DISEASE WITH HEART FAILURE: ICD-10-CM

## 2023-03-19 DIAGNOSIS — I35.0 NONRHEUMATIC AORTIC (VALVE) STENOSIS: ICD-10-CM

## 2023-03-21 NOTE — DIETITIAN INITIAL EVALUATION ADULT. - PROBLEM/PLAN-7
----- Message from Kelley Butler sent at 3/21/2023  1:02 PM CDT -----  Regarding: Discharge  Hello for the last couple weeks I have had a thick yellowish mucus like discharge that I haven’t thought much of but it hasn’t stopped and I didn’t have it with my other pregnancies, is this normal?    DISPLAY PLAN FREE TEXT

## 2023-03-26 ENCOUNTER — NON-APPOINTMENT (OUTPATIENT)
Age: 88
End: 2023-03-26

## 2023-03-27 ENCOUNTER — APPOINTMENT (OUTPATIENT)
Dept: WOUND CARE | Facility: HOSPITAL | Age: 88
End: 2023-03-27
Payer: MEDICARE

## 2023-03-27 ENCOUNTER — OUTPATIENT (OUTPATIENT)
Dept: OUTPATIENT SERVICES | Facility: HOSPITAL | Age: 88
LOS: 1 days | Discharge: HOME | End: 2023-03-27
Payer: MEDICARE

## 2023-03-27 VITALS
WEIGHT: 103 LBS | HEIGHT: 63 IN | SYSTOLIC BLOOD PRESSURE: 114 MMHG | HEART RATE: 80 BPM | RESPIRATION RATE: 18 BRPM | DIASTOLIC BLOOD PRESSURE: 62 MMHG | BODY MASS INDEX: 18.25 KG/M2

## 2023-03-27 DIAGNOSIS — Z98.61 CORONARY ANGIOPLASTY STATUS: Chronic | ICD-10-CM

## 2023-03-27 DIAGNOSIS — Z90.721 ACQUIRED ABSENCE OF OVARIES, UNILATERAL: Chronic | ICD-10-CM

## 2023-03-27 DIAGNOSIS — Z98.890 OTHER SPECIFIED POSTPROCEDURAL STATES: Chronic | ICD-10-CM

## 2023-03-27 DIAGNOSIS — T81.89XD OTHER COMPLICATIONS OF PROCEDURES, NOT ELSEWHERE CLASSIFIED, SUBSEQUENT ENCOUNTER: ICD-10-CM

## 2023-03-27 DIAGNOSIS — Z95.2 PRESENCE OF PROSTHETIC HEART VALVE: Chronic | ICD-10-CM

## 2023-03-27 DIAGNOSIS — Z95.5 PRESENCE OF CORONARY ANGIOPLASTY IMPLANT AND GRAFT: Chronic | ICD-10-CM

## 2023-03-27 PROCEDURE — 99213 OFFICE O/P EST LOW 20 MIN: CPT

## 2023-03-27 PROCEDURE — G0463: CPT

## 2023-03-27 NOTE — PHYSICAL EXAM
[0] : right 0 [1+] : right 1+ [FreeTextEntry1] : weak R DP [de-identified] : R inner shin 5 cm wound-covered with fibrin, indurated, over tibial bone, non cellulitic, subcutaneous exposure.

## 2023-03-27 NOTE — ASSESSMENT
[FreeTextEntry1] : Evidence of severe PVD with RLE YENI of 0.65\par Non healing post surgical wound.  Significant comorbidities including COPD and home O2.

## 2023-03-27 NOTE — HISTORY OF PRESENT ILLNESS
[FreeTextEntry1] : 89 yo F with large R shin wound - post surgical from oncologic soft tissue surgery. Open since September. Wound is worsening. Today to go over abnormal ABIs. She doesn't ambulate much. On home O2.

## 2023-03-28 DIAGNOSIS — Z87.891 PERSONAL HISTORY OF NICOTINE DEPENDENCE: ICD-10-CM
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
## 2023-03-28 DIAGNOSIS — Z82.49 FAMILY HISTORY OF ISCHEMIC HEART DISEASE AND OTHER DISEASES OF THE CIRCULATORY SYSTEM: ICD-10-CM

## 2023-03-28 DIAGNOSIS — L89.612 PRESSURE ULCER OF RIGHT HEEL, STAGE 2: ICD-10-CM

## 2023-03-28 DIAGNOSIS — Z88.0 ALLERGY STATUS TO PENICILLIN: ICD-10-CM

## 2023-03-28 DIAGNOSIS — T81.89XD OTHER COMPLICATIONS OF PROCEDURES, NOT ELSEWHERE CLASSIFIED, SUBSEQUENT ENCOUNTER: ICD-10-CM

## 2023-03-28 DIAGNOSIS — Z79.899 OTHER LONG TERM (CURRENT) DRUG THERAPY: ICD-10-CM

## 2023-03-28 DIAGNOSIS — Z98.890 OTHER SPECIFIED POSTPROCEDURAL STATES: ICD-10-CM
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
## 2023-04-03 ENCOUNTER — APPOINTMENT (OUTPATIENT)
Dept: WOUND CARE | Facility: HOSPITAL | Age: 88
End: 2023-04-03
Payer: MEDICARE

## 2023-04-03 ENCOUNTER — OUTPATIENT (OUTPATIENT)
Dept: OUTPATIENT SERVICES | Facility: HOSPITAL | Age: 88
LOS: 1 days | Discharge: HOME | End: 2023-04-03
Payer: MEDICARE

## 2023-04-03 VITALS
DIASTOLIC BLOOD PRESSURE: 76 MMHG | BODY MASS INDEX: 18.25 KG/M2 | TEMPERATURE: 97.7 F | HEIGHT: 63 IN | OXYGEN SATURATION: 94 % | HEART RATE: 82 BPM | RESPIRATION RATE: 18 BRPM | WEIGHT: 103 LBS | SYSTOLIC BLOOD PRESSURE: 116 MMHG

## 2023-04-03 DIAGNOSIS — Z90.721 ACQUIRED ABSENCE OF OVARIES, UNILATERAL: Chronic | ICD-10-CM

## 2023-04-03 DIAGNOSIS — Z98.890 OTHER SPECIFIED POSTPROCEDURAL STATES: Chronic | ICD-10-CM

## 2023-04-03 DIAGNOSIS — Z98.61 CORONARY ANGIOPLASTY STATUS: Chronic | ICD-10-CM

## 2023-04-03 DIAGNOSIS — Z95.2 PRESENCE OF PROSTHETIC HEART VALVE: Chronic | ICD-10-CM

## 2023-04-03 DIAGNOSIS — Z95.5 PRESENCE OF CORONARY ANGIOPLASTY IMPLANT AND GRAFT: Chronic | ICD-10-CM

## 2023-04-03 DIAGNOSIS — T80.89XD OTHER COMPLICATIONS FOLLOWING INFUSION, TRANSFUSION AND THERAPEUTIC INJECTION, SUBSEQUENT ENCOUNTER: ICD-10-CM

## 2023-04-03 PROCEDURE — 99213 OFFICE O/P EST LOW 20 MIN: CPT

## 2023-04-03 PROCEDURE — G0463: CPT

## 2023-04-05 DIAGNOSIS — I35.0 NONRHEUMATIC AORTIC (VALVE) STENOSIS: ICD-10-CM
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
## 2023-04-05 DIAGNOSIS — I11.0 HYPERTENSIVE HEART DISEASE WITH HEART FAILURE: ICD-10-CM

## 2023-04-05 DIAGNOSIS — Z98.890 OTHER SPECIFIED POSTPROCEDURAL STATES: ICD-10-CM

## 2023-04-05 DIAGNOSIS — Z79.899 OTHER LONG TERM (CURRENT) DRUG THERAPY: ICD-10-CM

## 2023-04-05 DIAGNOSIS — T81.89XD OTHER COMPLICATIONS OF PROCEDURES, NOT ELSEWHERE CLASSIFIED, SUBSEQUENT ENCOUNTER: ICD-10-CM

## 2023-04-05 DIAGNOSIS — Z85.828 PERSONAL HISTORY OF OTHER MALIGNANT NEOPLASM OF SKIN: ICD-10-CM

## 2023-04-05 DIAGNOSIS — J84.9 INTERSTITIAL PULMONARY DISEASE, UNSPECIFIED: ICD-10-CM

## 2023-04-05 DIAGNOSIS — I50.9 HEART FAILURE, UNSPECIFIED: ICD-10-CM

## 2023-04-05 DIAGNOSIS — I48.91 UNSPECIFIED ATRIAL FIBRILLATION: ICD-10-CM
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
## 2023-04-05 DIAGNOSIS — L89.612 PRESSURE ULCER OF RIGHT HEEL, STAGE 2: ICD-10-CM

## 2023-04-05 DIAGNOSIS — Z82.49 FAMILY HISTORY OF ISCHEMIC HEART DISEASE AND OTHER DISEASES OF THE CIRCULATORY SYSTEM: ICD-10-CM

## 2023-04-05 DIAGNOSIS — Z95.3 PRESENCE OF XENOGENIC HEART VALVE: ICD-10-CM

## 2023-04-05 DIAGNOSIS — J45.909 UNSPECIFIED ASTHMA, UNCOMPLICATED: ICD-10-CM

## 2023-04-05 DIAGNOSIS — E78.00 PURE HYPERCHOLESTEROLEMIA, UNSPECIFIED: ICD-10-CM

## 2023-04-07 ENCOUNTER — APPOINTMENT (OUTPATIENT)
Dept: SURGICAL ONCOLOGY | Facility: CLINIC | Age: 88
End: 2023-04-07

## 2023-04-17 ENCOUNTER — APPOINTMENT (OUTPATIENT)
Dept: WOUND CARE | Facility: HOSPITAL | Age: 88
End: 2023-04-17
Payer: MEDICARE

## 2023-04-17 ENCOUNTER — OUTPATIENT (OUTPATIENT)
Dept: OUTPATIENT SERVICES | Facility: HOSPITAL | Age: 88
LOS: 1 days | Discharge: HOME | End: 2023-04-17
Payer: MEDICARE

## 2023-04-17 VITALS
TEMPERATURE: 97.7 F | RESPIRATION RATE: 18 BRPM | BODY MASS INDEX: 18.25 KG/M2 | DIASTOLIC BLOOD PRESSURE: 66 MMHG | HEIGHT: 63 IN | SYSTOLIC BLOOD PRESSURE: 101 MMHG | HEART RATE: 81 BPM | OXYGEN SATURATION: 93 % | WEIGHT: 103 LBS

## 2023-04-17 DIAGNOSIS — Z98.61 CORONARY ANGIOPLASTY STATUS: Chronic | ICD-10-CM

## 2023-04-17 DIAGNOSIS — Z95.5 PRESENCE OF CORONARY ANGIOPLASTY IMPLANT AND GRAFT: Chronic | ICD-10-CM

## 2023-04-17 DIAGNOSIS — Z98.890 OTHER SPECIFIED POSTPROCEDURAL STATES: Chronic | ICD-10-CM

## 2023-04-17 DIAGNOSIS — T81.89XD OTHER COMPLICATIONS OF PROCEDURES, NOT ELSEWHERE CLASSIFIED, SUBSEQUENT ENCOUNTER: ICD-10-CM

## 2023-04-17 DIAGNOSIS — Z95.2 PRESENCE OF PROSTHETIC HEART VALVE: Chronic | ICD-10-CM

## 2023-04-17 DIAGNOSIS — Z90.721 ACQUIRED ABSENCE OF OVARIES, UNILATERAL: Chronic | ICD-10-CM

## 2023-04-17 PROCEDURE — 99213 OFFICE O/P EST LOW 20 MIN: CPT

## 2023-04-17 PROCEDURE — G0463: CPT

## 2023-04-17 NOTE — HISTORY OF PRESENT ILLNESS
[FreeTextEntry1] : patient presents to wound care clinic for evaluation of wound on her right leg, she states she has had the wound for four months. wound originates from a surgical incision.\par 3/13/23 wound has a few spots of granulation formation\par 4/3/23 wound looks shallower and  with wound margin mild erythema. Heel looks closed but is tender to palpation\par  \par

## 2023-04-17 NOTE — PLAN
[FreeTextEntry1] : post op wound right shin\par medihoney QOD with CaAlginate DD/PRN drainage\par stressed elevation of lower leg\par Arterial vascular study done and shows YENI on right .68 and unobtainable left side\par vascular consult recommended arteriogram but patient has not decided yet\par explained why it was needed and potential consequences of not having knowledge of arterial flow  \par return 2 weeks \par recommended pain management for her discomfort which may be arterial\par 20 minutes spent in review,evaluation and treatment planning

## 2023-04-17 NOTE — PHYSICAL EXAM
[0] : right 0 [1+] : right 1+ [Ankle Swelling On The Right] : mild [Alert] : alert [Oriented to Person] : oriented to person [Oriented to Place] : oriented to place [Oriented to Time] : oriented to time [Calm] : calm [4 x 4] : 4 x 4  [Ankle Swelling (On Exam)] : not present [de-identified] : WD WN 89 Y/O white female in NAD [de-identified] : ZAKIYA ALCAZARM intact [de-identified] : A Fib  [de-identified] : right shin wound [FreeTextEntry1] : Right medial leg [FreeTextEntry2] : 6.5 [FreeTextEntry3] : 1.9 [FreeTextEntry4] : 0.3 [de-identified] : serosanguinous [de-identified] : Medihoney [de-identified] : Wrapped in kerlix and stockinette [FreeTextEntry7] : Right heel (closed) [TWNoteComboBox4] : Small [TWNoteComboBox5] : No [TWNoteComboBox6] : Surgical [de-identified] : Normal [de-identified] : No [de-identified] : None [de-identified] : None [de-identified] : <20% [de-identified] : Yes [de-identified] : 3x Weekly [de-identified] : Primary Dressing

## 2023-04-17 NOTE — ASSESSMENT
[Verbal] : Verbal [Written] : Written [Demo] : Demo [Patient] : Patient [Caregiver] : Caregiver [Good - alert, interested, motivated] : Good - alert, interested, motivated [Verbalizes knowledge/Understanding] : Verbalizes knowledge/understanding [Dressing changes] : dressing changes [Foot Care] : foot care [Skin Care] : skin care [Pressure relief] : pressure relief [Signs and symptoms of infection] : sign and symptoms of infection [Nutrition] : nutrition [How and When to Call] : how and when to call [] : Yes [Stable] : stable [Home] : Home [Ambulatory] : Ambulatory [FreeTextEntry2] : 1. Prevent infection.\par 2. Maintain skin integrity.\par 3. Promote proper nutrition for wound healing. [FreeTextEntry4] : Pt. will continue with wound dressing 3 times weekly with application of medi-honey and DSD. Wrapped with kerlix and stockinette. Pt. will see Dr. Rodriguez in 2 weeks. Pt. is still deciding on angiogram of right leg.

## 2023-04-18 DIAGNOSIS — I11.0 HYPERTENSIVE HEART DISEASE WITH HEART FAILURE: ICD-10-CM

## 2023-04-18 DIAGNOSIS — Z79.899 OTHER LONG TERM (CURRENT) DRUG THERAPY: ICD-10-CM
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
## 2023-04-18 DIAGNOSIS — T81.89XD OTHER COMPLICATIONS OF PROCEDURES, NOT ELSEWHERE CLASSIFIED, SUBSEQUENT ENCOUNTER: ICD-10-CM

## 2023-04-18 DIAGNOSIS — Z82.49 FAMILY HISTORY OF ISCHEMIC HEART DISEASE AND OTHER DISEASES OF THE CIRCULATORY SYSTEM: ICD-10-CM

## 2023-04-18 DIAGNOSIS — L89.612 PRESSURE ULCER OF RIGHT HEEL, STAGE 2: ICD-10-CM

## 2023-04-18 DIAGNOSIS — I35.0 NONRHEUMATIC AORTIC (VALVE) STENOSIS: ICD-10-CM

## 2023-04-18 DIAGNOSIS — Z98.890 OTHER SPECIFIED POSTPROCEDURAL STATES: ICD-10-CM

## 2023-04-18 DIAGNOSIS — J45.909 UNSPECIFIED ASTHMA, UNCOMPLICATED: ICD-10-CM

## 2023-04-18 DIAGNOSIS — E78.00 PURE HYPERCHOLESTEROLEMIA, UNSPECIFIED: ICD-10-CM

## 2023-04-18 DIAGNOSIS — I50.9 HEART FAILURE, UNSPECIFIED: ICD-10-CM

## 2023-04-18 DIAGNOSIS — Z88.0 ALLERGY STATUS TO PENICILLIN: ICD-10-CM

## 2023-04-18 DIAGNOSIS — Z85.828 PERSONAL HISTORY OF OTHER MALIGNANT NEOPLASM OF SKIN: ICD-10-CM

## 2023-04-18 DIAGNOSIS — I48.91 UNSPECIFIED ATRIAL FIBRILLATION: ICD-10-CM
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
## 2023-04-18 DIAGNOSIS — J84.9 INTERSTITIAL PULMONARY DISEASE, UNSPECIFIED: ICD-10-CM

## 2023-04-18 DIAGNOSIS — Z95.3 PRESENCE OF XENOGENIC HEART VALVE: ICD-10-CM

## 2023-04-18 NOTE — PHYSICAL EXAM
[0] : right 0 [1+] : right 1+ [Ankle Swelling On The Right] : mild [Alert] : alert [Oriented to Person] : oriented to person [Oriented to Place] : oriented to place [Oriented to Time] : oriented to time [Calm] : calm [4 x 4] : 4 x 4  [Ankle Swelling (On Exam)] : not present [de-identified] : WD WN 89 Y/O white female in NAD [de-identified] : ZAKIYA ALCAZARM intact [de-identified] : A Fib  [de-identified] : right shin wound [FreeTextEntry1] : Right medial leg  [FreeTextEntry2] : 6.7 [FreeTextEntry3] : 1.9 [FreeTextEntry4] : 0.2 [de-identified] : moderate serous [de-identified] : other  - macerated [de-identified] : none [de-identified] : >75% [de-identified] : <25% [de-identified] : none [de-identified] : none [FreeTextEntry5] : none [de-identified] : Medihoney, Calcium Alginate [de-identified] : Mechanically cleansed with sterile gauze and normal saline 0.9%\par Dry Dressing\par  [TWNoteComboBox5] : No [TWNoteComboBox6] : Other [de-identified] : No [de-identified] : None [de-identified] : 3x Weekly [de-identified] : Primary Dressing

## 2023-04-18 NOTE — ASSESSMENT
[Verbal] : Verbal [Written] : Written [Demo] : Demo [Patient] : Patient [Good - alert, interested, motivated] : Good - alert, interested, motivated [Verbalizes knowledge/Understanding] : Verbalizes knowledge/understanding [Dressing changes] : dressing changes [Skin Care] : skin care [Signs and symptoms of infection] : sign and symptoms of infection [Nutrition] : nutrition [How and When to Call] : how and when to call [Off-loading] : off-loading [Home Health] : home health [Patient responsibility to plan of care] : patient responsibility to plan of care [] : Yes [Home] : Home [Stable] : stable [Wheelchair] : Wheelchair [FreeTextEntry2] : Infection prevention \par Wound care (dressing changes)\par Maintain optimal skin integrity to high pressure areas\par Nutrition and wound healing\par Offloading the stress on skin structures and decreasing potential pathologic biomechanical influences. [FreeTextEntry4] : Pt has private aid services to assist with dressing changes. Tolerating dressing changes well. \par F/u in 2 weeks.

## 2023-04-18 NOTE — PLAN
[FreeTextEntry1] : post op wound right shin\par medihoney QOD with CaAlginate DD/PRN drainage\par stressed elevation of lower leg \par return 2 weeks \par recommended pain management for her discomfort which may be arterial\par 20 minutes spent in review,evaluation and treatment planning

## 2023-05-01 ENCOUNTER — OUTPATIENT (OUTPATIENT)
Dept: OUTPATIENT SERVICES | Facility: HOSPITAL | Age: 88
LOS: 1 days | Discharge: HOME | End: 2023-05-01
Payer: MEDICARE

## 2023-05-01 ENCOUNTER — NON-APPOINTMENT (OUTPATIENT)
Age: 88
End: 2023-05-01

## 2023-05-01 ENCOUNTER — APPOINTMENT (OUTPATIENT)
Dept: WOUND CARE | Facility: HOSPITAL | Age: 88
End: 2023-05-01
Payer: MEDICARE

## 2023-05-01 VITALS
RESPIRATION RATE: 18 BRPM | SYSTOLIC BLOOD PRESSURE: 113 MMHG | DIASTOLIC BLOOD PRESSURE: 76 MMHG | WEIGHT: 103 LBS | HEIGHT: 63 IN | HEART RATE: 78 BPM | OXYGEN SATURATION: 91 % | BODY MASS INDEX: 18.25 KG/M2
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
## 2023-05-01 DIAGNOSIS — J84.9 INTERSTITIAL PULMONARY DISEASE, UNSPECIFIED: ICD-10-CM

## 2023-05-01 DIAGNOSIS — Z82.49 FAMILY HISTORY OF ISCHEMIC HEART DISEASE AND OTHER DISEASES OF THE CIRCULATORY SYSTEM: ICD-10-CM

## 2023-05-01 DIAGNOSIS — L89.622 PRESSURE ULCER OF RIGHT HEEL, STAGE 2: ICD-10-CM

## 2023-05-01 DIAGNOSIS — T81.89XD OTHER COMPLICATIONS OF PROCEDURES, NOT ELSEWHERE CLASSIFIED, SUBSEQUENT ENCOUNTER: ICD-10-CM

## 2023-05-01 DIAGNOSIS — I50.9 HEART FAILURE, UNSPECIFIED: ICD-10-CM

## 2023-05-01 DIAGNOSIS — Z88.0 ALLERGY STATUS TO PENICILLIN: ICD-10-CM

## 2023-05-01 DIAGNOSIS — Z85.828 PERSONAL HISTORY OF OTHER MALIGNANT NEOPLASM OF SKIN: ICD-10-CM

## 2023-05-01 DIAGNOSIS — Z98.890 OTHER SPECIFIED POSTPROCEDURAL STATES: ICD-10-CM

## 2023-05-01 DIAGNOSIS — Z95.5 PRESENCE OF CORONARY ANGIOPLASTY IMPLANT AND GRAFT: Chronic | ICD-10-CM

## 2023-05-01 DIAGNOSIS — I35.0 NONRHEUMATIC AORTIC (VALVE) STENOSIS: ICD-10-CM

## 2023-05-01 DIAGNOSIS — Z79.899 OTHER LONG TERM (CURRENT) DRUG THERAPY: ICD-10-CM

## 2023-05-01 DIAGNOSIS — I11.0 HYPERTENSIVE HEART DISEASE WITH HEART FAILURE: ICD-10-CM

## 2023-05-01 DIAGNOSIS — L89.612 PRESSURE ULCER OF RIGHT HEEL, STAGE 2: ICD-10-CM

## 2023-05-01 DIAGNOSIS — J45.909 UNSPECIFIED ASTHMA, UNCOMPLICATED: ICD-10-CM

## 2023-05-01 DIAGNOSIS — Z98.61 CORONARY ANGIOPLASTY STATUS: Chronic | ICD-10-CM
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
## 2023-05-01 DIAGNOSIS — Z95.3 PRESENCE OF XENOGENIC HEART VALVE: ICD-10-CM

## 2023-05-01 DIAGNOSIS — I48.91 UNSPECIFIED ATRIAL FIBRILLATION: ICD-10-CM

## 2023-05-01 DIAGNOSIS — E78.00 PURE HYPERCHOLESTEROLEMIA, UNSPECIFIED: ICD-10-CM

## 2023-05-01 DIAGNOSIS — Z95.2 PRESENCE OF PROSTHETIC HEART VALVE: Chronic | ICD-10-CM

## 2023-05-01 DIAGNOSIS — Z98.890 OTHER SPECIFIED POSTPROCEDURAL STATES: Chronic | ICD-10-CM

## 2023-05-01 DIAGNOSIS — Z90.721 ACQUIRED ABSENCE OF OVARIES, UNILATERAL: Chronic | ICD-10-CM

## 2023-05-01 PROCEDURE — G0463: CPT

## 2023-05-01 PROCEDURE — 99213 OFFICE O/P EST LOW 20 MIN: CPT

## 2023-05-01 NOTE — ASSESSMENT
[Verbal] : Verbal [Demo] : Demo [Patient] : Patient [Good - alert, interested, motivated] : Good - alert, interested, motivated [Verbalizes knowledge/Understanding] : Verbalizes knowledge/understanding [Dressing changes] : dressing changes [Foot Care] : foot care [Skin Care] : skin care [Signs and symptoms of infection] : sign and symptoms of infection [Nutrition] : nutrition [How and When to Call] : how and when to call [Off-loading] : off-loading [Patient responsibility to plan of care] : patient responsibility to plan of care [] : Yes [Stable] : stable [Home] : Home [Wheelchair] : Wheelchair [FreeTextEntry2] : Infection prevention \par Wound care (dressing changes)\par Maintain optimal skin integrity to high pressure areas\par Nutrition and wound healing\par Offloading the stress on skin structures and decreasing potential pathologic biomechanical influences. [FreeTextEntry4] : F/u 2 weeks

## 2023-05-01 NOTE — PHYSICAL EXAM
[0] : right 0 [1+] : right 1+ [Ankle Swelling On The Right] : mild [Alert] : alert [Oriented to Person] : oriented to person [Oriented to Place] : oriented to place [Oriented to Time] : oriented to time [Calm] : calm [4 x 4] : 4 x 4  [Ankle Swelling (On Exam)] : not present [de-identified] : WD WN 89 Y/O white female in NAD [de-identified] : A Fib  [de-identified] : right shin wound [FreeTextEntry1] : Right medial leg (proximal) [FreeTextEntry2] : 1.4 [FreeTextEntry3] : 0.5 [FreeTextEntry4] : 0.1 [de-identified] : small serous [de-identified] : none [de-identified] : >75% [de-identified] : <25% [de-identified] : none [de-identified] : none [de-identified] : Medihoney, Calcium Alginate [de-identified] : Mechanically cleansed with sterile gauze and normal saline 0.9%\par Dry Dressing\par  [FreeTextEntry7] : Right medial leg (distal) [FreeTextEntry8] : 3.5 [FreeTextEntry9] : 1.2 [de-identified] : 0.2 [de-identified] : moderate serous [de-identified] : none [de-identified] : 50% [de-identified] : 50% [de-identified] : Medihoney, Calcium Alginate [de-identified] : Mechanically cleansed with sterile gauze and normal saline 0.9%\par Dry Dressing\par  [TWNoteComboBox5] : No [TWNoteComboBox6] : Surgical [de-identified] : No [de-identified] : Normal [de-identified] : None [de-identified] : 3x Weekly [de-identified] : Primary Dressing [de-identified] : No [de-identified] : Surgical [de-identified] : No [de-identified] : Normal [de-identified] : None [de-identified] : 3x Weekly [de-identified] : Primary Dressing

## 2023-05-01 NOTE — HISTORY OF PRESENT ILLNESS
[FreeTextEntry1] : patient presents to wound care clinic for evaluation of wound on her right leg, she states she has had the wound for four months. wound originates from a surgical incision.\par 3/13/23 wound has a few spots of granulation formation\par 4/3/23 wound looks shallower and  with wound margin mild erythema. Heel looks closed but is tender to palpation\par 5/1/23 wound right lower leg smaller and \par  \par

## 2023-05-09 ENCOUNTER — NON-APPOINTMENT (OUTPATIENT)
Age: 88
End: 2023-05-09

## 2023-05-10 NOTE — DISCHARGE NOTE NURSING/CASE MANAGEMENT/SOCIAL WORK - NSDCPEELIQUISDIET_GEN_ALL_CORE
Make sure patient has hemoglobin A1c checked with her next CBC from wound care orders
Eat healthy foods you enjoy. Apixaban/Eliquis DOES NOT have a special diet. Limit your alcohol intake.
Pt states "I would like to walk with no pain in my R hip."

## 2023-05-15 ENCOUNTER — APPOINTMENT (OUTPATIENT)
Dept: WOUND CARE | Facility: HOSPITAL | Age: 88
End: 2023-05-15

## 2023-05-27 NOTE — HISTORY OF PRESENT ILLNESS
Home [FreeTextEntry1] : patient presents to wound care clinic for evaluation of wound on her right leg, she states she has had the wound for four months. wound originates from a surgical incision.\par  \par

## 2023-06-14 ENCOUNTER — APPOINTMENT (OUTPATIENT)
Dept: PULMONOLOGY | Facility: CLINIC | Age: 88
End: 2023-06-14

## 2023-06-15 NOTE — ASU PATIENT PROFILE, ADULT - TEACHING/LEARNING LEARNING PREFERENCES
<--- Click to Launch ICDx for PreOp, PostOp and Procedure
individual instruction/verbal instruction/written material

## 2023-07-21 ENCOUNTER — INPATIENT (INPATIENT)
Facility: HOSPITAL | Age: 88
LOS: 4 days | Discharge: HOME | DRG: 291 | End: 2023-07-26
Attending: HOSPITALIST | Admitting: HOSPITALIST
Payer: MEDICARE

## 2023-07-21 VITALS
DIASTOLIC BLOOD PRESSURE: 98 MMHG | TEMPERATURE: 98 F | RESPIRATION RATE: 36 BRPM | HEART RATE: 114 BPM | OXYGEN SATURATION: 97 % | HEIGHT: 60 IN | SYSTOLIC BLOOD PRESSURE: 138 MMHG | WEIGHT: 100.09 LBS

## 2023-07-21 DIAGNOSIS — Z95.5 PRESENCE OF CORONARY ANGIOPLASTY IMPLANT AND GRAFT: Chronic | ICD-10-CM

## 2023-07-21 DIAGNOSIS — Z98.61 CORONARY ANGIOPLASTY STATUS: Chronic | ICD-10-CM

## 2023-07-21 DIAGNOSIS — Z95.2 PRESENCE OF PROSTHETIC HEART VALVE: Chronic | ICD-10-CM

## 2023-07-21 DIAGNOSIS — Z90.721 ACQUIRED ABSENCE OF OVARIES, UNILATERAL: Chronic | ICD-10-CM

## 2023-07-21 DIAGNOSIS — Z98.890 OTHER SPECIFIED POSTPROCEDURAL STATES: Chronic | ICD-10-CM

## 2023-07-21 LAB
ANION GAP SERPL CALC-SCNC: 12 MMOL/L — SIGNIFICANT CHANGE UP (ref 5–17)
BASOPHILS # BLD AUTO: 0.04 K/UL — SIGNIFICANT CHANGE UP (ref 0–0.2)
BASOPHILS NFR BLD AUTO: 0.3 % — SIGNIFICANT CHANGE UP (ref 0–2)
BUN SERPL-MCNC: 27 MG/DL — HIGH (ref 7–23)
CALCIUM SERPL-MCNC: 9.8 MG/DL — SIGNIFICANT CHANGE UP (ref 8.4–10.5)
CHLORIDE SERPL-SCNC: 99 MMOL/L — SIGNIFICANT CHANGE UP (ref 96–108)
CO2 SERPL-SCNC: 24 MMOL/L — SIGNIFICANT CHANGE UP (ref 22–31)
CREAT SERPL-MCNC: 1.09 MG/DL — SIGNIFICANT CHANGE UP (ref 0.5–1.3)
EGFR: 46 ML/MIN/1.73M2 — LOW
EOSINOPHIL # BLD AUTO: 0.13 K/UL — SIGNIFICANT CHANGE UP (ref 0–0.5)
EOSINOPHIL NFR BLD AUTO: 1.1 % — SIGNIFICANT CHANGE UP (ref 0–6)
GLUCOSE SERPL-MCNC: 124 MG/DL — HIGH (ref 70–99)
HCT VFR BLD CALC: 39.8 % — SIGNIFICANT CHANGE UP (ref 34.5–45)
HGB BLD-MCNC: 12.6 G/DL — SIGNIFICANT CHANGE UP (ref 11.5–15.5)
IMM GRANULOCYTES NFR BLD AUTO: 0.7 % — SIGNIFICANT CHANGE UP (ref 0–0.9)
LYMPHOCYTES # BLD AUTO: 26.3 % — SIGNIFICANT CHANGE UP (ref 13–44)
LYMPHOCYTES # BLD AUTO: 3.19 K/UL — SIGNIFICANT CHANGE UP (ref 1–3.3)
MCHC RBC-ENTMCNC: 31.7 GM/DL — LOW (ref 32–36)
MCHC RBC-ENTMCNC: 32.8 PG — SIGNIFICANT CHANGE UP (ref 27–34)
MCV RBC AUTO: 103.6 FL — HIGH (ref 80–100)
MONOCYTES # BLD AUTO: 0.75 K/UL — SIGNIFICANT CHANGE UP (ref 0–0.9)
MONOCYTES NFR BLD AUTO: 6.2 % — SIGNIFICANT CHANGE UP (ref 2–14)
NEUTROPHILS # BLD AUTO: 7.91 K/UL — HIGH (ref 1.8–7.4)
NEUTROPHILS NFR BLD AUTO: 65.4 % — SIGNIFICANT CHANGE UP (ref 43–77)
NRBC # BLD: 0 /100 WBCS — SIGNIFICANT CHANGE UP (ref 0–0)
PLATELET # BLD AUTO: 196 K/UL — SIGNIFICANT CHANGE UP (ref 150–400)
POTASSIUM SERPL-MCNC: 5.1 MMOL/L — SIGNIFICANT CHANGE UP (ref 3.5–5.3)
POTASSIUM SERPL-SCNC: 5.1 MMOL/L — SIGNIFICANT CHANGE UP (ref 3.5–5.3)
RBC # BLD: 3.84 M/UL — SIGNIFICANT CHANGE UP (ref 3.8–5.2)
RBC # FLD: 17.6 % — HIGH (ref 10.3–14.5)
SODIUM SERPL-SCNC: 135 MMOL/L — SIGNIFICANT CHANGE UP (ref 135–145)
WBC # BLD: 12.11 K/UL — HIGH (ref 3.8–10.5)
WBC # FLD AUTO: 12.11 K/UL — HIGH (ref 3.8–10.5)

## 2023-07-21 PROCEDURE — 99285 EMERGENCY DEPT VISIT HI MDM: CPT

## 2023-07-21 PROCEDURE — 71045 X-RAY EXAM CHEST 1 VIEW: CPT | Mod: 26

## 2023-07-21 PROCEDURE — 71250 CT THORAX DX C-: CPT | Mod: 26,MA

## 2023-07-21 PROCEDURE — 93010 ELECTROCARDIOGRAM REPORT: CPT

## 2023-07-22 DIAGNOSIS — I50.9 HEART FAILURE, UNSPECIFIED: ICD-10-CM

## 2023-07-22 LAB
ALBUMIN SERPL ELPH-MCNC: 2.8 G/DL — LOW (ref 3.3–5)
ALBUMIN SERPL ELPH-MCNC: 3 G/DL — LOW (ref 3.3–5)
ALP SERPL-CCNC: 154 U/L — HIGH (ref 30–120)
ALP SERPL-CCNC: 162 U/L — HIGH (ref 30–120)
ALT FLD-CCNC: 20 U/L DA — SIGNIFICANT CHANGE UP (ref 10–60)
ALT FLD-CCNC: 25 U/L DA — SIGNIFICANT CHANGE UP (ref 10–60)
ANION GAP SERPL CALC-SCNC: 7 MMOL/L — SIGNIFICANT CHANGE UP (ref 5–17)
APTT BLD: 33.8 SEC — SIGNIFICANT CHANGE UP (ref 27.5–35.5)
AST SERPL-CCNC: 29 U/L — SIGNIFICANT CHANGE UP (ref 10–40)
AST SERPL-CCNC: 30 U/L — SIGNIFICANT CHANGE UP (ref 10–40)
BASE EXCESS BLDV CALC-SCNC: 4.3 MMOL/L — HIGH (ref -2–3)
BASOPHILS # BLD AUTO: 0.02 K/UL — SIGNIFICANT CHANGE UP (ref 0–0.2)
BASOPHILS NFR BLD AUTO: 0.2 % — SIGNIFICANT CHANGE UP (ref 0–2)
BILIRUB SERPL-MCNC: 1.7 MG/DL — HIGH (ref 0.2–1.2)
BILIRUB SERPL-MCNC: 2 MG/DL — HIGH (ref 0.2–1.2)
BLOOD GAS COMMENTS, VENOUS: SIGNIFICANT CHANGE UP
BUN SERPL-MCNC: 26 MG/DL — HIGH (ref 7–23)
CALCIUM SERPL-MCNC: 9.3 MG/DL — SIGNIFICANT CHANGE UP (ref 8.4–10.5)
CHLORIDE SERPL-SCNC: 98 MMOL/L — SIGNIFICANT CHANGE UP (ref 96–108)
CK MB BLD-MCNC: 5.5 % — HIGH (ref 0–3.5)
CK MB CFR SERPL CALC: 1.2 NG/ML — SIGNIFICANT CHANGE UP (ref 0–3.6)
CK SERPL-CCNC: 22 U/L — LOW (ref 26–192)
CO2 SERPL-SCNC: 29 MMOL/L — SIGNIFICANT CHANGE UP (ref 22–31)
CREAT SERPL-MCNC: 1.07 MG/DL — SIGNIFICANT CHANGE UP (ref 0.5–1.3)
CRP SERPL-MCNC: 38 MG/L — HIGH
EGFR: 48 ML/MIN/1.73M2 — LOW
EOSINOPHIL # BLD AUTO: 0.06 K/UL — SIGNIFICANT CHANGE UP (ref 0–0.5)
EOSINOPHIL NFR BLD AUTO: 0.6 % — SIGNIFICANT CHANGE UP (ref 0–6)
GAS PNL BLDV: SIGNIFICANT CHANGE UP
GLUCOSE SERPL-MCNC: 94 MG/DL — SIGNIFICANT CHANGE UP (ref 70–99)
HCO3 BLDV-SCNC: 28 MMOL/L — SIGNIFICANT CHANGE UP (ref 22–29)
HCT VFR BLD CALC: 34.2 % — LOW (ref 34.5–45)
HGB BLD-MCNC: 11 G/DL — LOW (ref 11.5–15.5)
IMM GRANULOCYTES NFR BLD AUTO: 0.6 % — SIGNIFICANT CHANGE UP (ref 0–0.9)
INR BLD: 1.76 RATIO — HIGH (ref 0.88–1.16)
LACTATE SERPL-SCNC: 1.6 MMOL/L — SIGNIFICANT CHANGE UP (ref 0.7–2)
LACTATE SERPL-SCNC: 2.2 MMOL/L — HIGH (ref 0.7–2)
LYMPHOCYTES # BLD AUTO: 1.74 K/UL — SIGNIFICANT CHANGE UP (ref 1–3.3)
LYMPHOCYTES # BLD AUTO: 17.8 % — SIGNIFICANT CHANGE UP (ref 13–44)
MAGNESIUM SERPL-MCNC: 1.5 MG/DL — LOW (ref 1.6–2.6)
MCHC RBC-ENTMCNC: 32.2 GM/DL — SIGNIFICANT CHANGE UP (ref 32–36)
MCHC RBC-ENTMCNC: 33 PG — SIGNIFICANT CHANGE UP (ref 27–34)
MCV RBC AUTO: 102.7 FL — HIGH (ref 80–100)
MONOCYTES # BLD AUTO: 0.52 K/UL — SIGNIFICANT CHANGE UP (ref 0–0.9)
MONOCYTES NFR BLD AUTO: 5.3 % — SIGNIFICANT CHANGE UP (ref 2–14)
NEUTROPHILS # BLD AUTO: 7.35 K/UL — SIGNIFICANT CHANGE UP (ref 1.8–7.4)
NEUTROPHILS NFR BLD AUTO: 75.5 % — SIGNIFICANT CHANGE UP (ref 43–77)
NRBC # BLD: 0 /100 WBCS — SIGNIFICANT CHANGE UP (ref 0–0)
NT-PROBNP SERPL-SCNC: 7500 PG/ML — HIGH (ref 0–450)
PCO2 BLDV: 42 MMHG — SIGNIFICANT CHANGE UP (ref 39–42)
PH BLDV: 7.44 — HIGH (ref 7.32–7.43)
PHOSPHATE SERPL-MCNC: 3.1 MG/DL — SIGNIFICANT CHANGE UP (ref 2.5–4.5)
PLATELET # BLD AUTO: 175 K/UL — SIGNIFICANT CHANGE UP (ref 150–400)
PO2 BLDV: 74 MMHG — HIGH (ref 25–45)
POTASSIUM SERPL-MCNC: 4.1 MMOL/L — SIGNIFICANT CHANGE UP (ref 3.5–5.3)
POTASSIUM SERPL-SCNC: 4.1 MMOL/L — SIGNIFICANT CHANGE UP (ref 3.5–5.3)
PROCALCITONIN SERPL-MCNC: 0.13 NG/ML — HIGH (ref 0.02–0.1)
PROCALCITONIN SERPL-MCNC: 0.17 NG/ML — HIGH (ref 0.02–0.1)
PROT SERPL-MCNC: 7.3 G/DL — SIGNIFICANT CHANGE UP (ref 6–8.3)
PROT SERPL-MCNC: 8.3 G/DL — SIGNIFICANT CHANGE UP (ref 6–8.3)
PROTHROM AB SERPL-ACNC: 20.3 SEC — HIGH (ref 10.5–13.4)
RAPID RVP RESULT: SIGNIFICANT CHANGE UP
RBC # BLD: 3.33 M/UL — LOW (ref 3.8–5.2)
RBC # FLD: 17.2 % — HIGH (ref 10.3–14.5)
SAO2 % BLDV: 95.2 % — HIGH (ref 67–88)
SARS-COV-2 RNA SPEC QL NAA+PROBE: SIGNIFICANT CHANGE UP
SODIUM SERPL-SCNC: 134 MMOL/L — LOW (ref 135–145)
TROPONIN I, HIGH SENSITIVITY RESULT: 37.1 NG/L — SIGNIFICANT CHANGE UP
WBC # BLD: 9.75 K/UL — SIGNIFICANT CHANGE UP (ref 3.8–10.5)
WBC # FLD AUTO: 9.75 K/UL — SIGNIFICANT CHANGE UP (ref 3.8–10.5)

## 2023-07-22 PROCEDURE — 99232 SBSQ HOSP IP/OBS MODERATE 35: CPT

## 2023-07-22 PROCEDURE — 99223 1ST HOSP IP/OBS HIGH 75: CPT | Mod: AI

## 2023-07-22 RX ORDER — BACITRACIN ZINC 500 UNIT/G
1 OINTMENT IN PACKET (EA) TOPICAL
Refills: 0 | DISCHARGE

## 2023-07-22 RX ORDER — LANOLIN ALCOHOL/MO/W.PET/CERES
3 CREAM (GRAM) TOPICAL AT BEDTIME
Refills: 0 | Status: DISCONTINUED | OUTPATIENT
Start: 2023-07-22 | End: 2023-07-26

## 2023-07-22 RX ORDER — APIXABAN 2.5 MG/1
1 TABLET, FILM COATED ORAL
Refills: 0 | DISCHARGE

## 2023-07-22 RX ORDER — TIMOLOL 0.5 %
1 DROPS OPHTHALMIC (EYE)
Refills: 0 | Status: DISCONTINUED | OUTPATIENT
Start: 2023-07-22 | End: 2023-07-26

## 2023-07-22 RX ORDER — TIOTROPIUM BROMIDE 18 UG/1
2 CAPSULE ORAL; RESPIRATORY (INHALATION) DAILY
Refills: 0 | Status: DISCONTINUED | OUTPATIENT
Start: 2023-07-22 | End: 2023-07-22

## 2023-07-22 RX ORDER — VANCOMYCIN HCL 1 G
1000 VIAL (EA) INTRAVENOUS ONCE
Refills: 0 | Status: DISCONTINUED | OUTPATIENT
Start: 2023-07-22 | End: 2023-07-22

## 2023-07-22 RX ORDER — FLUTICASONE FUROATE, UMECLIDINIUM BROMIDE AND VILANTEROL TRIFENATATE 200; 62.5; 25 UG/1; UG/1; UG/1
1 POWDER RESPIRATORY (INHALATION)
Qty: 0 | Refills: 0 | DISCHARGE

## 2023-07-22 RX ORDER — ONDANSETRON 8 MG/1
4 TABLET, FILM COATED ORAL EVERY 8 HOURS
Refills: 0 | Status: DISCONTINUED | OUTPATIENT
Start: 2023-07-22 | End: 2023-07-26

## 2023-07-22 RX ORDER — IPRATROPIUM BROMIDE 0.2 MG/ML
2.5 SOLUTION, NON-ORAL INHALATION
Refills: 0 | DISCHARGE

## 2023-07-22 RX ORDER — METOPROLOL TARTRATE 50 MG
50 TABLET ORAL DAILY
Refills: 0 | Status: DISCONTINUED | OUTPATIENT
Start: 2023-07-22 | End: 2023-07-26

## 2023-07-22 RX ORDER — IPRATROPIUM/ALBUTEROL SULFATE 18-103MCG
3 AEROSOL WITH ADAPTER (GRAM) INHALATION EVERY 6 HOURS
Refills: 0 | Status: DISCONTINUED | OUTPATIENT
Start: 2023-07-22 | End: 2023-07-26

## 2023-07-22 RX ORDER — BRIMONIDINE TARTRATE 2 MG/MG
1 SOLUTION/ DROPS OPHTHALMIC
Refills: 0 | Status: DISCONTINUED | OUTPATIENT
Start: 2023-07-22 | End: 2023-07-26

## 2023-07-22 RX ORDER — SIMVASTATIN 20 MG/1
1 TABLET, FILM COATED ORAL
Refills: 0 | DISCHARGE

## 2023-07-22 RX ORDER — FUROSEMIDE 40 MG
40 TABLET ORAL ONCE
Refills: 0 | Status: COMPLETED | OUTPATIENT
Start: 2023-07-22 | End: 2023-07-22

## 2023-07-22 RX ORDER — BRIMONIDINE TARTRATE 2 MG/MG
1 SOLUTION/ DROPS OPHTHALMIC
Refills: 0 | DISCHARGE

## 2023-07-22 RX ORDER — MAGNESIUM OXIDE 400 MG ORAL TABLET 241.3 MG
400 TABLET ORAL DAILY
Refills: 0 | Status: DISCONTINUED | OUTPATIENT
Start: 2023-07-22 | End: 2023-07-26

## 2023-07-22 RX ORDER — LEVOTHYROXINE SODIUM 125 MCG
25 TABLET ORAL DAILY
Refills: 0 | Status: DISCONTINUED | OUTPATIENT
Start: 2023-07-22 | End: 2023-07-26

## 2023-07-22 RX ORDER — BUDESONIDE AND FORMOTEROL FUMARATE DIHYDRATE 160; 4.5 UG/1; UG/1
2 AEROSOL RESPIRATORY (INHALATION)
Refills: 0 | Status: DISCONTINUED | OUTPATIENT
Start: 2023-07-22 | End: 2023-07-22

## 2023-07-22 RX ORDER — VANCOMYCIN HCL 1 G
VIAL (EA) INTRAVENOUS
Refills: 0 | Status: DISCONTINUED | OUTPATIENT
Start: 2023-07-22 | End: 2023-07-23

## 2023-07-22 RX ORDER — SENNA PLUS 8.6 MG/1
2 TABLET ORAL AT BEDTIME
Refills: 0 | Status: DISCONTINUED | OUTPATIENT
Start: 2023-07-22 | End: 2023-07-26

## 2023-07-22 RX ORDER — OMEPRAZOLE 10 MG/1
1 CAPSULE, DELAYED RELEASE ORAL
Refills: 0 | DISCHARGE

## 2023-07-22 RX ORDER — IPRATROPIUM/ALBUTEROL SULFATE 18-103MCG
3 AEROSOL WITH ADAPTER (GRAM) INHALATION EVERY 6 HOURS
Refills: 0 | Status: DISCONTINUED | OUTPATIENT
Start: 2023-07-22 | End: 2023-07-22

## 2023-07-22 RX ORDER — FAMOTIDINE 10 MG/ML
1 INJECTION INTRAVENOUS
Qty: 0 | Refills: 0 | DISCHARGE

## 2023-07-22 RX ORDER — ALBUTEROL 90 UG/1
3 AEROSOL, METERED ORAL
Refills: 0 | DISCHARGE

## 2023-07-22 RX ORDER — METOPROLOL TARTRATE 50 MG
1 TABLET ORAL
Refills: 0 | DISCHARGE

## 2023-07-22 RX ORDER — POTASSIUM CHLORIDE 20 MEQ
1 PACKET (EA) ORAL
Qty: 0 | Refills: 0 | DISCHARGE

## 2023-07-22 RX ORDER — PANTOPRAZOLE SODIUM 20 MG/1
40 TABLET, DELAYED RELEASE ORAL
Refills: 0 | Status: DISCONTINUED | OUTPATIENT
Start: 2023-07-22 | End: 2023-07-26

## 2023-07-22 RX ORDER — VANCOMYCIN HCL 1 G
750 VIAL (EA) INTRAVENOUS EVERY 24 HOURS
Refills: 0 | Status: DISCONTINUED | OUTPATIENT
Start: 2023-07-23 | End: 2023-07-23

## 2023-07-22 RX ORDER — SIMVASTATIN 20 MG/1
10 TABLET, FILM COATED ORAL AT BEDTIME
Refills: 0 | Status: DISCONTINUED | OUTPATIENT
Start: 2023-07-22 | End: 2023-07-26

## 2023-07-22 RX ORDER — MAGNESIUM SULFATE 500 MG/ML
2 VIAL (ML) INJECTION ONCE
Refills: 0 | Status: COMPLETED | OUTPATIENT
Start: 2023-07-22 | End: 2023-07-22

## 2023-07-22 RX ORDER — VANCOMYCIN HCL 1 G
750 VIAL (EA) INTRAVENOUS ONCE
Refills: 0 | Status: COMPLETED | OUTPATIENT
Start: 2023-07-22 | End: 2023-07-22

## 2023-07-22 RX ORDER — ACETAMINOPHEN 500 MG
650 TABLET ORAL EVERY 6 HOURS
Refills: 0 | Status: DISCONTINUED | OUTPATIENT
Start: 2023-07-22 | End: 2023-07-26

## 2023-07-22 RX ORDER — APIXABAN 2.5 MG/1
2.5 TABLET, FILM COATED ORAL EVERY 12 HOURS
Refills: 0 | Status: DISCONTINUED | OUTPATIENT
Start: 2023-07-22 | End: 2023-07-26

## 2023-07-22 RX ADMIN — Medication 40 MILLIGRAM(S): at 00:57

## 2023-07-22 RX ADMIN — Medication 10 MILLIGRAM(S): at 12:03

## 2023-07-22 RX ADMIN — Medication 3 MILLILITER(S): at 19:35

## 2023-07-22 RX ADMIN — APIXABAN 2.5 MILLIGRAM(S): 2.5 TABLET, FILM COATED ORAL at 06:14

## 2023-07-22 RX ADMIN — Medication 50 MILLIGRAM(S): at 06:13

## 2023-07-22 RX ADMIN — Medication 1 TABLET(S): at 12:03

## 2023-07-22 RX ADMIN — Medication 1 DROP(S): at 06:15

## 2023-07-22 RX ADMIN — TIOTROPIUM BROMIDE 2 PUFF(S): 18 CAPSULE ORAL; RESPIRATORY (INHALATION) at 08:50

## 2023-07-22 RX ADMIN — BRIMONIDINE TARTRATE 1 DROP(S): 2 SOLUTION/ DROPS OPHTHALMIC at 18:02

## 2023-07-22 RX ADMIN — Medication 25 MICROGRAM(S): at 06:14

## 2023-07-22 RX ADMIN — APIXABAN 2.5 MILLIGRAM(S): 2.5 TABLET, FILM COATED ORAL at 18:02

## 2023-07-22 RX ADMIN — Medication 1 DROP(S): at 18:02

## 2023-07-22 RX ADMIN — BUDESONIDE AND FORMOTEROL FUMARATE DIHYDRATE 2 PUFF(S): 160; 4.5 AEROSOL RESPIRATORY (INHALATION) at 08:50

## 2023-07-22 RX ADMIN — SIMVASTATIN 10 MILLIGRAM(S): 20 TABLET, FILM COATED ORAL at 21:36

## 2023-07-22 RX ADMIN — Medication 250 MILLIGRAM(S): at 02:45

## 2023-07-22 RX ADMIN — MAGNESIUM OXIDE 400 MG ORAL TABLET 400 MILLIGRAM(S): 241.3 TABLET ORAL at 12:03

## 2023-07-22 RX ADMIN — Medication 25 GRAM(S): at 12:15

## 2023-07-22 RX ADMIN — Medication 40 MILLIGRAM(S): at 12:03

## 2023-07-22 RX ADMIN — SENNA PLUS 2 TABLET(S): 8.6 TABLET ORAL at 21:36

## 2023-07-22 RX ADMIN — BRIMONIDINE TARTRATE 1 DROP(S): 2 SOLUTION/ DROPS OPHTHALMIC at 06:15

## 2023-07-22 RX ADMIN — PANTOPRAZOLE SODIUM 40 MILLIGRAM(S): 20 TABLET, DELAYED RELEASE ORAL at 06:14

## 2023-07-22 RX ADMIN — Medication 1 ENEMA: at 16:14

## 2023-07-22 NOTE — PATIENT CHOICE NOTE. - NSPTCHOICESTATE_GEN_ALL_CORE

## 2023-07-22 NOTE — CONSULT NOTE ADULT - ASSESSMENT
89y/o DNR/DNI. Seen at [facility] telemetry. From the [facility].  Lying flat, sleeping comfortably. Easily arousable  History HTN, high cholesterol, A-Fib, bronchiectasis, CHF, LBBB, thyroid disease, hiatal hernia, glaucoma  3/19 s/p stent to RCA  S/P PPM-Medtronic Micra ILD  S/P TAVR  S/P B/L cataract surgery  S/P unilateral oopherectomy  12/7/22 TLS Echocardiogram which visually with EF-45-50%    Admitted for shortness of breath  Treated with IV furosemide x1 and antibiotics  BNP-7,500  Cardiac enzymes negative x1  Patient claims to be feeling much better    Impression  Shortness of breath probably related to exacerbation of bronchiectasis and some degree of heart failure    Plan:  - Continue Metoprolol succinate-50mg OD                  Torsemide-10mg OD                  Simvastatin-10mg HS                  Apixaban-2.5mg BID  - Repeat Echocardiogram ordered  - Follow labs  - CXR-c/w chronic interstitial lung disease  - On vancomycin IV  - Seen by Pulmonary

## 2023-07-22 NOTE — ED PROVIDER NOTE - CARE PLAN
Called patient back stating that she needs 3 full months from stopping breastfeeding before starting initial testing and at this time she is about 1 month away from that. Patient encouraged to call anytime after Feb 16th with her day 1 to start being mapped out for FET. Patient verbalized understanding and has no questions or concerns at this time. 1 Principal Discharge DX:	CHF exacerbation  Secondary Diagnosis:	PNA (pneumonia)

## 2023-07-22 NOTE — CONSULT NOTE ADULT - SUBJECTIVE AND OBJECTIVE BOX
CARDIOLOGY CONSULT NOTE    Patient is a 89y Female with a known history of :    HPI:  89F with hx of bronchiectasis on 4L of O2, afib on Eliquis s/p Medtronic Micra VR TCR PPM, HTN, HLD, CHF with hx of exacerbations, CAD with stent in RCA, LBBB, hx of AS s/p TAVR, ILD, hx of RLE squamous cell cancer s/p resection who presents with SOB.  Patient said she was in her usual state of health an was even exercising this morning.  However, in the afternoon, patient became acutely SOB.  No chest pain or fevers.  Denies any recent or new leg swelling.  Denies any travel or sick contacts.  When EMS arrived to scene, they noted her O2 to be in the mid-80s on NC.  Switched to NRB with improvement of O2.      In the ED,   - triage vitals were /98    T 97.7F RR 36, 97% on NRB  - labs showed WBC 12.1, slightly elev t.bili 1.7 with alk phosph 162, pBNP 7500, troponin neg, and lactate 2.2  - CT showed "worsening bilateral interstitial and groundglass opacities. Differential includes interstitial edema, infection, and chronic interstitial lung disease."  - was given lasix 40mg IV x1 (of note, patient was on torsemide but was taken off it 3 week ago by her cardiologist since her BP was getting low) with good UOP  - started on levofloxacin and vancomycin by the ED  - patient is being admitted for SOB/acute resp failure 2/2 CHF exac vs PNA.   (2023 01:41)      REVIEW OF SYSTEMS:    CONSTITUTIONAL: No fever, weight loss, or fatigue  EYES: No eye pain, visual disturbances, or discharge  ENMT:  No difficulty hearing, tinnitus, vertigo; No sinus or throat pain  NECK: No pain or stiffness  BREASTS: No pain, masses, or nipple discharge  RESPIRATORY: No cough, wheezing, chills or hemoptysis; No shortness of breath  CARDIOVASCULAR: No chest pain, palpitations, dizziness, or leg swelling  GASTROINTESTINAL: No abdominal or epigastric pain. No nausea, vomiting, or hematemesis; No diarrhea or constipation. No melena or hematochezia.  GENITOURINARY: No dysuria, frequency, hematuria, or incontinence  NEUROLOGICAL: No headaches, memory loss, loss of strength, numbness, or tremors  SKIN: No itching, burning, rashes, or lesions   LYMPH NODES: No enlarged glands  ENDOCRINE: No heat or cold intolerance; No hair loss  MUSCULOSKELETAL: No joint pain or swelling; No muscle, back, or extremity pain  PSYCHIATRIC: No depression, anxiety, mood swings, or difficulty sleeping  HEME/LYMPH: No easy bruising, or bleeding gums  ALLERGY AND IMMUNOLOGIC: No hives or eczema    MEDICATIONS  (STANDING):  albuterol/ipratropium for Nebulization 3 milliLiter(s) Nebulizer every 6 hours  apixaban 2.5 milliGRAM(s) Oral every 12 hours  brimonidine 0.2% Ophthalmic Solution 1 Drop(s) Both EYES two times a day  levothyroxine 25 MICROGram(s) Oral daily  magnesium oxide 400 milliGRAM(s) Oral daily  methylPREDNISolone sodium succinate Injectable 40 milliGRAM(s) IV Push daily  metoprolol succinate ER 50 milliGRAM(s) Oral daily  multivitamin 1 Tablet(s) Oral daily  pantoprazole    Tablet 40 milliGRAM(s) Oral before breakfast  senna 2 Tablet(s) Oral at bedtime  simvastatin 10 milliGRAM(s) Oral at bedtime  timolol 0.5% Solution 1 Drop(s) Both EYES two times a day  torsemide 10 milliGRAM(s) Oral daily  vancomycin  IVPB        MEDICATIONS  (PRN):  acetaminophen     Tablet .. 650 milliGRAM(s) Oral every 6 hours PRN Temp greater or equal to 38C (100.4F), Mild Pain (1 - 3)  aluminum hydroxide/magnesium hydroxide/simethicone Suspension 30 milliLiter(s) Oral every 4 hours PRN Dyspepsia  guaiFENesin Oral Liquid (Sugar-Free) 200 milliGRAM(s) Oral every 6 hours PRN Cough  melatonin 3 milliGRAM(s) Oral at bedtime PRN Insomnia  ondansetron Injectable 4 milliGRAM(s) IV Push every 8 hours PRN Nausea and/or Vomiting      ALLERGIES: latex (Pruritus)  amoxicillin (Rash)  penicillin (Unknown)  losartan (Rash)  lidocaine (Rash)  adhesives (Rash)      FAMILY HISTORY:  Family history of heart failure        Social History:  Alochol:   Smoking:   Drug Use:   Marital Status:     I&O's Detail      PHYSICAL EXAMINATION:  -----------------------------  T(C): 36.5 (23 @ 13:55), Max: 36.5 (23 @ 22:17)  HR: 81 (23 @ :55) (67 - 114)  BP: 93/60 (23 @ 13:55) (93/60 - 138/98)  RR: 20 (23 @ :55) (19 - 36)  SpO2: 94% (23 @ :55) (90% - 98%)  Wt(kg): --    Height (cm): 152.4 (:)  Weight (kg): 45.4 (:17)  BMI (kg/m2): 19.5 (:)  BSA (m2): 1.39 (:)    Constitutional: well developed, normal appearance, well groomed, well nourished, no deformities and no acute distress.   Eyes: the conjunctiva exhibited no abnormalities and the eyelids demonstrated no xanthelasmas.   HEENT: normal oral mucosa, no oral pallor and no oral cyanosis.   Neck: normal jugular venous A waves present, normal jugular venous V waves present and no jugular venous shin A waves.   Pulmonary: no respiratory distress, normal respiratory rhythm and effort, no accessory muscle use and lungs were clear to auscultation bilaterally.   Cardiovascular: heart rate and rhythm were irreg/irreg, normal S1 and S2 and no rub, heave or thrill are present. with II/VI systolic murmur  Musculoskeletal: the gait could not be assessed.   Extremities: no clubbing of the fingernails, no localized cyanosis, no petechial hemorrhages and no ischemic changes.   Skin: normal skin color and pigmentation, no rash, no venous stasis, no skin lesions, no skin ulcer and no xanthoma was observed.   Psychiatric: oriented to person, place, and time, the affect was normal, the mood was normal and not feeling anxious.     LABS:   --------      134<L>  |  98  |  26<H>  ----------------------------<  94  4.1   |  29  |  1.07    Ca    9.3      2023 06:39  Phos  3.1       Mg     1.5         TPro  7.3  /  Alb  2.8<L>  /  TBili  2.0<H>  /  DBili  x   /  AST  29  /  ALT  20  /  AlkPhos  154<H>                           11.0   9.75  )-----------( 175      ( 2023 06:39 )             34.2     PT/INR - ( 2023 00:52 )   PT: 20.3 sec;   INR: 1.76 ratio         PTT - ( 2023 00:52 )  PTT:33.8 sec              RADIOLOGY:  -----------------    < from: TTE Echo Complete w/o Contrast w/ Doppler (22 @ 13:00) >    ACC: [id number] EXAM:  ECHO TTE WO CON COMP W DOPP                          PROCEDURE DATE:  2022          INTERPRETATION:  TRANSTHORACIC ECHOCARDIOGRAM REPORT        Patient Name:   BLANCA BENSON Patient Location: [address] Rec #:  [id number]       Accession #:      [id number]  Account #:      [id number]        Height:           58.3 in 148.0 cm  YOB: 1927      Weight:           112.0 lb 50.80 kg  Patient Age:    89 years       BSA:              1.43 m²  Patient Gender:F              BP:               119/81 mmHg      Date of Exam:        2022 11:28:36 AM  Sonographer:         JUNA  Referring Physician: CLIF    Procedure:     2D Echo/Doppler/Color Doppler Complete.  Indications:   Cardiac murmur, unspecified- R01.1  Diagnosis:     Mitral Regurgitation - I34.0  Study Details: Technically adequate study.        2D AND M-MODE MEASUREMENTS (normal ranges within parentheses):  Left Ventricle:                  Normal   Aorta/Left Atrium:               Normal  IVSd (2D):              0.67 cm (0.7-1.1) Aortic Root (Mmode): 1.68 cm   (2.4-3.7)  LVPWd (2D):             0.67 cm (0.7-1.1) AoV Cusp Separation: 0.88 cm   (1.5-2.6)  LVIDd (2D):             3.54 cm (3.4-5.7) Left Atrium (Mmode): 1.84 cm   (1.9-4.0)  LVIDs (2D):             2.32 cm  LV FS (2D):             34.5 %   (>25%)  LV EF (2D):              65 %    (>55%)  Relative Wall Thickness  0.38    (<0.42)    LV DIASTOLIC FUNCTION:  Decel Time: 262 msec    SPECTRAL DOPPLER ANALYSIS (where applicable):  Mitral Valve:  MV P1/2 Time: 75.92 msec  MV Area, PHT: 2.90 cm²    Aortic Valve: AoV Max Vega: 1.10 m/s AoV Peak P.9 mmHg AoV Mean P.9 mmHg    LVOT Vmax: 0.79 m/s LVOT VTI: 0.091 m LVOT Diameter: 1.70 cm    AoV Area, Vmax: 1.63 cm² AoV Area, VTI: 1.60 cm² AoV Area, Vmn: 1.60 cm²  Ao VTI: 0.129  Tricuspid Valve and PA/RV Systolic Pressure: TR Max Velocity: 2.29 m/s RA   Pressure: 3 mmHg RVSP/PASP: 24.0 mmHg      PHYSICIAN INTERPRETATION:  Left Ventricle: The left ventricular internal cavity size is normal. Left   ventricular wall thickness is normal.  Global LV systolic function was mildly decreased. Left ventricular   ejection fraction, by visual estimation, is 45 to 50%. The mitral in-flow   pattern reveals no discernable A-wave, therefore no comment on diastolic   function can be made.  Abnormal septal motion which may be due to conduction delay. Endocardium   not well visualized, consider use of IV echo contrast to better evaluate   endocardium and LVEF, if clinically indicated.  Right Ventricle: The right ventricular size is mildly enlarged. RV   systolic function is low normal.  Left Atrium: Left atrial enlargement.  Right Atrium: Right atrial enlargement.  Pericardium: There is no evidence of pericardial effusion.  Mitral Valve: Mild thickening and calcification of the anterior and   posterior mitral valve leaflets. There is mild mitral annular   calcification. Mild mitral valve regurgitation is seen.  Tricuspid Valve: The tricuspid valve is not well visualized.Mild   tricuspid regurgitation is visualized.  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  Pulmonic Valve: The pulmonic valve was not well visualized.  Venous: The inferior vena cava was normal sized, with respiratory size   variation greater than 50%.      Summary:   1. Technically difficult study with poor endocardial visualization.   2. Mildly decreased global left ventricular systolic function.   3. Left ventricular ejection fraction, by visual estimation, is 45 to   50%.   4. Abnormal septal motion which may be due to conduction delay.   Endocardium not well visualized, consider use of IV echo contrast to   better evaluate endocardium and LVEF, if clinically indicated.   5. Normal left ventricular internal cavity size.   6. The mitral in-flow pattern reveals no discernable A-wave, therefore   no comment on diastolic function can be made.   7. Mildly enlarged right ventricle with low normal right ventricle   systolic function.   8. Left atrial enlargement.   9. Right atrial enlargement.  10. Mild mitral annular calcification.  11. Mild thickening and calcification of the anterior and posterior   mitral valve leaflets.  12. Mild mitral valve regurgitation.  13. 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  14. Mild tricuspid regurgitation.  15. There is no evidence of pericardial effusion.    [id number] Michael Branch MD Electronically signed on   2022 at 2:38:45 PM            *** Final ***    < end of copied text >      ECG: A-Fib, LAD/LAHB, LBBB

## 2023-07-22 NOTE — ED PROVIDER NOTE - DIFFERENTIAL DIAGNOSIS
Differential Diagnosis Ddx includes but not limited to asthma exacerbation, CHF exacerbation, PNA, pleural effusion, ACS, pulmonary edema, PE

## 2023-07-22 NOTE — PATIENT PROFILE ADULT - SAFE PLACE TO LIVE
History  Chief Complaint   Patient presents with    Anxiety     Smoked marijuana and believes "bad drugs" was in it  Has nausea  Feeling very anxious and reports having fear of hospitals  HPI  This is a pleasant 49-year-old female without significant past medical history who endorses cannabis use who presents emergency department tonight via emergency medical services for evaluation of drug overdose  Patient reports that she was smoking marijuana tonight prior to arrival she reports that she began experience significant symptoms which she attributes to the possibility of other drugs being in the cannabis  She does not know where the cannabis originated from  It did not have any labels on it  She does not specifically know of any other drugs that were in it  The symptoms she has been experiencing range from anxiety heart racing sensation palpitations  She does report feeling nausea  Denies syncope  Denies diaphoresis  Denies chest pain shortness of breath  She was smoking with her boyfriend who also has similar symptoms and is also here for evaluation  She denies a history of similar symptoms in past   She denies any other drug abuse  Denies alcohol use  She does report a past medical history of anxiety and panic attacks  None       History reviewed  No pertinent past medical history  History reviewed  No pertinent surgical history  History reviewed  No pertinent family history  I have reviewed and agree with the history as documented  E-Cigarette/Vaping    E-Cigarette Use Current Every Day User      E-Cigarette/Vaping Substances    Nicotine Yes     THC No     CBD No     Flavoring Yes      Social History     Tobacco Use    Smoking status: Never Smoker    Smokeless tobacco: Never Used   Vaping Use    Vaping Use: Every day    Substances: Nicotine, Flavoring   Substance Use Topics    Alcohol use:  Yes     Alcohol/week: 1 0 standard drink     Types: 1 Standard drinks or equivalent per week    Drug use: Yes     Types: Marijuana       Review of Systems   Constitutional: Positive for activity change  Negative for chills and fever  HENT: Negative for ear pain and sore throat  Eyes: Negative for pain and visual disturbance  Respiratory: Negative for cough and shortness of breath  Cardiovascular: Positive for palpitations  Negative for chest pain  Gastrointestinal: Positive for nausea  Negative for abdominal pain and vomiting  Genitourinary: Negative for dysuria and hematuria  Musculoskeletal: Negative for arthralgias and back pain  Skin: Negative for color change and rash  Neurological: Negative for seizures and syncope  Psychiatric/Behavioral: Negative for self-injury, sleep disturbance and suicidal ideas  The patient is nervous/anxious  All other systems reviewed and are negative  Physical Exam  Physical Exam  Vitals and nursing note reviewed  Exam conducted with a chaperone present  Constitutional:       General: She is not in acute distress  Appearance: Normal appearance  She is not diaphoretic  HENT:      Head: Normocephalic and atraumatic  Right Ear: External ear normal       Left Ear: External ear normal       Nose: Nose normal       Mouth/Throat:      Mouth: Mucous membranes are moist       Pharynx: Oropharynx is clear  Eyes:      General:         Right eye: No discharge  Left eye: No discharge  Extraocular Movements: Extraocular movements intact  Pupils: Pupils are equal, round, and reactive to light  Comments: Bilateral conjunctival injection  Cardiovascular:      Rate and Rhythm: Regular rhythm  Tachycardia present  Pulses: Normal pulses  Pulmonary:      Effort: Pulmonary effort is normal  No respiratory distress  Abdominal:      General: Abdomen is flat  There is no distension  Musculoskeletal:      Cervical back: Neck supple  Right lower leg: No edema  Left lower leg: No edema     Skin: General: Skin is warm and dry  Capillary Refill: Capillary refill takes less than 2 seconds  Coloration: Skin is not pale  Neurological:      General: No focal deficit present  Mental Status: She is alert and oriented to person, place, and time  Mental status is at baseline  Vital Signs  ED Triage Vitals [08/13/22 0011]   Temperature Pulse Respirations Blood Pressure SpO2   97 6 °F (36 4 °C) (!) 159 22 (!) 169/110 98 %      Temp Source Heart Rate Source Patient Position - Orthostatic VS BP Location FiO2 (%)   Temporal Monitor Sitting Left arm --      Pain Score       No Pain           Vitals:    08/13/22 0030 08/13/22 0046 08/13/22 0100 08/13/22 0130   BP: 147/96  147/98 138/89   Pulse: (!) 132 (!) 108 (!) 133 (!) 113   Patient Position - Orthostatic VS: Lying  Lying Lying         Visual Acuity      ED Medications  Medications - No data to display    Diagnostic Studies  Results Reviewed     Procedure Component Value Units Date/Time    Rapid drug screen, urine [[id number]]  (Abnormal) Collected: 08/13/22 0113    Lab Status: Final result Specimen: Urine, Clean Catch Updated: 08/13/22 0138     Amph/Meth UR Negative     Barbiturate Ur Negative     Benzodiazepine Urine Negative     Cocaine Urine Negative     Methadone Urine Negative     Opiate Urine Negative     PCP Ur Negative     THC Urine Positive     Oxycodone Urine Negative    Narrative:      Presumptive report  If requested, specimen will be sent to reference lab for confirmation  FOR MEDICAL PURPOSES ONLY  IF CONFIRMATION NEEDED PLEASE CONTACT THE LAB WITHIN 5 DAYS      Drug Screen Cutoff Levels:  AMPHETAMINE/METHAMPHETAMINES  1000 ng/mL  BARBITURATES     200 ng/mL  BENZODIAZEPINES     200 ng/mL  COCAINE      300 ng/mL  METHADONE      300 ng/mL  OPIATES      300 ng/mL  PHENCYCLIDINE     25 ng/mL  THC       50 ng/mL  OXYCODONE      100 ng/mL    POCT pregnancy, urine [[id number]]  (Normal) Resulted: 08/13/22 0113    Lab Status: Final result Updated: 08/13/22 0113     EXT PREG TEST UR (Ref: Negative) negative     Control valid                 No orders to display              Procedures  Procedures         ED Course  ED Course as of 08/13/22 0150   Sat Aug 13, 2022   0034 EKG interpreted by myself  It [id number] to observe 019 demonstrates sinus tachycardia at 135 beats per minute, normal TX interval, normal QRS interval, normal QTC interval, no STEMI    0046 Pulse(!): 159  Markedly improved now    0112 Pt ambulatory to the restroom no difficulty, normal gait  0114 PREGNANCY TEST URINE: negative   0139 THC URINE(!): Positive                                             MDM  Number of Diagnoses or Management Options  Anxiety: new and requires workup  Cannabis abuse: new and requires workup     Amount and/or Complexity of Data Reviewed  Clinical lab tests: ordered and reviewed  Discuss the patient with other providers: yes    Risk of Complications, Morbidity, and/or Mortality  Presenting problems: low  Diagnostic procedures: low  Management options: low    Patient Progress  Patient progress: stable    80-year-old female presents to the emergency department via EMS for evaluation of possible drug overdose  Patient reporting cannabis use with unexpected severity of symptoms suspicious for other drug contaminant per the patient  The symptoms she is describing to me are consistent with a sympathomimetic/psychoactive drugs such as cannabis  It could be that she simply received a very potent dose of the drug  She has associated sinus tachycardia conjunctival injection anxiety dry mouth  Her blood pressure is somewhat elevated which she is quite stressed and anxious at this time we will monitor closely  No indication for screening labs    We will obtain a UDS if provided to evaluate for the possibility of other drug contaminant per the patient's request however no additional medications are needed to treat this other than the possibility of oral anxiety medication  Patient not suicidal homicidal not hallucinating appropriate for discharge to home if symptoms improving after brief ER stay  Will provide food/water to the patient and monitor on the cardiac monitor for improvement of symptoms  Patient reassured she is in a safe environment  Disposition  Final diagnoses:   Anxiety   Cannabis abuse     Time reflects when diagnosis was documented in both MDM as applicable and the Disposition within this note     Time User Action Codes Description Comment    8/13/2022 12:33 AM Lorrine Fear Add [F41 9] Anxiety     8/13/2022 12:33 AM Lorrine Fear Add [F12 10] Cannabis abuse       ED Disposition     ED Disposition   Discharge    Condition   Stable    Date/Time   Sat Aug 13, 2022  1:39 AM    Comment   Devorah Hassan discharge to home/self care  Follow-up Information     Follow up With Specialties Details Why Contact Info Additional [address] [facility] Emergency Department Emergency Medicine Go to  If symptoms worsen [facility] [id number]  [address] [place] [address] Emergency Department, [address], [address]          Patient's Medications    No medications on file       No discharge procedures on file      PDMP Review     None          ED Provider  Electronically Signed by           Fabio Patino DO  08/13/22 0150 no

## 2023-07-22 NOTE — CARE COORDINATION ASSESSMENT. - CURRENT MENTAL STATUS/COGNITIVE FUNCTIONING
alert/oriented to person/oriented to place/oriented to time/oriented to situation/behavior seems appropriate to situation

## 2023-07-22 NOTE — H&P ADULT - NSHPPHYSICALEXAM_GEN_ALL_CORE
PHYSICAL EXAM:  Vital Signs Last 24 Hrs  T(C): 36.5 (21 Jul 2023 22:17), Max: 36.5 (21 Jul 2023 22:17)  T(F): 97.7 (21 Jul 2023 22:17), Max: 97.7 (21 Jul 2023 22:17)  HR: 98 (22 Jul 2023 00:58) (98 - 114)  BP: 136/82 (22 Jul 2023 00:58) (136/82 - 138/98)  BP(mean): --  RR: 24 (22 Jul 2023 00:58) (24 - 36)  SpO2: 98% (22 Jul 2023 00:58) (96% - 98%)    Parameters below as of 22 Jul 2023 00:58  Patient On (Oxygen Delivery Method): mask, nonrebreather  O2 Flow (L/min): 15    GENERAL:     elderly thin female in NAD  HEAD:     atraumatic, normocephalic  EYES:     EOMI, conjunctiva and sclera clear  RESPIRATORY:     no gross crackles heard, mostly clear to auscultation  CARDIOVASCULAR:     irregular irregular, no murmurs or rubs or gallops  GASTROINTESTINAL:     soft, nontender, nondistended, bowel sounds present  EXTREMITIES:     trace BLE edema  MUSCULOSKELETAL:    no joint pain or swelling or deformities  NERVOUS SYSTEM:     moves all 4s  SKIN:     healing excision site of RLE (tibial) with no purulent drainage  PSYCH:     appropriate, alert and orientated x3, good concentration

## 2023-07-22 NOTE — PROGRESS NOTE ADULT - ASSESSMENT
89F with hx of bronchiectasis on 4L of O2, afib on Eliquis s/p Medtronic Micra VR TCR PPM, HTN, HLD, CHF with hx of exacerbations, CAD with stent in RCA, LBBB, hx of AS s/p TAVR, ILD, hx of RLE squamous cell cancer s/p resection who presents with SOB.          acute respiratory failure   s/p suspect chf   last TTE showed EF 45-50% with conduction delay and mildly enlarged RV  torsemide- vancomycin 750mg q24h (but check trough prior to next dose)  - check strep PNA and legionella Ag  - check RVP and COVID  - wean down O2 as much as possible (currently on NRB -> attempts to go to NC 6L and patient would desat to 87-88%)  - duoneb PRN  - cont with trelegy or equivalent    Chronic afib  - cont with eliquis  - cont with metoprolol    Hypothyroidism  - cont with levothyroxine    CAD/HLD  - cont with statin  - cont with metoprolol    Preventive measures  - on eliquis  - DNR (has a form in chart) and patient requests to be DNI as well

## 2023-07-22 NOTE — ED PROVIDER NOTE - CLINICAL SUMMARY MEDICAL DECISION MAKING FREE TEXT BOX
89 year old female p/w SOB since yesterday afternoon.  No cough, fever, chest pain.  On 4L home O2.  Check labs, CE, lactate, cultures, CXR, EKG, supplemental O2, CT chest, admit

## 2023-07-22 NOTE — ED PROVIDER NOTE - NSICDXPASTMEDICALHX_GEN_ALL_CORE_FT
PAST MEDICAL HISTORY:  Afib     Aortic stenosis     Aortic stenosis     Asthma on breo for 2 yrs    Asthma     Bronchitis     CAD (coronary artery disease) recent coronary stent 3/19    Cardiac pacemaker Medtronic Micra placed 4/2019    Glaucoma     Heart failure     Hiatal hernia     History of left bundle branch block (LBBB)     HLD (hyperlipidemia)     HTN (hypertension)     Hypertension     Skin cancer basal    Squamous cell skin cancer

## 2023-07-22 NOTE — H&P ADULT - NSHPREVIEWOFSYSTEMS_GEN_ALL_CORE
REVIEW OF SYSTEMS:  CONSTITUTIONAL:    no fever or weight loss or fatigue  EYES:    no eye pain or visual disturbances or discharge  ENMT:     no difficulty hearing or tinnitus or vertigo, no sinus or throat pain  NECK:    no pain or stiffness  RESPIRATORY:    +SOB, +cough, no wheezing or chills or hemoptysis  CARDIOVASCULAR:    no chest pain or palpitations or dizziness or leg swelling  GASTROINTESTINAL:    no abdominal or epigastric pain. no nausea or vomiting or hematemesis, no diarrhea or constipation. no melena or hematochezia.  GENITOURINARY:    no dysuria or frequency or hematuria or incontinence  NEUROLOGICAL:    no headaches or memory loss or loss of strength or numbness or tremors  SKIN:   RLE skin excision   LYMPH NODES:    no enlarged glands  ENDOCRINE:    no heat or cold intolerance, no hair loss, no polydipsia or polyuria  MUSCULOSKELETAL:    no joint pain or swelling, no muscle or back or extremity pain  PSYCHIATRIC:    no depression or anxiety or mood swings or difficulty sleeping  HEME/LYMPH:    no easy bruising or bleeding gums  ALLERGY AND IMMUNOLOGIC:    no hives or eczema

## 2023-07-22 NOTE — CONSULT NOTE ADULT - ASSESSMENT
89F with hx of bronchiectasis on 4L of O2, afib on Eliquis s/p Medtronic Micra VR TCR PPM, HTN, HLD, CHF with hx of exacerbations, CAD with stent in RCA, LBBB, hx of AS s/p TAVR, ILD, hx of RLE squamous cell cancer s/p resection who presents with SOB 89F with hx of bronchiectasis on 4L of O2, afib on Eliquis s/p Medtronic Micra VR TCR PPM, HTN, HLD, CHF with hx of exacerbations, CAD with stent in RCA, LBBB, hx of AS s/p TAVR, ILD, hx of RLE squamous cell cancer s/p resection who presents with SOB    bronchiectasis  chr lung disease  dyspnea  pulm edema  valv heart disease  HFpEF  AF  OP  OA  Frailty  PPM  HTN  HLD  CAD with PCI  ILD    nebs - steroids  diuresis  I and O  ct chest reviewed  old records reviewed  monitor VS and Sat  keep sat > 88 pct  oral hygiene  skin care  SLP eval  GOC discussion  [facility] rx regimen optimization  on ABX for poss STSI

## 2023-07-22 NOTE — CONSULT NOTE ADULT - SUBJECTIVE AND OBJECTIVE BOX
Date/Time Patient Seen:  		  Referring MD:   Data Reviewed	       Patient is a 89y old  Female who presents with a chief complaint of SOB -> CHF exacerbation vs PNA (22 Jul 2023 01:41)      Subjective/HPI   89F with hx of bronchiectasis on 4L of O2, afib on Eliquis s/p Medtronic Micra VR TCR PPM, HTN, HLD, CHF with hx of exacerbations, CAD with stent in RCA, LBBB, hx of AS s/p TAVR, ILD, hx of RLE squamous cell cancer s/p resection who presents with SOB.  Patient said she was in her usual state of health an was even exercising this morning.  However, in the afternoon, patient became acutely SOB.  No chest pain or fevers.  Denies any recent or new leg swelling.  Denies any travel or sick contacts.  When EMS arrived to scene, they noted her O2 to be in the mid-80s on NC.  Switched to NRB with improvement of O2.    PAST MEDICAL & SURGICAL HISTORY:  Asthma  on breo for 2 yrs    Aortic stenosis    HTN (hypertension)    HLD (hyperlipidemia)    Hiatal hernia    Skin cancer  basal    Heart failure    CAD (coronary artery disease)  recent coronary stent 3/19    Afib    Bronchitis    Hypertension    Glaucoma    Pacemaker    Heart failure    Aortic stenosis    HLD (hyperlipidemia)    Asthma    Squamous cell carcinoma of colon    Squamous cell skin cancer    History of left bundle branch block (LBBB)    Cardiac pacemaker  Medtronic Micra placed 4/2019    H/O unilateral oophorectomy    Acquired cataract    H/O eye surgery  bilateral cataracts    S/P PTCA (percutaneous transluminal coronary angioplasty)  coronary stent 3/19    S/P TAVR (transcatheter aortic valve replacement)    S/P coronary artery stent placement    PAST SURGICAL HISTORY:  H/O eye surgery bilateral cataracts    H/O unilateral oophorectomy     S/P coronary artery stent placement     S/P PTCA (percutaneous transluminal coronary angioplasty) coronary stent 3/19    S/P TAVR (transcatheter aortic valve replacement).     FAMILY HISTORY:  Family history of heart failure.     Social History:  · Substance use	Yes  · Alcohol use	socially  · Substance use	none  · Tobacco use	former smoker, was a 1ppd for about 20-30 years, quit about 40-50 years ago  · Social History (marital status, living situation, occupation, and sexual history)	lives at the [facility] with a walker     Tobacco Screening:  · Core Measure Site	No  · Has the patient used tobacco in the past 30 days?	No    Risk Assessment:    Present on Admission:  Deep Venous Thrombosis	no  Pulmonary Embolus	no        Medication list         MEDICATIONS  (STANDING):  apixaban 2.5 milliGRAM(s) Oral every 12 hours  brimonidine 0.2% Ophthalmic Solution 1 Drop(s) Both EYES two times a day  budesonide  80 MICROgram(s)/formoterol 4.5 MICROgram(s) Inhaler 2 Puff(s) Inhalation two times a day  levothyroxine 25 MICROGram(s) Oral daily  magnesium oxide 400 milliGRAM(s) Oral daily  metoprolol succinate ER 50 milliGRAM(s) Oral daily  multivitamin 1 Tablet(s) Oral daily  pantoprazole    Tablet 40 milliGRAM(s) Oral before breakfast  senna 2 Tablet(s) Oral at bedtime  simvastatin 10 milliGRAM(s) Oral at bedtime  timolol 0.5% Solution 1 Drop(s) Both EYES two times a day  tiotropium 2.5 MICROgram(s) Inhaler 2 Puff(s) Inhalation daily  vancomycin  IVPB        MEDICATIONS  (PRN):  acetaminophen     Tablet .. 650 milliGRAM(s) Oral every 6 hours PRN Temp greater or equal to 38C (100.4F), Mild Pain (1 - 3)  albuterol/ipratropium for Nebulization 3 milliLiter(s) Nebulizer every 6 hours PRN Shortness of Breath and/or Wheezing  aluminum hydroxide/magnesium hydroxide/simethicone Suspension 30 milliLiter(s) Oral every 4 hours PRN Dyspepsia  melatonin 3 milliGRAM(s) Oral at bedtime PRN Insomnia  ondansetron Injectable 4 milliGRAM(s) IV Push every 8 hours PRN Nausea and/or Vomiting         Vitals log        ICU Vital Signs Last 24 Hrs  T(C): 36.3 (22 Jul 2023 05:56), Max: 36.5 (21 Jul 2023 22:17)  T(F): 97.4 (22 Jul 2023 05:56), Max: 97.7 (21 Jul 2023 22:17)  HR: 67 (22 Jul 2023 05:56) (67 - 114)  BP: 137/86 (22 Jul 2023 05:56) (129/82 - 138/98)  BP(mean): --  ABP: --  ABP(mean): --  RR: 20 (22 Jul 2023 05:56) (20 - 36)  SpO2: 95% (22 Jul 2023 05:56) (90% - 98%)    O2 Parameters below as of 22 Jul 2023 05:56    O2 Flow (L/min): 10               Input and Output:  I&O's Detail      Lab Data                        12.6   12.11 )-----------( 196      ( 21 Jul 2023 23:42 )             39.8     07-21    135  |  99  |  27<H>  ----------------------------<  124<H>  5.1   |  24  |  1.09    Ca    9.8      21 Jul 2023 23:42    TPro  8.3  /  Alb  3.0<L>  /  TBili  1.7<H>  /  DBili  x   /  AST  30  /  ALT  25  /  AlkPhos  162<H>  07-21      CARDIAC MARKERS ( 21 Jul 2023 23:42 )  x     / x     / 22 U/L / x     / 1.2 ng/mL        Review of Systems	      Objective     Physical Examination        Pertinent Lab findings & Imaging      Santana:  NO   Adequate UO     I&O's Detail           Discussed with:     Cultures:	        Radiology      ACC: [id number] EXAM:  CT CHEST   ORDERED BY: NAYE ALVARADO     PROCEDURE DATE:  07/21/2023          INTERPRETATION:  CLINICAL INFORMATION: Shortness of breath    COMPARISON: CT chest 12/20/2022.    CONTRAST/COMPLICATIONS:  IV Contrast: NONE  Oral Contrast: NONE  Complications: None reported at time of study completion    PROCEDURE:  CT of the Chest was performed.  Sagittal and coronal reformats were performed.    FINDINGS:    LUNGS AND AIRWAYS: Patent central airways.  Worsening bilateral   interstitial and groundglass opacities throughout the lungs. Bilateral   areas of bronchiectasis most significant in the right upper lobe.   Scattered calcified granulomas.  PLEURA: Trace left pleural effusion.  MEDIASTINUM AND JUANI: Calcified mediastinal and hilar lymph nodes.  VESSELS: Atherosclerotic consultations of the aorta and coronary arteries.  HEART: Cardiomegaly. Status post TAVR. No pericardial effusion.  CHEST WALL AND LOWER NECK: Within normal limits.  VISUALIZED UPPER ABDOMEN: Large hiatal hernia with an intrathoracic   stomach. Calcified hepatic and splenic granulomas.  BONES: Degenerative changes of the spine with thoracolumbar levoscoliosis.    IMPRESSION:  Worsening bilateral interstitial and groundglass opacities. Differential   includes interstitial edema, infection, and chronic interstitial lung   disease.        --- End of Report ---            SCOTT LEAH MD; Attending Radiologist  This document has been electronically signed. Jul 22 2023 12:14AM                         Date/Time Patient Seen:  		  Referring MD:   Data Reviewed	       Patient is a 89y old  Female who presents with a chief complaint of SOB -> CHF exacerbation vs PNA (22 Jul 2023 01:41)      Subjective/HPI  in bed  seen and examined  on o2 support  verbal  alert  frail  weak  elderly     89F with hx of bronchiectasis on 4L of O2, afib on Eliquis s/p Medtronic Micra VR TCR PPM, HTN, HLD, CHF with hx of exacerbations, CAD with stent in RCA, LBBB, hx of AS s/p TAVR, ILD, hx of RLE squamous cell cancer s/p resection who presents with SOB.  Patient said she was in her usual state of health an was even exercising this morning.  However, in the afternoon, patient became acutely SOB.  No chest pain or fevers.  Denies any recent or new leg swelling.  Denies any travel or sick contacts.  When EMS arrived to scene, they noted her O2 to be in the mid-80s on NC.  Switched to NRB with improvement of O2.    PAST MEDICAL & SURGICAL HISTORY:  Asthma  on breo for 2 yrs    Aortic stenosis    HTN (hypertension)    HLD (hyperlipidemia)    Hiatal hernia    Skin cancer  basal    Heart failure    CAD (coronary artery disease)  recent coronary stent 3/19    Afib    Bronchitis    Hypertension    Glaucoma    Pacemaker    Heart failure    Aortic stenosis    HLD (hyperlipidemia)    Asthma    Squamous cell carcinoma of colon    Squamous cell skin cancer    History of left bundle branch block (LBBB)    Cardiac pacemaker  Medtronic Micra placed 4/2019    H/O unilateral oophorectomy    Acquired cataract    H/O eye surgery  bilateral cataracts    S/P PTCA (percutaneous transluminal coronary angioplasty)  coronary stent 3/19    S/P TAVR (transcatheter aortic valve replacement)    S/P coronary artery stent placement    PAST SURGICAL HISTORY:  H/O eye surgery bilateral cataracts    H/O unilateral oophorectomy     S/P coronary artery stent placement     S/P PTCA (percutaneous transluminal coronary angioplasty) coronary stent 3/19    S/P TAVR (transcatheter aortic valve replacement).     FAMILY HISTORY:  Family history of heart failure.     Social History:  · Substance use	Yes  · Alcohol use	socially  · Substance use	none  · Tobacco use	former smoker, was a 1ppd for about 20-30 years, quit about 40-50 years ago  · Social History (marital status, living situation, occupation, and sexual history)	lives at the [facility] with a walker     Tobacco Screening:  · Core Measure Site	No  · Has the patient used tobacco in the past 30 days?	No    Risk Assessment:    Present on Admission:  Deep Venous Thrombosis	no  Pulmonary Embolus	no        Medication list         MEDICATIONS  (STANDING):  apixaban 2.5 milliGRAM(s) Oral every 12 hours  brimonidine 0.2% Ophthalmic Solution 1 Drop(s) Both EYES two times a day  budesonide  80 MICROgram(s)/formoterol 4.5 MICROgram(s) Inhaler 2 Puff(s) Inhalation two times a day  levothyroxine 25 MICROGram(s) Oral daily  magnesium oxide 400 milliGRAM(s) Oral daily  metoprolol succinate ER 50 milliGRAM(s) Oral daily  multivitamin 1 Tablet(s) Oral daily  pantoprazole    Tablet 40 milliGRAM(s) Oral before breakfast  senna 2 Tablet(s) Oral at bedtime  simvastatin 10 milliGRAM(s) Oral at bedtime  timolol 0.5% Solution 1 Drop(s) Both EYES two times a day  tiotropium 2.5 MICROgram(s) Inhaler 2 Puff(s) Inhalation daily  vancomycin  IVPB        MEDICATIONS  (PRN):  acetaminophen     Tablet .. 650 milliGRAM(s) Oral every 6 hours PRN Temp greater or equal to 38C (100.4F), Mild Pain (1 - 3)  albuterol/ipratropium for Nebulization 3 milliLiter(s) Nebulizer every 6 hours PRN Shortness of Breath and/or Wheezing  aluminum hydroxide/magnesium hydroxide/simethicone Suspension 30 milliLiter(s) Oral every 4 hours PRN Dyspepsia  melatonin 3 milliGRAM(s) Oral at bedtime PRN Insomnia  ondansetron Injectable 4 milliGRAM(s) IV Push every 8 hours PRN Nausea and/or Vomiting         Vitals log        ICU Vital Signs Last 24 Hrs  T(C): 36.3 (22 Jul 2023 05:56), Max: 36.5 (21 Jul 2023 22:17)  T(F): 97.4 (22 Jul 2023 05:56), Max: 97.7 (21 Jul 2023 22:17)  HR: 67 (22 Jul 2023 05:56) (67 - 114)  BP: 137/86 (22 Jul 2023 05:56) (129/82 - 138/98)  BP(mean): --  ABP: --  ABP(mean): --  RR: 20 (22 Jul 2023 05:56) (20 - 36)  SpO2: 95% (22 Jul 2023 05:56) (90% - 98%)    O2 Parameters below as of 22 Jul 2023 05:56    O2 Flow (L/min): 10               Input and Output:  I&O's Detail      Lab Data                        12.6   12.11 )-----------( 196      ( 21 Jul 2023 23:42 )             39.8     07-21    135  |  99  |  27<H>  ----------------------------<  124<H>  5.1   |  24  |  1.09    Ca    9.8      21 Jul 2023 23:42    TPro  8.3  /  Alb  3.0<L>  /  TBili  1.7<H>  /  DBili  x   /  AST  30  /  ALT  25  /  AlkPhos  162<H>  07-21      CARDIAC MARKERS ( 21 Jul 2023 23:42 )  x     / x     / 22 U/L / x     / 1.2 ng/mL        Review of Systems	  sob  [place]  weakness  frail  weak  elderly      Objective     Physical Examination    heart s1s2  lung dec BS  verbal  alert  cn grossly int  on o2 support      Pertinent Lab findings & Imaging      Santana:  NO   Adequate UO     I&O's Detail           Discussed with:     Cultures:	        Radiology      ACC: [id number] EXAM:  CT CHEST   ORDERED BY: NAYE ALVARADO     PROCEDURE DATE:  07/21/2023          INTERPRETATION:  CLINICAL INFORMATION: Shortness of breath    COMPARISON: CT chest 12/20/2022.    CONTRAST/COMPLICATIONS:  IV Contrast: NONE  Oral Contrast: NONE  Complications: None reported at time of study completion    PROCEDURE:  CT of the Chest was performed.  Sagittal and coronal reformats were performed.    FINDINGS:    LUNGS AND AIRWAYS: Patent central airways.  Worsening bilateral   interstitial and groundglass opacities throughout the lungs. Bilateral   areas of bronchiectasis most significant in the right upper lobe.   Scattered calcified granulomas.  PLEURA: Trace left pleural effusion.  MEDIASTINUM AND JUANI: Calcified mediastinal and hilar lymph nodes.  VESSELS: Atherosclerotic consultations of the aorta and coronary arteries.  HEART: Cardiomegaly. Status post TAVR. No pericardial effusion.  CHEST WALL AND LOWER NECK: Within normal limits.  VISUALIZED UPPER ABDOMEN: Large hiatal hernia with an intrathoracic   stomach. Calcified hepatic and splenic granulomas.  BONES: Degenerative changes of the spine with thoracolumbar levoscoliosis.    IMPRESSION:  Worsening bilateral interstitial and groundglass opacities. Differential   includes interstitial edema, infection, and chronic interstitial lung   disease.        --- End of Report ---            SCOTT LYNN MD; Attending Radiologist  This document has been electronically signed. Jul 22 2023 12:14AM

## 2023-07-22 NOTE — PATIENT CHOICE NOTE. - NSPTCHOICENOTES_GEN_ALL_CORE
TRANSITION OF CARE PLAN: DC back to [facility] with [facility] as Star Patient; 485 initiated.

## 2023-07-22 NOTE — ED PROVIDER NOTE - CHPI ED SYMPTOMS NEG
no body aches/no chest pain/no cough/no fever/no headache/no hemoptysis/no shortness of breath/no diaphoresis

## 2023-07-22 NOTE — ED ADULT NURSE NOTE - NSFALLRISKASMT_ED_ALL_ED_DT
22-Jul-2023 01:03 Hydroxyzine Pregnancy And Lactation Text: This medication is not safe during pregnancy and should not be taken. It is also excreted in breast milk and breast feeding isn't recommended.

## 2023-07-22 NOTE — H&P ADULT - NSHPLABSRESULTS_GEN_ALL_CORE
LABS:                        12.6   12.11<H> )-----------( 196      ( 21 Jul 2023 23:42 )             39.8     135    |  99     |  27<H>  ----------------------------<  124<H>    21 Jul 2023 23:42  5.1     |  24     |  1.09       Ca 9.8           21 Jul 2023 23:42    TPro  8.3    /  Alb  3.0<L>  /  TBili  1.7<H>  /  DBili  x      /  AST  30     /  ALT  25     /  AlkPhos  162<H>  21 Jul 2023 23:42    PT/INR - ( 22 Jul 2023 00:52 )   PT: 20.3<H>;   INR: 1.76<H>         PTT - ( 22 Jul 2023 00:52 )  PTT:33.8     Urinalysis Basic - ( 21 Jul 2023 23:42 )    Color: x / Appearance: x / SG: x / pH: x  Gluc: 124 mg/dL / Ketone: x  / Bili: x / Urobili: x   Blood: x / Protein: x / Nitrite: x   Leuk Esterase: x / RBC: x / WBC x   Sq Epi: x / Non Sq Epi: x / Bacteria: x    Troponin trend:  Troponin I, High Sensitivity Result: 37.1 ng/L (07-21-23 @ 23:42)    Lactate, Blood: 2.2 mmol/L (07-22-23 @ 00:50)    EKG:  afib with LAFB, LVH, q wave in II, III, V1-V5  Radiology:  < from: CT Chest No Cont (07.21.23 @ 23:53) >    IMPRESSION:  Worsening bilateral interstitial and groundglass opacities. Differential  includes interstitial edema, infection, and chronic interstitial lung   disease.    < end of copied text >

## 2023-07-22 NOTE — CARE COORDINATION ASSESSMENT. - NSPASTMEDSURGHISTORY_GEN_ALL_CORE_FT
PAST MEDICAL & SURGICAL HISTORY:  Heart failure      Skin cancer  basal      Hiatal hernia      HTN (hypertension)      Aortic stenosis      Asthma  on breo for 2 yrs      H/O eye surgery  bilateral cataracts      H/O unilateral oophorectomy      CAD (coronary artery disease)  recent coronary stent 3/19      S/P PTCA (percutaneous transluminal coronary angioplasty)  coronary stent 3/19      Asthma      HLD (hyperlipidemia)      Aortic stenosis      Glaucoma      Hypertension      Bronchitis      Afib      S/P coronary artery stent placement      S/P TAVR (transcatheter aortic valve replacement)      Cardiac pacemaker  Medtronic Micra placed 4/2019      History of left bundle branch block (LBBB)      Squamous cell skin cancer

## 2023-07-22 NOTE — ED PROVIDER NOTE - OBJECTIVE STATEMENT
89 year old female with a history of a-fib, CHF, PM, asthma, HTN, aortic stenosis, CAF, CHF presents with SOB.  Patient BIBA, resides at the [facility].  She is on 4L oxygen via nasal canula 24 hours/day.  She reports worsening shortness of breath that started yesterday afternoon.  She denies associated cough, fever, chest pain.  She has a history of bronchiectasis.  When EMS arrived, they noted O2 sat in mid-80s while on NC.  They switched her to NRB and O2 sat improved to mid-90s.  Patient is a full code. PMD Joanne Man, Cardiology [facility]

## 2023-07-22 NOTE — H&P ADULT - ASSESSMENT
89F with hx of bronchiectasis on 4L of O2, afib on Eliquis s/p Medtronic Micra VR TCR PPM, HTN, HLD, CHF with hx of exacerbations, CAD with stent in RCA, LBBB, hx of AS s/p TAVR, ILD, hx of RLE squamous cell cancer s/p resection who presents with SOB.        SOB - unclear if CHF exacerbation vs PNA (or other pulmonary process), last TTE showed EF 45-50% with conduction delay and mildly enlarged RV  - admit to medicine  - s/p lasix 40mg IV -> monitor UOP  - cardiology consult  - pulmonary consult  - TTE  - will continue vancomycin and levaquin (from [facility])  - vancomycin 750mg q24h (but check trough prior to next dose)  - check strep PNA and legionella Ag  - check RVP and COVID  - wean down O2 as much as possible (currently on NRB -> attempts to go to NC 6L and patient would desat to 87-88%)  - duoneb PRN  - cont with trelegy or equivalent    Chronic afib  - cont with eliquis  - cont with metoprolol    Hypothyroidism  - cont with levothyroxine    CAD/HLD  - cont with statin  - cont with metoprolol    Preventive measures  - on eliquis 89F with hx of bronchiectasis on 4L of O2, afib on Eliquis s/p Medtronic Micra VR TCR PPM, HTN, HLD, CHF with hx of exacerbations, CAD with stent in RCA, LBBB, hx of AS s/p TAVR, ILD, hx of RLE squamous cell cancer s/p resection who presents with SOB.        SOB - unclear if CHF exacerbation vs PNA (or other pulmonary process), last TTE showed EF 45-50% with conduction delay and mildly enlarged RV  - admit to medicine  - s/p lasix 40mg IV -> monitor UOP  - cardiology consult  - pulmonary consult  - TTE  - will continue vancomycin and levaquin (from [facility])  - vancomycin 750mg q24h (but check trough prior to next dose)  - check strep PNA and legionella Ag  - check RVP and COVID  - wean down O2 as much as possible (currently on NRB -> attempts to go to NC 6L and patient would desat to 87-88%)  - duoneb PRN  - cont with trelegy or equivalent    Chronic afib  - cont with eliquis  - cont with metoprolol    Hypothyroidism  - cont with levothyroxine    CAD/HLD  - cont with statin  - cont with metoprolol    Preventive measures  - on eliquis  - DNR (has a form in chart) and patient requests to be DNI as well

## 2023-07-22 NOTE — PATIENT PROFILE ADULT - FALL HARM RISK - HARM RISK INTERVENTIONS

## 2023-07-22 NOTE — CARE COORDINATION ASSESSMENT. - NSCAREPROVIDERS_GEN_ALL_CORE_FT
CARE PROVIDERS:  Accepting Physician: Jagdeep Mills  Administration: Augustus Lisa  Admitting: Jagdeep Mills  Attending: Jagdeep Mills  Cardiology Technician: Katia Foreman  Case Management: Santaromana, Anna  Consultant: Kalina Hernandez  Consultant: Crispin Salmeron  Consultant: Kaushal Hernandez  Covering Team: Jagdeep Mills  ED Attending: Shelby Goins  ED Nurse: Inna Van  Nurse: Ying Garcia  Nurse: Ally Hooper  Nurse: Paige Sellers  Nurse: Mars Shannon  Nurse: Queenie Woodard  Override: Mars Shannon  Override: Ying Garcia  Override: Queenie Woodard  PCA/Nursing Assistant: Mel Bear  PCA/Nursing Assistant: Elise Skelton  Primary Team: Titi Celis  Registered Dietitian: Evelyne Hurst  Respiratory Therapy: Jordan Venegas  : Macy Swenson  Team: MIKEL  Hospitalists, Team  UR// Supp. Assoc.: Pam Jim

## 2023-07-22 NOTE — CARE COORDINATION ASSESSMENT. - OTHER PERTINENT DISCHARGE PLANNING INFORMATION:
Patient admitted for CHF; possible PNA; Patient has Medicare; Possible STAR patient;  Star Patient notice explained and given to patient at bedside; all parties verbalized understanding.RN YIN and patient; son; family assessment; Transition of Care planning completed at bedside; patient is a long time resident at [facility] [phone]; Patient alert & orientedX3; able to self direct; son Dr. Yris Contreras is [facility] [phone]; utilizes R/W; transport W/C adlib; received highest level of care assistance at [facility]; WANTS to hire own HHA post DC; family instructed to coordinate pvt hire HHA services with [facility].  TRANSITION OF CARE PLAN: DC back to [facility] with [facility] as Star Patient; 485 initiated.

## 2023-07-22 NOTE — H&P ADULT - HISTORY OF PRESENT ILLNESS
89F with hx of bronchiectasis on 4L of O2, afib on Eliquis s/p Medtronic Micra VR TCR PPM, HTN, HLD, CHF with hx of exacerbations, CAD with stent in RCA, LBBB, hx of AS s/p TAVR, ILD, hx of RLE squamous cell cancer s/p resection who presents with SOB.  Patient said she was in her usual state of health an was even exercising this morning.  However, in the afternoon, patient became acutely SOB.  No chest pain or fevers.  Denies any recent or new leg swelling.  Denies any travel or sick contacts.  When EMS arrived to scene, they noted her O2 to be in the mid-80s on NC.  Switched to NRB with improvement of O2.      In the ED,   - triage vitals were /98    T 97.7F RR 36, 97% on NRB  - labs showed WBC 12.1, slightly elev t.bili 1.7 with alk phosph 162, pBNP 7500, troponin neg, and lactate 2.2  - CT showed "worsening bilateral interstitial and groundglass opacities. Differential includes interstitial edema, infection, and chronic interstitial lung disease."  - was given lasix 40mg IV x1 (of note, patient was on torsemide but was taken off it 3 week ago by her cardiologist since her BP was getting low) with good UOP  - started on levofloxacin and vancomycin by the ED  - patient is being admitted for SOB/acute resp failure 2/2 CHF exac vs PNA.

## 2023-07-22 NOTE — ED PROVIDER NOTE - RESPIRATORY, MLM
Breath sounds clear and equal bilaterally.  No wheezing, no rales,  no crackles. Speaking in complete sentences

## 2023-07-23 LAB
ALBUMIN SERPL ELPH-MCNC: 2.5 G/DL — LOW (ref 3.3–5)
ALP SERPL-CCNC: 117 U/L — SIGNIFICANT CHANGE UP (ref 30–120)
ALT FLD-CCNC: 20 U/L DA — SIGNIFICANT CHANGE UP (ref 10–60)
ANION GAP SERPL CALC-SCNC: 11 MMOL/L — SIGNIFICANT CHANGE UP (ref 5–17)
AST SERPL-CCNC: 25 U/L — SIGNIFICANT CHANGE UP (ref 10–40)
BILIRUB SERPL-MCNC: 1.2 MG/DL — SIGNIFICANT CHANGE UP (ref 0.2–1.2)
BUN SERPL-MCNC: 30 MG/DL — HIGH (ref 7–23)
CALCIUM SERPL-MCNC: 9.2 MG/DL — SIGNIFICANT CHANGE UP (ref 8.4–10.5)
CHLORIDE SERPL-SCNC: 100 MMOL/L — SIGNIFICANT CHANGE UP (ref 96–108)
CO2 SERPL-SCNC: 28 MMOL/L — SIGNIFICANT CHANGE UP (ref 22–31)
CREAT SERPL-MCNC: 1.27 MG/DL — SIGNIFICANT CHANGE UP (ref 0.5–1.3)
EGFR: 39 ML/MIN/1.73M2 — LOW
GLUCOSE SERPL-MCNC: 122 MG/DL — HIGH (ref 70–99)
HCT VFR BLD CALC: 32.9 % — LOW (ref 34.5–45)
HGB BLD-MCNC: 10.3 G/DL — LOW (ref 11.5–15.5)
LEGIONELLA AG UR QL: NEGATIVE — SIGNIFICANT CHANGE UP
MAGNESIUM SERPL-MCNC: 1.9 MG/DL — SIGNIFICANT CHANGE UP (ref 1.6–2.6)
MCHC RBC-ENTMCNC: 31.3 GM/DL — LOW (ref 32–36)
MCHC RBC-ENTMCNC: 32 PG — SIGNIFICANT CHANGE UP (ref 27–34)
MCV RBC AUTO: 102.2 FL — HIGH (ref 80–100)
NRBC # BLD: 0 /100 WBCS — SIGNIFICANT CHANGE UP (ref 0–0)
PLATELET # BLD AUTO: 163 K/UL — SIGNIFICANT CHANGE UP (ref 150–400)
POTASSIUM SERPL-MCNC: 3.8 MMOL/L — SIGNIFICANT CHANGE UP (ref 3.5–5.3)
POTASSIUM SERPL-SCNC: 3.8 MMOL/L — SIGNIFICANT CHANGE UP (ref 3.5–5.3)
PROT SERPL-MCNC: 6.5 G/DL — SIGNIFICANT CHANGE UP (ref 6–8.3)
RBC # BLD: 3.22 M/UL — LOW (ref 3.8–5.2)
RBC # FLD: 17.2 % — HIGH (ref 10.3–14.5)
S PNEUM AG UR QL: NEGATIVE — SIGNIFICANT CHANGE UP
SODIUM SERPL-SCNC: 139 MMOL/L — SIGNIFICANT CHANGE UP (ref 135–145)
VANCOMYCIN TROUGH SERPL-MCNC: 5.8 UG/ML — LOW (ref 10–20)
WBC # BLD: 10.48 K/UL — SIGNIFICANT CHANGE UP (ref 3.8–10.5)
WBC # FLD AUTO: 10.48 K/UL — SIGNIFICANT CHANGE UP (ref 3.8–10.5)

## 2023-07-23 PROCEDURE — 99232 SBSQ HOSP IP/OBS MODERATE 35: CPT

## 2023-07-23 RX ORDER — VANCOMYCIN HCL 1 G
250 VIAL (EA) INTRAVENOUS ONCE
Refills: 0 | Status: COMPLETED | OUTPATIENT
Start: 2023-07-23 | End: 2023-07-23

## 2023-07-23 RX ADMIN — Medication 3 MILLILITER(S): at 06:39

## 2023-07-23 RX ADMIN — Medication 50 MILLIGRAM(S): at 06:15

## 2023-07-23 RX ADMIN — Medication 1 DROP(S): at 06:23

## 2023-07-23 RX ADMIN — Medication 3 MILLILITER(S): at 19:34

## 2023-07-23 RX ADMIN — MAGNESIUM OXIDE 400 MG ORAL TABLET 400 MILLIGRAM(S): 241.3 TABLET ORAL at 11:22

## 2023-07-23 RX ADMIN — APIXABAN 2.5 MILLIGRAM(S): 2.5 TABLET, FILM COATED ORAL at 17:26

## 2023-07-23 RX ADMIN — Medication 100 MILLIGRAM(S): at 02:50

## 2023-07-23 RX ADMIN — Medication 1 DROP(S): at 17:26

## 2023-07-23 RX ADMIN — SENNA PLUS 2 TABLET(S): 8.6 TABLET ORAL at 21:44

## 2023-07-23 RX ADMIN — PANTOPRAZOLE SODIUM 40 MILLIGRAM(S): 20 TABLET, DELAYED RELEASE ORAL at 06:15

## 2023-07-23 RX ADMIN — Medication 3 MILLILITER(S): at 13:17

## 2023-07-23 RX ADMIN — BRIMONIDINE TARTRATE 1 DROP(S): 2 SOLUTION/ DROPS OPHTHALMIC at 17:26

## 2023-07-23 RX ADMIN — Medication 20 MILLIGRAM(S): at 11:23

## 2023-07-23 RX ADMIN — SIMVASTATIN 10 MILLIGRAM(S): 20 TABLET, FILM COATED ORAL at 21:45

## 2023-07-23 RX ADMIN — Medication 1 TABLET(S): at 11:22

## 2023-07-23 RX ADMIN — Medication 250 MILLIGRAM(S): at 01:27

## 2023-07-23 RX ADMIN — APIXABAN 2.5 MILLIGRAM(S): 2.5 TABLET, FILM COATED ORAL at 06:15

## 2023-07-23 RX ADMIN — BRIMONIDINE TARTRATE 1 DROP(S): 2 SOLUTION/ DROPS OPHTHALMIC at 06:24

## 2023-07-23 RX ADMIN — Medication 40 MILLIGRAM(S): at 06:16

## 2023-07-23 RX ADMIN — Medication 10 MILLIGRAM(S): at 06:15

## 2023-07-23 RX ADMIN — Medication 3 MILLILITER(S): at 00:41

## 2023-07-23 RX ADMIN — Medication 25 MICROGRAM(S): at 06:24

## 2023-07-23 NOTE — PHYSICAL THERAPY INITIAL EVALUATION ADULT - SITTING BALANCE: DYNAMIC
· Was hospitalized for this, has since resolved  · Following with gyn-uro  · Encouraged timed voids, good periarea hygiene, avoid constipation, keep hydrated good balance

## 2023-07-23 NOTE — PROGRESS NOTE ADULT - ASSESSMENT
89F with hx of bronchiectasis on 4L of O2, afib on Eliquis s/p Medtronic Micra VR TCR PPM, HTN, HLD, CHF with hx of exacerbations, CAD with stent in RCA, LBBB, hx of AS s/p TAVR, ILD, hx of RLE squamous cell cancer s/p resection who presents with SOB    bronchiectasis  chr lung disease  dyspnea  pulm edema  valv heart disease  HFpEF  AF  OP  OA  Frailty  PPM  HTN  HLD  CAD with PCI  ILD    cardio eval noted  fio2 wean  diuresis -   nebs - steroids - abx    nebs - steroids  diuresis  I and O  ct chest reviewed  old records reviewed  monitor VS and Sat  keep sat > 88 pct  oral hygiene  skin care  SLP eval  GOC discussion  cvs rx regimen optimization  on ABX for poss STSI

## 2023-07-23 NOTE — CHART NOTE - NSCHARTNOTEFT_GEN_A_CORE
Vancomycin trough low at 5.8.  Per RN, vancomycin 750mg infusing now.  Will supplement with 250mg x1 and then start vancomycin 1000mg q24h.  Check trough before next dose.

## 2023-07-23 NOTE — PHYSICAL THERAPY INITIAL EVALUATION ADULT - ADDITIONAL COMMENTS
Lives in the [facility].  No stairs. Lives in the [facility].  No stairs.  Pt on home O2 3L.

## 2023-07-23 NOTE — PROGRESS NOTE ADULT - ASSESSMENT
89y/o DNR/DNI. Seen at [facility] telemetry. From the [facility].  Lying flat, sleeping comfortably. Easily arousable  History HTN, high cholesterol, A-Fib, bronchiectasis, CHF, LBBB, thyroid disease, hiatal hernia, glaucoma  3/19 s/p stent to RCA  S/P PPM-Medtronic Micra ILD  S/P TAVR  S/P B/L cataract surgery  S/P unilateral oopherectomy  12/7/22 TLS Echocardiogram which visually with EF-45-50%    Admitted for shortness of breath  Treated with IV furosemide x1 and antibiotics  BNP-7,500  Cardiac enzymes negative x1  Patient claims to be feeling much better    7/23/23  Seen at [facility] telemetry  Sitting up  Feeling better  No complaints offered    Impression  Shortness of breath probably related to exacerbation of bronchiectasis and some degree of heart failure    Plan:  - Continue Metoprolol succinate-50mg OD                  Torsemide-10mg OD                  Simvastatin-10mg HS                  Apixaban-2.5mg BID  - Repeat Echocardiogram pending  - Follow labs  - CXR-c/w chronic interstitial lung disease  - On levaquin IV  - Seen by Pulmonary

## 2023-07-23 NOTE — PROGRESS NOTE ADULT - ASSESSMENT
89F with hx of bronchiectasis on 4L of O2, afib on Eliquis s/p Medtronic Micra VR TCR PPM, HTN, HLD, CHF with hx of exacerbations, CAD with stent in RCA, LBBB, hx of AS s/p TAVR, ILD, hx of RLE squamous cell cancer s/p resection who presents with SOB.          acute respiratory failure   2/2 to acute on chronic  chf exacerbation    last TTE showed EF 45-50% with conduction delay and mildly enlarged RV  now on 5 L nasal canula, titrating off from non-breather   will d/c iv vanco, and desclate emperically to levaquin   taper predisone to 20 mg bid for 3 days  tolerating well  reached out son, awaiting call back     Chronic afib  - cont with eliquis  - cont with metoprolol    Hypothyroidism  - cont with levothyroxine        CAD/HLD  - cont with statin  - cont with metoprolol    Preventive measures  - on eliquis  - DNR (has a form in chart) and patient requests to be DNI as well

## 2023-07-24 LAB
ALBUMIN SERPL ELPH-MCNC: 2.7 G/DL — LOW (ref 3.3–5)
ALP SERPL-CCNC: 148 U/L — HIGH (ref 30–120)
ALT FLD-CCNC: 19 U/L DA — SIGNIFICANT CHANGE UP (ref 10–60)
ANION GAP SERPL CALC-SCNC: 9 MMOL/L — SIGNIFICANT CHANGE UP (ref 5–17)
AST SERPL-CCNC: 23 U/L — SIGNIFICANT CHANGE UP (ref 10–40)
BILIRUB SERPL-MCNC: 1 MG/DL — SIGNIFICANT CHANGE UP (ref 0.2–1.2)
BUN SERPL-MCNC: 45 MG/DL — HIGH (ref 7–23)
CALCIUM SERPL-MCNC: 9.9 MG/DL — SIGNIFICANT CHANGE UP (ref 8.4–10.5)
CHLORIDE SERPL-SCNC: 100 MMOL/L — SIGNIFICANT CHANGE UP (ref 96–108)
CO2 SERPL-SCNC: 28 MMOL/L — SIGNIFICANT CHANGE UP (ref 22–31)
CREAT SERPL-MCNC: 1.37 MG/DL — HIGH (ref 0.5–1.3)
EGFR: 35 ML/MIN/1.73M2 — LOW
GLUCOSE SERPL-MCNC: 111 MG/DL — HIGH (ref 70–99)
HCT VFR BLD CALC: 34.4 % — LOW (ref 34.5–45)
HGB BLD-MCNC: 11 G/DL — LOW (ref 11.5–15.5)
MCHC RBC-ENTMCNC: 32 GM/DL — SIGNIFICANT CHANGE UP (ref 32–36)
MCHC RBC-ENTMCNC: 32.8 PG — SIGNIFICANT CHANGE UP (ref 27–34)
MCV RBC AUTO: 102.7 FL — HIGH (ref 80–100)
NRBC # BLD: 0 /100 WBCS — SIGNIFICANT CHANGE UP (ref 0–0)
PLATELET # BLD AUTO: 203 K/UL — SIGNIFICANT CHANGE UP (ref 150–400)
POTASSIUM SERPL-MCNC: 4 MMOL/L — SIGNIFICANT CHANGE UP (ref 3.5–5.3)
POTASSIUM SERPL-SCNC: 4 MMOL/L — SIGNIFICANT CHANGE UP (ref 3.5–5.3)
PROT SERPL-MCNC: 7.6 G/DL — SIGNIFICANT CHANGE UP (ref 6–8.3)
RBC # BLD: 3.35 M/UL — LOW (ref 3.8–5.2)
RBC # FLD: 17.3 % — HIGH (ref 10.3–14.5)
SODIUM SERPL-SCNC: 137 MMOL/L — SIGNIFICANT CHANGE UP (ref 135–145)
WBC # BLD: 15.2 K/UL — HIGH (ref 3.8–10.5)
WBC # FLD AUTO: 15.2 K/UL — HIGH (ref 3.8–10.5)

## 2023-07-24 PROCEDURE — 99232 SBSQ HOSP IP/OBS MODERATE 35: CPT

## 2023-07-24 RX ADMIN — BRIMONIDINE TARTRATE 1 DROP(S): 2 SOLUTION/ DROPS OPHTHALMIC at 18:02

## 2023-07-24 RX ADMIN — APIXABAN 2.5 MILLIGRAM(S): 2.5 TABLET, FILM COATED ORAL at 18:02

## 2023-07-24 RX ADMIN — Medication 3 MILLILITER(S): at 00:33

## 2023-07-24 RX ADMIN — SIMVASTATIN 10 MILLIGRAM(S): 20 TABLET, FILM COATED ORAL at 21:28

## 2023-07-24 RX ADMIN — MAGNESIUM OXIDE 400 MG ORAL TABLET 400 MILLIGRAM(S): 241.3 TABLET ORAL at 11:11

## 2023-07-24 RX ADMIN — Medication 3 MILLILITER(S): at 06:31

## 2023-07-24 RX ADMIN — APIXABAN 2.5 MILLIGRAM(S): 2.5 TABLET, FILM COATED ORAL at 05:57

## 2023-07-24 RX ADMIN — Medication 1 DROP(S): at 05:58

## 2023-07-24 RX ADMIN — Medication 20 MILLIGRAM(S): at 05:57

## 2023-07-24 RX ADMIN — Medication 10 MILLIGRAM(S): at 05:57

## 2023-07-24 RX ADMIN — Medication 1 DROP(S): at 18:03

## 2023-07-24 RX ADMIN — Medication 3 MILLILITER(S): at 20:20

## 2023-07-24 RX ADMIN — Medication 1 TABLET(S): at 11:11

## 2023-07-24 RX ADMIN — Medication 25 MICROGRAM(S): at 05:58

## 2023-07-24 RX ADMIN — BRIMONIDINE TARTRATE 1 DROP(S): 2 SOLUTION/ DROPS OPHTHALMIC at 05:59

## 2023-07-24 RX ADMIN — SENNA PLUS 2 TABLET(S): 8.6 TABLET ORAL at 21:28

## 2023-07-24 RX ADMIN — Medication 3 MILLILITER(S): at 13:31

## 2023-07-24 RX ADMIN — PANTOPRAZOLE SODIUM 40 MILLIGRAM(S): 20 TABLET, DELAYED RELEASE ORAL at 05:57

## 2023-07-24 RX ADMIN — Medication 50 MILLIGRAM(S): at 11:11

## 2023-07-24 NOTE — PROGRESS NOTE ADULT - ASSESSMENT
89F with hx of bronchiectasis on 4L of O2, afib on Eliquis s/p Medtronic Micra VR TCR PPM, HTN, HLD, CHF with hx of exacerbations, CAD with stent in RCA, LBBB, hx of AS s/p TAVR, ILD, hx of RLE squamous cell cancer s/p resection who presents with SOB.          acute respiratory failure   2/2 to acute on chronic  chf exacerbation    last TTE showed EF 45-50% with conduction delay and mildly enlarged RV  was placed on non-breathrer, then 50 %, now on 3 L nasal canula  will d/c iv vanco, and desclate emperically to levaquin   taper predisone to 20 mg bid for 3 days  tolerating well  reached out son, awaiting call back , from [facility], needs help at home  f/u with      leukocytosis  wbc 15 K  suspect from steroids  mointor wbc     Chronic afib  - cont with eliquis  - cont with metoprolol    Hypothyroidism  - cont with levothyroxine        CAD/HLD  - cont with statin  - cont with metoprolol    Preventive measures  - on eliquis  - DNR (has a form in chart) and patient requests to be DNI as well

## 2023-07-24 NOTE — CASE MANAGEMENT PROGRESS NOTE - NSCMPROGRESSNOTE_GEN_ALL_CORE
YIN spoke with Dafne Director of Reflections at [facility] in [place] and stated to me patient was on 2 liters at the [facility] , and she used a walker for short distances  and used walker for long distances. Family wants to hire a private aide. Dafne recommended [facility] to hire a private aide that they recommend and use. YIN left message for Yris [phone] awaiting a call back. Dafne stated patient has only been at the [facility] less than a month. Will continue to follow patient.  YIN spoke with Dafne Director of Reflections at [facility] in [place] and stated to me patient was on 2 liters at the [facility] , and she used a walker for short distances  and used walker for long distances. Family wants to hire a private aide. Dafne recommended [employer] to hire a private aide that they recommend and use. YIN left message for Yris [phone] awaiting a call back. Dafne stated patient has only been at the [facility] less than a month.  In order for patient to return back will need Attestation form, and  PT evaluation note. Will continue to follow patient.

## 2023-07-24 NOTE — CASE MANAGEMENT PROGRESS NOTE - NSCMPROGRESSNOTE_GEN_ALL_CORE
CM called  [facility] [phone] and left a message with Dafne Director of Reflections at the [facility] in [place] awaiting a call back regarding fucntion status of patient to return and homecare .Will continue to follow patient.

## 2023-07-24 NOTE — PROGRESS NOTE ADULT - ASSESSMENT
The patient is a 89 year old female with a history of HTN, HL, CAD s/p PCI, leadless pacemaker, TAVR, chronic systolic heart failure, bronchiectasis who presents with shortness of breath.    Plan:  - ECG with LBBB  - Echo with mildly reduced LV systolic function, mod MR, mod TR, mod pulm HTN, mildly elevated TAVR velocities  - CT chest with worsening interstitial opacities and GGO  - BNP 7500  - Worsening CHASITY  - Hold IV diuretics  - Continue torsemide 10 mg PO daily  - Continue metoprolol succinate 50 mg daily  - Continue apixaban 5 mg bid  - Pulm follow-up

## 2023-07-25 PROCEDURE — 99232 SBSQ HOSP IP/OBS MODERATE 35: CPT

## 2023-07-25 RX ORDER — FUROSEMIDE 40 MG
20 TABLET ORAL ONCE
Refills: 0 | Status: COMPLETED | OUTPATIENT
Start: 2023-07-25 | End: 2023-07-25

## 2023-07-25 RX ADMIN — Medication 10 MILLIGRAM(S): at 05:19

## 2023-07-25 RX ADMIN — APIXABAN 2.5 MILLIGRAM(S): 2.5 TABLET, FILM COATED ORAL at 17:16

## 2023-07-25 RX ADMIN — Medication 20 MILLIGRAM(S): at 05:20

## 2023-07-25 RX ADMIN — Medication 3 MILLILITER(S): at 02:19

## 2023-07-25 RX ADMIN — Medication 3 MILLILITER(S): at 13:23

## 2023-07-25 RX ADMIN — Medication 25 MICROGRAM(S): at 05:19

## 2023-07-25 RX ADMIN — PANTOPRAZOLE SODIUM 40 MILLIGRAM(S): 20 TABLET, DELAYED RELEASE ORAL at 05:20

## 2023-07-25 RX ADMIN — Medication 3 MILLILITER(S): at 20:09

## 2023-07-25 RX ADMIN — Medication 1 TABLET(S): at 11:16

## 2023-07-25 RX ADMIN — APIXABAN 2.5 MILLIGRAM(S): 2.5 TABLET, FILM COATED ORAL at 05:19

## 2023-07-25 RX ADMIN — Medication 20 MILLIGRAM(S): at 15:13

## 2023-07-25 RX ADMIN — BRIMONIDINE TARTRATE 1 DROP(S): 2 SOLUTION/ DROPS OPHTHALMIC at 17:16

## 2023-07-25 RX ADMIN — BRIMONIDINE TARTRATE 1 DROP(S): 2 SOLUTION/ DROPS OPHTHALMIC at 05:20

## 2023-07-25 RX ADMIN — MAGNESIUM OXIDE 400 MG ORAL TABLET 400 MILLIGRAM(S): 241.3 TABLET ORAL at 11:16

## 2023-07-25 RX ADMIN — Medication 1 DROP(S): at 05:20

## 2023-07-25 RX ADMIN — SIMVASTATIN 10 MILLIGRAM(S): 20 TABLET, FILM COATED ORAL at 21:17

## 2023-07-25 RX ADMIN — Medication 1 DROP(S): at 17:17

## 2023-07-25 RX ADMIN — Medication 50 MILLIGRAM(S): at 05:19

## 2023-07-25 RX ADMIN — SENNA PLUS 2 TABLET(S): 8.6 TABLET ORAL at 21:17

## 2023-07-25 RX ADMIN — Medication 3 MILLILITER(S): at 07:44

## 2023-07-25 NOTE — CAREGIVER ENGAGEMENT NOTE - CAREGIVER OUTREACH NOTES - FREE TEXT
CMS STAR and treatment team made aware.   Per treatment team rounds pt's  spo2 80's on 4L NC with  Some wheezing and sob nurse  increased the o2 to 5L NC.   Continues on Duonebs q6h, prednisone po, no edema.  [facility] PT PTA at the [facility] [place]  family looking into private hire aide.  	per CM Fauzia :  Family wants to hire a private aide. Dafne recommended [employer] to hire a private aide that they recommend and use. This CM called and  left message for Yris [phone] -awaiting a call back.

## 2023-07-25 NOTE — PROGRESS NOTE ADULT - ASSESSMENT
89F with hx of bronchiectasis on 4L of O2, afib on Eliquis s/p Medtronic Micra VR TCR PPM, HTN, HLD, CHF with hx of exacerbations, CAD with stent in RCA, LBBB, hx of AS s/p TAVR, ILD, hx of RLE squamous cell cancer s/p resection who presents with SOB    bronchiectasis  chr lung disease  dyspnea  pulm edema  valv heart disease  HFpEF  AF  OP  OA  Frailty  PPM  HTN  HLD  CAD with PCI  ILD    cardio eval noted  fio2 wean  diuresis -   nebs - steroids - abx  sw f/u noted    nebs - steroids  diuresis  I and O  ct chest reviewed  old records reviewed  monitor VS and Sat  keep sat > 88 pct  oral hygiene  skin care  SLP eval  GOC discussion  cvs rx regimen optimization  on ABX for poss STSI

## 2023-07-25 NOTE — PROGRESS NOTE ADULT - ASSESSMENT
89F with hx of bronchiectasis on 4L of O2, afib on Eliquis s/p Medtronic Micra VR TCR PPM, HTN, HLD, CHF with hx of exacerbations, CAD with stent in RCA, LBBB, hx of AS s/p TAVR, ILD, hx of RLE squamous cell cancer s/p resection who presents with SOB.          acute respiratory failure   2/2 to acute on chronic  chf exacerbation    last TTE showed EF 45-50% with conduction delay and mildly enlarged RV  was placed on non-breathrer, then 50 %, now on 3 L nasal canula  s/p vanco, on levaquin, can finish course  prednisone to finish in 2 days  appears slightly abigail hypoxic today, giving extra dose of lasix on top of daily torsemide.  PT eval  f/u with      leukocytosis  suspect from steroids  mointor wbc     Chronic afib  - cont with eliquis  - cont with metoprolol    Hypothyroidism  - cont with levothyroxine        CAD/HLD  - cont with statin  - cont with metoprolol    Preventive measures  - on eliquis  - DNR (has a form in chart) and patient requests to be DNI as well   89F with hx of bronchiectasis on 4L of O2, afib on Eliquis s/p Medtronic Micra VR TCR PPM, HTN, HLD, CHF with hx of exacerbations, CAD with stent in RCA, LBBB, hx of AS s/p TAVR, ILD, hx of RLE squamous cell cancer s/p resection who presents with SOB.          acute respiratory failure   2/2 to acute on chronic  chf exacerbation    last TTE showed EF 45-50% with conduction delay and mildly enlarged RV  was placed on non-breathrer, then 50 %, now on 3 L nasal canula  s/p vanco, on levaquin, can finish course  prednisone to finish in 2 days  appears slightly abigail hypoxic today, giving extra dose of lasix on top of daily torsemide.  PT eval  f/u with      leukocytosis  suspect from steroids  mointor wbc     Chronic afib  - cont with eliquis  - cont with metoprolol    Hypothyroidism  - cont with levothyroxine        CAD/HLD  - cont with statin  - cont with metoprolol    Preventive measures  - on eliquis  - DNR/DNI   89F with hx of bronchiectasis on 4L of O2, afib on Eliquis s/p Medtronic Micra VR TCR PPM, HTN, HLD, CHF with hx of exacerbations, CAD with stent in RCA, LBBB, hx of AS s/p TAVR, ILD, hx of RLE squamous cell cancer s/p resection who presents with SOB.          acute respiratory failure   2/2 to acute on chronic  chf exacerbation, doubt pneumonia given no other infectious symptoms. ruled out   last TTE showed EF 45-50% with conduction delay and mildly enlarged RV  was placed on non-breathrer, then 50 %, now on 3 L nasal canula  s/p vanco, on levaquin, can finish course given history of bronchiectasis  prednisone to finish in 2 days  appears slightly more hypoxic today, giving extra dose of lasix on top of daily torsemide.  PT eval  f/u with      leukocytosis  suspect from steroids  mointor wbc     Chronic afib  - cont with eliquis  - cont with metoprolol    Hypothyroidism  - cont with levothyroxine        CAD/HLD  - cont with statin  - cont with metoprolol    Preventive measures  - on eliquis  - DNR/DNI

## 2023-07-26 ENCOUNTER — TRANSCRIPTION ENCOUNTER (OUTPATIENT)
Age: 88
End: 2023-07-26

## 2023-07-26 VITALS
RESPIRATION RATE: 16 BRPM | HEART RATE: 77 BPM | DIASTOLIC BLOOD PRESSURE: 72 MMHG | OXYGEN SATURATION: 94 % | SYSTOLIC BLOOD PRESSURE: 109 MMHG | TEMPERATURE: 98 F

## 2023-07-26 LAB
ANION GAP SERPL CALC-SCNC: 5 MMOL/L — SIGNIFICANT CHANGE UP (ref 5–17)
BUN SERPL-MCNC: 53 MG/DL — HIGH (ref 7–23)
CALCIUM SERPL-MCNC: 9.6 MG/DL — SIGNIFICANT CHANGE UP (ref 8.4–10.5)
CHLORIDE SERPL-SCNC: 99 MMOL/L — SIGNIFICANT CHANGE UP (ref 96–108)
CO2 SERPL-SCNC: 33 MMOL/L — HIGH (ref 22–31)
CREAT SERPL-MCNC: 1.23 MG/DL — SIGNIFICANT CHANGE UP (ref 0.5–1.3)
EGFR: 40 ML/MIN/1.73M2 — LOW
GLUCOSE SERPL-MCNC: 78 MG/DL — SIGNIFICANT CHANGE UP (ref 70–99)
HCT VFR BLD CALC: 35.4 % — SIGNIFICANT CHANGE UP (ref 34.5–45)
HGB BLD-MCNC: 11.3 G/DL — LOW (ref 11.5–15.5)
MCHC RBC-ENTMCNC: 31.9 GM/DL — LOW (ref 32–36)
MCHC RBC-ENTMCNC: 32.8 PG — SIGNIFICANT CHANGE UP (ref 27–34)
MCV RBC AUTO: 102.9 FL — HIGH (ref 80–100)
NRBC # BLD: 0 /100 WBCS — SIGNIFICANT CHANGE UP (ref 0–0)
PLATELET # BLD AUTO: 197 K/UL — SIGNIFICANT CHANGE UP (ref 150–400)
POTASSIUM SERPL-MCNC: 4.1 MMOL/L — SIGNIFICANT CHANGE UP (ref 3.5–5.3)
POTASSIUM SERPL-SCNC: 4.1 MMOL/L — SIGNIFICANT CHANGE UP (ref 3.5–5.3)
RBC # BLD: 3.44 M/UL — LOW (ref 3.8–5.2)
RBC # FLD: 17.6 % — HIGH (ref 10.3–14.5)
SODIUM SERPL-SCNC: 137 MMOL/L — SIGNIFICANT CHANGE UP (ref 135–145)
WBC # BLD: 11.88 K/UL — HIGH (ref 3.8–10.5)
WBC # FLD AUTO: 11.88 K/UL — HIGH (ref 3.8–10.5)

## 2023-07-26 PROCEDURE — 85025 COMPLETE CBC W/AUTO DIFF WBC: CPT

## 2023-07-26 PROCEDURE — 87449 NOS EACH ORGANISM AG IA: CPT

## 2023-07-26 PROCEDURE — 36415 COLL VENOUS BLD VENIPUNCTURE: CPT

## 2023-07-26 PROCEDURE — 71250 CT THORAX DX C-: CPT | Mod: MA

## 2023-07-26 PROCEDURE — 82550 ASSAY OF CK (CPK): CPT

## 2023-07-26 PROCEDURE — 87040 BLOOD CULTURE FOR BACTERIA: CPT

## 2023-07-26 PROCEDURE — 0225U NFCT DS DNA&RNA 21 SARSCOV2: CPT

## 2023-07-26 PROCEDURE — 97116 GAIT TRAINING THERAPY: CPT

## 2023-07-26 PROCEDURE — 84100 ASSAY OF PHOSPHORUS: CPT

## 2023-07-26 PROCEDURE — 93005 ELECTROCARDIOGRAM TRACING: CPT

## 2023-07-26 PROCEDURE — 83880 ASSAY OF NATRIURETIC PEPTIDE: CPT

## 2023-07-26 PROCEDURE — 86140 C-REACTIVE PROTEIN: CPT

## 2023-07-26 PROCEDURE — 87899 AGENT NOS ASSAY W/OPTIC: CPT

## 2023-07-26 PROCEDURE — 80053 COMPREHEN METABOLIC PANEL: CPT

## 2023-07-26 PROCEDURE — 84484 ASSAY OF TROPONIN QUANT: CPT

## 2023-07-26 PROCEDURE — 93306 TTE W/DOPPLER COMPLETE: CPT

## 2023-07-26 PROCEDURE — 99239 HOSP IP/OBS DSCHRG MGMT >30: CPT

## 2023-07-26 PROCEDURE — 94640 AIRWAY INHALATION TREATMENT: CPT

## 2023-07-26 PROCEDURE — 85610 PROTHROMBIN TIME: CPT

## 2023-07-26 PROCEDURE — 85730 THROMBOPLASTIN TIME PARTIAL: CPT

## 2023-07-26 PROCEDURE — 85027 COMPLETE CBC AUTOMATED: CPT

## 2023-07-26 PROCEDURE — 80202 ASSAY OF VANCOMYCIN: CPT

## 2023-07-26 PROCEDURE — 97110 THERAPEUTIC EXERCISES: CPT

## 2023-07-26 PROCEDURE — 97161 PT EVAL LOW COMPLEX 20 MIN: CPT

## 2023-07-26 PROCEDURE — 82553 CREATINE MB FRACTION: CPT

## 2023-07-26 PROCEDURE — 83605 ASSAY OF LACTIC ACID: CPT

## 2023-07-26 PROCEDURE — 97530 THERAPEUTIC ACTIVITIES: CPT

## 2023-07-26 PROCEDURE — 94760 N-INVAS EAR/PLS OXIMETRY 1: CPT

## 2023-07-26 PROCEDURE — 71045 X-RAY EXAM CHEST 1 VIEW: CPT

## 2023-07-26 PROCEDURE — 83735 ASSAY OF MAGNESIUM: CPT

## 2023-07-26 PROCEDURE — 84145 PROCALCITONIN (PCT): CPT

## 2023-07-26 PROCEDURE — 80048 BASIC METABOLIC PNL TOTAL CA: CPT

## 2023-07-26 PROCEDURE — 82803 BLOOD GASES ANY COMBINATION: CPT

## 2023-07-26 PROCEDURE — 99285 EMERGENCY DEPT VISIT HI MDM: CPT

## 2023-07-26 RX ORDER — TIMOLOL 0.5 %
1 DROPS OPHTHALMIC (EYE)
Qty: 0 | Refills: 0 | DISCHARGE

## 2023-07-26 RX ORDER — ACETAMINOPHEN 500 MG
2 TABLET ORAL
Qty: 0 | Refills: 0 | DISCHARGE

## 2023-07-26 RX ORDER — MAGNESIUM OXIDE 400 MG ORAL TABLET 241.3 MG
1 TABLET ORAL
Qty: 0 | Refills: 0 | DISCHARGE

## 2023-07-26 RX ORDER — ACETAMINOPHEN 500 MG
2 TABLET ORAL
Qty: 0 | Refills: 0 | DISCHARGE
Start: 2023-07-26

## 2023-07-26 RX ORDER — LEVOFLOXACIN 5 MG/ML
1 INJECTION, SOLUTION INTRAVENOUS
Qty: 2 | Refills: 0
Start: 2023-07-26 | End: 2023-07-27

## 2023-07-26 RX ORDER — FLUTICASONE FUROATE, UMECLIDINIUM BROMIDE AND VILANTEROL TRIFENATATE 200; 62.5; 25 UG/1; UG/1; UG/1
1 POWDER RESPIRATORY (INHALATION)
Qty: 0 | Refills: 0 | DISCHARGE

## 2023-07-26 RX ORDER — SENNA PLUS 8.6 MG/1
1 TABLET ORAL
Qty: 0 | Refills: 0 | DISCHARGE

## 2023-07-26 RX ORDER — LEVOTHYROXINE SODIUM 125 MCG
1 TABLET ORAL
Qty: 0 | Refills: 0 | DISCHARGE

## 2023-07-26 RX ADMIN — Medication 1 DROP(S): at 05:28

## 2023-07-26 RX ADMIN — MAGNESIUM OXIDE 400 MG ORAL TABLET 400 MILLIGRAM(S): 241.3 TABLET ORAL at 11:37

## 2023-07-26 RX ADMIN — Medication 3 MILLILITER(S): at 13:43

## 2023-07-26 RX ADMIN — Medication 25 MICROGRAM(S): at 05:27

## 2023-07-26 RX ADMIN — Medication 1 TABLET(S): at 11:41

## 2023-07-26 RX ADMIN — Medication 3 MILLILITER(S): at 06:56

## 2023-07-26 RX ADMIN — Medication 50 MILLIGRAM(S): at 05:30

## 2023-07-26 RX ADMIN — BRIMONIDINE TARTRATE 1 DROP(S): 2 SOLUTION/ DROPS OPHTHALMIC at 17:26

## 2023-07-26 RX ADMIN — Medication 10 MILLIGRAM(S): at 05:26

## 2023-07-26 RX ADMIN — Medication 1 DROP(S): at 17:26

## 2023-07-26 RX ADMIN — Medication 20 MILLIGRAM(S): at 05:26

## 2023-07-26 RX ADMIN — APIXABAN 2.5 MILLIGRAM(S): 2.5 TABLET, FILM COATED ORAL at 05:28

## 2023-07-26 RX ADMIN — Medication 3 MILLILITER(S): at 01:26

## 2023-07-26 RX ADMIN — BRIMONIDINE TARTRATE 1 DROP(S): 2 SOLUTION/ DROPS OPHTHALMIC at 05:27

## 2023-07-26 RX ADMIN — APIXABAN 2.5 MILLIGRAM(S): 2.5 TABLET, FILM COATED ORAL at 17:26

## 2023-07-26 RX ADMIN — PANTOPRAZOLE SODIUM 40 MILLIGRAM(S): 20 TABLET, DELAYED RELEASE ORAL at 05:27

## 2023-07-26 NOTE — DISCHARGE NOTE NURSING/CASE MANAGEMENT/SOCIAL WORK - NSDCPEFALRISK_GEN_ALL_CORE
For information on Fall & Injury Prevention, visit: [email or web address] OR  [email or web address] OR  https://www.cdc.gov/steadi/patient.html

## 2023-07-26 NOTE — CASE MANAGEMENT PROGRESS NOTE - NSCMPROGRESSNOTE_GEN_ALL_CORE
CM called the Son  Yris twice and left messages  [phone] awaiting a call back to make aware that mother is returning back to the [facility] today and is being  by ambulance at 6pm. Will continue to follow patient.

## 2023-07-26 NOTE — PROGRESS NOTE ADULT - ASSESSMENT
89F with hx of bronchiectasis on 4L of O2, afib on Eliquis s/p Medtronic Micra VR TCR PPM, HTN, HLD, CHF with hx of exacerbations, CAD with stent in RCA, LBBB, hx of AS s/p TAVR, ILD, hx of RLE squamous cell cancer s/p resection who presents with SOB    bronchiectasis  chr lung disease  dyspnea  pulm edema  valv heart disease  HFpEF  AF  OP  OA  Frailty  PPM  HTN  HLD  CAD with PCI  ILD    on abx  vs noted  labs reviewed  on torsemide  s/p steroids    nebs - s/p steroids  diuresis  I and O  ct chest reviewed  old records reviewed  monitor VS and Sat  keep sat > 88 pct  oral hygiene  skin care  SLP eval  GOC discussion  cvs rx regimen optimization  on ABX for poss STSI

## 2023-07-26 NOTE — DISCHARGE NOTE NURSING/CASE MANAGEMENT/SOCIAL WORK - NSDCFUADDAPPT_GEN_ALL_CORE_FT
Patient returned back to [facility] in [place] 7/26/23  by ambulance because she needed oxygen nasal cannula. Patients family was left a message.

## 2023-07-26 NOTE — DISCHARGE NOTE PROVIDER - CARE PROVIDERS DIRECT ADDRESSES
,DirectAddress_Unknown,[email or web address]

## 2023-07-26 NOTE — DISCHARGE NOTE PROVIDER - HOSPITAL COURSE
HPI:  89F with hx of bronchiectasis on 4L of O2, afib on Eliquis s/p Medtronic Micra VR TCR PPM, HTN, HLD, CHF with hx of exacerbations, CAD with stent in RCA, LBBB, hx of AS s/p TAVR, ILD, hx of RLE squamous cell cancer s/p resection who presents with SOB.  Patient said she was in her usual state of health an was even exercising this morning.  However, in the afternoon, patient became acutely SOB.  No chest pain or fevers.  Denies any recent or new leg swelling.  Denies any travel or sick contacts.  When EMS arrived to scene, they noted her O2 to be in the mid-80s on NC.  Switched to NRB with improvement of O2.      In the ED,   - triage vitals were /98    T 97.7F RR 36, 97% on NRB  - labs showed WBC 12.1, slightly elev t.bili 1.7 with alk phosph 162, pBNP 7500, troponin neg, and lactate 2.2  - CT showed "worsening bilateral interstitial and groundglass opacities. Differential includes interstitial edema, infection, and chronic interstitial lung disease."  - was given lasix 40mg IV x1 (of note, patient was on torsemide but was taken off it 3 week ago by her cardiologist since her BP was getting low) with good UOP  - started on levofloxacin and vancomycin by the ED  - patient is being admitted for SOB/acute resp failure 2/2 CHF exac vs PNA.   (22 Jul 2023 01:41)    89F with hx of bronchiectasis on 4L of O2, afib on Eliquis s/p Medtronic Micra VR TCR PPM, HTN, HLD, CHF with hx of exacerbations, CAD with stent in RCA, LBBB, hx of AS s/p TAVR, ILD, hx of RLE squamous cell cancer s/p resection who presents with SOB.          acute respiratory failure   2/2 to acute on chronic  chf exacerbation, doubt pneumonia given no other infectious symptoms. ruled out   last TTE showed EF 45-50% with conduction delay and mildly enlarged RV  was placed on non-breathrer, then 50 %, now on 3 L nasal canula  s/p vanco, on levaquin, can finish course given history of bronchiectasis  completed course of prednisone  PT eval recs appreciated      leukocytosis  suspect from steroids  improved    Chronic afib  - cont with eliquis  - cont with metoprolol  seen by cardiology    Hypothyroidism  - cont with levothyroxine        CAD/HLD  - cont with statin  - cont with metoprolol    Preventive measures  - on eliquis  - DNR/DNI

## 2023-07-26 NOTE — PROGRESS NOTE ADULT - REASON FOR ADMISSION
SOB -> CHF exacerbation vs PNA

## 2023-07-26 NOTE — PROGRESS NOTE ADULT - PROVIDER SPECIALTY LIST ADULT
Hospitalist
Cardiology
Cardiology
Pulmonology
Cardiology
Cardiology
Hospitalist
Hospitalist
Pulmonology
Pulmonology
Hospitalist
Pulmonology

## 2023-07-26 NOTE — DISCHARGE NOTE PROVIDER - CARE PROVIDER_API CALL
AG WOOD  [address] [place] [address]  Phone: [phone]  Fax: [phone]  Follow Up Time:     James Palmer  Cardiovascular Disease  [facility], [address] [place] [address], [place] [address]  Phone: [phone]  Fax: [phone]  Follow Up Time:

## 2023-07-26 NOTE — DISCHARGE NOTE NURSING/CASE MANAGEMENT/SOCIAL WORK - PATIENT PORTAL LINK FT
You can access the FollowMyHealth Patient Portal offered by [facility] by registering at the following website: [email or web address]. By joining [facility]’s FollowMyHealth portal, you will also be able to view your health information using other applications (apps) compatible with our system.

## 2023-07-26 NOTE — PROGRESS NOTE ADULT - ASSESSMENT
The patient is a 89 year old female with a history of HTN, HL, CAD s/p PCI, leadless pacemaker, TAVR, chronic systolic heart failure, bronchiectasis who presents with shortness of breath.    Plan:  - ECG with LBBB  - Echo with mildly reduced LV systolic function, mod MR, mod TR, mod pulm HTN, mildly elevated TAVR velocities  - CT chest with worsening interstitial opacities and GGO  - BNP 7500  - Received IV diuretics - discontinued with worsening CHASITY  - CHASITY resolving  - Continue torsemide 10 mg PO daily  - Continue metoprolol succinate 50 mg daily  - Continue apixaban 5 mg bid  - Pulm follow-up

## 2023-07-26 NOTE — DISCHARGE NOTE PROVIDER - NSDCMRMEDTOKEN_GEN_ALL_CORE_FT
acetaminophen 325 mg oral tablet: 2 tab(s) orally every 6 hours As needed Temp greater or equal to 38C (100.4F), Mild Pain (1 - 3)  albuterol 2.5 mg/3 mL (0.083%) inhalation solution: 3 milliliter(s) by nebulizer every 6 hours as needed for  shortness of breath and/or wheezing  bacitracin 500 units/g topical ointment: Apply topically to affected area once a day  brimonidine 0.2% ophthalmic solution: 1 drop(s) in each affected eye 2 times a day  Eliquis 2.5 mg oral tablet: 1 orally 2 times a day  ipratropium 500 mcg/2.5 mL inhalation solution: 2.5 by nebulizer  levoFLOXacin 250 mg oral tablet: 1 tab(s) orally every 24 hours  levothyroxine 25 mcg (0.025 mg) oral capsule: 1 capsule orally once a day  magnesium oxide 400 mg oral tablet: 1 tablet orally once a day  metoprolol succinate 50 mg oral capsule, extended release: 1 cap(s) orally once a day  Multiple Vitamins oral tablet: 1 tablet orally once a day  omeprazole 20 mg oral delayed release capsule: 1 cap(s) orally once a day  senna (sennosides) 8.6 mg oral tablet: 1 tablet orally once a day  simvastatin 10 mg oral tablet: 1 tab(s) orally once a day (at bedtime)  timolol maleate 0.5% preservative-free ophthalmic solution: 1 solution in each affected eye 2 times a day  torsemide 10 mg oral tablet: 1 tab(s) orally once a day  Trelegy Ellipta 100 mcg-62.5 mcg-25 mcg/inh inhalation powder: 1 powder inhaled once a day

## 2023-07-26 NOTE — DISCHARGE NOTE PROVIDER - NSDCCPCAREPLAN_GEN_ALL_CORE_FT
PRINCIPAL DISCHARGE DIAGNOSIS  Diagnosis: CHF exacerbation  Assessment and Plan of Treatment: seen by cardiology, continue torsemide.  f/u routinely with cardiology or PMD

## 2023-07-26 NOTE — PROGRESS NOTE ADULT - SUBJECTIVE AND OBJECTIVE BOX
Chief Complaint: Shortness of breath    Interval Events: No events overnight.    Review of Systems:  General: No fevers, chills, weight gain  Skin: No rashes, color changes  Cardiovascular: No chest pain, orthopnea  Respiratory: No shortness of breath, cough  Gastrointestinal: No nausea, abdominal pain  Genitourinary: No incontinence, pain with urination  Musculoskeletal: No pain, swelling, decreased range of motion  Neurological: No headache, weakness  Psychiatric: No depression, anxiety  Endocrine: No weight gain, increased thirst  All other systems are comprehensively negative.    Physical Exam:  Vital Signs Last 24 Hrs  T(C): 36.4 (26 Jul 2023 05:23), Max: 36.6 (25 Jul 2023 10:17)  T(F): 97.5 (26 Jul 2023 05:23), Max: 97.8 (25 Jul 2023 10:17)  HR: 85 (26 Jul 2023 07:55) (80 - 88)  BP: 139/76 (26 Jul 2023 05:23) (108/70 - 139/76)  BP(mean): --  RR: 18 (26 Jul 2023 05:23) (18 - 19)  SpO2: 94% (26 Jul 2023 07:55) (93% - 94%)  Parameters below as of 26 Jul 2023 07:55  Patient On (Oxygen Delivery Method): nasal cannula  General: NAD  HEENT: MMM  Neck: No JVD, no carotid bruit  Lungs: CTAB  CV: RRR, nl S1/S2, no M/R/G  Abdomen: S/NT/ND, +BS  Extremities: No LE edema, no cyanosis  Neuro: AAOx3, non-focal  Skin: No rash    Labs:             07-24    137  |  100  |  45<H>  ----------------------------<  111<H>  4.0   |  28  |  1.37<H>    Ca    9.9      24 Jul 2023 06:00    TPro  7.6  /  Alb  2.7<L>  /  TBili  1.0  /  DBili  x   /  AST  23  /  ALT  19  /  AlkPhos  148<H>  07-24                        11.0   15.20 )-----------( 203      ( 24 Jul 2023 06:00 )             34.4       ECG/Telemetry: Sinus rhythm
Date/Time Patient Seen:  		  Referring MD:   Data Reviewed	       Patient is a 89y old  Female who presents with a chief complaint of SOB -> CHF exacerbation vs PNA (22 Jul 2023 17:29)      Subjective/HPI     PAST MEDICAL & SURGICAL HISTORY:  Asthma  on breo for 2 yrs    Aortic stenosis    HTN (hypertension)    HLD (hyperlipidemia)    Hiatal hernia    Skin cancer  basal    Heart failure    CAD (coronary artery disease)  recent coronary stent 3/19    Afib    Bronchitis    Hypertension    Glaucoma    Pacemaker    Heart failure    Aortic stenosis    HLD (hyperlipidemia)    Asthma    Squamous cell carcinoma of colon    Squamous cell skin cancer    History of left bundle branch block (LBBB)    Cardiac pacemaker  Medtronic Micra placed 4/2019    H/O unilateral oophorectomy    Acquired cataract    H/O eye surgery  bilateral cataracts    S/P PTCA (percutaneous transluminal coronary angioplasty)  coronary stent 3/19    S/P TAVR (transcatheter aortic valve replacement)    S/P coronary artery stent placement          Medication list         MEDICATIONS  (STANDING):  albuterol/ipratropium for Nebulization 3 milliLiter(s) Nebulizer every 6 hours  apixaban 2.5 milliGRAM(s) Oral every 12 hours  brimonidine 0.2% Ophthalmic Solution 1 Drop(s) Both EYES two times a day  levothyroxine 25 MICROGram(s) Oral daily  magnesium oxide 400 milliGRAM(s) Oral daily  methylPREDNISolone sodium succinate Injectable 40 milliGRAM(s) IV Push daily  metoprolol succinate ER 50 milliGRAM(s) Oral daily  multivitamin 1 Tablet(s) Oral daily  pantoprazole    Tablet 40 milliGRAM(s) Oral before breakfast  senna 2 Tablet(s) Oral at bedtime  simvastatin 10 milliGRAM(s) Oral at bedtime  timolol 0.5% Solution 1 Drop(s) Both EYES two times a day  torsemide 10 milliGRAM(s) Oral daily    MEDICATIONS  (PRN):  acetaminophen     Tablet .. 650 milliGRAM(s) Oral every 6 hours PRN Temp greater or equal to 38C (100.4F), Mild Pain (1 - 3)  aluminum hydroxide/magnesium hydroxide/simethicone Suspension 30 milliLiter(s) Oral every 4 hours PRN Dyspepsia  guaiFENesin Oral Liquid (Sugar-Free) 200 milliGRAM(s) Oral every 6 hours PRN Cough  melatonin 3 milliGRAM(s) Oral at bedtime PRN Insomnia  ondansetron Injectable 4 milliGRAM(s) IV Push every 8 hours PRN Nausea and/or Vomiting         Vitals log        ICU Vital Signs Last 24 Hrs  T(C): 36.4 (23 Jul 2023 05:12), Max: 36.5 (22 Jul 2023 13:55)  T(F): 97.5 (23 Jul 2023 05:12), Max: 97.7 (22 Jul 2023 13:55)  HR: 73 (23 Jul 2023 05:12) (61 - 89)  BP: 121/77 (23 Jul 2023 05:12) (93/60 - 124/70)  BP(mean): --  ABP: --  ABP(mean): --  RR: 18 (23 Jul 2023 05:12) (18 - 20)  SpO2: 94% (23 Jul 2023 05:12) (92% - 96%)    O2 Parameters below as of 23 Jul 2023 05:12  Patient On (Oxygen Delivery Method): 40%NRB                 Input and Output:  I&O's Detail    22 Jul 2023 07:01  -  23 Jul 2023 06:15  --------------------------------------------------------  IN:    IV PiggyBack: 350 mL  Total IN: 350 mL    OUT:  Total OUT: 0 mL    Total NET: 350 mL          Lab Data                        11.0   9.75  )-----------( 175      ( 22 Jul 2023 06:39 )             34.2     07-22    134<L>  |  98  |  26<H>  ----------------------------<  94  4.1   |  29  |  1.07    Ca    9.3      22 Jul 2023 06:39  Phos  3.1     07-22  Mg     1.5     07-22    TPro  7.3  /  Alb  2.8<L>  /  TBili  2.0<H>  /  DBili  x   /  AST  29  /  ALT  20  /  AlkPhos  154<H>  07-22      CARDIAC MARKERS ( 21 Jul 2023 23:42 )  x     / x     / 22 U/L / x     / 1.2 ng/mL        Review of Systems	      Objective     Physical Examination    heart s1s2  lung dc BS  head nc      Pertinent Lab findings & Imaging      Esther:  NO   Adequate UO     I&O's Detail    22 Jul 2023 07:01  -  23 Jul 2023 06:15  --------------------------------------------------------  IN:    IV PiggyBack: 350 mL  Total IN: 350 mL    OUT:  Total OUT: 0 mL    Total NET: 350 mL               Discussed with:     Cultures:	        Radiology                            
Date/Time Patient Seen:  		  Referring MD:   Data Reviewed	       Patient is a 89y old  Female who presents with a chief complaint of SOB -> CHF exacerbation vs PNA (25 Jul 2023 12:16)      Subjective/HPI     PAST MEDICAL & SURGICAL HISTORY:  Asthma  on breo for 2 yrs    Aortic stenosis    HTN (hypertension)    HLD (hyperlipidemia)    Hiatal hernia    Skin cancer  basal    Heart failure    CAD (coronary artery disease)  recent coronary stent 3/19    Afib    Bronchitis    Hypertension    Glaucoma    Pacemaker    Heart failure    Aortic stenosis    HLD (hyperlipidemia)    Asthma    Squamous cell carcinoma of colon    Squamous cell skin cancer    History of left bundle branch block (LBBB)    Cardiac pacemaker  Medtronic Micra placed 4/2019    H/O unilateral oophorectomy    Acquired cataract    H/O eye surgery  bilateral cataracts    S/P PTCA (percutaneous transluminal coronary angioplasty)  coronary stent 3/19    S/P TAVR (transcatheter aortic valve replacement)    S/P coronary artery stent placement          Medication list         MEDICATIONS  (STANDING):  albuterol/ipratropium for Nebulization 3 milliLiter(s) Nebulizer every 6 hours  apixaban 2.5 milliGRAM(s) Oral every 12 hours  brimonidine 0.2% Ophthalmic Solution 1 Drop(s) Both EYES two times a day  levoFLOXacin  Tablet 250 milliGRAM(s) Oral every 24 hours  levothyroxine 25 MICROGram(s) Oral daily  magnesium oxide 400 milliGRAM(s) Oral daily  metoprolol succinate ER 50 milliGRAM(s) Oral daily  multivitamin 1 Tablet(s) Oral daily  pantoprazole    Tablet 40 milliGRAM(s) Oral before breakfast  senna 2 Tablet(s) Oral at bedtime  simvastatin 10 milliGRAM(s) Oral at bedtime  timolol 0.5% Solution 1 Drop(s) Both EYES two times a day  torsemide 10 milliGRAM(s) Oral daily    MEDICATIONS  (PRN):  acetaminophen     Tablet .. 650 milliGRAM(s) Oral every 6 hours PRN Temp greater or equal to 38C (100.4F), Mild Pain (1 - 3)  aluminum hydroxide/magnesium hydroxide/simethicone Suspension 30 milliLiter(s) Oral every 4 hours PRN Dyspepsia  guaiFENesin Oral Liquid (Sugar-Free) 200 milliGRAM(s) Oral every 6 hours PRN Cough  melatonin 3 milliGRAM(s) Oral at bedtime PRN Insomnia  ondansetron Injectable 4 milliGRAM(s) IV Push every 8 hours PRN Nausea and/or Vomiting         Vitals log        ICU Vital Signs Last 24 Hrs  T(C): 36.4 (26 Jul 2023 05:23), Max: 36.6 (25 Jul 2023 10:17)  T(F): 97.5 (26 Jul 2023 05:23), Max: 97.8 (25 Jul 2023 10:17)  HR: 85 (26 Jul 2023 05:23) (80 - 92)  BP: 139/76 (26 Jul 2023 05:23) (108/70 - 139/76)  BP(mean): --  ABP: --  ABP(mean): --  RR: 18 (26 Jul 2023 05:23) (18 - 19)  SpO2: 94% (26 Jul 2023 05:23) (93% - 96%)    O2 Parameters below as of 26 Jul 2023 05:23  Patient On (Oxygen Delivery Method): nasal cannula  O2 Flow (L/min): 4               Input and Output:  I&O's Detail    24 Jul 2023 07:01  -  25 Jul 2023 07:00  --------------------------------------------------------  IN:  Total IN: 0 mL    OUT:    Voided (mL): 1500 mL  Total OUT: 1500 mL    Total NET: -1500 mL          Lab Data                        11.0   15.20 )-----------( 203      ( 24 Jul 2023 06:00 )             34.4     07-24    137  |  100  |  45<H>  ----------------------------<  111<H>  4.0   |  28  |  1.37<H>    Ca    9.9      24 Jul 2023 06:00    TPro  7.6  /  Alb  2.7<L>  /  TBili  1.0  /  DBili  x   /  AST  23  /  ALT  19  /  AlkPhos  148<H>  07-24            Review of Systems	      Objective     Physical Examination    heart s1s2  lung dc bS  head nc      Pertinent Lab findings & Imaging      Esther:  NO   Adequate UO     I&O's Detail    24 Jul 2023 07:01  -  25 Jul 2023 07:00  --------------------------------------------------------  IN:  Total IN: 0 mL    OUT:    Voided (mL): 1500 mL  Total OUT: 1500 mL    Total NET: -1500 mL               Discussed with:     Cultures:	        Radiology                            
Patient is a 89y Female with a known history of :    HPI:  89F with hx of bronchiectasis on 4L of O2, afib on Eliquis s/p Medtronic Micra VR TCR PPM, HTN, HLD, CHF with hx of exacerbations, CAD with stent in RCA, LBBB, hx of AS s/p TAVR, ILD, hx of RLE squamous cell cancer s/p resection who presents with SOB.  Patient said she was in her usual state of health an was even exercising this morning.  However, in the afternoon, patient became acutely SOB.  No chest pain or fevers.  Denies any recent or new leg swelling.  Denies any travel or sick contacts.  When EMS arrived to scene, they noted her O2 to be in the mid-80s on NC.  Switched to NRB with improvement of O2.      In the ED,   - triage vitals were /98    T 97.7F RR 36, 97% on NRB  - labs showed WBC 12.1, slightly elev t.bili 1.7 with alk phosph 162, pBNP 7500, troponin neg, and lactate 2.2  - CT showed "worsening bilateral interstitial and groundglass opacities. Differential includes interstitial edema, infection, and chronic interstitial lung disease."  - was given lasix 40mg IV x1 (of note, patient was on torsemide but was taken off it 3 week ago by her cardiologist since her BP was getting low) with good UOP  - started on levofloxacin and vancomycin by the ED  - patient is being admitted for SOB/acute resp failure 2/2 CHF exac vs PNA.   (22 Jul 2023 01:41)      REVIEW OF SYSTEMS:    CONSTITUTIONAL: No fever, weight loss, or fatigue  EYES: No eye pain, visual disturbances, or discharge  ENMT:  No difficulty hearing, tinnitus, vertigo; No sinus or throat pain  NECK: No pain or stiffness  BREASTS: No pain, masses, or nipple discharge  RESPIRATORY: No cough, wheezing, chills or hemoptysis; No shortness of breath  CARDIOVASCULAR: No chest pain, palpitations, dizziness, or leg swelling  GASTROINTESTINAL: No abdominal or epigastric pain. No nausea, vomiting, or hematemesis; No diarrhea or constipation. No melena or hematochezia.  GENITOURINARY: No dysuria, frequency, hematuria, or incontinence  NEUROLOGICAL: No headaches, memory loss, loss of strength, numbness, or tremors  SKIN: No itching, burning, rashes, or lesions   LYMPH NODES: No enlarged glands  ENDOCRINE: No heat or cold intolerance; No hair loss  MUSCULOSKELETAL: No joint pain or swelling; No muscle, back, or extremity pain  PSYCHIATRIC: No depression, anxiety, mood swings, or difficulty sleeping  HEME/LYMPH: No easy bruising, or bleeding gums  ALLERGY AND IMMUNOLOGIC: No hives or eczema    MEDICATIONS  (STANDING):  albuterol/ipratropium for Nebulization 3 milliLiter(s) Nebulizer every 6 hours  apixaban 2.5 milliGRAM(s) Oral every 12 hours  brimonidine 0.2% Ophthalmic Solution 1 Drop(s) Both EYES two times a day  levoFLOXacin  Tablet 250 milliGRAM(s) Oral every 24 hours  levothyroxine 25 MICROGram(s) Oral daily  magnesium oxide 400 milliGRAM(s) Oral daily  metoprolol succinate ER 50 milliGRAM(s) Oral daily  multivitamin 1 Tablet(s) Oral daily  pantoprazole    Tablet 40 milliGRAM(s) Oral before breakfast  predniSONE   Tablet 20 milliGRAM(s) Oral daily  senna 2 Tablet(s) Oral at bedtime  simvastatin 10 milliGRAM(s) Oral at bedtime  timolol 0.5% Solution 1 Drop(s) Both EYES two times a day  torsemide 10 milliGRAM(s) Oral daily    MEDICATIONS  (PRN):  acetaminophen     Tablet .. 650 milliGRAM(s) Oral every 6 hours PRN Temp greater or equal to 38C (100.4F), Mild Pain (1 - 3)  aluminum hydroxide/magnesium hydroxide/simethicone Suspension 30 milliLiter(s) Oral every 4 hours PRN Dyspepsia  guaiFENesin Oral Liquid (Sugar-Free) 200 milliGRAM(s) Oral every 6 hours PRN Cough  melatonin 3 milliGRAM(s) Oral at bedtime PRN Insomnia  ondansetron Injectable 4 milliGRAM(s) IV Push every 8 hours PRN Nausea and/or Vomiting      ALLERGIES: latex (Pruritus)  amoxicillin (Rash)  penicillin (Unknown)  losartan (Rash)  lidocaine (Rash)  adhesives (Rash)      FAMILY HISTORY:  Family history of heart failure        Social history:  Alochol:   Smoking:   Drug Use:   Marital Status:     PHYSICAL EXAMINATION:  -----------------------------  T(C): 36.4 (07-23-23 @ 09:46), Max: 36.5 (07-22-23 @ 13:55)  HR: 86 (07-23-23 @ 09:46) (61 - 89)  BP: 123/79 (07-23-23 @ 09:46) (93/60 - 123/79)  RR: 19 (07-23-23 @ 09:46) (18 - 20)  SpO2: 94% (07-23-23 @ 09:46) (92% - 96%)  Wt(kg): --    07-22 @ 07:01  -  07-23 @ 07:00  --------------------------------------------------------  IN:    IV PiggyBack: 350 mL    Oral Fluid: 240 mL  Total IN: 590 mL    OUT:    Voided (mL): 800 mL  Total OUT: 800 mL    Total NET: -210 mL            Constitutional: well developed, normal appearance, well groomed, well nourished, no deformities and no acute distress.   Eyes: the conjunctiva exhibited no abnormalities and the eyelids demonstrated no xanthelasmas.   HEENT: normal oral mucosa, no oral pallor and no oral cyanosis.   Neck: normal jugular venous A waves present, normal jugular venous V waves present and no jugular venous shin A waves.   Pulmonary: no respiratory distress, normal respiratory rhythm and effort, no accessory muscle use and lungs were clear to auscultation bilaterally. Anteriorly  Cardiovascular: heart rate and rhythm were irreg/irreg, normal S1 and S2 and no rub, heave or thrill are present. with II/VI systolic murmur  Musculoskeletal: the gait could not be assessed.  Extremities: no clubbing of the fingernails, no localized cyanosis, no petechial hemorrhages and no ischemic changes.   Skin: normal skin color and pigmentation, no rash, no venous stasis, no skin lesions, no skin ulcer and no xanthoma was observed.   Psychiatric: oriented to person, place, and time, the affect was normal, the mood was normal and not feeling anxious.     LABS:   --------  07-23    139  |  100  |  30<H>  ----------------------------<  122<H>  3.8   |  28  |  1.27    Ca    9.2      23 Jul 2023 06:40  Phos  3.1     07-22  Mg     1.9     07-23    TPro  6.5  /  Alb  2.5<L>  /  TBili  1.2  /  DBili  x   /  AST  25  /  ALT  20  /  AlkPhos  117  07-23                         10.3   10.48 )-----------( 163      ( 23 Jul 2023 06:40 )             32.9     PT/INR - ( 22 Jul 2023 00:52 )   PT: 20.3 sec;   INR: 1.76 ratio         PTT - ( 22 Jul 2023 00:52 )  PTT:33.8 sec              Radiology:    
Chief Complaint: Shortness of breath    Interval Events: No events overnight.    Review of Systems:  General: No fevers, chills, weight gain  Skin: No rashes, color changes  Cardiovascular: No chest pain, orthopnea  Respiratory: No shortness of breath, cough  Gastrointestinal: No nausea, abdominal pain  Genitourinary: No incontinence, pain with urination  Musculoskeletal: No pain, swelling, decreased range of motion  Neurological: No headache, weakness  Psychiatric: No depression, anxiety  Endocrine: No weight gain, increased thirst  All other systems are comprehensively negative.    Physical Exam:  Vital Signs Last 24 Hrs  T(C): 36.3 (25 Jul 2023 05:10), Max: 36.8 (24 Jul 2023 10:35)  T(F): 97.4 (25 Jul 2023 05:10), Max: 98.3 (24 Jul 2023 14:25)  HR: 92 (25 Jul 2023 08:07) (75 - 102)  BP: 132/80 (25 Jul 2023 05:10) (105/68 - 132/91)  BP(mean): --  RR: 18 (25 Jul 2023 06:06) (18 - 18)  SpO2: 94% (25 Jul 2023 08:07) (83% - 97%)  Parameters below as of 25 Jul 2023 08:07  Patient On (Oxygen Delivery Method): nasal cannula  General: NAD  HEENT: MMM  Neck: No JVD, no carotid bruit  Lungs: CTAB  CV: RRR, nl S1/S2, no M/R/G  Abdomen: S/NT/ND, +BS  Extremities: No LE edema, no cyanosis  Neuro: AAOx3, non-focal  Skin: No rash    Labs:             07-24    137  |  100  |  45<H>  ----------------------------<  111<H>  4.0   |  28  |  1.37<H>    Ca    9.9      24 Jul 2023 06:00    TPro  7.6  /  Alb  2.7<L>  /  TBili  1.0  /  DBili  x   /  AST  23  /  ALT  19  /  AlkPhos  148<H>  07-24                        11.0   15.20 )-----------( 203      ( 24 Jul 2023 06:00 )             34.4       ECG/Telemetry: Sinus rhythm
Chief Complaint: Shortness of breath    Interval Events: No events overnight.    Review of Systems:  General: No fevers, chills, weight gain  Skin: No rashes, color changes  Cardiovascular: No chest pain, orthopnea  Respiratory: No shortness of breath, cough  Gastrointestinal: No nausea, abdominal pain  Genitourinary: No incontinence, pain with urination  Musculoskeletal: No pain, swelling, decreased range of motion  Neurological: No headache, weakness  Psychiatric: No depression, anxiety  Endocrine: No weight gain, increased thirst  All other systems are comprehensively negative.    Physical Exam:  Vitals:        Vital Signs Last 24 Hrs  T(C): 36.3 (24 Jul 2023 05:05), Max: 36.6 (23 Jul 2023 18:01)  T(F): 97.4 (24 Jul 2023 05:05), Max: 97.8 (23 Jul 2023 18:01)  HR: 82 (24 Jul 2023 06:32) (80 - 103)  BP: 144/93 (24 Jul 2023 05:05) (102/67 - 144/93)  BP(mean): --  RR: 18 (24 Jul 2023 05:05) (18 - 19)  SpO2: 97% (24 Jul 2023 06:32) (90% - 97%)  Parameters below as of 24 Jul 2023 06:32  Patient On (Oxygen Delivery Method): nasal cannula, 4  General: NAD  HEENT: MMM  Neck: No JVD, no carotid bruit  Lungs: CTAB  CV: RRR, nl S1/S2, no M/R/G  Abdomen: S/NT/ND, +BS  Extremities: No LE edema, no cyanosis  Neuro: AAOx3, non-focal  Skin: No rash    Labs:                        11.0   15.20 )-----------( 203      ( 24 Jul 2023 06:00 )             34.4     07-24    137  |  100  |  45<H>  ----------------------------<  111<H>  4.0   |  28  |  1.37<H>    Ca    9.9      24 Jul 2023 06:00  Mg     1.9     07-23    TPro  7.6  /  Alb  2.7<L>  /  TBili  1.0  /  DBili  x   /  AST  23  /  ALT  19  /  AlkPhos  148<H>  07-24            ECG/Telemetry: Sinus rhythm
Patient is a 89y old  Female who presents with a chief complaint of SOB -> CHF exacerbation vs PNA (23 Jul 2023 06:15)        HPI:  89F with hx of bronchiectasis on 4L of O2, afib on Eliquis s/p Medtronic Micra VR TCR PPM, HTN, HLD, CHF with hx of exacerbations, CAD with stent in RCA, LBBB, hx of AS s/p TAVR, ILD, hx of RLE squamous cell cancer s/p resection who presents with SOB.  Patient said she was in her usual state of health an was even exercising this morning.  However, in the afternoon, patient became acutely SOB.  No chest pain or fevers.  Denies any recent or new leg swelling.  Denies any travel or sick contacts.  When EMS arrived to scene, they noted her O2 to be in the mid-80s on NC.  Switched to NRB with improvement of O2.      In the ED,   - triage vitals were /98    T 97.7F RR 36, 97% on NRB  - labs showed WBC 12.1, slightly elev t.bili 1.7 with alk phosph 162, pBNP 7500, troponin neg, and lactate 2.2  - CT showed "worsening bilateral interstitial and groundglass opacities. Differential includes interstitial edema, infection, and chronic interstitial lung disease."  - was given lasix 40mg IV x1 (of note, patient was on torsemide but was taken off it 3 week ago by her cardiologist since her BP was getting low) with good UOP  - started on levofloxacin and vancomycin by the ED  - patient is being admitted for SOB/acute resp failure 2/2 CHF exac vs PNA.   (22 Jul 2023 01:41)      SUBJECTIVE & OBJECTIVE: Pt seen and examined at bedside.     PHYSICAL EXAM:  T(C): 36.4 (07-23-23 @ 05:12), Max: 36.5 (07-22-23 @ 13:55)  HR: 82 (07-23-23 @ 06:42) (61 - 89)  BP: 121/77 (07-23-23 @ 05:12) (93/60 - 124/70)  RR: 18 (07-23-23 @ 05:12) (18 - 20)  SpO2: 94% (07-23-23 @ 06:42) (92% - 96%)  Wt(kg): --   GENERAL: NAD  HEAD:  Atraumatic, Normocephalic  EYES: EOMI, PERRLA, conjunctiva and sclera clear  ENMT: Moist mucous membranes  NECK: Supple, No JVD  NERVOUS SYSTEM:  Alert & Oriented X3, Motor Strength 5/5 B/L upper and lower extremities; DTRs 2+ intact and symmetric  CHEST/LUNG: Clear to auscultation bilaterally; No rales, rhonchi, wheezing, or rubs  HEART: Regular rate and rhythm; No murmurs, rubs, or gallops  ABDOMEN: Soft, Nontender, Nondistended; Bowel sounds present  EXTREMITIES:  2+ Peripheral Pulses, No clubbing, cyanosis, or edema        MEDICATIONS  (STANDING):  albuterol/ipratropium for Nebulization 3 milliLiter(s) Nebulizer every 6 hours  apixaban 2.5 milliGRAM(s) Oral every 12 hours  brimonidine 0.2% Ophthalmic Solution 1 Drop(s) Both EYES two times a day  levoFLOXacin  Tablet 250 milliGRAM(s) Oral every 24 hours  levothyroxine 25 MICROGram(s) Oral daily  magnesium oxide 400 milliGRAM(s) Oral daily  methylPREDNISolone sodium succinate Injectable 40 milliGRAM(s) IV Push daily  metoprolol succinate ER 50 milliGRAM(s) Oral daily  multivitamin 1 Tablet(s) Oral daily  pantoprazole    Tablet 40 milliGRAM(s) Oral before breakfast  senna 2 Tablet(s) Oral at bedtime  simvastatin 10 milliGRAM(s) Oral at bedtime  timolol 0.5% Solution 1 Drop(s) Both EYES two times a day  torsemide 10 milliGRAM(s) Oral daily    MEDICATIONS  (PRN):  acetaminophen     Tablet .. 650 milliGRAM(s) Oral every 6 hours PRN Temp greater or equal to 38C (100.4F), Mild Pain (1 - 3)  aluminum hydroxide/magnesium hydroxide/simethicone Suspension 30 milliLiter(s) Oral every 4 hours PRN Dyspepsia  guaiFENesin Oral Liquid (Sugar-Free) 200 milliGRAM(s) Oral every 6 hours PRN Cough  melatonin 3 milliGRAM(s) Oral at bedtime PRN Insomnia  ondansetron Injectable 4 milliGRAM(s) IV Push every 8 hours PRN Nausea and/or Vomiting      LABS:                        10.3   10.48 )-----------( 163      ( 23 Jul 2023 06:40 )             32.9     07-23    139  |  100  |  30<H>  ----------------------------<  122<H>  3.8   |  28  |  1.27    Ca    9.2      23 Jul 2023 06:40  Phos  3.1     07-22  Mg     1.9     07-23    TPro  6.5  /  Alb  2.5<L>  /  TBili  1.2  /  DBili  x   /  AST  25  /  ALT  20  /  AlkPhos  117  07-23    PT/INR - ( 22 Jul 2023 00:52 )   PT: 20.3 sec;   INR: 1.76 ratio         PTT - ( 22 Jul 2023 00:52 )  PTT:33.8 sec  Urinalysis Basic - ( 23 Jul 2023 06:40 )    Color: x / Appearance: x / SG: x / pH: x  Gluc: 122 mg/dL / Ketone: x  / Bili: x / Urobili: x   Blood: x / Protein: x / Nitrite: x   Leuk Esterase: x / RBC: x / WBC x   Sq Epi: x / Non Sq Epi: x / Bacteria: x      Magnesium: 1.9 mg/dL (07-23 @ 06:40)    CAPILLARY BLOOD GLUCOSE          CAPILLARY BLOOD GLUCOSE        CAPILLARY BLOOD GLUCOSE          CARDIAC MARKERS ( 21 Jul 2023 23:42 )  x     / x     / 22 U/L / x     / 1.2 ng/mL        RECENT CULTURES:      RADIOLOGY & ADDITIONAL TESTS:                        DVT/GI ppx  Discussed with pt @ bedside
Patient is a 89y old  Female who presents with a chief complaint of SOB -> CHF exacerbation vs PNA (25 Jul 2023 05:51)      INTERVAL HPI/OVERNIGHT EVENTS:  Patient seen and examined in am, feels fair, improved from admission, still some SOB, dry cough, had mild hypoxia worsening from baseline 02 requirement of 4L to now at 5L.     ROS reviewed and is otherwise negative        Vital Signs Last 24 Hrs  T(C): 36.6 (25 Jul 2023 10:17), Max: 36.8 (24 Jul 2023 14:25)  T(F): 97.8 (25 Jul 2023 10:17), Max: 98.3 (24 Jul 2023 14:25)  HR: 84 (25 Jul 2023 10:17) (75 - 100)  BP: 108/70 (25 Jul 2023 10:17) (105/68 - 132/91)  BP(mean): --  RR: 18 (25 Jul 2023 10:17) (18 - 18)  SpO2: 93% (25 Jul 2023 10:17) (92% - 97%)    Parameters below as of 25 Jul 2023 08:07  Patient On (Oxygen Delivery Method): nasal cannula        PHYSICAL EXAM:  GENERAL: NAD, elderly female, thin  HEAD:  Atraumatic, Normocephalic  EYES: EOMI, PERRLA  ENMT: Moist mucous membranes , No lesions; No tonsillar erythema,   NECK: Supple, No JVD noted  NERVOUS SYSTEM:  Alert & Oriented X3, Good concentration; All 4 extremities mobile, no gross sensory deficits.   CHEST/LUNG: dec bs in bases with bibasilar rales  HEART: Regular rate and rhythm; No murmurs, rubs, or gallops  ABDOMEN: Soft, Nontender, Nondistended; Bowel sounds present  EXTREMITIES:  + Pulses, trace edema  SKIN: No rashes or lesions    MEDICATIONS  (STANDING):  albuterol/ipratropium for Nebulization 3 milliLiter(s) Nebulizer every 6 hours  apixaban 2.5 milliGRAM(s) Oral every 12 hours  brimonidine 0.2% Ophthalmic Solution 1 Drop(s) Both EYES two times a day  furosemide   Injectable 20 milliGRAM(s) IV Push once  levoFLOXacin  Tablet 250 milliGRAM(s) Oral every 24 hours  levothyroxine 25 MICROGram(s) Oral daily  magnesium oxide 400 milliGRAM(s) Oral daily  metoprolol succinate ER 50 milliGRAM(s) Oral daily  multivitamin 1 Tablet(s) Oral daily  pantoprazole    Tablet 40 milliGRAM(s) Oral before breakfast  predniSONE   Tablet 20 milliGRAM(s) Oral daily  senna 2 Tablet(s) Oral at bedtime  simvastatin 10 milliGRAM(s) Oral at bedtime  timolol 0.5% Solution 1 Drop(s) Both EYES two times a day  torsemide 10 milliGRAM(s) Oral daily    MEDICATIONS  (PRN):  acetaminophen     Tablet .. 650 milliGRAM(s) Oral every 6 hours PRN Temp greater or equal to 38C (100.4F), Mild Pain (1 - 3)  aluminum hydroxide/magnesium hydroxide/simethicone Suspension 30 milliLiter(s) Oral every 4 hours PRN Dyspepsia  guaiFENesin Oral Liquid (Sugar-Free) 200 milliGRAM(s) Oral every 6 hours PRN Cough  melatonin 3 milliGRAM(s) Oral at bedtime PRN Insomnia  ondansetron Injectable 4 milliGRAM(s) IV Push every 8 hours PRN Nausea and/or Vomiting      Allergies    latex (Pruritus)  amoxicillin (Rash)  penicillin (Unknown)  losartan (Rash)  lidocaine (Rash)  adhesives (Rash)    Intolerances          LABS:      Ca    9.9        24 Jul 2023 06:00        Urinalysis Basic - ( 24 Jul 2023 06:00 )    Color: x / Appearance: x / SG: x / pH: x  Gluc: 111 mg/dL / Ketone: x  / Bili: x / Urobili: x   Blood: x / Protein: x / Nitrite: x   Leuk Esterase: x / RBC: x / WBC x   Sq Epi: x / Non Sq Epi: x / Bacteria: x      CAPILLARY BLOOD GLUCOSE          RADIOLOGY & ADDITIONAL TESTS:        Care Discussed with Consultants/Other Providers:    Advanced Directives: [ ] DNR  [ ] No feeding tube  [ ] MOLST in chart  [ ] MOLST completed today  [ ] Unknown  
Date/Time Patient Seen:  		  Referring MD:   Data Reviewed	       Patient is a 89y old  Female who presents with a chief complaint of SOB -> CHF exacerbation vs PNA (23 Jul 2023 11:21)      Subjective/HPI     PAST MEDICAL & SURGICAL HISTORY:  Asthma  on breo for 2 yrs    Aortic stenosis    HTN (hypertension)    HLD (hyperlipidemia)    Hiatal hernia    Skin cancer  basal    Heart failure    CAD (coronary artery disease)  recent coronary stent 3/19    Afib    Bronchitis    Hypertension    Glaucoma    Pacemaker    Heart failure    Aortic stenosis    HLD (hyperlipidemia)    Asthma    Squamous cell carcinoma of colon    Squamous cell skin cancer    History of left bundle branch block (LBBB)    Cardiac pacemaker  Medtronic Micra placed 4/2019    H/O unilateral oophorectomy    Acquired cataract    H/O eye surgery  bilateral cataracts    S/P PTCA (percutaneous transluminal coronary angioplasty)  coronary stent 3/19    S/P TAVR (transcatheter aortic valve replacement)    S/P coronary artery stent placement          Medication list         MEDICATIONS  (STANDING):  albuterol/ipratropium for Nebulization 3 milliLiter(s) Nebulizer every 6 hours  apixaban 2.5 milliGRAM(s) Oral every 12 hours  brimonidine 0.2% Ophthalmic Solution 1 Drop(s) Both EYES two times a day  levoFLOXacin  Tablet 250 milliGRAM(s) Oral every 24 hours  levothyroxine 25 MICROGram(s) Oral daily  magnesium oxide 400 milliGRAM(s) Oral daily  metoprolol succinate ER 50 milliGRAM(s) Oral daily  multivitamin 1 Tablet(s) Oral daily  pantoprazole    Tablet 40 milliGRAM(s) Oral before breakfast  predniSONE   Tablet 20 milliGRAM(s) Oral daily  senna 2 Tablet(s) Oral at bedtime  simvastatin 10 milliGRAM(s) Oral at bedtime  timolol 0.5% Solution 1 Drop(s) Both EYES two times a day  torsemide 10 milliGRAM(s) Oral daily    MEDICATIONS  (PRN):  acetaminophen     Tablet .. 650 milliGRAM(s) Oral every 6 hours PRN Temp greater or equal to 38C (100.4F), Mild Pain (1 - 3)  aluminum hydroxide/magnesium hydroxide/simethicone Suspension 30 milliLiter(s) Oral every 4 hours PRN Dyspepsia  guaiFENesin Oral Liquid (Sugar-Free) 200 milliGRAM(s) Oral every 6 hours PRN Cough  melatonin 3 milliGRAM(s) Oral at bedtime PRN Insomnia  ondansetron Injectable 4 milliGRAM(s) IV Push every 8 hours PRN Nausea and/or Vomiting         Vitals log        ICU Vital Signs Last 24 Hrs  T(C): 36.3 (24 Jul 2023 05:05), Max: 36.6 (23 Jul 2023 18:01)  T(F): 97.4 (24 Jul 2023 05:05), Max: 97.8 (23 Jul 2023 18:01)  HR: 88 (24 Jul 2023 05:05) (80 - 103)  BP: 144/93 (24 Jul 2023 05:05) (102/67 - 144/93)  BP(mean): --  ABP: --  ABP(mean): --  RR: 18 (24 Jul 2023 05:05) (18 - 19)  SpO2: 96% (24 Jul 2023 05:05) (90% - 96%)    O2 Parameters below as of 24 Jul 2023 05:05  Patient On (Oxygen Delivery Method): nasal cannula  O2 Flow (L/min): 2               Input and Output:  I&O's Detail    22 Jul 2023 07:01  -  23 Jul 2023 07:00  --------------------------------------------------------  IN:    IV PiggyBack: 350 mL    Oral Fluid: 240 mL  Total IN: 590 mL    OUT:    Voided (mL): 800 mL  Total OUT: 800 mL    Total NET: -210 mL          Lab Data                        10.3   10.48 )-----------( 163      ( 23 Jul 2023 06:40 )             32.9     07-23    139  |  100  |  30<H>  ----------------------------<  122<H>  3.8   |  28  |  1.27    Ca    9.2      23 Jul 2023 06:40  Phos  3.1     07-22  Mg     1.9     07-23    TPro  6.5  /  Alb  2.5<L>  /  TBili  1.2  /  DBili  x   /  AST  25  /  ALT  20  /  AlkPhos  117  07-23            Review of Systems	      Objective     Physical Examination    heart s1s2  lung dc BS  head nc      Pertinent Lab findings & Imaging      Esther:  NO   Adequate UO     I&O's Detail    22 Jul 2023 07:01  -  23 Jul 2023 07:00  --------------------------------------------------------  IN:    IV PiggyBack: 350 mL    Oral Fluid: 240 mL  Total IN: 590 mL    OUT:    Voided (mL): 800 mL  Total OUT: 800 mL    Total NET: -210 mL               Discussed with:     Cultures:	        Radiology                            
Date/Time Patient Seen:  		  Referring MD:   Data Reviewed	       Patient is a 89y old  Female who presents with a chief complaint of SOB -> CHF exacerbation vs PNA (24 Jul 2023 10:56)      Subjective/HPI     PAST MEDICAL & SURGICAL HISTORY:  Asthma  on breo for 2 yrs    Aortic stenosis    HTN (hypertension)    HLD (hyperlipidemia)    Hiatal hernia    Skin cancer  basal    Heart failure    CAD (coronary artery disease)  recent coronary stent 3/19    Afib    Bronchitis    Hypertension    Glaucoma    Pacemaker    Heart failure    Aortic stenosis    HLD (hyperlipidemia)    Asthma    Squamous cell carcinoma of colon    Squamous cell skin cancer    History of left bundle branch block (LBBB)    Cardiac pacemaker  Medtronic Micra placed 4/2019    H/O unilateral oophorectomy    Acquired cataract    H/O eye surgery  bilateral cataracts    S/P PTCA (percutaneous transluminal coronary angioplasty)  coronary stent 3/19    S/P TAVR (transcatheter aortic valve replacement)    S/P coronary artery stent placement          Medication list         MEDICATIONS  (STANDING):  albuterol/ipratropium for Nebulization 3 milliLiter(s) Nebulizer every 6 hours  apixaban 2.5 milliGRAM(s) Oral every 12 hours  brimonidine 0.2% Ophthalmic Solution 1 Drop(s) Both EYES two times a day  levoFLOXacin  Tablet 250 milliGRAM(s) Oral every 24 hours  levothyroxine 25 MICROGram(s) Oral daily  magnesium oxide 400 milliGRAM(s) Oral daily  metoprolol succinate ER 50 milliGRAM(s) Oral daily  multivitamin 1 Tablet(s) Oral daily  pantoprazole    Tablet 40 milliGRAM(s) Oral before breakfast  predniSONE   Tablet 20 milliGRAM(s) Oral daily  senna 2 Tablet(s) Oral at bedtime  simvastatin 10 milliGRAM(s) Oral at bedtime  timolol 0.5% Solution 1 Drop(s) Both EYES two times a day  torsemide 10 milliGRAM(s) Oral daily    MEDICATIONS  (PRN):  acetaminophen     Tablet .. 650 milliGRAM(s) Oral every 6 hours PRN Temp greater or equal to 38C (100.4F), Mild Pain (1 - 3)  aluminum hydroxide/magnesium hydroxide/simethicone Suspension 30 milliLiter(s) Oral every 4 hours PRN Dyspepsia  guaiFENesin Oral Liquid (Sugar-Free) 200 milliGRAM(s) Oral every 6 hours PRN Cough  melatonin 3 milliGRAM(s) Oral at bedtime PRN Insomnia  ondansetron Injectable 4 milliGRAM(s) IV Push every 8 hours PRN Nausea and/or Vomiting         Vitals log        ICU Vital Signs Last 24 Hrs  T(C): 36.3 (25 Jul 2023 05:10), Max: 36.8 (24 Jul 2023 10:35)  T(F): 97.4 (25 Jul 2023 05:10), Max: 98.3 (24 Jul 2023 14:25)  HR: 94 (25 Jul 2023 05:10) (75 - 102)  BP: 132/80 (25 Jul 2023 05:10) (105/68 - 132/91)  BP(mean): --  ABP: --  ABP(mean): --  RR: 18 (25 Jul 2023 05:10) (18 - 18)  SpO2: 97% (25 Jul 2023 05:10) (83% - 97%)    O2 Parameters below as of 25 Jul 2023 05:10  Patient On (Oxygen Delivery Method): nasal cannula                 Input and Output:  I&O's Detail    23 Jul 2023 07:01  -  24 Jul 2023 07:00  --------------------------------------------------------  IN:    Oral Fluid: 120 mL  Total IN: 120 mL    OUT:    Voided (mL): 800 mL  Total OUT: 800 mL    Total NET: -680 mL      24 Jul 2023 07:01  -  25 Jul 2023 05:51  --------------------------------------------------------  IN:  Total IN: 0 mL    OUT:    Voided (mL): 1500 mL  Total OUT: 1500 mL    Total NET: -1500 mL          Lab Data                        11.0   15.20 )-----------( 203      ( 24 Jul 2023 06:00 )             34.4     07-24    137  |  100  |  45<H>  ----------------------------<  111<H>  4.0   |  28  |  1.37<H>    Ca    9.9      24 Jul 2023 06:00  Mg     1.9     07-23    TPro  7.6  /  Alb  2.7<L>  /  TBili  1.0  /  DBili  x   /  AST  23  /  ALT  19  /  AlkPhos  148<H>  07-24            Review of Systems	      Objective     Physical Examination  heart s1s2  lung dc bS  head nc        Pertinent Lab findings & Imaging      Esther:  NO   Adequate UO     I&O's Detail    23 Jul 2023 07:01  -  24 Jul 2023 07:00  --------------------------------------------------------  IN:    Oral Fluid: 120 mL  Total IN: 120 mL    OUT:    Voided (mL): 800 mL  Total OUT: 800 mL    Total NET: -680 mL      24 Jul 2023 07:01  -  25 Jul 2023 05:51  --------------------------------------------------------  IN:  Total IN: 0 mL    OUT:    Voided (mL): 1500 mL  Total OUT: 1500 mL    Total NET: -1500 mL               Discussed with:     Cultures:	        Radiology                            
Patient is a 89y old  Female who presents with a chief complaint of SOB -> CHF exacerbation vs PNA (22 Jul 2023 06:43)        HPI:  89F with hx of bronchiectasis on 4L of O2, afib on Eliquis s/p Medtronic Micra VR TCR PPM, HTN, HLD, CHF with hx of exacerbations, CAD with stent in RCA, LBBB, hx of AS s/p TAVR, ILD, hx of RLE squamous cell cancer s/p resection who presents with SOB.  Patient said she was in her usual state of health an was even exercising this morning.  However, in the afternoon, patient became acutely SOB.  No chest pain or fevers.  Denies any recent or new leg swelling.  Denies any travel or sick contacts.  When EMS arrived to scene, they noted her O2 to be in the mid-80s on NC.  Switched to NRB with improvement of O2.      In the ED,   - triage vitals were /98    T 97.7F RR 36, 97% on NRB  - labs showed WBC 12.1, slightly elev t.bili 1.7 with alk phosph 162, pBNP 7500, troponin neg, and lactate 2.2  - CT showed "worsening bilateral interstitial and groundglass opacities. Differential includes interstitial edema, infection, and chronic interstitial lung disease."  - was given lasix 40mg IV x1 (of note, patient was on torsemide but was taken off it 3 week ago by her cardiologist since her BP was getting low) with good UOP  - started on levofloxacin and vancomycin by the ED  - patient is being admitted for SOB/acute resp failure 2/2 CHF exac vs PNA.   (22 Jul 2023 01:41)      SUBJECTIVE & OBJECTIVE: Pt seen and examined at bedside. on venti mask , will tape of fnasal canula     PHYSICAL EXAM:  T(C): 36.4 (07-22-23 @ 09:26), Max: 36.5 (07-21-23 @ 22:17)  HR: 74 (07-22-23 @ 09:26) (67 - 114)  BP: 124/70 (07-22-23 @ 09:26) (124/70 - 138/98)  RR: 19 (07-22-23 @ 09:26) (19 - 36)  SpO2: 96% (07-22-23 @ 09:26) (90% - 98%)  Wt(kg): -- Height (cm): 152.4 (07-21 @ 22:17)  Weight (kg): 45.4 (07-21 @ 22:17)  BMI (kg/m2): 19.5 (07-21 @ 22:17)  BSA (m2): 1.39 (07-21 @ 22:17)  GENERAL: NAD,  CHEST/LUNG: Clear to auscultation bilaterally; No rales, rhonchi, wheezing, or rubs  HEART: Regular rate and rhythm; No murmurs, rubs, or gallops  ABDOMEN: Soft, Nontender, Nondistended; Bowel sounds present  EXTREMITIES:  2+ Peripheral Pulses, No clubbing, cyanosis, or edema        MEDICATIONS  (STANDING):  albuterol/ipratropium for Nebulization 3 milliLiter(s) Nebulizer every 6 hours  apixaban 2.5 milliGRAM(s) Oral every 12 hours  brimonidine 0.2% Ophthalmic Solution 1 Drop(s) Both EYES two times a day  levothyroxine 25 MICROGram(s) Oral daily  magnesium oxide 400 milliGRAM(s) Oral daily  magnesium sulfate  IVPB 2 Gram(s) IV Intermittent once  methylPREDNISolone sodium succinate Injectable 40 milliGRAM(s) IV Push daily  metoprolol succinate ER 50 milliGRAM(s) Oral daily  multivitamin 1 Tablet(s) Oral daily  pantoprazole    Tablet 40 milliGRAM(s) Oral before breakfast  senna 2 Tablet(s) Oral at bedtime  simvastatin 10 milliGRAM(s) Oral at bedtime  timolol 0.5% Solution 1 Drop(s) Both EYES two times a day  torsemide 10 milliGRAM(s) Oral daily  vancomycin  IVPB        MEDICATIONS  (PRN):  acetaminophen     Tablet .. 650 milliGRAM(s) Oral every 6 hours PRN Temp greater or equal to 38C (100.4F), Mild Pain (1 - 3)  aluminum hydroxide/magnesium hydroxide/simethicone Suspension 30 milliLiter(s) Oral every 4 hours PRN Dyspepsia  guaiFENesin Oral Liquid (Sugar-Free) 200 milliGRAM(s) Oral every 6 hours PRN Cough  melatonin 3 milliGRAM(s) Oral at bedtime PRN Insomnia  ondansetron Injectable 4 milliGRAM(s) IV Push every 8 hours PRN Nausea and/or Vomiting      LABS:                        11.0   9.75  )-----------( 175      ( 22 Jul 2023 06:39 )             34.2     07-22    134<L>  |  98  |  26<H>  ----------------------------<  94  4.1   |  29  |  1.07    Ca    9.3      22 Jul 2023 06:39  Phos  3.1     07-22  Mg     1.5     07-22    TPro  7.3  /  Alb  2.8<L>  /  TBili  2.0<H>  /  DBili  x   /  AST  29  /  ALT  20  /  AlkPhos  154<H>  07-22    PT/INR - ( 22 Jul 2023 00:52 )   PT: 20.3 sec;   INR: 1.76 ratio         PTT - ( 22 Jul 2023 00:52 )  PTT:33.8 sec  Urinalysis Basic - ( 22 Jul 2023 06:39 )    Color: x / Appearance: x / SG: x / pH: x  Gluc: 94 mg/dL / Ketone: x  / Bili: x / Urobili: x   Blood: x / Protein: x / Nitrite: x   Leuk Esterase: x / RBC: x / WBC x   Sq Epi: x / Non Sq Epi: x / Bacteria: x      Magnesium: 1.5 mg/dL (07-22 @ 06:39)    CAPILLARY BLOOD GLUCOSE          CAPILLARY BLOOD GLUCOSE        CAPILLARY BLOOD GLUCOSE          CARDIAC MARKERS ( 21 Jul 2023 23:42 )  x     / x     / 22 U/L / x     / 1.2 ng/mL        RECENT CULTURES:      RADIOLOGY & ADDITIONAL TESTS:                        DVT/GI ppx  Discussed with pt @ bedside
Patient is a 89y old  Female who presents with a chief complaint of SOB -> CHF exacerbation vs PNA (24 Jul 2023 05:59)        HPI:  89F with hx of bronchiectasis on 4L of O2, afib on Eliquis s/p Medtronic Micra VR TCR PPM, HTN, HLD, CHF with hx of exacerbations, CAD with stent in RCA, LBBB, hx of AS s/p TAVR, ILD, hx of RLE squamous cell cancer s/p resection who presents with SOB.  Patient said she was in her usual state of health an was even exercising this morning.  However, in the afternoon, patient became acutely SOB.  No chest pain or fevers.  Denies any recent or new leg swelling.  Denies any travel or sick contacts.  When EMS arrived to scene, they noted her O2 to be in the mid-80s on NC.  Switched to NRB with improvement of O2.      In the ED,   - triage vitals were /98    T 97.7F RR 36, 97% on NRB  - labs showed WBC 12.1, slightly elev t.bili 1.7 with alk phosph 162, pBNP 7500, troponin neg, and lactate 2.2  - CT showed "worsening bilateral interstitial and groundglass opacities. Differential includes interstitial edema, infection, and chronic interstitial lung disease."  - was given lasix 40mg IV x1 (of note, patient was on torsemide but was taken off it 3 week ago by her cardiologist since her BP was getting low) with good UOP  - started on levofloxacin and vancomycin by the ED  - patient is being admitted for SOB/acute resp failure 2/2 CHF exac vs PNA.   (22 Jul 2023 01:41)      SUBJECTIVE & OBJECTIVE: Pt seen and examined at bedside. nad, tapering off nasal canula     PHYSICAL EXAM:  T(C): 36.8 (07-24-23 @ 10:35), Max: 36.8 (07-24-23 @ 10:35)  HR: 93 (07-24-23 @ 10:35) (80 - 103)  BP: 112/75 (07-24-23 @ 10:35) (102/67 - 144/93)  RR: 18 (07-24-23 @ 10:35) (18 - 19)  SpO2: 94% (07-24-23 @ 10:35) (83% - 97%)  Wt(kg): --   GENERAL: NAD, NECK: Supple, No JVD  NERVOUS SYSTEM:  Alert & Oriented X3,   CHEST/LUNG: Clear to auscultation bilaterally; No rales, rhonchi, wheezing, or rubs  HEART: Regular rate and rhythm; No murmurs, rubs, or gallops  ABDOMEN: Soft, Nontender, Nondistended; Bowel sounds present  EXTREMITIES:  2+ Peripheral Pulses, No clubbing, cyanosis, or edema        MEDICATIONS  (STANDING):  albuterol/ipratropium for Nebulization 3 milliLiter(s) Nebulizer every 6 hours  apixaban 2.5 milliGRAM(s) Oral every 12 hours  brimonidine 0.2% Ophthalmic Solution 1 Drop(s) Both EYES two times a day  levoFLOXacin  Tablet 250 milliGRAM(s) Oral every 24 hours  levothyroxine 25 MICROGram(s) Oral daily  magnesium oxide 400 milliGRAM(s) Oral daily  metoprolol succinate ER 50 milliGRAM(s) Oral daily  multivitamin 1 Tablet(s) Oral daily  pantoprazole    Tablet 40 milliGRAM(s) Oral before breakfast  predniSONE   Tablet 20 milliGRAM(s) Oral daily  senna 2 Tablet(s) Oral at bedtime  simvastatin 10 milliGRAM(s) Oral at bedtime  timolol 0.5% Solution 1 Drop(s) Both EYES two times a day  torsemide 10 milliGRAM(s) Oral daily    MEDICATIONS  (PRN):  acetaminophen     Tablet .. 650 milliGRAM(s) Oral every 6 hours PRN Temp greater or equal to 38C (100.4F), Mild Pain (1 - 3)  aluminum hydroxide/magnesium hydroxide/simethicone Suspension 30 milliLiter(s) Oral every 4 hours PRN Dyspepsia  guaiFENesin Oral Liquid (Sugar-Free) 200 milliGRAM(s) Oral every 6 hours PRN Cough  melatonin 3 milliGRAM(s) Oral at bedtime PRN Insomnia  ondansetron Injectable 4 milliGRAM(s) IV Push every 8 hours PRN Nausea and/or Vomiting      LABS:                        11.0   15.20 )-----------( 203      ( 24 Jul 2023 06:00 )             34.4     07-24    137  |  100  |  45<H>  ----------------------------<  111<H>  4.0   |  28  |  1.37<H>    Ca    9.9      24 Jul 2023 06:00  Mg     1.9     07-23    TPro  7.6  /  Alb  2.7<L>  /  TBili  1.0  /  DBili  x   /  AST  23  /  ALT  19  /  AlkPhos  148<H>  07-24      Urinalysis Basic - ( 24 Jul 2023 06:00 )    Color: x / Appearance: x / SG: x / pH: x  Gluc: 111 mg/dL / Ketone: x  / Bili: x / Urobili: x   Blood: x / Protein: x / Nitrite: x   Leuk Esterase: x / RBC: x / WBC x   Sq Epi: x / Non Sq Epi: x / Bacteria: x        CAPILLARY BLOOD GLUCOSE          CAPILLARY BLOOD GLUCOSE        CAPILLARY BLOOD GLUCOSE                RECENT CULTURES:      RADIOLOGY & ADDITIONAL TESTS:                        DVT/GI ppx  Discussed with pt @ bedside

## 2023-07-27 ENCOUNTER — TRANSCRIPTION ENCOUNTER (OUTPATIENT)
Age: 88
End: 2023-07-27

## 2023-07-27 LAB
CULTURE RESULTS: SIGNIFICANT CHANGE UP
CULTURE RESULTS: SIGNIFICANT CHANGE UP
SPECIMEN SOURCE: SIGNIFICANT CHANGE UP
SPECIMEN SOURCE: SIGNIFICANT CHANGE UP

## 2023-07-27 NOTE — CASE MANAGEMENT PROGRESS NOTE - NSCMPROGRESSNOTE_GEN_ALL_CORE
Patient was Discharged back to [facility] on 7/26/23 by Ambulance because patient needed nasal cannula oxygen for transport and uses oxygen at the [facility] at 6pm. YIN called joslyn Arndt twice and left messages awaiting a call back.  YIN given report to LORRI the CM at [facility] in [place]. YIN faxed all documents to [facility] in [place] and [facility] [phone] Homecare on board for patient. I was told that family was interested in hiring a private aide but I could not get in touch with family. YIN Cage made Lorri the  aware that her family was interested in hiring private aide. Will continue to follow patient.

## 2023-07-31 ENCOUNTER — APPOINTMENT (OUTPATIENT)
Dept: CARE COORDINATION | Facility: HOME HEALTH | Age: 88
End: 2023-07-31
Payer: MEDICARE

## 2023-07-31 VITALS
RESPIRATION RATE: 17 BRPM | HEART RATE: 74 BPM | DIASTOLIC BLOOD PRESSURE: 54 MMHG | SYSTOLIC BLOOD PRESSURE: 93 MMHG | OXYGEN SATURATION: 97 %

## 2023-07-31 DIAGNOSIS — E03.9 HYPOTHYROIDISM, UNSPECIFIED: ICD-10-CM

## 2023-07-31 PROCEDURE — 99495 TRANSJ CARE MGMT MOD F2F 14D: CPT

## 2023-08-01 ENCOUNTER — EMERGENCY (EMERGENCY)
Facility: HOSPITAL | Age: 88
LOS: 1 days | Discharge: HOME | End: 2023-08-01
Attending: EMERGENCY MEDICINE | Admitting: EMERGENCY MEDICINE
Payer: MEDICARE

## 2023-08-01 VITALS
HEIGHT: 60 IN | RESPIRATION RATE: 18 BRPM | WEIGHT: 110.01 LBS | DIASTOLIC BLOOD PRESSURE: 70 MMHG | OXYGEN SATURATION: 97 % | SYSTOLIC BLOOD PRESSURE: 110 MMHG | HEART RATE: 90 BPM | TEMPERATURE: 98 F

## 2023-08-01 VITALS
DIASTOLIC BLOOD PRESSURE: 74 MMHG | HEART RATE: 81 BPM | RESPIRATION RATE: 17 BRPM | TEMPERATURE: 98 F | SYSTOLIC BLOOD PRESSURE: 110 MMHG | OXYGEN SATURATION: 98 %

## 2023-08-01 DIAGNOSIS — Z98.61 CORONARY ANGIOPLASTY STATUS: Chronic | ICD-10-CM

## 2023-08-01 DIAGNOSIS — Z95.5 PRESENCE OF CORONARY ANGIOPLASTY IMPLANT AND GRAFT: Chronic | ICD-10-CM

## 2023-08-01 DIAGNOSIS — Z95.2 PRESENCE OF PROSTHETIC HEART VALVE: Chronic | ICD-10-CM

## 2023-08-01 DIAGNOSIS — Z98.890 OTHER SPECIFIED POSTPROCEDURAL STATES: Chronic | ICD-10-CM

## 2023-08-01 DIAGNOSIS — Z90.721 ACQUIRED ABSENCE OF OVARIES, UNILATERAL: Chronic | ICD-10-CM

## 2023-08-01 PROBLEM — E03.9 HYPOTHYROIDISM, UNSPECIFIED TYPE: Status: ACTIVE | Noted: 2023-08-01

## 2023-08-01 PROCEDURE — 73060 X-RAY EXAM OF HUMERUS: CPT

## 2023-08-01 PROCEDURE — 70450 CT HEAD/BRAIN W/O DYE: CPT | Mod: 26,MA

## 2023-08-01 PROCEDURE — 72125 CT NECK SPINE W/O DYE: CPT | Mod: MA

## 2023-08-01 PROCEDURE — 90471 IMMUNIZATION ADMIN: CPT

## 2023-08-01 PROCEDURE — 72125 CT NECK SPINE W/O DYE: CPT | Mod: 26,MA

## 2023-08-01 PROCEDURE — 70450 CT HEAD/BRAIN W/O DYE: CPT | Mod: MA

## 2023-08-01 PROCEDURE — 73060 X-RAY EXAM OF HUMERUS: CPT | Mod: 26,RT

## 2023-08-01 PROCEDURE — 71045 X-RAY EXAM CHEST 1 VIEW: CPT

## 2023-08-01 PROCEDURE — 71045 X-RAY EXAM CHEST 1 VIEW: CPT | Mod: 26

## 2023-08-01 PROCEDURE — 73070 X-RAY EXAM OF ELBOW: CPT | Mod: 26,RT

## 2023-08-01 PROCEDURE — 90715 TDAP VACCINE 7 YRS/> IM: CPT

## 2023-08-01 PROCEDURE — 99284 EMERGENCY DEPT VISIT MOD MDM: CPT

## 2023-08-01 PROCEDURE — 72170 X-RAY EXAM OF PELVIS: CPT

## 2023-08-01 PROCEDURE — 73030 X-RAY EXAM OF SHOULDER: CPT | Mod: 26,RT

## 2023-08-01 PROCEDURE — 73030 X-RAY EXAM OF SHOULDER: CPT

## 2023-08-01 PROCEDURE — 72170 X-RAY EXAM OF PELVIS: CPT | Mod: 26

## 2023-08-01 PROCEDURE — 99284 EMERGENCY DEPT VISIT MOD MDM: CPT | Mod: 25

## 2023-08-01 PROCEDURE — 73070 X-RAY EXAM OF ELBOW: CPT

## 2023-08-01 RX ORDER — TORSEMIDE 10 MG/1
10 TABLET ORAL DAILY
Refills: 0 | Status: ACTIVE | COMMUNITY
Start: 2023-07-26

## 2023-08-01 RX ORDER — FLUTICASONE FUROATE, UMECLIDINIUM BROMIDE AND VILANTEROL TRIFENATATE 100; 62.5; 25 UG/1; UG/1; UG/1
100-62.5-25 POWDER RESPIRATORY (INHALATION) DAILY
Refills: 0 | Status: ACTIVE | COMMUNITY
Start: 2023-08-01

## 2023-08-01 RX ORDER — METOPROLOL TARTRATE 25 MG/1
25 TABLET, FILM COATED ORAL TWICE DAILY
Qty: 180 | Refills: 3 | Status: DISCONTINUED | COMMUNITY
Start: 2019-05-07 | End: 2023-08-01

## 2023-08-01 RX ORDER — TIMOLOL MALEATE 5 MG/ML
0.5 SOLUTION OPHTHALMIC
Qty: 5 | Refills: 0 | Status: ACTIVE | COMMUNITY
Start: 2023-06-26

## 2023-08-01 RX ORDER — ALBUTEROL SULFATE 90 UG/1
108 (90 BASE) AEROSOL, METERED RESPIRATORY (INHALATION)
Qty: 3 | Refills: 1 | Status: DISCONTINUED | COMMUNITY
End: 2023-08-01

## 2023-08-01 RX ORDER — OMEGA-3/DHA/EPA/FISH OIL 300-1000MG
400 CAPSULE ORAL DAILY
Refills: 0 | Status: ACTIVE | COMMUNITY
Start: 2023-08-01

## 2023-08-01 RX ORDER — TETANUS TOXOID, REDUCED DIPHTHERIA TOXOID AND ACELLULAR PERTUSSIS VACCINE, ADSORBED 5; 2.5; 8; 8; 2.5 [IU]/.5ML; [IU]/.5ML; UG/.5ML; UG/.5ML; UG/.5ML
0.5 SUSPENSION INTRAMUSCULAR ONCE
Refills: 0 | Status: COMPLETED | OUTPATIENT
Start: 2023-08-01 | End: 2023-08-01

## 2023-08-01 RX ORDER — LEVOFLOXACIN 250 MG/1
250 TABLET, FILM COATED ORAL
Qty: 2 | Refills: 0 | Status: COMPLETED | COMMUNITY
Start: 2023-07-26

## 2023-08-01 RX ORDER — SIMVASTATIN 10 MG/1
10 TABLET, FILM COATED ORAL
Refills: 0 | Status: ACTIVE | COMMUNITY
Start: 2023-06-13

## 2023-08-01 RX ORDER — COLLAGENASE SANTYL 250 [ARB'U]/G
250 OINTMENT TOPICAL
Qty: 30 | Refills: 0 | Status: COMPLETED | COMMUNITY
Start: 2023-06-26

## 2023-08-01 RX ORDER — BACITRACIN ZINC 500 UNIT/G
1 OINTMENT IN PACKET (EA) TOPICAL ONCE
Refills: 0 | Status: COMPLETED | OUTPATIENT
Start: 2023-08-01 | End: 2023-08-01

## 2023-08-01 RX ORDER — BRIMONIDINE TARTRATE, TIMOLOL MALEATE 2; 5 MG/ML; MG/ML
0.2-0.5 SOLUTION/ DROPS TOPICAL
Qty: 20 | Refills: 0 | Status: COMPLETED | COMMUNITY
Start: 2022-12-13

## 2023-08-01 RX ORDER — ALBUTEROL SULFATE 2.5 MG/3ML
(2.5 MG/3ML) SOLUTION RESPIRATORY (INHALATION) EVERY 6 HOURS
Qty: 1 | Refills: 1 | Status: ACTIVE | COMMUNITY
Start: 2023-07-18

## 2023-08-01 RX ORDER — LEVOTHYROXINE SODIUM 0.03 MG/1
25 TABLET ORAL DAILY
Refills: 0 | Status: ACTIVE | COMMUNITY
Start: 2023-06-30

## 2023-08-01 RX ORDER — APIXABAN 2.5 MG/1
2.5 TABLET, FILM COATED ORAL
Qty: 60 | Refills: 2 | Status: ACTIVE | COMMUNITY
Start: 2019-05-02 | End: 1900-01-01

## 2023-08-01 RX ORDER — APIXABAN 2.5 MG/1
2.5 TABLET, FILM COATED ORAL
Qty: 60 | Refills: 0 | Status: COMPLETED | COMMUNITY
Start: 2023-06-30

## 2023-08-01 RX ORDER — ACETAMINOPHEN 500 MG
650 TABLET ORAL ONCE
Refills: 0 | Status: COMPLETED | OUTPATIENT
Start: 2023-08-01 | End: 2023-08-01

## 2023-08-01 RX ORDER — FLUTICASONE FUROATE AND VILANTEROL TRIFENATATE 200; 25 UG/1; UG/1
200-25 POWDER RESPIRATORY (INHALATION)
Refills: 0 | Status: DISCONTINUED | COMMUNITY
End: 2023-08-01

## 2023-08-01 RX ORDER — IPRATROPIUM BROMIDE 0.5 MG/2.5ML
0.02 SOLUTION RESPIRATORY (INHALATION) 4 TIMES DAILY
Refills: 0 | Status: ACTIVE | COMMUNITY
Start: 2023-06-26

## 2023-08-01 RX ORDER — OMEPRAZOLE 20 MG/1
20 CAPSULE, DELAYED RELEASE ORAL
Qty: 60 | Refills: 0 | Status: COMPLETED | COMMUNITY
Start: 2023-06-26

## 2023-08-01 RX ORDER — FLUTICASONE FUROATE, UMECLIDINIUM BROMIDE AND VILANTEROL TRIFENATATE 100; 62.5; 25 UG/1; UG/1; UG/1
100-62.5-25 POWDER RESPIRATORY (INHALATION)
Qty: 60 | Refills: 0 | Status: COMPLETED | COMMUNITY
Start: 2023-07-20

## 2023-08-01 RX ORDER — BRIMONIDINE TARTRATE 2 MG/MG
0.2 SOLUTION/ DROPS OPHTHALMIC
Refills: 0 | Status: ACTIVE | COMMUNITY
Start: 2023-06-30

## 2023-08-01 RX ORDER — METOPROLOL SUCCINATE 50 MG/1
50 TABLET, EXTENDED RELEASE ORAL
Qty: 90 | Refills: 3 | Status: ACTIVE | COMMUNITY
Start: 2023-06-26

## 2023-08-01 RX ORDER — NEBULIZER AND COMPRESSOR
EACH MISCELLANEOUS
Qty: 1 | Refills: 0 | Status: ACTIVE | COMMUNITY
Start: 2023-07-19

## 2023-08-01 RX ADMIN — Medication 1 APPLICATION(S): at 11:48

## 2023-08-01 RX ADMIN — Medication 650 MILLIGRAM(S): at 11:43

## 2023-08-01 RX ADMIN — TETANUS TOXOID, REDUCED DIPHTHERIA TOXOID AND ACELLULAR PERTUSSIS VACCINE, ADSORBED 0.5 MILLILITER(S): 5; 2.5; 8; 8; 2.5 SUSPENSION INTRAMUSCULAR at 11:48

## 2023-08-01 NOTE — ED PROVIDER NOTE - PHYSICAL EXAMINATION
Constitutional: Awake, Alert. NAD. Well appearing, well nourished. Cooperative. VSS. elderly fenale resting comfortably in stretcher.   HEAD: NC/AT; no signs of trauma   EYES: Conjunctiva and sclera are clear bilaterally. EOMI. No nystagmus. PERRL.  ENT: MMM. No rhinorrhea, normal pharynx, oropharynx patent, no tonsillar exudates or enlargement, no erythema, no drooling or stridor.   NECK: FROM. Supple. No nuchal rigidity. No midline or paraspinal TTP.  CARDIOVASCULAR: RRR, Normal S1, S2. No audible murmurs. No chest wall ttp. Radial pulses +2 B/L.  RESPIRATORY: Speaking full sentences. Normal respiratory effort; breath sounds CTAB, No WRR. No accessory muscle use. on home o2 nc.   ABDOMEN: Soft; NTND.  EXTREMITIES: Full active ROM in all extremities; no deformities; non-tender to palpation; no edema, no crepitus or step off;  distal pulses palpable and symmetric.  SKIN: Warm, dry; good skin turgor, no ecchymosis, brisk capillary refill. +right elbow skin avulsion, no laceration.   NEURO: AAOx3 follows commands. No facial droop. CN 2-12 grossly intact. No truncal ataxia. Steady gait. Muscle strength 5/5 UE and LE B/L. Sensation intact B/L.

## 2023-08-01 NOTE — PHYSICAL EXAM
[No Acute Distress] : no acute distress [Normal Sclera/Conjunctiva] : normal sclera/conjunctiva [Normal Outer Ear/Nose] : the outer ears and nose were normal in appearance [No JVD] : no jugular venous distention [No Respiratory Distress] : no respiratory distress  [Clear to Auscultation] : lungs were clear to auscultation bilaterally [Normal Rate] : normal rate  [No Edema] : there was no peripheral edema [Soft] : abdomen soft [No Rash] : no rash [No Focal Deficits] : no focal deficits [Normal Affect] : the affect was normal [Normal Insight/Judgement] : insight and judgment were intact

## 2023-08-01 NOTE — ED ADULT NURSE NOTE - OBJECTIVE STATEMENT
Sent from [facility] for s/p fall.  Pt on Eliquis for heart valve replacement, no LOC, unsure if patient hit head.  Skin tear noted to right elbow.  Cleaned with NS and wrapped with gauze and bacitracin applied by provider.

## 2023-08-01 NOTE — ED PROVIDER NOTE - CARE PLAN
Principal Discharge DX:	Right elbow pain  Secondary Diagnosis:	Fall  Secondary Diagnosis:	Skin tear of elbow without complication, right, initial encounter  Secondary Diagnosis:	Right shoulder pain   1

## 2023-08-01 NOTE — ED CLERICAL - NS ED CLERK NOTE PRE-ARRIVAL INFORMATION; ADDITIONAL PRE-ARRIVAL INFORMATION
This patient is enrolled in a readmission reduction initiative and has active care navigation. This patient can be followed up by the care navigation team within 24 hours.  To arrange close follow-up or to obtain additional clinical information, please call the care navigation contact number above.  For patients previously on the Hospitalist Service please call the hospitalist at [phone] for input and/or consultation PRIOR to admission. If the patient was on a Voluntary Physician’s service please contact that physician for input and/or consultation PRIOR to admission.

## 2023-08-01 NOTE — ED PROVIDER NOTE - PROGRESS NOTE DETAILS
imaging and results reviewed, finding not acute. cxr findings likely 2/2 chronic lung dz. pt is on home o2 and no resp distress, lungs ctabl.  Reevaluated patient at bedside.  Patient feeling well. Discussed the results of all diagnostic testing in ED and copies of all reports given.   An opportunity to ask questions was given.  Discussed the importance of prompt, close medical follow-up.  Patient will return with any changes, concerns or persistent / worsening symptoms.  Understanding of all instructions verbalized.. dc w pmd f/u  Based on the H&P, I do not suspect any life- / limb- threatening pathology that requires further intervention at this time.

## 2023-08-01 NOTE — HISTORY OF PRESENT ILLNESS
[Post-hospitalization from ___ Hospital] : Post-hospitalization from [unfilled] Hospital [Discharge Summary] : discharge summary [Discharge Med List] : discharge medication list [Med Reconciliation] : medication reconciliation has been completed [Patient Contacted By: ____] : and contacted by [unfilled] [Admitted on: ___] : The patient was admitted on [unfilled] [Discharged on ___] : discharged on [unfilled] [FreeTextEntry3] : Patient was contacted by TCM RN on 7/27/23 for 24/48 hour call and documentation is present in the Clinical Viewer  [FreeTextEntry2] : 89F with hx of bronchiectasis on 4L of O2, afib on Eliquis s/p Med[employer] Micra VR TCR PPM, HTN, HLD, CHF with hx of exacerbations, CAD with stent in RCA, LBBB, hx of AS s/p TAVR, ILD, hx of RLE squamous cell cancer s/p resection who presented with SOB.  acute respiratory failure 2/2 to acute on chronic  chf exacerbation, doubt pneumonia given no other infectious symptoms. ruled out .   last TTE showed EF 45-50% with conduction delay and mildly enlarged RV was placed on non-breathrer, then 50 %, now on 3 L nasal canula  s/p vanco, on levaquin, can finish course given history of bronchiectasis.  completed course of prednisone  Since dc, pt states she has been feeling better.  She is on 4LNC ATC, o2 sat 97%.  Appears euvolemic.  She lives in [place] and sees Dr. Man.  She is DNR/DNI.  TCM numbers provided for any questions/concerns.

## 2023-08-01 NOTE — PRE-OP CHECKLIST - TEMPERATURE IN CELSIUS (DEGREES C)
[place]   [facility] [phone] (fax: [phone]).    He will need to start hyperbaric again for his upcoming surgery to remove his right 2nd and 3rd ischemic toes. Surgery is 9/29/2022.    Called and let patient know.      Purvi Whelan DPM     36.7

## 2023-08-01 NOTE — ED PROVIDER NOTE - PATIENT PORTAL LINK FT
You can access the FollowMyHealth Patient Portal offered by [facility] by registering at the following website: [email or web address]. By joining [employer]’s FollowMyHealth portal, you will also be able to view your health information using other applications (apps) compatible with our system.

## 2023-08-01 NOTE — ED PROVIDER NOTE - CLINICAL SUMMARY MEDICAL DECISION MAKING FREE TEXT BOX
89 yo F on eliquis p/w mechanical fall BIBEMS from NH, c/o pain to right shoudler and R elbow. unsure if head trauma  vss  FROM x 4  R elbow w skin tear  no fnd  NCAT    low suspicion for ich; however given eliquis use, will get ct r/o ich and c spine injury  skin tear irrigated and bacitracin applied with gauze and kerlix; no lac to repair.   tdap given   will get xrays of R arm, r/o fx  reassess

## 2023-08-01 NOTE — ED ADULT NURSE NOTE - NSFALLHARMRISKINTERV_ED_ALL_ED

## 2023-08-01 NOTE — ED PROVIDER NOTE - OBJECTIVE STATEMENT
89 yo F pmh chronic lung dz on home o2, on eliquis for valve replacement p/w mechanical fall from NH when trying to transfer from bed to chair. usually walks w walker. denies loc. fall was unwitnessed. denies head trauma. c/o pain to Right arm (shoulder and elbow) as well as wound to R elbow.

## 2023-08-03 ENCOUNTER — APPOINTMENT (OUTPATIENT)
Dept: CARE COORDINATION | Facility: HOME HEALTH | Age: 88
End: 2023-08-03
Payer: MEDICARE

## 2023-08-03 ENCOUNTER — TRANSCRIPTION ENCOUNTER (OUTPATIENT)
Age: 88
End: 2023-08-03

## 2023-08-03 VITALS
SYSTOLIC BLOOD PRESSURE: 90 MMHG | HEART RATE: 78 BPM | RESPIRATION RATE: 14 BRPM | DIASTOLIC BLOOD PRESSURE: 60 MMHG | OXYGEN SATURATION: 98 %

## 2023-08-03 DIAGNOSIS — J84.9 INTERSTITIAL PULMONARY DISEASE, UNSPECIFIED: ICD-10-CM

## 2023-08-03 DIAGNOSIS — I50.9 HEART FAILURE, UNSPECIFIED: ICD-10-CM

## 2023-08-03 DIAGNOSIS — I48.91 UNSPECIFIED ATRIAL FIBRILLATION: ICD-10-CM

## 2023-08-03 DIAGNOSIS — S51.001D: ICD-10-CM

## 2023-08-03 DIAGNOSIS — I95.9 HYPOTENSION, UNSPECIFIED: ICD-10-CM

## 2023-08-03 PROCEDURE — 99348 HOME/RES VST EST LOW MDM 30: CPT

## 2023-08-18 ENCOUNTER — TRANSCRIPTION ENCOUNTER (OUTPATIENT)
Age: 88
End: 2023-08-18

## 2023-10-03 ENCOUNTER — APPOINTMENT (OUTPATIENT)
Dept: OTOLARYNGOLOGY | Facility: CLINIC | Age: 88
End: 2023-10-03
Payer: MEDICARE

## 2023-10-03 ENCOUNTER — NON-APPOINTMENT (OUTPATIENT)
Age: 88
End: 2023-10-03

## 2023-10-03 VITALS
DIASTOLIC BLOOD PRESSURE: 77 MMHG | SYSTOLIC BLOOD PRESSURE: 115 MMHG | BODY MASS INDEX: 19.04 KG/M2 | HEIGHT: 60 IN | WEIGHT: 97 LBS | HEART RATE: 96 BPM

## 2023-10-03 DIAGNOSIS — H61.20 IMPACTED CERUMEN, UNSPECIFIED EAR: ICD-10-CM

## 2023-10-03 DIAGNOSIS — H91.10 PRESBYCUSIS, UNSPECIFIED EAR: ICD-10-CM

## 2023-10-03 PROCEDURE — 99204 OFFICE O/P NEW MOD 45 MIN: CPT | Mod: 25

## 2023-10-03 PROCEDURE — 69210 REMOVE IMPACTED EAR WAX UNI: CPT

## 2023-10-24 NOTE — ED PROVIDER NOTE - CADM POA PRESS ULCER
Extra Heart Failure sites:     [email or web address]://[email or web address] InterpretOmics/[email or web address]  --- this is American Heart Association Interactive Healthier Living with Heart Failure guidebook. Please click hyperlink or copy / paste link into search bar. Use your mouse to scroll through the pages. Lots of information about weight monitoring, diet tips, activity, meds, etc    HF Tornillo ronnie -- this is a free smart phone ronnie available for iPhone and Android download. Use your phone to track sodium / fluid intake, zone tool symptom tracking, weights, medications, etc. Click on this hyperlink  HF Tornillo Ronnie   for QR code for easy download--.look for this in your ronnie store                                          DASH (Dietary Approach to Stop Hypertension) diet --  [email or web address].no -- this diet is a flexible eating plan that promotes heart healthy eating style. Click on hyperlink or copy / paste link into search bar. Lots of low sodium recipes and tips.     [email or web address].[email or web address]  -- more free recipes No

## 2023-11-19 ENCOUNTER — INPATIENT (INPATIENT)
Facility: HOSPITAL | Age: 88
LOS: 5 days | DRG: 871 | End: 2023-11-25
Attending: INTERNAL MEDICINE | Admitting: INTERNAL MEDICINE
Payer: MEDICARE

## 2023-11-19 VITALS
TEMPERATURE: 99 F | HEART RATE: 101 BPM | DIASTOLIC BLOOD PRESSURE: 71 MMHG | RESPIRATION RATE: 18 BRPM | SYSTOLIC BLOOD PRESSURE: 101 MMHG | HEIGHT: 63 IN | OXYGEN SATURATION: 95 % | WEIGHT: 119.93 LBS

## 2023-11-19 DIAGNOSIS — Z98.61 CORONARY ANGIOPLASTY STATUS: Chronic | ICD-10-CM

## 2023-11-19 DIAGNOSIS — Z98.890 OTHER SPECIFIED POSTPROCEDURAL STATES: Chronic | ICD-10-CM

## 2023-11-19 DIAGNOSIS — Z90.721 ACQUIRED ABSENCE OF OVARIES, UNILATERAL: Chronic | ICD-10-CM

## 2023-11-19 DIAGNOSIS — Z95.5 PRESENCE OF CORONARY ANGIOPLASTY IMPLANT AND GRAFT: Chronic | ICD-10-CM

## 2023-11-19 DIAGNOSIS — Z95.2 PRESENCE OF PROSTHETIC HEART VALVE: Chronic | ICD-10-CM

## 2023-11-19 LAB
ALBUMIN SERPL ELPH-MCNC: 3.2 G/DL — LOW (ref 3.3–5)
ALBUMIN SERPL ELPH-MCNC: 3.2 G/DL — LOW (ref 3.3–5)
ALP SERPL-CCNC: 150 U/L — HIGH (ref 40–120)
ALP SERPL-CCNC: 150 U/L — HIGH (ref 40–120)
ALT FLD-CCNC: 22 U/L — SIGNIFICANT CHANGE UP (ref 12–78)
ALT FLD-CCNC: 22 U/L — SIGNIFICANT CHANGE UP (ref 12–78)
ANION GAP SERPL CALC-SCNC: 4 MMOL/L — LOW (ref 5–17)
ANION GAP SERPL CALC-SCNC: 4 MMOL/L — LOW (ref 5–17)
APTT BLD: 35.2 SEC — SIGNIFICANT CHANGE UP (ref 24.5–35.6)
APTT BLD: 35.2 SEC — SIGNIFICANT CHANGE UP (ref 24.5–35.6)
AST SERPL-CCNC: 34 U/L — SIGNIFICANT CHANGE UP (ref 15–37)
AST SERPL-CCNC: 34 U/L — SIGNIFICANT CHANGE UP (ref 15–37)
BASOPHILS # BLD AUTO: 0.03 K/UL — SIGNIFICANT CHANGE UP (ref 0–0.2)
BASOPHILS # BLD AUTO: 0.03 K/UL — SIGNIFICANT CHANGE UP (ref 0–0.2)
BASOPHILS NFR BLD AUTO: 0.4 % — SIGNIFICANT CHANGE UP (ref 0–2)
BASOPHILS NFR BLD AUTO: 0.4 % — SIGNIFICANT CHANGE UP (ref 0–2)
BILIRUB SERPL-MCNC: 1.5 MG/DL — HIGH (ref 0.2–1.2)
BILIRUB SERPL-MCNC: 1.5 MG/DL — HIGH (ref 0.2–1.2)
BUN SERPL-MCNC: 30 MG/DL — HIGH (ref 7–23)
BUN SERPL-MCNC: 30 MG/DL — HIGH (ref 7–23)
CALCIUM SERPL-MCNC: 9 MG/DL — SIGNIFICANT CHANGE UP (ref 8.5–10.1)
CALCIUM SERPL-MCNC: 9 MG/DL — SIGNIFICANT CHANGE UP (ref 8.5–10.1)
CHLORIDE SERPL-SCNC: 107 MMOL/L — SIGNIFICANT CHANGE UP (ref 96–108)
CHLORIDE SERPL-SCNC: 107 MMOL/L — SIGNIFICANT CHANGE UP (ref 96–108)
CO2 SERPL-SCNC: 29 MMOL/L — SIGNIFICANT CHANGE UP (ref 22–31)
CO2 SERPL-SCNC: 29 MMOL/L — SIGNIFICANT CHANGE UP (ref 22–31)
CREAT SERPL-MCNC: 1.4 MG/DL — HIGH (ref 0.5–1.3)
CREAT SERPL-MCNC: 1.4 MG/DL — HIGH (ref 0.5–1.3)
EGFR: 34 ML/MIN/1.73M2 — LOW
EGFR: 34 ML/MIN/1.73M2 — LOW
EOSINOPHIL # BLD AUTO: 0.06 K/UL — SIGNIFICANT CHANGE UP (ref 0–0.5)
EOSINOPHIL # BLD AUTO: 0.06 K/UL — SIGNIFICANT CHANGE UP (ref 0–0.5)
EOSINOPHIL NFR BLD AUTO: 0.7 % — SIGNIFICANT CHANGE UP (ref 0–6)
EOSINOPHIL NFR BLD AUTO: 0.7 % — SIGNIFICANT CHANGE UP (ref 0–6)
GLUCOSE SERPL-MCNC: 125 MG/DL — HIGH (ref 70–99)
GLUCOSE SERPL-MCNC: 125 MG/DL — HIGH (ref 70–99)
HCT VFR BLD CALC: 40.6 % — SIGNIFICANT CHANGE UP (ref 34.5–45)
HCT VFR BLD CALC: 40.6 % — SIGNIFICANT CHANGE UP (ref 34.5–45)
HGB BLD-MCNC: 13.1 G/DL — SIGNIFICANT CHANGE UP (ref 11.5–15.5)
HGB BLD-MCNC: 13.1 G/DL — SIGNIFICANT CHANGE UP (ref 11.5–15.5)
IMM GRANULOCYTES NFR BLD AUTO: 0.7 % — SIGNIFICANT CHANGE UP (ref 0–0.9)
IMM GRANULOCYTES NFR BLD AUTO: 0.7 % — SIGNIFICANT CHANGE UP (ref 0–0.9)
INR BLD: 1.7 RATIO — HIGH (ref 0.85–1.18)
INR BLD: 1.7 RATIO — HIGH (ref 0.85–1.18)
LACTATE SERPL-SCNC: 2.1 MMOL/L — HIGH (ref 0.7–2)
LACTATE SERPL-SCNC: 2.1 MMOL/L — HIGH (ref 0.7–2)
LYMPHOCYTES # BLD AUTO: 1.01 K/UL — SIGNIFICANT CHANGE UP (ref 1–3.3)
LYMPHOCYTES # BLD AUTO: 1.01 K/UL — SIGNIFICANT CHANGE UP (ref 1–3.3)
LYMPHOCYTES # BLD AUTO: 11.9 % — LOW (ref 13–44)
LYMPHOCYTES # BLD AUTO: 11.9 % — LOW (ref 13–44)
MCHC RBC-ENTMCNC: 32.3 GM/DL — SIGNIFICANT CHANGE UP (ref 32–36)
MCHC RBC-ENTMCNC: 32.3 GM/DL — SIGNIFICANT CHANGE UP (ref 32–36)
MCHC RBC-ENTMCNC: 33.3 PG — SIGNIFICANT CHANGE UP (ref 27–34)
MCHC RBC-ENTMCNC: 33.3 PG — SIGNIFICANT CHANGE UP (ref 27–34)
MCV RBC AUTO: 103.3 FL — HIGH (ref 80–100)
MCV RBC AUTO: 103.3 FL — HIGH (ref 80–100)
MONOCYTES # BLD AUTO: 0.46 K/UL — SIGNIFICANT CHANGE UP (ref 0–0.9)
MONOCYTES # BLD AUTO: 0.46 K/UL — SIGNIFICANT CHANGE UP (ref 0–0.9)
MONOCYTES NFR BLD AUTO: 5.4 % — SIGNIFICANT CHANGE UP (ref 2–14)
MONOCYTES NFR BLD AUTO: 5.4 % — SIGNIFICANT CHANGE UP (ref 2–14)
NEUTROPHILS # BLD AUTO: 6.86 K/UL — SIGNIFICANT CHANGE UP (ref 1.8–7.4)
NEUTROPHILS # BLD AUTO: 6.86 K/UL — SIGNIFICANT CHANGE UP (ref 1.8–7.4)
NEUTROPHILS NFR BLD AUTO: 80.9 % — HIGH (ref 43–77)
NEUTROPHILS NFR BLD AUTO: 80.9 % — HIGH (ref 43–77)
NRBC # BLD: 0 /100 WBCS — SIGNIFICANT CHANGE UP (ref 0–0)
NRBC # BLD: 0 /100 WBCS — SIGNIFICANT CHANGE UP (ref 0–0)
PLATELET # BLD AUTO: 166 K/UL — SIGNIFICANT CHANGE UP (ref 150–400)
PLATELET # BLD AUTO: 166 K/UL — SIGNIFICANT CHANGE UP (ref 150–400)
POTASSIUM SERPL-MCNC: 4.9 MMOL/L — SIGNIFICANT CHANGE UP (ref 3.5–5.3)
POTASSIUM SERPL-MCNC: 4.9 MMOL/L — SIGNIFICANT CHANGE UP (ref 3.5–5.3)
POTASSIUM SERPL-SCNC: 4.9 MMOL/L — SIGNIFICANT CHANGE UP (ref 3.5–5.3)
POTASSIUM SERPL-SCNC: 4.9 MMOL/L — SIGNIFICANT CHANGE UP (ref 3.5–5.3)
PROT SERPL-MCNC: 8.1 G/DL — SIGNIFICANT CHANGE UP (ref 6–8.3)
PROT SERPL-MCNC: 8.1 G/DL — SIGNIFICANT CHANGE UP (ref 6–8.3)
PROTHROM AB SERPL-ACNC: 19.6 SEC — HIGH (ref 9.5–13)
PROTHROM AB SERPL-ACNC: 19.6 SEC — HIGH (ref 9.5–13)
RBC # BLD: 3.93 M/UL — SIGNIFICANT CHANGE UP (ref 3.8–5.2)
RBC # BLD: 3.93 M/UL — SIGNIFICANT CHANGE UP (ref 3.8–5.2)
RBC # FLD: 15.4 % — HIGH (ref 10.3–14.5)
RBC # FLD: 15.4 % — HIGH (ref 10.3–14.5)
SODIUM SERPL-SCNC: 140 MMOL/L — SIGNIFICANT CHANGE UP (ref 135–145)
SODIUM SERPL-SCNC: 140 MMOL/L — SIGNIFICANT CHANGE UP (ref 135–145)
TROPONIN I, HIGH SENSITIVITY RESULT: 40.1 NG/L — SIGNIFICANT CHANGE UP
TROPONIN I, HIGH SENSITIVITY RESULT: 40.1 NG/L — SIGNIFICANT CHANGE UP
WBC # BLD: 8.48 K/UL — SIGNIFICANT CHANGE UP (ref 3.8–10.5)
WBC # BLD: 8.48 K/UL — SIGNIFICANT CHANGE UP (ref 3.8–10.5)
WBC # FLD AUTO: 8.48 K/UL — SIGNIFICANT CHANGE UP (ref 3.8–10.5)
WBC # FLD AUTO: 8.48 K/UL — SIGNIFICANT CHANGE UP (ref 3.8–10.5)

## 2023-11-19 PROCEDURE — 93010 ELECTROCARDIOGRAM REPORT: CPT

## 2023-11-19 PROCEDURE — 99291 CRITICAL CARE FIRST HOUR: CPT

## 2023-11-19 PROCEDURE — 71045 X-RAY EXAM CHEST 1 VIEW: CPT | Mod: 26

## 2023-11-19 RX ORDER — ACETAMINOPHEN 500 MG
1000 TABLET ORAL ONCE
Refills: 0 | Status: COMPLETED | OUTPATIENT
Start: 2023-11-19 | End: 2023-11-19

## 2023-11-19 RX ORDER — IPRATROPIUM/ALBUTEROL SULFATE 18-103MCG
3 AEROSOL WITH ADAPTER (GRAM) INHALATION ONCE
Refills: 0 | Status: COMPLETED | OUTPATIENT
Start: 2023-11-19 | End: 2023-11-19

## 2023-11-19 RX ORDER — SODIUM CHLORIDE 9 MG/ML
500 INJECTION INTRAMUSCULAR; INTRAVENOUS; SUBCUTANEOUS ONCE
Refills: 0 | Status: COMPLETED | OUTPATIENT
Start: 2023-11-19 | End: 2023-11-19

## 2023-11-19 RX ADMIN — Medication 1000 MILLIGRAM(S): at 23:45

## 2023-11-19 RX ADMIN — Medication 125 MILLIGRAM(S): at 23:32

## 2023-11-19 RX ADMIN — Medication 3 MILLILITER(S): at 23:31

## 2023-11-19 RX ADMIN — Medication 400 MILLIGRAM(S): at 23:31

## 2023-11-19 NOTE — ED PROVIDER NOTE - CLINICAL SUMMARY MEDICAL DECISION MAKING FREE TEXT BOX
Patient is a 89-year-old female who presents to the emergency room with a chief complaint of shortness of breath and indigestion..  Past medical history of hypothyroidism hyperlipidemia glaucoma hypertension A-fib on Eliquis asthma GERD peripheral edema acute chronic respiratory failure with hypoxia on home O2 CHF on torsemide history of cardiac murmur history of TAVR history of CAD status post stent.  Patient presents from [place] due to shortness of breath and reflux.  Patient reports that earlier all day she was experiencing indigestion.  She was given oral medication prior to arrival which seems to have resolved her symptoms.  On arrival of EMS patient then subsequently developed shortness of breath.  She was transferred to the emergency room for further work-up and evaluation. Found to be febrile on arrival. Denies CP, vomiting, diarrhea.  On exam patient is lying in bed pursed lip breathing elevated heart rate noted maintaining sats nasal cannula.  Normocephalic atraumatic dry mucous membranes heart is rapid and irregular lungs are diminished at the bases abdomen soft nontender nondistended.  Patient presenting to the emergency room with chief complaint of indigestion and shortness of breath.  Differential is broad includes possible pulmonary infection viral versus bacterial versus cardiac pathology versus intra-abdominal pathology.  At this time abdomen is soft nontender nondistended.  Will obtain screening septic work-up RVP ABG.  Will obtain EKG proBNP chest x-ray cardiac enzymes.  We will continue oxygen begin antibiotics as needed and monitor.  Independent review of EKG reveals atrial fibrillation at a rate of 102 bpm.  Independent review of chest x-ray reveals interstitial lung disease but appears to be pulmonary vascular congestion.  Patient was found to be febrile we will treat for fever.  If a bacterial source is discovered we will begin antibiotics. Results of labs reviewed patient with a normal white count no evidence of anemia coags noted mildly elevated T. bili although AST and ALT are within normal.  Creatinine of one-point reports unclear what patient's baseline is right now there is concern for possible volume overload we will have to be careful with aggressive hydration lactate of 2.1 this may be due to respiratory distress will repeat after a 500 cc bolus but will not provide full sepsis fluids in light of patient's cardiac disease.  proBNP is elevated.  Patient ultimately was found to be positive for COVID she would explain her symptoms.  Initial cardiac enzyme within normal patient with no active chest pain.  CT imaging of the chest abdomen pelvis were added.  Decadron was ordered.  Patient will require admission for further management of her COVID.  Patient was admitted to the hospital CT results are pending at time of admission attending to follow these up.  Short time after admission patient began to develop hypoxia on nasal cannula.  The decision was made to place patient on high flow.  Sats were maintained on high flow at this time.  Overall patient's prognosis is poor. Repeat lactate normalized.

## 2023-11-19 NOTE — ED PROVIDER NOTE - NS ED ATTENDING STATEMENT MOD
Cheek To Nose Interpolation Flap Division And Inset Text: Division and inset of the cheek to nose interpolation flap was performed to achieve optimal aesthetic result, restore normal anatomic appearance and avoid distortion of normal anatomy, expedite and facilitate wound healing, achieve optimal functional result and because linear closure either not possible or would produce suboptimal result. The patient was prepped and draped in the usual manner. The pedicle was infiltrated with local anesthesia. The pedicle was sectioned with a #15 blade. The pedicle was de-bulked and trimmed to match the shape of the defect. Hemostasis was achieved. The flap donor site and free margin of the flap were secured with deep buried sutures and the wound edges were re-approximated. I have personally provided the amount of critical care time documented below excluding time spent on separate procedures.

## 2023-11-19 NOTE — ED ADULT NURSE NOTE - CHIEF COMPLAINT QUOTE
Pt sent from [facility] [place] for indigestion, on ems arrival, pt in rapid afib, given metoprolol 5mg, ekg in field reas STEMI.

## 2023-11-19 NOTE — ED PROVIDER NOTE - DIFFERENTIAL DIAGNOSIS
Differential Diagnosis Patient presenting to the emergency room with chief complaint of indigestion and shortness of breath.  Differential is broad includes possible pulmonary infection viral versus bacterial versus cardiac pathology versus intra-abdominal pathology.  At this time abdomen is soft nontender nondistended.  Will obtain screening septic work-up RVP ABG.  Will obtain EKG proBNP chest x-ray cardiac enzymes.  We will continue oxygen begin antibiotics as needed and monitor.

## 2023-11-19 NOTE — ED PROVIDER NOTE - CRITICAL CARE ATTENDING CONTRIBUTION TO CARE
Patient is a 89-year-old female who presents to the emergency room with a chief complaint of shortness of breath and indigestion..  Past medical history of hypothyroidism hyperlipidemia glaucoma hypertension A-fib on Eliquis asthma GERD peripheral edema acute chronic respiratory failure with hypoxia on home O2 CHF on torsemide history of cardiac murmur history of TAVR history of CAD status post stent.  Patient presents from [place] due to shortness of breath and reflux.  Patient reports that earlier all day she was experiencing indigestion.  She was given oral medication prior to arrival which seems to have resolved her symptoms.  On arrival of EMS patient then subsequently developed shortness of breath.  She was transferred to the emergency room for further work-up and evaluation. Found to be febrile on arrival. Denies CP, vomiting, diarrhea.  On exam patient is lying in bed pursed lip breathing elevated heart rate noted maintaining sats nasal cannula.  Normocephalic atraumatic dry mucous membranes heart is rapid and irregular lungs are diminished at the bases abdomen soft nontender nondistended.  Patient presenting to the emergency room with chief complaint of indigestion and shortness of breath.  Differential is broad includes possible pulmonary infection viral versus bacterial versus cardiac pathology versus intra-abdominal pathology.  At this time abdomen is soft nontender nondistended.  Will obtain screening septic work-up RVP ABG.  Will obtain EKG proBNP chest x-ray cardiac enzymes.  We will continue oxygen begin antibiotics as needed and monitor.  Independent review of EKG reveals atrial fibrillation at a rate of 102 bpm.  Independent review of chest x-ray reveals interstitial lung disease but appears to be pulmonary vascular congestion.  Patient was found to be febrile we will treat for fever.  If a bacterial source is discovered we will begin antibiotics. Results of labs reviewed patient with a normal white count no evidence of anemia coags noted mildly elevated T. bili although AST and ALT are within normal.  Creatinine of one-point reports unclear what patient's baseline is right now there is concern for possible volume overload we will have to be careful with aggressive hydration lactate of 2.1 this may be due to respiratory distress will repeat after a 500 cc bolus but will not provide full sepsis fluids in light of patient's cardiac disease.  proBNP is elevated.  Patient ultimately was found to be positive for COVID she would explain her symptoms.  Initial cardiac enzyme within normal patient with no active chest pain.  CT imaging of the chest abdomen pelvis were added.  Decadron was ordered.  Patient will require admission for further management of her COVID.  Patient was admitted to the hospital CT results are pending at time of admission attending to follow these up.  Short time after admission patient began to develop hypoxia on nasal cannula.  The decision was made to place patient on high flow.  Sats were maintained on high flow at this time.  Overall patient's prognosis is poor. Repeat lactate normalized

## 2023-11-19 NOTE — ED PROVIDER NOTE - OBJECTIVE STATEMENT
89 F hx hypothyroid, hld, glaucoma, htn, afib on eliquis, asthma, gerd, edema, acute chronic respiratory failure with hypoxia on home O2, chf on torsemide, cardiac murmur, TAVR, cad s/p 1 stent BIBEMS from [place] due to shortness of breath. Earlier pt was complaining of indigestion, which resolved. EMS found pt to be in rapid afib, gave 5 IV metoprolol, EMS reading as STEMI. Pt states early this morning was having indigestion, after many hours was able to get medication from staff which improved this. SOB all day which worsened, prompting EMS call. Denies fever, cp, cough, n/v/d, leg swelling.

## 2023-11-19 NOTE — ED ADULT NURSE NOTE - NSFALLHARMRISKINTERV_ED_ALL_ED
Assistance OOB with selected safe patient handling equipment if applicable/Assistance with ambulation/Communicate risk of Fall with Harm to all staff, patient, and family/Provide visual cue: red socks, yellow wristband, yellow gown, etc/Reinforce activity limits and safety measures with patient and family/Bed in lowest position, wheels locked, appropriate side rails in place/Call bell, personal items and telephone in reach/Instruct patient to call for assistance before getting out of bed/chair/stretcher/Non-slip footwear applied when patient is off stretcher/[place] to call system/Physically safe environment - no spills, clutter or unnecessary equipment/Purposeful Proactive Rounding/Room/bathroom lighting operational, light cord in reach Assistance OOB with selected safe patient handling equipment if applicable/Assistance with ambulation/Communicate risk of Fall with Harm to all staff, patient, and family/Monitor gait and stability/Provide patient with walking aids/Provide visual cue: red socks, yellow wristband, yellow gown, etc/Reinforce activity limits and safety measures with patient and family/Bed in lowest position, wheels locked, appropriate side rails in place/Call bell, personal items and telephone in reach/Instruct patient to call for assistance before getting out of bed/chair/stretcher/Non-slip footwear applied when patient is off stretcher/[place] to call system/Physically safe environment - no spills, clutter or unnecessary equipment/Purposeful Proactive Rounding/Room/bathroom lighting operational, light cord in reach

## 2023-11-19 NOTE — ED ADULT TRIAGE NOTE - CHIEF COMPLAINT QUOTE
Pt sent from [facility] at [place] for indigestion, on ems arrival, pt in rapid afib, given metoprolol 5mg, ekg in field reas STEMI.

## 2023-11-19 NOTE — ED PROVIDER NOTE - CARE PLAN
Principal Discharge DX:	Shortness of breath  Secondary Diagnosis:	Fever   1 Principal Discharge DX:	2019 novel coronavirus disease (COVID-19)  Secondary Diagnosis:	Fever  Secondary Diagnosis:	Hypoxia

## 2023-11-19 NOTE — ED ADULT NURSE NOTE - OBJECTIVE STATEMENT
Pt presents to the ED s/p indigestion and fever. EKG done, pt placed on cardiac monitor, continuous pulse ox, 02 @ 2l via n/c. Pt presents to the ED s/p indigestion and fever. EKG done, pt placed on cardiac monitor, continuous pulse ox, 02 @ 2l via n/c. Pt presents to the ED with #20 IV access on the left a/c, flushes with ease.

## 2023-11-19 NOTE — ED ADULT TRIAGE NOTE - NS ED TRIAGE AVPU SCALE
Spoke with pt's son who reports [facility].    Alert-The patient is alert, awake and responds to voice. The patient is oriented to time, place, and person. The triage nurse is able to obtain subjective information.

## 2023-11-20 DIAGNOSIS — R06.02 SHORTNESS OF BREATH: ICD-10-CM

## 2023-11-20 LAB
ALBUMIN SERPL ELPH-MCNC: 2.9 G/DL — LOW (ref 3.3–5)
ALBUMIN SERPL ELPH-MCNC: 2.9 G/DL — LOW (ref 3.3–5)
ALP SERPL-CCNC: 139 U/L — HIGH (ref 40–120)
ALP SERPL-CCNC: 139 U/L — HIGH (ref 40–120)
ALT FLD-CCNC: 21 U/L — SIGNIFICANT CHANGE UP (ref 12–78)
ALT FLD-CCNC: 21 U/L — SIGNIFICANT CHANGE UP (ref 12–78)
AST SERPL-CCNC: 35 U/L — SIGNIFICANT CHANGE UP (ref 15–37)
AST SERPL-CCNC: 35 U/L — SIGNIFICANT CHANGE UP (ref 15–37)
BASE EXCESS BLDA CALC-SCNC: -0.9 MMOL/L — SIGNIFICANT CHANGE UP (ref -2–3)
BASE EXCESS BLDA CALC-SCNC: -0.9 MMOL/L — SIGNIFICANT CHANGE UP (ref -2–3)
BILIRUB DIRECT SERPL-MCNC: 0.6 MG/DL — HIGH (ref 0–0.3)
BILIRUB DIRECT SERPL-MCNC: 0.6 MG/DL — HIGH (ref 0–0.3)
BILIRUB INDIRECT FLD-MCNC: 0.8 MG/DL — SIGNIFICANT CHANGE UP (ref 0.2–1)
BILIRUB INDIRECT FLD-MCNC: 0.8 MG/DL — SIGNIFICANT CHANGE UP (ref 0.2–1)
BILIRUB SERPL-MCNC: 1.4 MG/DL — HIGH (ref 0.2–1.2)
BILIRUB SERPL-MCNC: 1.4 MG/DL — HIGH (ref 0.2–1.2)
BLOOD GAS COMMENTS ARTERIAL: SIGNIFICANT CHANGE UP
BLOOD GAS COMMENTS ARTERIAL: SIGNIFICANT CHANGE UP
CREAT SERPL-MCNC: 1.4 MG/DL — HIGH (ref 0.5–1.3)
CREAT SERPL-MCNC: 1.4 MG/DL — HIGH (ref 0.5–1.3)
CRP SERPL-MCNC: 22 MG/L — HIGH
CRP SERPL-MCNC: 22 MG/L — HIGH
EGFR: 34 ML/MIN/1.73M2 — LOW
EGFR: 34 ML/MIN/1.73M2 — LOW
GAS PNL BLDA: SIGNIFICANT CHANGE UP
GAS PNL BLDA: SIGNIFICANT CHANGE UP
HCO3 BLDA-SCNC: 21 MMOL/L — SIGNIFICANT CHANGE UP (ref 21–28)
HCO3 BLDA-SCNC: 21 MMOL/L — SIGNIFICANT CHANGE UP (ref 21–28)
HOROWITZ INDEX BLDA+IHG-RTO: 50 — SIGNIFICANT CHANGE UP
HOROWITZ INDEX BLDA+IHG-RTO: 50 — SIGNIFICANT CHANGE UP
LACTATE SERPL-SCNC: 2 MMOL/L — SIGNIFICANT CHANGE UP (ref 0.7–2)
LACTATE SERPL-SCNC: 2 MMOL/L — SIGNIFICANT CHANGE UP (ref 0.7–2)
NT-PROBNP SERPL-SCNC: 6274 PG/ML — HIGH (ref 0–450)
NT-PROBNP SERPL-SCNC: 6274 PG/ML — HIGH (ref 0–450)
PCO2 BLDA: 23 MMHG — LOW (ref 32–35)
PCO2 BLDA: 23 MMHG — LOW (ref 32–35)
PH BLDA: 7.57 — HIGH (ref 7.35–7.45)
PH BLDA: 7.57 — HIGH (ref 7.35–7.45)
PO2 BLDA: 181 MMHG — HIGH (ref 83–108)
PO2 BLDA: 181 MMHG — HIGH (ref 83–108)
PROT SERPL-MCNC: 6.8 G/DL — SIGNIFICANT CHANGE UP (ref 6–8.3)
PROT SERPL-MCNC: 6.8 G/DL — SIGNIFICANT CHANGE UP (ref 6–8.3)
RAPID RVP RESULT: DETECTED
RAPID RVP RESULT: DETECTED
SAO2 % BLDA: 99 % — HIGH (ref 94–98)
SAO2 % BLDA: 99 % — HIGH (ref 94–98)
SARS-COV-2 RNA SPEC QL NAA+PROBE: DETECTED
SARS-COV-2 RNA SPEC QL NAA+PROBE: DETECTED

## 2023-11-20 PROCEDURE — 74176 CT ABD & PELVIS W/O CONTRAST: CPT | Mod: 26

## 2023-11-20 PROCEDURE — 71250 CT THORAX DX C-: CPT | Mod: 26

## 2023-11-20 RX ORDER — REMDESIVIR 5 MG/ML
INJECTION INTRAVENOUS
Refills: 0 | Status: COMPLETED | OUTPATIENT
Start: 2023-11-20 | End: 2023-11-24

## 2023-11-20 RX ORDER — METOPROLOL TARTRATE 50 MG
25 TABLET ORAL DAILY
Refills: 0 | Status: DISCONTINUED | OUTPATIENT
Start: 2023-11-20 | End: 2023-11-22

## 2023-11-20 RX ORDER — IPRATROPIUM/ALBUTEROL SULFATE 18-103MCG
3 AEROSOL WITH ADAPTER (GRAM) INHALATION EVERY 6 HOURS
Refills: 0 | Status: DISCONTINUED | OUTPATIENT
Start: 2023-11-20 | End: 2023-11-25

## 2023-11-20 RX ORDER — AZITHROMYCIN 500 MG/1
500 TABLET, FILM COATED ORAL ONCE
Refills: 0 | Status: COMPLETED | OUTPATIENT
Start: 2023-11-20 | End: 2023-11-20

## 2023-11-20 RX ORDER — DEXAMETHASONE 0.5 MG/5ML
6 ELIXIR ORAL ONCE
Refills: 0 | Status: COMPLETED | OUTPATIENT
Start: 2023-11-20 | End: 2023-11-20

## 2023-11-20 RX ORDER — DEXAMETHASONE 0.5 MG/5ML
6 ELIXIR ORAL DAILY
Refills: 0 | Status: DISCONTINUED | OUTPATIENT
Start: 2023-11-20 | End: 2023-11-25

## 2023-11-20 RX ORDER — APIXABAN 2.5 MG/1
2.5 TABLET, FILM COATED ORAL EVERY 12 HOURS
Refills: 0 | Status: DISCONTINUED | OUTPATIENT
Start: 2023-11-20 | End: 2023-11-25

## 2023-11-20 RX ORDER — APIXABAN 2.5 MG/1
2.5 TABLET, FILM COATED ORAL ONCE
Refills: 0 | Status: COMPLETED | OUTPATIENT
Start: 2023-11-20 | End: 2023-11-20

## 2023-11-20 RX ORDER — ACETAMINOPHEN 500 MG
650 TABLET ORAL EVERY 6 HOURS
Refills: 0 | Status: DISCONTINUED | OUTPATIENT
Start: 2023-11-20 | End: 2023-11-25

## 2023-11-20 RX ORDER — ASCORBIC ACID 60 MG
500 TABLET,CHEWABLE ORAL
Refills: 0 | Status: CANCELLED | OUTPATIENT
Start: 2023-11-20 | End: 2023-11-25

## 2023-11-20 RX ORDER — ATORVASTATIN CALCIUM 80 MG/1
40 TABLET, FILM COATED ORAL AT BEDTIME
Refills: 0 | Status: DISCONTINUED | OUTPATIENT
Start: 2023-11-20 | End: 2023-11-25

## 2023-11-20 RX ORDER — MAGNESIUM HYDROXIDE 400 MG/1
30 TABLET, CHEWABLE ORAL DAILY
Refills: 0 | Status: DISCONTINUED | OUTPATIENT
Start: 2023-11-20 | End: 2023-11-25

## 2023-11-20 RX ORDER — REMDESIVIR 5 MG/ML
100 INJECTION INTRAVENOUS EVERY 24 HOURS
Refills: 0 | Status: COMPLETED | OUTPATIENT
Start: 2023-11-21 | End: 2023-11-24

## 2023-11-20 RX ORDER — REMDESIVIR 5 MG/ML
200 INJECTION INTRAVENOUS EVERY 24 HOURS
Refills: 0 | Status: COMPLETED | OUTPATIENT
Start: 2023-11-20 | End: 2023-11-20

## 2023-11-20 RX ORDER — IPRATROPIUM BROMIDE 0.2 MG/ML
1 SOLUTION, NON-ORAL INHALATION EVERY 6 HOURS
Refills: 0 | Status: DISCONTINUED | OUTPATIENT
Start: 2023-11-20 | End: 2023-11-25

## 2023-11-20 RX ADMIN — REMDESIVIR 200 MILLIGRAM(S): 5 INJECTION INTRAVENOUS at 10:57

## 2023-11-20 RX ADMIN — Medication 3 MILLILITER(S): at 20:14

## 2023-11-20 RX ADMIN — APIXABAN 2.5 MILLIGRAM(S): 2.5 TABLET, FILM COATED ORAL at 14:22

## 2023-11-20 RX ADMIN — Medication 6 MILLIGRAM(S): at 10:57

## 2023-11-20 RX ADMIN — AZITHROMYCIN 255 MILLIGRAM(S): 500 TABLET, FILM COATED ORAL at 00:57

## 2023-11-20 RX ADMIN — Medication 25 MILLIGRAM(S): at 10:58

## 2023-11-20 RX ADMIN — APIXABAN 2.5 MILLIGRAM(S): 2.5 TABLET, FILM COATED ORAL at 21:13

## 2023-11-20 RX ADMIN — SODIUM CHLORIDE 500 MILLILITER(S): 9 INJECTION INTRAMUSCULAR; INTRAVENOUS; SUBCUTANEOUS at 00:24

## 2023-11-20 RX ADMIN — Medication 10 MILLIGRAM(S): at 10:58

## 2023-11-20 RX ADMIN — Medication 1 PUFF(S): at 21:13

## 2023-11-20 RX ADMIN — Medication 1000 MILLIGRAM(S): at 00:34

## 2023-11-20 RX ADMIN — ATORVASTATIN CALCIUM 40 MILLIGRAM(S): 80 TABLET, FILM COATED ORAL at 21:13

## 2023-11-20 RX ADMIN — Medication 6 MILLIGRAM(S): at 01:23

## 2023-11-20 RX ADMIN — Medication 3 MILLILITER(S): at 13:54

## 2023-11-20 NOTE — DIETITIAN INITIAL EVALUATION ADULT - ADD RECOMMEND
Rx MVI daily and vit C 500 mg BID  Rx MVI daily and vit C 500 mg BID   honor pt's food preferences/tolerances

## 2023-11-20 NOTE — ED ADULT NURSE REASSESSMENT NOTE - NS ED NURSE REASSESS COMMENT FT1
approx 0230 noticed pt oxygen saturation in mid 80's, turned oxygen to 6 LPM via nasal, no change, placed pt on NRB @ 15 lPM 02 sat improved, pt denies shortness of breathe or feeling difficulty breathing, MD walters aware. high flow ordered, respiratory placed pt on high cody nasal. pt tolerating, sat improved, pt safety maintained, will monitor

## 2023-11-20 NOTE — H&P ADULT - NSHPLABSRESULTS_GEN_ALL_CORE
Lab Results:  CBC  CBC Full  -  ( 19 Nov 2023 22:40 )  WBC Count : 8.48 K/uL  RBC Count : 3.93 M/uL  Hemoglobin : 13.1 g/dL  Hematocrit : 40.6 %  Platelet Count - Automated : 166 K/uL  Mean Cell Volume : 103.3 fl  Mean Cell Hemoglobin : 33.3 pg  Mean Cell Hemoglobin Concentration : 32.3 gm/dL  Auto Neutrophil # : 6.86 K/uL  Auto Lymphocyte # : 1.01 K/uL  Auto Monocyte # : 0.46 K/uL  Auto Eosinophil # : 0.06 K/uL  Auto Basophil # : 0.03 K/uL  Auto Neutrophil % : 80.9 %  Auto Lymphocyte % : 11.9 %  Auto Monocyte % : 5.4 %  Auto Eosinophil % : 0.7 %  Auto Basophil % : 0.4 %    .		Differential:	[] Automated		[] Manual  Chemistry                        13.1   8.48  )-----------( 166      ( 19 Nov 2023 22:40 )             40.6     11-19    140  |  107  |  30<H>  ----------------------------<  125<H>  4.9   |  29  |  1.40<H>    Ca    9.0      19 Nov 2023 22:40    TPro  8.1  /  Alb  3.2<L>  /  TBili  1.5<H>  /  DBili  x   /  AST  34  /  ALT  22  /  AlkPhos  150<H>  11-19    LIVER FUNCTIONS - ( 19 Nov 2023 22:40 )  Alb: 3.2 g/dL / Pro: 8.1 g/dL / ALK PHOS: 150 U/L / ALT: 22 U/L / AST: 34 U/L / GGT: x           PT/INR - ( 19 Nov 2023 22:40 )   PT: 19.6 sec;   INR: 1.70 ratio         PTT - ( 19 Nov 2023 22:40 )  PTT:35.2 sec  Urinalysis Basic - ( 19 Nov 2023 22:40 )    Color: x / Appearance: x / SG: x / pH: x  Gluc: 125 mg/dL / Ketone: x  / Bili: x / Urobili: x   Blood: x / Protein: x / Nitrite: x   Leuk Esterase: x / RBC: x / WBC x   Sq Epi: x / Non Sq Epi: x / Bacteria: x            MICROBIOLOGY/CULTURES:      RADIOLOGY RESULTS:

## 2023-11-20 NOTE — DIETITIAN INITIAL EVALUATION ADULT - PERTINENT MEDS FT
MEDICATIONS  (STANDING):  albuterol/ipratropium for Nebulization 3 milliLiter(s) Nebulizer every 6 hours  apixaban 2.5 milliGRAM(s) Oral every 12 hours  atorvastatin 40 milliGRAM(s) Oral at bedtime  dexAMETHasone     Tablet 6 milliGRAM(s) Oral daily  ipratropium 17 MICROgram(s) HFA Inhaler 1 Puff(s) Inhalation every 6 hours  metoprolol succinate ER 25 milliGRAM(s) Oral daily  remdesivir  IVPB   IV Intermittent   remdesivir  IVPB 200 milliGRAM(s) IV Intermittent every 24 hours  torsemide 10 milliGRAM(s) Oral daily    MEDICATIONS  (PRN):  acetaminophen     Tablet .. 650 milliGRAM(s) Oral every 6 hours PRN Temp greater or equal to 38C (100.4F), Mild Pain (1 - 3)  guaiFENesin Oral Liquid (Sugar-Free) 200 milliGRAM(s) Oral every 6 hours PRN Cough  magnesium hydroxide Suspension 30 milliLiter(s) Oral daily PRN Constipation

## 2023-11-20 NOTE — DIETITIAN INITIAL EVALUATION ADULT - NSFNSPHYEXAMSKINFT_GEN_A_CORE
Pressure Injury 1: Left:, heel, Stage I  Pressure Injury 2: Right:, Stage I, heel  Pressure Injury 3: back, lower, Stage I  Pressure Injury 4: sacrum, Stage I  Pressure Injury 5: none, none  Pressure Injury 6: none, none  Pressure Injury 7: none, none  Pressure Injury 8: none, none  Pressure Injury 9: none, none  Pressure Injury 10: none, none  Pressure Injury 11: none, none, Pressure Injury 1: Left:, heel, Stage I  Pressure Injury 2: Right:, heel, Stage I  Pressure Injury 3: lower, back, Stage I  Pressure Injury 4: sacrum, Stage I, Suspected deep tissue injury  Pressure Injury 5: none, none  Pressure Injury 6: none, none  Pressure Injury 7: none, none  Pressure Injury 8: none, none  Pressure Injury 9: none, none  Pressure Injury 10: none, none  Pressure Injury 11: none, none

## 2023-11-20 NOTE — GOALS OF CARE CONVERSATION - ADVANCED CARE PLANNING - CONVERSATION DETAILS
[facility]- Palliative care SW met with patient at bedside and spoke with son Yris by phone. Reviewed patient's medical and social history as well as events leading to patient's hospitalization. Writer discussed patient's current diagnosis (AHRF s/s to COVID, HX of hypothyroid, hld, glaucoma, htn, afib on eliquis, asthma, gerd, edema, acute chronic respiratory failure with hypoxia on home O2, chf, cardiac murmur, TAVR, cad s/p 1 stent), medical condition and management,  prognosis, and life expectancy. Patient reports her son Yris is her health care agent. Patient has a copy MOLST on chart sent by [facility] with DNR orders. Reviewed MOLST form with patient and she states that she wants to continue DNR orders and add DNI orders. Writer recommended completion of a new MOLST. MOLST form completed with DNR/DNI orders and placed on patient's chart. SW also placed a call to patient's son to advise him of conversation with patient. He states that patient makes her own decisions. Patient showed insight into medical condition. All questions answered.  Psychosocial support provided.

## 2023-11-20 NOTE — CONSULT NOTE ADULT - ASSESSMENT
89 F hx hypothyroid, hld, glaucoma, htn, afib on eliquis, asthma, gerd, edema, acute chronic respiratory failure with hypoxia on home O2, chf on torsemide, cardiac murmur, TAVR, cad s/p 1 stent BIBEMS from [place] due to shortness of breath    bronchiectasis  resp viral illness  valv heart disease   HTN  HLD  AF  Asthma  GERD  COVID    covid isol  remdesivir - decadron  NEBS ATC for Asthma and Bronchiectasis  fio2 titration - HF NC - NRB -   goal sat > 88 pct  GOC discussion   assist with needs  assess need for Diuresis - proBNP noted - pt with known extensive card hx -   on tele monitor  tylenol PRN   robitussin PRN   on AC -   prognosis guarded    
89 F hx hypothyroid, hld, glaucoma, htn, afib on eliquis, asthma, gerd, edema, acute chronic respiratory failure with hypoxia on home O2, chf on torsemide, cardiac murmur, TAVR, cad s/p 1 stent BIBEMS from [place] due to shortness of breath. EMS found pt to be in rapid afib, SOB worsened, prompting EMS call. Nerissa reports that starting Friday morning she felt poorly and the woman with whom she has her meals has COVID and is right now doing her 5 days of isolation. PT COVID+    RECOMMENDATIONS    1-COVID-19  Remdesivir started 11/20 am, with pt already requiring oxygen recommend 5 days so last day 11/24-Friday in am  Pt with hypoxemia so dexamethasone 6mg PO daily x 6 days so last day 11/25  PT on apixaban for VTE prophylaxis  pulm support  obs off abx    CArdiology involved for rapid af-b    Thank you for consulting us and involving us in the management of this most interesting and challenging case.  We will follow along in the care of this patient. Please call us at [phone] or text me directly on my cell# at [phone] with any concerns.  
89F with known HLD, HTN, AF on Eliquis 2.5 bid, Asthma, GERD on home O2 who presents with worsening SOB found with afib and RVR along with Positive RVP and COVID along with elevated BNP OF 6K. Now clinically improving.    SUGGEST:    1. COVID/RVP  - C/w Remdisivir, Steroids     2. AF  - c/w Eliquis  - monitor on Tele  - get echocardiogram  - rate control with Toprol    3. Will follow  -

## 2023-11-20 NOTE — DIETITIAN INITIAL EVALUATION ADULT - PERTINENT LABORATORY DATA
11-19    140  |  107  |  30<H>  ----------------------------<  125<H>  4.9   |  29  |  1.40<H>    Ca    9.0      19 Nov 2023 22:40    TPro  8.1  /  Alb  3.2<L>  /  TBili  1.5<H>  /  DBili  x   /  AST  34  /  ALT  22  /  AlkPhos  150<H>  11-19  POCT Blood Glucose.: 176 mg/dL (11-20-23 @ 07:28)

## 2023-11-20 NOTE — PATIENT PROFILE ADULT - FALL HARM RISK - HARM RISK INTERVENTIONS
Previous message was taken earlier on 4-1-22 and sent to neurologist's office. Patient stated she believed her heartburn was related to starting Gabapentin. I cannot investigate any further as this message was appropriately sent to the neurologist's office.    Assistance with ambulation/Assistance OOB with selected safe patient handling equipment/Communicate Risk of Fall with Harm to all staff/Discuss with provider need for PT consult/Monitor gait and stability/Provide patient with walking aids - walker, cane, crutches/Reinforce activity limits and safety measures with patient and family/Tailored Fall Risk Interventions/Visual Cue: Yellow wristband and red socks/Bed in lowest position, wheels locked, appropriate side rails in place/Call bell, personal items and telephone in reach/Instruct patient to call for assistance before getting out of bed or chair/Non-slip footwear when patient is out of bed/[place] to call system/Physically safe environment - no spills, clutter or unnecessary equipment/Purposeful Proactive Rounding/Room/bathroom lighting operational, light cord in reach

## 2023-11-20 NOTE — CARE COORDINATION ASSESSMENT. - OTHER PERTINENT DISCHARGE PLANNING INFORMATION:
CM contacted the [facility], spoke with Desean RAND. Per Desean patient ambulates with a walker in her room, but stays in a wheelchair when she is outside the room. Pt had home Physical therapy through [facility] prior to admission. They will need the Attestation from completed and faxed to [phone], prior to patient returning to facility. However, any changes in mental status or ambulatory status will require a 3122 to be completed. New or changed medicaiton sent to [facility] pharmacy [place].  CM contacted the [facility], spoke with Desean RAND. Per Desean patient ambulates with a walker in her room, but stays in a wheelchair when she is outside the room. Pt had home Physical therapy through [facility] prior to admission. They will need the Attestation from completed and faxed to [phone], prior to patient returning to facility. However, any changes in mental status or ambulatory status will require a 3122 to be completed. New or changed medication sent to [facility] pharmacy [place]. 11/21/23. CM met with the patient at the bedside Pt A/O x 4, CM explained role of CM and transition planning. Patient verbalized understanding. CM provided direct contact/resource folder and remains available. Pt confirmed that she ambulates with a walker with 1 person assisting her at the [facility], she stated that she uses oxygen 4 L N/C. Pt gave permission for CM to contact her son Yris Junior, CM contacted him, educated him on role of CM and provided CM contact information.

## 2023-11-20 NOTE — DIETITIAN INITIAL EVALUATION ADULT - ETIOLOGY
related to increased nutrient needs for wound healing  related to disliking food served at the [facility], as per pt

## 2023-11-20 NOTE — CONSULT NOTE ADULT - SUBJECTIVE AND OBJECTIVE BOX
ICS Cardiology Consult - Alana Debra Alcantar [phone]    CHIEF COMPLAINT: Patient is a 89y old  Female who presents with a chief complaint of SOB (20 Nov 2023 08:12)      HPI:  89 F hx hypothyroid, hld, glaucoma, htn, afib on eliquis, asthma, gerd, edema, acute chronic respiratory failure with hypoxia on home O2, chf on torsemide, cardiac murmur, TAVR, cad s/p 1 stent BIBEMS from [place] due to shortness of breath. Earlier pt was complaining of indigestion, which resolved. EMS found pt to be in rapid afib, gave 5 IV metoprolol, EMS reading as STEMI. Pt states early this morning was having indigestion, after many hours was able to get medication from staff which improved this. SOB all day which worsened, prompting EMS call. Denies fever, cp, cough, n/v/d, leg swelling. (20 Nov 2023 01:53)      PAST MEDICAL & SURGICAL HISTORY:      SOCIAL HISTORY: Alochol: Denied  Smoking: Nonsmoker  Drug Use: Denied  Marital Status:         FAMILY HISTORY: FAMILY HISTORY:      MEDICATIONS  (STANDING):  albuterol/ipratropium for Nebulization 3 milliLiter(s) Nebulizer every 6 hours  apixaban 2.5 milliGRAM(s) Oral every 12 hours  atorvastatin 40 milliGRAM(s) Oral at bedtime  dexAMETHasone     Tablet 6 milliGRAM(s) Oral daily  ipratropium 17 MICROgram(s) HFA Inhaler 1 Puff(s) Inhalation every 6 hours  metoprolol succinate ER 25 milliGRAM(s) Oral daily  remdesivir  IVPB   IV Intermittent   remdesivir  IVPB 200 milliGRAM(s) IV Intermittent every 24 hours  torsemide 10 milliGRAM(s) Oral daily    MEDICATIONS  (PRN):  acetaminophen     Tablet .. 650 milliGRAM(s) Oral every 6 hours PRN Temp greater or equal to 38C (100.4F), Mild Pain (1 - 3)  guaiFENesin Oral Liquid (Sugar-Free) 200 milliGRAM(s) Oral every 6 hours PRN Cough  magnesium hydroxide Suspension 30 milliLiter(s) Oral daily PRN Constipation      Allergies    amoxicillin (Rash)  penicillin (Rash)    Intolerances        REVIEW OF SYSTEMS:  CONSTITUTIONAL: No weakness, fevers or chills  EYES/ENT: No visual changes;  No vertigo or throat pain   NECK: No pain or stiffness  RESPIRATORY: No cough, wheezing, hemoptysis; No shortness of breath  CARDIOVASCULAR: No chest pain or palpitations  GASTROINTESTINAL: No abdominal pain. No nausea, vomiting, or hematemesis; No diarrhea or constipation. No melena or hematochezia.  GENITOURINARY: No dysuria, frequency or hematuria  NEUROLOGICAL: No numbness or weakness  SKIN: No itching or rash  All other review of systems is negative unless indicated above    VITAL SIGNS:   Vital Signs Last 24 Hrs  T(C): 36.3 (20 Nov 2023 04:54), Max: 39.2 (19 Nov 2023 22:48)  T(F): 97.4 (20 Nov 2023 04:54), Max: 102.6 (19 Nov 2023 22:48)  HR: 98 (20 Nov 2023 04:54) (65 - 101)  BP: 126/81 (20 Nov 2023 04:54) (101/71 - 126/81)  BP(mean): --  RR: 18 (20 Nov 2023 04:54) (18 - 18)  SpO2: 98% (20 Nov 2023 08:42) (93% - 100%)    Parameters below as of 20 Nov 2023 08:42  Patient On (Oxygen Delivery Method): nasal cannula, 6L        I&O's Summary      PHYSICAL EXAM:  Constitutional: Awake in NAD  HEENT:  ARVIN, EOMI  Neck: No JVD  Pulmonary: CTA B/L No R/R/W  Cardiovascular: PMI not palpable non-displaced Regular S1 and S2, no murmurs  Gastrointestinal: Bowel Sounds present, soft, nontender.   Extremities: Trace peripheral edema.   Neuro: No gross focal deficits    LABS: All Labs Reviewed:                        13.1   8.48  )-----------( 166      ( 19 Nov 2023 22:40 )             40.6     19 Nov 2023 22:40    140    |  107    |  30     ----------------------------<  125    4.9     |  29     |  1.40     Ca    9.0        19 Nov 2023 22:40    TPro  8.1    /  Alb  3.2    /  TBili  1.5    /  DBili  x      /  AST  34     /  ALT  22     /  AlkPhos  150    19 Nov 2023 22:40    PT/INR - ( 19 Nov 2023 22:40 )   PT: 19.6 sec;   INR: 1.70 ratio         PTT - ( 19 Nov 2023 22:40 )  PTT:35.2 sec      Blood Culture:         RADIOLOGY/EKG:    
Date/Time Patient Seen:  		  Referring MD:   Data Reviewed	       Patient is a 89y old  Female who presents with a chief complaint of SOB (20 Nov 2023 01:53)      Subjective/HPI   89 F hx hypothyroid, hld, glaucoma, htn, afib on eliquis, asthma, gerd, edema, acute chronic respiratory failure with hypoxia on home O2, chf on torsemide, cardiac murmur, TAVR, cad s/p 1 stent BIBEMS from [place] due to shortness of breath. Earlier pt was complaining of indigestion, which resolved. EMS found pt to be in rapid afib, gave 5 IV metoprolol, EMS reading as STEMI. Pt states early this morning was having indigestion, after many hours was able to get medication from staff which improved this. SOB all day which worsened, prompting EMS call. Denies fever, cp, cough, n/v/d, leg swelling.  PAST MEDICAL & SURGICAL HISTORY:        Medication list         MEDICATIONS  (STANDING):    MEDICATIONS  (PRN):  acetaminophen     Tablet .. 650 milliGRAM(s) Oral every 6 hours PRN Temp greater or equal to 38C (100.4F), Mild Pain (1 - 3)         Vitals log        ICU Vital Signs Last 24 Hrs  T(C): 36.3 (20 Nov 2023 04:54), Max: 39.2 (19 Nov 2023 22:48)  T(F): 97.4 (20 Nov 2023 04:54), Max: 102.6 (19 Nov 2023 22:48)  HR: 98 (20 Nov 2023 04:54) (65 - 101)  BP: 126/81 (20 Nov 2023 04:54) (101/71 - 126/81)  BP(mean): --  ABP: --  ABP(mean): --  RR: 18 (20 Nov 2023 04:54) (18 - 18)  SpO2: 100% (20 Nov 2023 04:54) (93% - 100%)    O2 Parameters below as of 20 Nov 2023 04:54  Patient On (Oxygen Delivery Method): mask, nonrebreather      PAST MEDICAL/SURGICAL/FAMILY/SOCIAL HISTORY:    Tobacco Usage:  · Tobacco Usage	Unknown if ever smoked    ALLERGIES AND HOME MEDICATIONS:   Allergies:        Allergies:  	amoxicillin: Drug, Rash  	penicillin: Drug, Rash    Home Medications:   * Outpatient Medication Status not yet specified    REVIEW OF SYSTEMS:    Review of Systems:  · CARDIOVASCULAR: - - -  · Cardiovascular [-]: no chest pain, no peripheral edema  · RESPIRATORY: - - -  · Respiratory [+]: SHORTNESS OF BREATH  · ENDOCRINE: - - -  · Endocrine [+]: thyroid  · ROS STATEMENT: all other ROS negative except as per HPI             Input and Output:  I&O's Detail      Lab Data                        13.1   8.48  )-----------( 166      ( 19 Nov 2023 22:40 )             40.6     11-19    140  |  107  |  30<H>  ----------------------------<  125<H>  4.9   |  29  |  1.40<H>    Ca    9.0      19 Nov 2023 22:40    TPro  8.1  /  Alb  3.2<L>  /  TBili  1.5<H>  /  DBili  x   /  AST  34  /  ALT  22  /  AlkPhos  150<H>  11-19    ABG - ( 20 Nov 2023 02:10 )  pH, Arterial: 7.57  pH, Blood: x     /  pCO2: 23    /  pO2: 181   / HCO3: 21    / Base Excess: -0.9  /  SaO2: 99.0                    Review of Systems	  weakness  sob  [place]      Objective     Physical Examination    heart s1s2  lung dc BS  head nc  frail  weak      Pertinent Lab findings & Imaging      [place]:  NO   Adequate UO     I&O's Detail           Discussed with:     Cultures:	        Radiology      ACC: [id number] EXAM:  CT ABDOMEN AND PELVIS   ORDERED BY: BALDEMAR HUNT     ACC: [id number] EXAM:  CT CHEST   ORDERED BY: BALDEMAR HUNT     PROCEDURE DATE:  11/20/2023          INTERPRETATION:  CLINICAL INFORMATION: Fever, shortness of breath,   abnormal chest x-ray.    COMPARISON: None.    CONTRAST/COMPLICATIONS:  IV Contrast: NONE  Oral Contrast: NONE  Complications: None reported at time of study completion    PROCEDURE:  CT of the Chest, Abdomen and Pelvis was performed.  Sagittal and coronal reformats were performed.    FINDINGS:  Evaluation of solid organs and vascular structures is limited without   intravenous contrast.    CHEST:  LUNGS AND LARGE AIRWAYS: Patent central airways. Extensive bilateral   interstitial opacities, traction bronchiectasis, and groundglass haziness   compatible with fibrotic disease. Focal area of prominent bronchiectasis   in the right upper lobe with adjacent area of discoid opacity (601, 70   and 2, 24). Bilateral lower lobe calcified granulomas. Mild left basilar   atelectasis.  PLEURA: Small pleural effusion.  VESSELS: Atherosclerotic changes of the aorta and coronary arteries.  HEART: Cardiomegaly. No pericardial effusion. Status post TAVR. Micra   pacemaker.  MEDIASTINUM AND JUANI: Calcified mediastinal and right hilar nodes.   Nonspecific mildly prominent, measuring up to 1.3 cm.  CHEST WALL AND LOWER NECK: Within normal limits.    ABDOMEN AND PELVIS:  LIVER: Scattered calcified granulomas.  BILE DUCTS: Normal caliber.  GALLBLADDER: Mild wall thickening versus pericholecystic fluid (2, 75).  SPLEEN: Calcified granuloma.  PANCREAS: Atrophic.  ADRENALS: Within normal limits.  KIDNEYS/URETERS: No renal stones or hydronephrosis. Left renal cyst.    BLADDER: Within normal limits.  REPRODUCTIVE ORGANS: Uterus and adnexa within normal limits.    BOWEL: Large hiatal hernia. No bowel obstruction. Appendix is not   visualized. No evidence of inflammation in the pericecal region.   Prominent rectal stool burden. Extensive colonic diverticulosis.  PERITONEUM: Trace of ascites at the liver tip.  VESSELS: Atherosclerotic changes.  RETROPERITONEUM/LYMPH NODES: No lymphadenopathy.  ABDOMINAL WALL: Within normal limits.  BONES: Degenerative changes. Osteopenia.    IMPRESSION:  Exam limited by noncontrast technique.    Extensive bilateral interstitial opacities, traction bronchiectasis and   groundglass haziness compatible with interstitial lung disease. Focal   area of bronchiectasis in the right upper lobe with adjacent area of   discoid opacity, most likely reflecting scarring/atelectasis.   Superimposed infection should be excluded clinically. Comparison with   prior imaging would be helpful to assess for stability. Continued   follow-up imaging is recommended.    Mild gallbladder wall thickening versus pericholecystic fluid. Right   upper quadrant ultrasound can be obtained for further evaluation as   clinically warranted.        --- End of Report ---            KEV FONSECA MD; Attending Radiologist  This document has been electronically signed. Nov 20 2023  4:42AM                        
[facility] of INFECTIOUS DISEASE  Donnie Hoyt MD PhD, Morena Zarco MD, Christa Valerio MD, Tish Alanis MD, Marty Atkins MD  and providing coverage with Charmaine Thayer MD  Providing Infectious Disease Consultations at [facility], [facility], [place], [place], [facility], [facility]    Office# [phone] to schedule follow up appointments  Answering Service for urgent calls or New Consults [phone]  Cell# to text for urgent issues Donnie Hoyt [phone]     HPI:  89 F hx hypothyroid, hld, glaucoma, htn, afib on eliquis, asthma, gerd, edema, acute chronic respiratory failure with hypoxia on home O2, chf on torsemide, cardiac murmur, TAVR, cad s/p 1 stent BIBEMS from [place] due to shortness of breath. Earlier pt was complaining of indigestion, which resolved. EMS found pt to be in rapid afib, gave 5 IV metoprolol, EMS reading as STEMI. Pt states early this morning was having indigestion, after many hours was able to get medication from staff which improved this. SOB all day which worsened, prompting EMS call. Nerissa reports that starting Friday morning she felt poorly and the woman with whom she has her meals has COVID and is right now doing her 5 days of isolation.      PAST MEDICAL & SURGICAL HISTORY:      Antimicrobials  remdesivir  IVPB   IV Intermittent       Immunological      Other  acetaminophen     Tablet .. 650 milliGRAM(s) Oral every 6 hours PRN  albuterol/ipratropium for Nebulization 3 milliLiter(s) Nebulizer every 6 hours  apixaban 2.5 milliGRAM(s) Oral every 12 hours  atorvastatin 40 milliGRAM(s) Oral at bedtime  dexAMETHasone     Tablet 6 milliGRAM(s) Oral daily  guaiFENesin Oral Liquid (Sugar-Free) 200 milliGRAM(s) Oral every 6 hours PRN  ipratropium 17 MICROgram(s) HFA Inhaler 1 Puff(s) Inhalation every 6 hours  magnesium hydroxide Suspension 30 milliLiter(s) Oral daily PRN  metoprolol succinate ER 25 milliGRAM(s) Oral daily  torsemide 10 milliGRAM(s) Oral daily      Allergies    amoxicillin (Rash)  penicillin (Rash)    Intolerances        SOCIAL HISTORY:  Social History:  neg, lives in assisted living (20 Nov 2023 01:53)      FAMILY HISTORY: noncontrib      ROS:    EYES:  Negative  blurry vision or double vision  GASTROINTESTINAL:  Negative for nausea, vomiting, diarrhea  -otherwise negative except for subjective    Vital Signs Last 24 Hrs  T(C): 36.3 (20 Nov 2023 11:44), Max: 39.2 (19 Nov 2023 22:48)  T(F): 97.3 (20 Nov 2023 11:44), Max: 102.6 (19 Nov 2023 22:48)  HR: 80 (20 Nov 2023 14:00) (65 - 101)  BP: 138/91 (20 Nov 2023 11:44) (101/71 - 138/91)  BP(mean): --  RR: 18 (20 Nov 2023 11:44) (18 - 18)  SpO2: 96% (20 Nov 2023 14:00) (93% - 100%)    Parameters below as of 20 Nov 2023 14:00  Patient On (Oxygen Delivery Method): nasal cannula        PE:  In no distress  HEENT:  NC, PERRL, sclerae anicteric, conjunctivae clear, EOMI.  Sinuses nontender, no nasal exudate.  No buccal or pharyngeal lesions, erythema or exudate  Neck:  Supple, no adenopathy  Lungs:  No accessory muscle use, bilaterally clear to auscultation  Cor:  distant  Abd:  Symmetric, normoactive BS.  Soft, nontender, no masses, guarding or rebound.  Liver and spleen not enlarged  Extrem:  No cyanosis or edema  Skin:  No rashes.  Neuro: grossly intact  Musc: moving all limbs freely, no focal deficits        LABS:                        13.1   8.48  )-----------( 166      ( 19 Nov 2023 22:40 )             40.6       WBC Count: 8.48 K/uL (11-19-23 @ 22:40)      11-20    x   |  x   |  x   ----------------------------<  x   x    |  x   |  1.40<H>    Ca    9.0      19 Nov 2023 22:40    TPro  6.8  /  Alb  2.9<L>  /  TBili  1.4<H>  /  DBili  0.6<H>  /  AST  35  /  ALT  21  /  AlkPhos  139<H>  11-20      Creatinine: 1.40 mg/dL (11-20-23 @ 02:56)  Creatinine: 1.40 mg/dL (11-19-23 @ 22:40)      Urinalysis Basic - ( 19 Nov 2023 22:40 )    Color: x / Appearance: x / SG: x / pH: x  Gluc: 125 mg/dL / Ketone: x  / Bili: x / Urobili: x   Blood: x / Protein: x / Nitrite: x   Leuk Esterase: x / RBC: x / WBC x   Sq Epi: x / Non Sq Epi: x / Bacteria: x              MICROBIOLOGY:      RADIOLOGY & ADDITIONAL STUDIES:    --< from: CT Chest No Cont (11.20.23 @ 04:26) >    ACC: [id number] EXAM:  CT ABDOMEN AND PELVIS   ORDERED BY: BALDEMAR HUNT     ACC: [id number] EXAM:  CT CHEST   ORDERED BY: BALDEMAR HUNT     PROCEDURE DATE:  11/20/2023          INTERPRETATION:  CLINICAL INFORMATION: Fever, shortness of breath,   abnormal chest x-ray.    COMPARISON: None.    CONTRAST/COMPLICATIONS:  IV Contrast: NONE  Oral Contrast: NONE  Complications: None reported at time of study completion    PROCEDURE:  CT of the Chest, Abdomen and Pelvis was performed.  Sagittal and coronal reformats were performed.    FINDINGS:  Evaluation of solid organs and vascular structures is limited without   intravenous contrast.    CHEST:  LUNGS AND LARGE AIRWAYS: Patent central airways. Extensive bilateral   interstitial opacities,traction bronchiectasis, and groundglass haziness   compatible with fibrotic disease. Focal area of prominent bronchiectasis   in the right upper lobe with adjacent area of discoid opacity (601, 70   and 2, 24). Bilateral lower lobe calcified granulomas. Mild left basilar   atelectasis.  PLEURA: Small pleural effusion.  VESSELS: Atherosclerotic changes of the aorta and coronary arteries.  HEART: Cardiomegaly. No pericardial effusion. Status post TAVR. Micra   pacemaker.  MEDIASTINUM AND JUANI: Calcified mediastinal and right hilar nodes.   Nonspecific mildly prominent, measuring up to 1.3 cm.  CHEST WALL AND LOWER NECK: Within normal limits.    ABDOMEN AND PELVIS:  LIVER: Scattered calcified granulomas.  BILE DUCTS: Normal caliber.  GALLBLADDER: Mild wall thickening versus pericholecystic fluid (2, 75).  SPLEEN: Calcified granuloma.  PANCREAS: Atrophic.  ADRENALS: Within normal limits.  KIDNEYS/URETERS: No renal stones or hydronephrosis. Left renal cyst.    BLADDER: Within normal limits.  REPRODUCTIVE ORGANS: Uterus and adnexa within normal limits.    BOWEL: Large hiatal hernia. No bowel obstruction. Appendix is not   visualized. No evidence of inflammation in the pericecal region.   Prominent rectal stool burden. Extensive colonic diverticulosis.  PERITONEUM: Trace of ascites at the liver tip.  VESSELS: Atherosclerotic changes.  RETROPERITONEUM/LYMPH NODES: No lymphadenopathy.  ABDOMINAL WALL: Within normal limits.  BONES: Degenerative changes. Osteopenia.    IMPRESSION:  Exam limited by noncontrast technique.    Extensive bilateral interstitial opacities, traction bronchiectasis and   groundglass haziness compatible with interstitial lung disease. Focal   area of bronchiectasis in the right upper lobe with adjacent area of   discoid opacity, most likely reflecting scarring/atelectasis.   Superimposed infection should be excluded clinically. Comparison with   prior imaging would be helpful to assess for stability. Continued   follow-up imaging is recommended.    Mild gallbladder wall thickening versus pericholecystic fluid. Right   upper quadrant ultrasound can be obtained for further evaluation as   clinically warranted.

## 2023-11-20 NOTE — DIETITIAN INITIAL EVALUATION ADULT - SIGNS/SYMPTOMS
as evidenced by DTI and multiple Stage I pressure ulcers  as evidenced by mod/sev muscle depletion, mod fat loss, 18.5% wt loss/1 yr, <75% est needs met

## 2023-11-20 NOTE — DIETITIAN NUTRITION RISK NOTIFICATION - TREATMENT: THE FOLLOWING DIET HAS BEEN RECOMMENDED
Diet, DASH/TLC:   Sodium & Cholesterol Restricted  Supplement Feeding Modality:  Oral  Ensure Plus High Protein Cans or Servings Per Day:  1       Frequency:  Two Times a day (11-20-23 @ 10:26) [Pending Verification By Attending]  Diet, DASH/TLC:   Sodium & Cholesterol Restricted (11-20-23 @ 01:55) [Active]

## 2023-11-20 NOTE — H&P ADULT - HISTORY OF PRESENT ILLNESS
89 F hx hypothyroid, hld, glaucoma, htn, afib on eliquis, asthma, gerd, edema, acute chronic respiratory failure with hypoxia on home O2, chf on torsemide, cardiac murmur, TAVR, cad s/p 1 stent BIBEMS from [place] due to shortness of breath. Earlier pt was complaining of indigestion, which resolved. EMS found pt to be in rapid afib, gave 5 IV metoprolol, EMS reading as STEMI. Pt states early this morning was having indigestion, after many hours was able to get medication from staff which improved this. SOB all day which worsened, prompting EMS call. Denies fever, cp, cough, n/v/d, leg swelling.

## 2023-11-20 NOTE — H&P ADULT - ASSESSMENT
89 F hx hypothyroid, hld, glaucoma, htn, afib on eliquis, asthma, gerd, edema, acute chronic respiratory failure with hypoxia on home O2, chf on torsemide, cardiac murmur, TAVR, cad s/p 1 stent BIBEMS from [place] due to shortness of breath. Earlier pt was complaining of indigestion, which resolved. EMS found pt to be in rapid afib, gave 5 IV metoprolol, EMS reading as STEMI. Pt states early this morning was having indigestion, after many hours was able to get medication from staff which improved this. SOB all day which worsened, prompting EMS call. Denies fever, cp, cough, n/v/d, leg swelling.    1 Acute hypoxic resp failure sec to COVID 19 POA  - started remdesivir and decadron  - oulm fu   - on high flow  - will monitor    2 Afib  - cw eliquis  - cw metoprolol    3 HLD  - cw statin    4 Asthma  - cw nebs

## 2023-11-20 NOTE — DIETITIAN INITIAL EVALUATION ADULT - OTHER INFO
89 y/o female adm with acute hypoxic respiratory failure 2/2 COVID 19, possible STEMI. PMH hypothyroidism, HLD, HTN, afib, asthma, GERD, HF. Pt visited at bedside this morning. Pt sitting up in bed. Feeling much better, "now that I can breathe". Pt states that she some times has difficulty swallowing when she can not breathe. Pt without any issues at this time, as per pt. Tolerating meals well, no complaints. Pt reports that she hasn't been eating well at the [facility] 2/2 food not good there. Pt has lost 22# since entering [facility] (~1 yr ago). #, now weighing 97#. Pt willing to have Ensure supplement. Order placed in EMR, MD contacted.

## 2023-11-20 NOTE — PATIENT PROFILE ADULT - PATIENT REPRESENTATIVE: ( YOU CAN CHOOSE ANY PERSON THAT CAN ASSIST YOU WITH YOUR HEALTH CARE PREFERENCES, DOES NOT HAVE TO BE A SPOUSE, IMMEDIATE FAMILY OR SIGNIFICANT OTHER/PARTNER)
[FreeTextEntry1] : DM  A1C 10.8.  .  Endo ordered Lantus 20. has not started.  SHould also be more compliant with Glimepiride bid\par Wearing sneakers like slippers and walking on the backs.  Advised to wear socks and shoes properly\par Chol 260 Non compliant with Crestor due to belief it causes DM.  Accepts change to Atorvastatin 10.\par Hyponatremia due to uncontrolled DM\par BMI 39\par ECHO LVH EF 75% and Holter 12/2020 normal\par AM dizziness.  Advised to sit at the bedside for a few min.\par Low vit D.  Compliant now with Vit D\par Derm.  Satisfied.\par Quantiferon 3/2021 at work neg.\par All vaccines declined.\par Covid vaccine.  Reluctant.  Able to get it at work.\par 
declines

## 2023-11-20 NOTE — H&P ADULT - NSHPPHYSICALEXAM_GEN_ALL_CORE
General: WN/WD NAD  PERRLA  Neurology: A&Ox3, nonfocal, ANNE x 4  Respiratory: CTA B/L  CV: RRR, S1S2, no murmurs, rubs or gallops  Abdominal: Soft, NT, ND +BS, Last BM  Extremities: No edema, + peripheral pulses  Skin Normal

## 2023-11-20 NOTE — ED ADULT NURSE REASSESSMENT NOTE - NS ED NURSE REASSESS COMMENT FT1
delay entry due to pt care, pt received from day shift. pt alert. pt in no acute distress, breathing on own, with assistace of oxygen at 4 LPM via nasal. pt safety maintained. pt on continuous monitoring.

## 2023-11-20 NOTE — DIETITIAN INITIAL EVALUATION ADULT - RD TO REMAIN AVAILABLE
Patient Quality Checklist              Description: Remind: Annual appt.  Create Date: 08/01/2019     Due Date: 06/16/2020     Sanct Date: 07/28/2020  Subject:   Notes: Patient passed away on 12/13/2019  Provider: Manpreet Cleaning    
yes

## 2023-11-21 LAB
ALBUMIN SERPL ELPH-MCNC: 3 G/DL — LOW (ref 3.3–5)
ALP SERPL-CCNC: 155 U/L — HIGH (ref 40–120)
ALT FLD-CCNC: 43 U/L — SIGNIFICANT CHANGE UP (ref 12–78)
ANION GAP SERPL CALC-SCNC: 4 MMOL/L — LOW (ref 5–17)
ANION GAP SERPL CALC-SCNC: 4 MMOL/L — LOW (ref 5–17)
AST SERPL-CCNC: 68 U/L — HIGH (ref 15–37)
BILIRUB DIRECT SERPL-MCNC: 0.4 MG/DL — HIGH (ref 0–0.3)
BILIRUB DIRECT SERPL-MCNC: 0.4 MG/DL — HIGH (ref 0–0.3)
BILIRUB INDIRECT FLD-MCNC: 0.5 MG/DL — SIGNIFICANT CHANGE UP (ref 0.2–1)
BILIRUB INDIRECT FLD-MCNC: 0.5 MG/DL — SIGNIFICANT CHANGE UP (ref 0.2–1)
BILIRUB SERPL-MCNC: 0.9 MG/DL — SIGNIFICANT CHANGE UP (ref 0.2–1.2)
BILIRUB SERPL-MCNC: 0.9 MG/DL — SIGNIFICANT CHANGE UP (ref 0.2–1.2)
BILIRUB SERPL-MCNC: 1 MG/DL — SIGNIFICANT CHANGE UP (ref 0.2–1.2)
BILIRUB SERPL-MCNC: 1 MG/DL — SIGNIFICANT CHANGE UP (ref 0.2–1.2)
BUN SERPL-MCNC: 39 MG/DL — HIGH (ref 7–23)
BUN SERPL-MCNC: 39 MG/DL — HIGH (ref 7–23)
CALCIUM SERPL-MCNC: 9.2 MG/DL — SIGNIFICANT CHANGE UP (ref 8.5–10.1)
CALCIUM SERPL-MCNC: 9.2 MG/DL — SIGNIFICANT CHANGE UP (ref 8.5–10.1)
CHLORIDE SERPL-SCNC: 108 MMOL/L — SIGNIFICANT CHANGE UP (ref 96–108)
CHLORIDE SERPL-SCNC: 108 MMOL/L — SIGNIFICANT CHANGE UP (ref 96–108)
CO2 SERPL-SCNC: 29 MMOL/L — SIGNIFICANT CHANGE UP (ref 22–31)
CO2 SERPL-SCNC: 29 MMOL/L — SIGNIFICANT CHANGE UP (ref 22–31)
CREAT SERPL-MCNC: 0.93 MG/DL — SIGNIFICANT CHANGE UP (ref 0.5–1.3)
CREAT SERPL-MCNC: 0.93 MG/DL — SIGNIFICANT CHANGE UP (ref 0.5–1.3)
EGFR: 56 ML/MIN/1.73M2 — LOW
EGFR: 56 ML/MIN/1.73M2 — LOW
GLUCOSE SERPL-MCNC: 117 MG/DL — HIGH (ref 70–99)
GLUCOSE SERPL-MCNC: 117 MG/DL — HIGH (ref 70–99)
HCT VFR BLD CALC: 39.4 % — SIGNIFICANT CHANGE UP (ref 34.5–45)
HCT VFR BLD CALC: 39.4 % — SIGNIFICANT CHANGE UP (ref 34.5–45)
HGB BLD-MCNC: 12.7 G/DL — SIGNIFICANT CHANGE UP (ref 11.5–15.5)
HGB BLD-MCNC: 12.7 G/DL — SIGNIFICANT CHANGE UP (ref 11.5–15.5)
MCHC RBC-ENTMCNC: 32.2 GM/DL — SIGNIFICANT CHANGE UP (ref 32–36)
MCHC RBC-ENTMCNC: 32.2 GM/DL — SIGNIFICANT CHANGE UP (ref 32–36)
MCHC RBC-ENTMCNC: 33.2 PG — SIGNIFICANT CHANGE UP (ref 27–34)
MCHC RBC-ENTMCNC: 33.2 PG — SIGNIFICANT CHANGE UP (ref 27–34)
MCV RBC AUTO: 103.1 FL — HIGH (ref 80–100)
MCV RBC AUTO: 103.1 FL — HIGH (ref 80–100)
NRBC # BLD: 0 /100 WBCS — SIGNIFICANT CHANGE UP (ref 0–0)
NRBC # BLD: 0 /100 WBCS — SIGNIFICANT CHANGE UP (ref 0–0)
PLATELET # BLD AUTO: 171 K/UL — SIGNIFICANT CHANGE UP (ref 150–400)
PLATELET # BLD AUTO: 171 K/UL — SIGNIFICANT CHANGE UP (ref 150–400)
POTASSIUM SERPL-MCNC: 4.2 MMOL/L — SIGNIFICANT CHANGE UP (ref 3.5–5.3)
POTASSIUM SERPL-MCNC: 4.2 MMOL/L — SIGNIFICANT CHANGE UP (ref 3.5–5.3)
POTASSIUM SERPL-SCNC: 4.2 MMOL/L — SIGNIFICANT CHANGE UP (ref 3.5–5.3)
POTASSIUM SERPL-SCNC: 4.2 MMOL/L — SIGNIFICANT CHANGE UP (ref 3.5–5.3)
PROT SERPL-MCNC: 7.6 G/DL — SIGNIFICANT CHANGE UP (ref 6–8.3)
PROT SERPL-MCNC: 7.6 G/DL — SIGNIFICANT CHANGE UP (ref 6–8.3)
PROT SERPL-MCNC: 7.7 G/DL — SIGNIFICANT CHANGE UP (ref 6–8.3)
PROT SERPL-MCNC: 7.7 G/DL — SIGNIFICANT CHANGE UP (ref 6–8.3)
RBC # BLD: 3.82 M/UL — SIGNIFICANT CHANGE UP (ref 3.8–5.2)
RBC # BLD: 3.82 M/UL — SIGNIFICANT CHANGE UP (ref 3.8–5.2)
RBC # FLD: 15.1 % — HIGH (ref 10.3–14.5)
RBC # FLD: 15.1 % — HIGH (ref 10.3–14.5)
SODIUM SERPL-SCNC: 141 MMOL/L — SIGNIFICANT CHANGE UP (ref 135–145)
SODIUM SERPL-SCNC: 141 MMOL/L — SIGNIFICANT CHANGE UP (ref 135–145)
WBC # BLD: 12.98 K/UL — HIGH (ref 3.8–10.5)
WBC # BLD: 12.98 K/UL — HIGH (ref 3.8–10.5)
WBC # FLD AUTO: 12.98 K/UL — HIGH (ref 3.8–10.5)
WBC # FLD AUTO: 12.98 K/UL — HIGH (ref 3.8–10.5)

## 2023-11-21 RX ADMIN — Medication 3 MILLILITER(S): at 08:18

## 2023-11-21 RX ADMIN — ATORVASTATIN CALCIUM 40 MILLIGRAM(S): 80 TABLET, FILM COATED ORAL at 21:30

## 2023-11-21 RX ADMIN — Medication 25 MILLIGRAM(S): at 05:37

## 2023-11-21 RX ADMIN — Medication 3 MILLILITER(S): at 00:49

## 2023-11-21 RX ADMIN — Medication 1 PUFF(S): at 13:51

## 2023-11-21 RX ADMIN — Medication 6 MILLIGRAM(S): at 05:37

## 2023-11-21 RX ADMIN — Medication 3 MILLILITER(S): at 13:51

## 2023-11-21 RX ADMIN — APIXABAN 2.5 MILLIGRAM(S): 2.5 TABLET, FILM COATED ORAL at 09:55

## 2023-11-21 RX ADMIN — Medication 10 MILLIGRAM(S): at 05:37

## 2023-11-21 RX ADMIN — REMDESIVIR 200 MILLIGRAM(S): 5 INJECTION INTRAVENOUS at 10:59

## 2023-11-21 RX ADMIN — Medication 1 PUFF(S): at 01:16

## 2023-11-21 RX ADMIN — Medication 3 MILLILITER(S): at 19:22

## 2023-11-21 RX ADMIN — Medication 1 PUFF(S): at 21:30

## 2023-11-21 RX ADMIN — APIXABAN 2.5 MILLIGRAM(S): 2.5 TABLET, FILM COATED ORAL at 21:30

## 2023-11-21 NOTE — PROGRESS NOTE ADULT - SUBJECTIVE AND OBJECTIVE BOX
Date/Time Patient Seen:  		  Referring MD:   Data Reviewed	       Patient is a 89y old  Female who presents with a chief complaint of SOB (20 Nov 2023 14:45)      Subjective/HPI     PAST MEDICAL & SURGICAL HISTORY:        Medication list         MEDICATIONS  (STANDING):  albuterol/ipratropium for Nebulization 3 milliLiter(s) Nebulizer every 6 hours  apixaban 2.5 milliGRAM(s) Oral every 12 hours  atorvastatin 40 milliGRAM(s) Oral at bedtime  dexAMETHasone     Tablet 6 milliGRAM(s) Oral daily  ipratropium 17 MICROgram(s) HFA Inhaler 1 Puff(s) Inhalation every 6 hours  metoprolol succinate ER 25 milliGRAM(s) Oral daily  remdesivir  IVPB   IV Intermittent   remdesivir  IVPB 100 milliGRAM(s) IV Intermittent every 24 hours  torsemide 10 milliGRAM(s) Oral daily    MEDICATIONS  (PRN):  acetaminophen     Tablet .. 650 milliGRAM(s) Oral every 6 hours PRN Temp greater or equal to 38C (100.4F), Mild Pain (1 - 3)  guaiFENesin Oral Liquid (Sugar-Free) 200 milliGRAM(s) Oral every 6 hours PRN Cough  magnesium hydroxide Suspension 30 milliLiter(s) Oral daily PRN Constipation         Vitals log        ICU Vital Signs Last 24 Hrs  T(C): 36.4 (21 Nov 2023 04:46), Max: 36.4 (21 Nov 2023 04:46)  T(F): 97.6 (21 Nov 2023 04:46), Max: 97.6 (21 Nov 2023 04:46)  HR: 83 (21 Nov 2023 04:46) (80 - 91)  BP: 143/86 (21 Nov 2023 04:46) (129/88 - 143/86)  BP(mean): --  ABP: --  ABP(mean): --  RR: 19 (21 Nov 2023 04:46) (18 - 19)  SpO2: 97% (21 Nov 2023 04:46) (89% - 98%)    O2 Parameters below as of 21 Nov 2023 04:46  Patient On (Oxygen Delivery Method): nasal cannula                 Input and Output:  I&O's Detail      Lab Data                        13.1   8.48  )-----------( 166      ( 19 Nov 2023 22:40 )             40.6     11-20    x   |  x   |  x   ----------------------------<  x   x    |  x   |  1.40<H>    Ca    9.0      19 Nov 2023 22:40    TPro  6.8  /  Alb  2.9<L>  /  TBili  1.4<H>  /  DBili  0.6<H>  /  AST  35  /  ALT  21  /  AlkPhos  139<H>  11-20    ABG - ( 20 Nov 2023 02:10 )  pH, Arterial: 7.57  pH, Blood: x     /  pCO2: 23    /  pO2: 181   / HCO3: 21    / Base Excess: -0.9  /  SaO2: 99.0                    Review of Systems	      Objective     Physical Examination    heart s1s2  lung dec BS  head nc      Pertinent Lab findings & Imaging      Esther:  NO   Adequate UO     I&O's Detail           Discussed with:     Cultures:	        Radiology

## 2023-11-21 NOTE — PROGRESS NOTE ADULT - SUBJECTIVE AND OBJECTIVE BOX
Patient is a 89y old  Female who presents with a chief complaint of SOB (21 Nov 2023 10:39)    Date of servie : 11-21-23 @ 13:29  INTERVAL HPI/OVERNIGHT EVENTS:  T(C): 36.4 (11-21-23 @ 04:46), Max: 36.4 (11-21-23 @ 04:46)  HR: 74 (11-21-23 @ 09:08) (74 - 91)  BP: 143/86 (11-21-23 @ 04:46) (129/88 - 143/86)  RR: 19 (11-21-23 @ 04:46) (19 - 19)  SpO2: 98% (11-21-23 @ 09:08) (89% - 98%)  Wt(kg): --  I&O's Summary      LABS:                        12.7   12.98 )-----------( 171      ( 21 Nov 2023 09:10 )             39.4     11-21    141  |  108  |  39<H>  ----------------------------<  117<H>  4.2   |  29  |  0.93    Ca    9.2      21 Nov 2023 09:10    TPro  7.7  /  Alb  3.0<L>  /  TBili  1.0  /  DBili  0.4<H>  /  AST  68<H>  /  ALT  43  /  AlkPhos  155<H>  11-21    PT/INR - ( 19 Nov 2023 22:40 )   PT: 19.6 sec;   INR: 1.70 ratio         PTT - ( 19 Nov 2023 22:40 )  PTT:35.2 sec  Urinalysis Basic - ( 21 Nov 2023 09:10 )    Color: x / Appearance: x / SG: x / pH: x  Gluc: 117 mg/dL / Ketone: x  / Bili: x / Urobili: x   Blood: x / Protein: x / Nitrite: x   Leuk Esterase: x / RBC: x / WBC x   Sq Epi: x / Non Sq Epi: x / Bacteria: x      CAPILLARY BLOOD GLUCOSE        ABG - ( 20 Nov 2023 02:10 )  pH, Arterial: 7.57  pH, Blood: x     /  pCO2: 23    /  pO2: 181   / HCO3: 21    / Base Excess: -0.9  /  SaO2: 99.0                Urinalysis Basic - ( 21 Nov 2023 09:10 )    Color: x / Appearance: x / SG: x / pH: x  Gluc: 117 mg/dL / Ketone: x  / Bili: x / Urobili: x   Blood: x / Protein: x / Nitrite: x   Leuk Esterase: x / RBC: x / WBC x   Sq Epi: x / Non Sq Epi: x / Bacteria: x        MEDICATIONS  (STANDING):  albuterol/ipratropium for Nebulization 3 milliLiter(s) Nebulizer every 6 hours  apixaban 2.5 milliGRAM(s) Oral every 12 hours  atorvastatin 40 milliGRAM(s) Oral at bedtime  dexAMETHasone     Tablet 6 milliGRAM(s) Oral daily  ipratropium 17 MICROgram(s) HFA Inhaler 1 Puff(s) Inhalation every 6 hours  metoprolol succinate ER 25 milliGRAM(s) Oral daily  remdesivir  IVPB 100 milliGRAM(s) IV Intermittent every 24 hours  remdesivir  IVPB   IV Intermittent   torsemide 10 milliGRAM(s) Oral daily    MEDICATIONS  (PRN):  acetaminophen     Tablet .. 650 milliGRAM(s) Oral every 6 hours PRN Temp greater or equal to 38C (100.4F), Mild Pain (1 - 3)  guaiFENesin Oral Liquid (Sugar-Free) 200 milliGRAM(s) Oral every 6 hours PRN Cough  magnesium hydroxide Suspension 30 milliLiter(s) Oral daily PRN Constipation          PHYSICAL EXAM:  GENERAL: NAD, well-groomed, well-developed  HEAD:  Atraumatic, Normocephalic  CHEST/LUNG: Clear to percussion bilaterally; No rales, rhonchi, wheezing, or rubs  HEART: Regular rate and rhythm; No murmurs, rubs, or gallops  ABDOMEN: Soft, Nontender, Nondistended; Bowel sounds present  EXTREMITIES:  2+ Peripheral Pulses, No clubbing, cyanosis, or edema  LYMPH: No lymphadenopathy noted  SKIN: No rashes or lesions    Care Discussed with Consultants/Other Providers [ ] YES  [ ] NO

## 2023-11-21 NOTE — PROGRESS NOTE ADULT - SUBJECTIVE AND OBJECTIVE BOX
[facility] Cardiology Progress Note [phone] (Dr. Warner, Ortiz, Debra, Mari)    CHIEF COMPLAINT: Patient is a 89y old  Female who presents with a chief complaint of SOB (21 Nov 2023 06:13)      Follow Up Today: The patient denies any chest discomfort or shortness of breath.    HPI:  89 F hx hypothyroid, hld, glaucoma, htn, afib on eliquis, asthma, gerd, edema, acute chronic respiratory failure with hypoxia on home O2, chf on torsemide, cardiac murmur, TAVR, cad s/p 1 stent BIBEMS from [facility] due to shortness of breath. Earlier pt was complaining of indigestion, which resolved. EMS found pt to be in rapid afib, gave 5 IV metoprolol, EMS reading as STEMI. Pt states early this morning was having indigestion, after many hours was able to get medication from staff which improved this. SOB all day which worsened, prompting EMS call. Denies fever, cp, cough, n/v/d, leg swelling. (20 Nov 2023 01:53)      PAST MEDICAL & SURGICAL HISTORY:      MEDICATIONS  (STANDING):  albuterol/ipratropium for Nebulization 3 milliLiter(s) Nebulizer every 6 hours  apixaban 2.5 milliGRAM(s) Oral every 12 hours  atorvastatin 40 milliGRAM(s) Oral at bedtime  dexAMETHasone     Tablet 6 milliGRAM(s) Oral daily  ipratropium 17 MICROgram(s) HFA Inhaler 1 Puff(s) Inhalation every 6 hours  metoprolol succinate ER 25 milliGRAM(s) Oral daily  remdesivir  IVPB   IV Intermittent   remdesivir  IVPB 100 milliGRAM(s) IV Intermittent every 24 hours  torsemide 10 milliGRAM(s) Oral daily    MEDICATIONS  (PRN):  acetaminophen     Tablet .. 650 milliGRAM(s) Oral every 6 hours PRN Temp greater or equal to 38C (100.4F), Mild Pain (1 - 3)  guaiFENesin Oral Liquid (Sugar-Free) 200 milliGRAM(s) Oral every 6 hours PRN Cough  magnesium hydroxide Suspension 30 milliLiter(s) Oral daily PRN Constipation      Allergies    amoxicillin (Rash)  penicillin (Rash)    Intolerances        REVIEW OF SYSTEMS:    All other review of systems is negative unless indicated above    Vital Signs Last 24 Hrs  T(C): 36.4 (21 Nov 2023 04:46), Max: 36.4 (21 Nov 2023 04:46)  T(F): 97.6 (21 Nov 2023 04:46), Max: 97.6 (21 Nov 2023 04:46)  HR: 83 (21 Nov 2023 04:46) (80 - 91)  BP: 143/86 (21 Nov 2023 04:46) (129/88 - 143/86)  BP(mean): --  RR: 19 (21 Nov 2023 04:46) (18 - 19)  SpO2: 97% (21 Nov 2023 04:46) (89% - 98%)    Parameters below as of 21 Nov 2023 04:46  Patient On (Oxygen Delivery Method): nasal cannula        I&O's Summary      PHYSICAL EXAM:    Constitutional: NAD, awake and alert, well-developed  Eyes:  EOMI,  Pupils round, No oral cyanosis.  HEENT: No exudate or erythema  Pulmonary: Non-labored, breath sounds are clear bilaterally, No wheezing, rales or rhonchi  Cardiovascular: Regular, S1 and S2, No murmurs  Gastrointestinal: Bowel Sounds present, soft, nontender.   Ext: No significant LE edema  Neurological: Alert, no gross focal motor deficits  Skin: No rashes.  Psych:  Mood & affect appropriate    LABS: All Labs Reviewed:                        13.1   8.48  )-----------( 166      ( 19 Nov 2023 22:40 )             40.6     20 Nov 2023 02:56    x      |  x      |  x      ----------------------------<  x      x       |  x      |  1.40   19 Nov 2023 22:40    140    |  107    |  30     ----------------------------<  125    4.9     |  29     |  1.40     Ca    9.0        19 Nov 2023 22:40    TPro  6.8    /  Alb  2.9    /  TBili  1.4    /  DBili  0.6    /  AST  35     /  ALT  21     /  AlkPhos  139    20 Nov 2023 02:56  TPro  8.1    /  Alb  3.2    /  TBili  1.5    /  DBili  x      /  AST  34     /  ALT  22     /  AlkPhos  150    19 Nov 2023 22:40    PT/INR - ( 19 Nov 2023 22:40 )   PT: 19.6 sec;   INR: 1.70 ratio         PTT - ( 19 Nov 2023 22:40 )  PTT:35.2 sec      Blood Culture:         RADIOLOGY/EKG:    Attending Attestation:   50 minutes spent on total encounter; more than 50% of the visit was spent counseling and/or coordinating care by the attending physician.     ASSESSMENT AND PLAN [facility] Cardiology Progress Note [phone] (Dr. Warner, Ortiz, Debra, Mari)    CHIEF COMPLAINT: Patient is a 89y old  Female who presents with a chief complaint of SOB (21 Nov 2023 06:13)      Follow Up Today: The patient denies any chest discomfort and breathing and energy improving    HPI:  89 F hx hypothyroid, hld, glaucoma, htn, afib on eliquis, asthma, gerd, edema, acute chronic respiratory failure with hypoxia on home O2, chf on torsemide, cardiac murmur, TAVR, cad s/p 1 stent BIBEMS from [place] due to shortness of breath. Earlier pt was complaining of indigestion, which resolved. EMS found pt to be in rapid afib, gave 5 IV metoprolol, EMS reading as STEMI. Pt states early this morning was having indigestion, after many hours was able to get medication from staff which improved this. SOB all day which worsened, prompting EMS call. Denies fever, cp, cough, n/v/d, leg swelling. (20 Nov 2023 01:53)      PAST MEDICAL & SURGICAL HISTORY:      MEDICATIONS  (STANDING):  albuterol/ipratropium for Nebulization 3 milliLiter(s) Nebulizer every 6 hours  apixaban 2.5 milliGRAM(s) Oral every 12 hours  atorvastatin 40 milliGRAM(s) Oral at bedtime  dexAMETHasone     Tablet 6 milliGRAM(s) Oral daily  ipratropium 17 MICROgram(s) HFA Inhaler 1 Puff(s) Inhalation every 6 hours  metoprolol succinate ER 25 milliGRAM(s) Oral daily  remdesivir  IVPB   IV Intermittent   remdesivir  IVPB 100 milliGRAM(s) IV Intermittent every 24 hours  torsemide 10 milliGRAM(s) Oral daily    MEDICATIONS  (PRN):  acetaminophen     Tablet .. 650 milliGRAM(s) Oral every 6 hours PRN Temp greater or equal to 38C (100.4F), Mild Pain (1 - 3)  guaiFENesin Oral Liquid (Sugar-Free) 200 milliGRAM(s) Oral every 6 hours PRN Cough  magnesium hydroxide Suspension 30 milliLiter(s) Oral daily PRN Constipation      Allergies    amoxicillin (Rash)  penicillin (Rash)    Intolerances        REVIEW OF SYSTEMS:    All other review of systems is negative unless indicated above    Vital Signs Last 24 Hrs  T(C): 36.4 (21 Nov 2023 04:46), Max: 36.4 (21 Nov 2023 04:46)  T(F): 97.6 (21 Nov 2023 04:46), Max: 97.6 (21 Nov 2023 04:46)  HR: 83 (21 Nov 2023 04:46) (80 - 91)  BP: 143/86 (21 Nov 2023 04:46) (129/88 - 143/86)  BP(mean): --  RR: 19 (21 Nov 2023 04:46) (18 - 19)  SpO2: 97% (21 Nov 2023 04:46) (89% - 98%)    Parameters below as of 21 Nov 2023 04:46  Patient On (Oxygen Delivery Method): nasal cannula        I&O's Summary      PHYSICAL EXAM:    Constitutional: NAD, awake and alert, well-developed  Eyes:  EOMI,  Pupils round, No oral cyanosis.  HEENT: No exudate or erythema  Pulmonary: Non-labored, breath sounds are clear bilaterally, No wheezing, rales or rhonchi  Cardiovascular: Regular, S1 and S2, No murmurs  Gastrointestinal: Bowel Sounds present, soft, nontender.   Ext: No significant LE edema  Neurological: Alert, no gross focal motor deficits  Skin: No rashes.  Psych:  Mood & affect appropriate    LABS: All Labs Reviewed:                        13.1   8.48  )-----------( 166      ( 19 Nov 2023 22:40 )             40.6     20 Nov 2023 02:56    x      |  x      |  x      ----------------------------<  x      x       |  x      |  1.40   19 Nov 2023 22:40    140    |  107    |  30     ----------------------------<  125    4.9     |  29     |  1.40     Ca    9.0        19 Nov 2023 22:40    TPro  6.8    /  Alb  2.9    /  TBili  1.4    /  DBili  0.6    /  AST  35     /  ALT  21     /  AlkPhos  139    20 Nov 2023 02:56  TPro  8.1    /  Alb  3.2    /  TBili  1.5    /  DBili  x      /  AST  34     /  ALT  22     /  AlkPhos  150    19 Nov 2023 22:40    PT/INR - ( 19 Nov 2023 22:40 )   PT: 19.6 sec;   INR: 1.70 ratio         PTT - ( 19 Nov 2023 22:40 )  PTT:35.2 sec      Blood Culture:         RADIOLOGY/EKG:    Attending Attestation:   50 minutes spent on total encounter; more than 50% of the visit was spent counseling and/or coordinating care by the attending physician.     ASSESSMENT AND PLAN

## 2023-11-21 NOTE — CASE MANAGEMENT PROGRESS NOTE - NSCMPROGRESSNOTE_GEN_ALL_CORE
Discussed pt in rounds, pt remains acute, receiving IV remdesivir, per ID until 11/24/23. CM contacted Lorri at the [facility], explained Patient is identified as a CMS STAR patient. Transition care management program explained and, Lorri verbalized understanding and stated that patient will be seen by Dr Man at the [facility].

## 2023-11-21 NOTE — PROGRESS NOTE ADULT - SUBJECTIVE AND OBJECTIVE BOX
[facility] DIVISION of INFECTIOUS DISEASE  Donnie Hoyt MD PhD, Morena Zarco MD, Christa Valerio MD, Tish Alanis MD, Marty Atkins MD  and providing coverage with Charmaine Thayer MD  Providing Infectious Disease Consultations at [facility], [facility], [facility], [facility]'s    Office# [phone] to schedule follow up appointments  Answering Service for urgent calls or New Consults [phone]  Cell# to text for urgent issues Donnie Hoyt [phone]     infectious diseases progress note:    BLANCA BENSON is a 89y y. o. Female patient    Overnight and events of the last 24hrs reviewed    Allergies    amoxicillin (Rash)  penicillin (Rash)    Intolerances        ANTIBIOTICS/RELEVANT:  antimicrobials  remdesivir  IVPB 100 milliGRAM(s) IV Intermittent every 24 hours  remdesivir  IVPB   IV Intermittent     immunologic:    OTHER:  acetaminophen     Tablet .. 650 milliGRAM(s) Oral every 6 hours PRN  albuterol/ipratropium for Nebulization 3 milliLiter(s) Nebulizer every 6 hours  apixaban 2.5 milliGRAM(s) Oral every 12 hours  atorvastatin 40 milliGRAM(s) Oral at bedtime  dexAMETHasone     Tablet 6 milliGRAM(s) Oral daily  guaiFENesin Oral Liquid (Sugar-Free) 200 milliGRAM(s) Oral every 6 hours PRN  ipratropium 17 MICROgram(s) HFA Inhaler 1 Puff(s) Inhalation every 6 hours  magnesium hydroxide Suspension 30 milliLiter(s) Oral daily PRN  metoprolol succinate ER 25 milliGRAM(s) Oral daily  torsemide 10 milliGRAM(s) Oral daily      Objective:  Vital Signs Last 24 Hrs  T(C): 36.4 (21 Nov 2023 04:46), Max: 36.4 (21 Nov 2023 04:46)  T(F): 97.6 (21 Nov 2023 04:46), Max: 97.6 (21 Nov 2023 04:46)  HR: 74 (21 Nov 2023 09:08) (74 - 91)  BP: 143/86 (21 Nov 2023 04:46) (129/88 - 143/86)  BP(mean): --  RR: 19 (21 Nov 2023 04:46) (18 - 19)  SpO2: 98% (21 Nov 2023 09:08) (89% - 98%)    Parameters below as of 21 Nov 2023 09:08  Patient On (Oxygen Delivery Method): nasal cannula, 4L        T(C): 36.4 (11-21-23 @ 04:46), Max: 39.2 (11-19-23 @ 22:48)  T(C): 36.4 (11-21-23 @ 04:46), Max: 39.2 (11-19-23 @ 22:48)  T(C): 36.4 (11-21-23 @ 04:46), Max: 39.2 (11-19-23 @ 22:48)    PHYSICAL EXAM:  HEENT: NC atraumatic  Neck: supple  Respiratory: no accessory muscle use, breathing comfortably  Cardiovascular: distant  Gastrointestinal: normal appearing, nondistended  Extremities: no clubbing, no cyanosis,        LABS:                          12.7   12.98 )-----------( 171      ( 21 Nov 2023 09:10 )             39.4       WBC  12.98 11-21 @ 09:10  8.48 11-19 @ 22:40      11-21    141  |  108  |  39<H>  ----------------------------<  117<H>  4.2   |  29  |  0.93    Ca    9.2      21 Nov 2023 09:10    TPro  x   /  Alb  3.0<L>  /  TBili  x   /  DBili  x   /  AST  x   /  ALT  x   /  AlkPhos  x   11-21      Creatinine: 0.93 mg/dL (11-21-23 @ 09:10)  Creatinine: 1.40 mg/dL (11-20-23 @ 02:56)  Creatinine: 1.40 mg/dL (11-19-23 @ 22:40)      PT/INR - ( 19 Nov 2023 22:40 )   PT: 19.6 sec;   INR: 1.70 ratio         PTT - ( 19 Nov 2023 22:40 )  PTT:35.2 sec  Urinalysis Basic - ( 21 Nov 2023 09:10 )    Color: x / Appearance: x / SG: x / pH: x  Gluc: 117 mg/dL / Ketone: x  / Bili: x / Urobili: x   Blood: x / Protein: x / Nitrite: x   Leuk Esterase: x / RBC: x / WBC x   Sq Epi: x / Non Sq Epi: x / Bacteria: x            INFLAMMATORY MARKERS      MICROBIOLOGY:              RADIOLOGY & ADDITIONAL STUDIES:

## 2023-11-21 NOTE — PROGRESS NOTE ADULT - ASSESSMENT
89F with known HLD, HTN, AF on Eliquis 2.5 bid, Asthma, GERD on home O2 who presents with worsening SOB found with afib and RVR along with Positive RVP and COVID along with elevated BNP OF 6K. Now clinically improving.    SUGGEST:    1. COVID/RVP  - C/w Remdisivir, Steroids     2. AF  - c/w Eliquis  - monitor on Tele  - get echocardiogram  - rate control with Toprol    3. Will follow  -

## 2023-11-21 NOTE — PROGRESS NOTE ADULT - ASSESSMENT
89 F hx hypothyroid, hld, glaucoma, htn, afib on eliquis, asthma, gerd, edema, acute chronic respiratory failure with hypoxia on home O2, chf on torsemide, cardiac murmur, TAVR, cad s/p 1 stent BIBEMS from [place] due to shortness of breath    bronchiectasis  resp viral illness  valv heart disease   HTN  HLD  AF  Asthma  GERD  COVID    cardio eval noted  GOC documented - DNR DNI  vs noted    covid isol  remdesivir - decadron  NEBS ATC for Asthma and Bronchiectasis  fio2 titration - HF NC - NRB -   goal sat > 88 pct  GOC discussion   assist with needs  assess need for Diuresis - proBNP noted - pt with known extensive card hx -   on tele monitor  tylenol PRN   robitussin PRN   on AC -   prognosis guarded

## 2023-11-21 NOTE — PROGRESS NOTE ADULT - ASSESSMENT
89 F hx hypothyroid, hld, glaucoma, htn, afib on eliquis, asthma, gerd, edema, acute chronic respiratory failure with hypoxia on home O2, chf on torsemide, cardiac murmur, TAVR, cad s/p 1 stent BIBEMS from [place] due to shortness of breath. EMS found pt to be in rapid afib, SOB worsened, prompting EMS call. Nerissa reports that starting Friday morning she felt poorly and the woman with whom she has her meals has COVID and is right now doing her 5 days of isolation. PT COVID+    RECOMMENDATIONS    1-COVID-19  Remdesivir started 11/20 am, with pt already requiring oxygen recommend 5 days so last day 11/24-Friday in am  Pt with hypoxemia so dexamethasone 6mg PO daily x 6 days so last day 11/25  PT on apixaban for VTE prophylaxis  pulm support  obs off abx    Cardiology involved for rapid af-b    Thank you for consulting us and involving us in the management of this most interesting and challenging case.  We will follow along in the care of this patient. Please call us at [phone] or text me directly on my cell# at [phone] with any concerns.

## 2023-11-21 NOTE — PATIENT CHOICE NOTE. - NSPTCHOICENOTES_GEN_ALL_CORE
Per Assisted Living staff, pt was receiving home Physical therapy through [facility] [phone] [facility] care agency.

## 2023-11-21 NOTE — CAREGIVER ENGAGEMENT NOTE - CAREGIVER OUTREACH NOTES - FREE TEXT
Discussed Patient is identified as a CMS STAR patient. Transition care management program explained and yellow contact card left in folder with patient. Son confirmed that his mother uses 4L oxygen at the [facility] through [employer] [facility] [employer].

## 2023-11-21 NOTE — PROGRESS NOTE ADULT - ASSESSMENT
89 F hx hypothyroid, hld, glaucoma, htn, afib on eliquis, asthma, gerd, edema, acute chronic respiratory failure with hypoxia on home O2, chf on torsemide, cardiac murmur, TAVR, cad s/p 1 stent BIBEMS from [place] due to shortness of breath. Earlier pt was complaining of indigestion, which resolved. EMS found pt to be in rapid afib, gave 5 IV metoprolol, EMS reading as STEMI. Pt states early this morning was having indigestion, after many hours was able to get medication from staff which improved this. SOB all day which worsened, prompting EMS call. Denies fever, cp, cough, n/v/d, leg swelling.    1 Acute hypoxic resp failure sec to COVID 19 POA  - started remdesivir and decadron  - oulm fu   - off high flow now   - will monitor    2 Afib  - cw eliquis  - cw metoprolol    3 HLD  - cw statin    4 Asthma  - cw nebs

## 2023-11-22 LAB
ALBUMIN SERPL ELPH-MCNC: 2.5 G/DL — LOW (ref 3.3–5)
ALBUMIN SERPL ELPH-MCNC: 2.5 G/DL — LOW (ref 3.3–5)
ALP SERPL-CCNC: 141 U/L — HIGH (ref 40–120)
ALP SERPL-CCNC: 141 U/L — HIGH (ref 40–120)
ALT FLD-CCNC: 45 U/L — SIGNIFICANT CHANGE UP (ref 12–78)
ALT FLD-CCNC: 45 U/L — SIGNIFICANT CHANGE UP (ref 12–78)
AST SERPL-CCNC: 56 U/L — HIGH (ref 15–37)
AST SERPL-CCNC: 56 U/L — HIGH (ref 15–37)
BILIRUB DIRECT SERPL-MCNC: 0.4 MG/DL — HIGH (ref 0–0.3)
BILIRUB DIRECT SERPL-MCNC: 0.4 MG/DL — HIGH (ref 0–0.3)
BILIRUB INDIRECT FLD-MCNC: 0.4 MG/DL — SIGNIFICANT CHANGE UP (ref 0.2–1)
BILIRUB INDIRECT FLD-MCNC: 0.4 MG/DL — SIGNIFICANT CHANGE UP (ref 0.2–1)
BILIRUB SERPL-MCNC: 0.8 MG/DL — SIGNIFICANT CHANGE UP (ref 0.2–1.2)
BILIRUB SERPL-MCNC: 0.8 MG/DL — SIGNIFICANT CHANGE UP (ref 0.2–1.2)
CREAT SERPL-MCNC: 0.98 MG/DL — SIGNIFICANT CHANGE UP (ref 0.5–1.3)
CREAT SERPL-MCNC: 0.98 MG/DL — SIGNIFICANT CHANGE UP (ref 0.5–1.3)
EGFR: 53 ML/MIN/1.73M2 — LOW
EGFR: 53 ML/MIN/1.73M2 — LOW
PROT SERPL-MCNC: 6.7 G/DL — SIGNIFICANT CHANGE UP (ref 6–8.3)
PROT SERPL-MCNC: 6.7 G/DL — SIGNIFICANT CHANGE UP (ref 6–8.3)

## 2023-11-22 RX ORDER — METOPROLOL TARTRATE 50 MG
50 TABLET ORAL DAILY
Refills: 0 | Status: DISCONTINUED | OUTPATIENT
Start: 2023-11-22 | End: 2023-11-25

## 2023-11-22 RX ADMIN — APIXABAN 2.5 MILLIGRAM(S): 2.5 TABLET, FILM COATED ORAL at 11:12

## 2023-11-22 RX ADMIN — Medication 6 MILLIGRAM(S): at 05:26

## 2023-11-22 RX ADMIN — Medication 1 PUFF(S): at 13:12

## 2023-11-22 RX ADMIN — Medication 3 MILLILITER(S): at 00:38

## 2023-11-22 RX ADMIN — Medication 3 MILLILITER(S): at 19:17

## 2023-11-22 RX ADMIN — Medication 3 MILLILITER(S): at 08:19

## 2023-11-22 RX ADMIN — Medication 10 MILLIGRAM(S): at 05:26

## 2023-11-22 RX ADMIN — Medication 1 PUFF(S): at 06:34

## 2023-11-22 RX ADMIN — Medication 25 MILLIGRAM(S): at 05:26

## 2023-11-22 RX ADMIN — ATORVASTATIN CALCIUM 40 MILLIGRAM(S): 80 TABLET, FILM COATED ORAL at 21:37

## 2023-11-22 RX ADMIN — Medication 50 MILLIGRAM(S): at 11:12

## 2023-11-22 RX ADMIN — Medication 1 PUFF(S): at 01:22

## 2023-11-22 RX ADMIN — APIXABAN 2.5 MILLIGRAM(S): 2.5 TABLET, FILM COATED ORAL at 21:38

## 2023-11-22 RX ADMIN — Medication 1 PUFF(S): at 21:37

## 2023-11-22 RX ADMIN — REMDESIVIR 200 MILLIGRAM(S): 5 INJECTION INTRAVENOUS at 11:12

## 2023-11-22 RX ADMIN — Medication 200 MILLIGRAM(S): at 21:40

## 2023-11-22 RX ADMIN — Medication 3 MILLILITER(S): at 13:47

## 2023-11-22 NOTE — PROGRESS NOTE ADULT - ASSESSMENT
89F with known HLD, HTN, AF on Eliquis 2.5 bid, Asthma, GERD on home O2 who presents with worsening SOB found with afib and RVR along with Positive RVP and COVID along with elevated BNP OF 6K. Now clinically improving.    SUGGEST:    1. COVID/RVP  - C/w Remdisivir, Steroids     2. AF  - c/w Eliquis  - monitor on Tele  - get echocardiogram  - rate control slightly increased. Titrate up metoprolol from 25 mg to 50 mg    3. Will follow prn  -

## 2023-11-22 NOTE — PROGRESS NOTE ADULT - ASSESSMENT
89 F hx hypothyroid, hld, glaucoma, htn, afib on eliquis, asthma, gerd, edema, acute chronic respiratory failure secondary to bronchiectasis with hypoxia on home O2, CHF on torsemide, cardiac murmur, TAVR, cad s/p 1 stent BIBEMS from [place] due to shortness of breath. EMS found pt to be in rapid afib, SOB worsened, prompting EMS call. Starting Friday morning 11/17 she felt poorly and the woman with whom she has her meals has COVID and is right now doing her 5 days of isolation. PT COVID+    RECOMMENDATIONS    1-COVID-19  Remdesivir started 11/20 am, with pt already requiring oxygen recommend 5 days so last day 11/24-Friday in am  Pt with hypoxemia so dexamethasone 6mg PO daily x 6 days so last day 11/25  PT on apixaban for VTE prophylaxis  pulm support 11/22 pt on 5L oxygen but on 4L at home for bronchiectasis  obs off abx    Thank you for consulting us and involving us in the management of this most interesting and challenging case.  We will follow along in the care of this patient. Please call us at [phone] or text me directly on my cell# at [phone] with any concerns.    Starting tomorrow Dr Marty Atkins will be covering this patient so please contact him with further questions office [phone] or our answering service at [phone]

## 2023-11-22 NOTE — PROGRESS NOTE ADULT - SUBJECTIVE AND OBJECTIVE BOX
Patient is a 89y old  Female who presents with a chief complaint of SOB (22 Nov 2023 10:08)    Date of servie : 11-22-23 @ 12:51  INTERVAL HPI/OVERNIGHT EVENTS:  T(C): 36.4 (11-22-23 @ 11:14), Max: 36.4 (11-21-23 @ 20:43)  HR: 88 (11-22-23 @ 11:14) (88 - 110)  BP: 133/86 (11-22-23 @ 11:14) (133/86 - 135/68)  RR: 16 (11-22-23 @ 11:14) (16 - 20)  SpO2: 92% (11-22-23 @ 11:14) (92% - 98%)  Wt(kg): --  I&O's Summary    21 Nov 2023 07:01  -  22 Nov 2023 07:00  --------------------------------------------------------  IN: 240 mL / OUT: 500 mL / NET: -260 mL        LABS:                        12.7   12.98 )-----------( 171      ( 21 Nov 2023 09:10 )             39.4     11-22    x   |  x   |  x   ----------------------------<  x   x    |  x   |  0.98    Ca    9.2      21 Nov 2023 09:10    TPro  6.7  /  Alb  2.5<L>  /  TBili  0.8  /  DBili  0.4<H>  /  AST  56<H>  /  ALT  45  /  AlkPhos  141<H>  11-22      Urinalysis Basic - ( 21 Nov 2023 09:10 )    Color: x / Appearance: x / SG: x / pH: x  Gluc: 117 mg/dL / Ketone: x  / Bili: x / Urobili: x   Blood: x / Protein: x / Nitrite: x   Leuk Esterase: x / RBC: x / WBC x   Sq Epi: x / Non Sq Epi: x / Bacteria: x      CAPILLARY BLOOD GLUCOSE            Urinalysis Basic - ( 21 Nov 2023 09:10 )    Color: x / Appearance: x / SG: x / pH: x  Gluc: 117 mg/dL / Ketone: x  / Bili: x / Urobili: x   Blood: x / Protein: x / Nitrite: x   Leuk Esterase: x / RBC: x / WBC x   Sq Epi: x / Non Sq Epi: x / Bacteria: x        MEDICATIONS  (STANDING):  albuterol/ipratropium for Nebulization 3 milliLiter(s) Nebulizer every 6 hours  apixaban 2.5 milliGRAM(s) Oral every 12 hours  atorvastatin 40 milliGRAM(s) Oral at bedtime  dexAMETHasone     Tablet 6 milliGRAM(s) Oral daily  ipratropium 17 MICROgram(s) HFA Inhaler 1 Puff(s) Inhalation every 6 hours  metoprolol succinate ER 50 milliGRAM(s) Oral daily  remdesivir  IVPB 100 milliGRAM(s) IV Intermittent every 24 hours  remdesivir  IVPB   IV Intermittent   torsemide 10 milliGRAM(s) Oral daily    MEDICATIONS  (PRN):  acetaminophen     Tablet .. 650 milliGRAM(s) Oral every 6 hours PRN Temp greater or equal to 38C (100.4F), Mild Pain (1 - 3)  guaiFENesin Oral Liquid (Sugar-Free) 200 milliGRAM(s) Oral every 6 hours PRN Cough  magnesium hydroxide Suspension 30 milliLiter(s) Oral daily PRN Constipation          PHYSICAL EXAM:  GENERAL: NAD, well-groomed, well-developed  HEAD:  Atraumatic, Normocephalic  CHEST/LUNG: Clear to percussion bilaterally; No rales, rhonchi, wheezing, or rubs  HEART: Regular rate and rhythm; No murmurs, rubs, or gallops  ABDOMEN: Soft, Nontender, Nondistended; Bowel sounds present  EXTREMITIES:  2+ Peripheral Pulses, No clubbing, cyanosis, or edema  LYMPH: No lymphadenopathy noted  SKIN: No rashes or lesions    Care Discussed with Consultants/Other Providers [ ] YES  [ ] NO

## 2023-11-22 NOTE — CASE MANAGEMENT PROGRESS NOTE - NSCMPROGRESSNOTE_GEN_ALL_CORE
Discussed pt in rounds, pt remains on IV remdesivir. WBC 12.98, Per cardio note lopressor to be increased from 25mg to 50mg, due to elevated HR.

## 2023-11-22 NOTE — PROGRESS NOTE ADULT - SUBJECTIVE AND OBJECTIVE BOX
Date/Time Patient Seen:  		  Referring MD:   Data Reviewed	       Patient is a 89y old  Female who presents with a chief complaint of SOB (21 Nov 2023 13:29)      Subjective/HPI     PAST MEDICAL & SURGICAL HISTORY:        Medication list         MEDICATIONS  (STANDING):  albuterol/ipratropium for Nebulization 3 milliLiter(s) Nebulizer every 6 hours  apixaban 2.5 milliGRAM(s) Oral every 12 hours  atorvastatin 40 milliGRAM(s) Oral at bedtime  dexAMETHasone     Tablet 6 milliGRAM(s) Oral daily  ipratropium 17 MICROgram(s) HFA Inhaler 1 Puff(s) Inhalation every 6 hours  metoprolol succinate ER 25 milliGRAM(s) Oral daily  remdesivir  IVPB 100 milliGRAM(s) IV Intermittent every 24 hours  remdesivir  IVPB   IV Intermittent   torsemide 10 milliGRAM(s) Oral daily    MEDICATIONS  (PRN):  acetaminophen     Tablet .. 650 milliGRAM(s) Oral every 6 hours PRN Temp greater or equal to 38C (100.4F), Mild Pain (1 - 3)  guaiFENesin Oral Liquid (Sugar-Free) 200 milliGRAM(s) Oral every 6 hours PRN Cough  magnesium hydroxide Suspension 30 milliLiter(s) Oral daily PRN Constipation         Vitals log        ICU Vital Signs Last 24 Hrs  T(C): 36.3 (22 Nov 2023 05:21), Max: 36.4 (21 Nov 2023 20:43)  T(F): 97.4 (22 Nov 2023 05:21), Max: 97.5 (21 Nov 2023 20:43)  HR: 97 (22 Nov 2023 05:21) (74 - 110)  BP: 133/90 (22 Nov 2023 05:21) (133/90 - 135/68)  BP(mean): --  ABP: --  ABP(mean): --  RR: 20 (22 Nov 2023 05:21) (19 - 20)  SpO2: 94% (22 Nov 2023 05:21) (93% - 98%)    O2 Parameters below as of 22 Nov 2023 05:21  Patient On (Oxygen Delivery Method): nasal cannula  O2 Flow (L/min): 4               Input and Output:  I&O's Detail    21 Nov 2023 07:01  -  22 Nov 2023 06:08  --------------------------------------------------------  IN:    Oral Fluid: 240 mL  Total IN: 240 mL    OUT:    Voided (mL): 500 mL  Total OUT: 500 mL    Total NET: -260 mL          Lab Data                        12.7   12.98 )-----------( 171      ( 21 Nov 2023 09:10 )             39.4     11-21    141  |  108  |  39<H>  ----------------------------<  117<H>  4.2   |  29  |  0.93    Ca    9.2      21 Nov 2023 09:10    TPro  7.7  /  Alb  3.0<L>  /  TBili  1.0  /  DBili  0.4<H>  /  AST  68<H>  /  ALT  43  /  AlkPhos  155<H>  11-21            Review of Systems	      Objective     Physical Examination    heart s1s2  lung dc BS  head nc      Pertinent Lab findings & Imaging      Esther:  NO   Adequate UO     I&O's Detail    21 Nov 2023 07:01  -  22 Nov 2023 06:08  --------------------------------------------------------  IN:    Oral Fluid: 240 mL  Total IN: 240 mL    OUT:    Voided (mL): 500 mL  Total OUT: 500 mL    Total NET: -260 mL               Discussed with:     Cultures:	        Radiology

## 2023-11-22 NOTE — PROGRESS NOTE ADULT - ASSESSMENT
89 F hx hypothyroid, hld, glaucoma, htn, afib on eliquis, asthma, gerd, edema, acute chronic respiratory failure with hypoxia on home O2, chf on torsemide, cardiac murmur, TAVR, cad s/p 1 stent BIBEMS from [place] due to shortness of breath    bronchiectasis  resp viral illness  valv heart disease   HTN  HLD  AF  Asthma  GERD  COVID    cardio eval noted  GOC documented - DNR DNI  vs noted    covid isol  remdesivir - decadron  NEBS ATC for Asthma and Bronchiectasis  fio2 titration - HF NC - NRB -   goal sat > 88 pct  GOC discussion   assist with needs  assess need for Diuresis - proBNP noted - pt with known extensive card hx -   on tele monitor  tylenol PRN   robitussin PRN   on AC -   prognosis guarded

## 2023-11-22 NOTE — PROGRESS NOTE ADULT - SUBJECTIVE AND OBJECTIVE BOX
[facility] DIVISION of INFECTIOUS DISEASE  Donnie Hoyt MD PhD, Morena Zarco MD, Christa Valerio MD, Tish Alanis MD, Marty Atkins MD  and providing coverage with Charmaine Thayer MD  Providing Infectious Disease Consultations at [facility], [facility], [facility], [facility]'s    Office# [phone] to schedule follow up appointments  Answering Service for urgent calls or New Consults [phone]  Cell# to text for urgent issues Donnie Hoyt [phone]     infectious diseases progress note:    BLANCA BENSON is a 89y y. o. Female patient    Overnight and events of the last 24hrs reviewed    Allergies    amoxicillin (Rash)  penicillin (Rash)    Intolerances        ANTIBIOTICS/RELEVANT:  antimicrobials  remdesivir  IVPB 100 milliGRAM(s) IV Intermittent every 24 hours  remdesivir  IVPB   IV Intermittent     immunologic:    OTHER:  acetaminophen     Tablet .. 650 milliGRAM(s) Oral every 6 hours PRN  albuterol/ipratropium for Nebulization 3 milliLiter(s) Nebulizer every 6 hours  apixaban 2.5 milliGRAM(s) Oral every 12 hours  atorvastatin 40 milliGRAM(s) Oral at bedtime  dexAMETHasone     Tablet 6 milliGRAM(s) Oral daily  guaiFENesin Oral Liquid (Sugar-Free) 200 milliGRAM(s) Oral every 6 hours PRN  ipratropium 17 MICROgram(s) HFA Inhaler 1 Puff(s) Inhalation every 6 hours  magnesium hydroxide Suspension 30 milliLiter(s) Oral daily PRN  metoprolol succinate ER 50 milliGRAM(s) Oral daily  torsemide 10 milliGRAM(s) Oral daily      Objective:  Vital Signs Last 24 Hrs  T(C): 36.4 (22 Nov 2023 11:14), Max: 36.4 (21 Nov 2023 20:43)  T(F): 97.6 (22 Nov 2023 11:14), Max: 97.6 (22 Nov 2023 11:14)  HR: 88 (22 Nov 2023 11:14) (88 - 110)  BP: 133/86 (22 Nov 2023 11:14) (133/86 - 135/68)  BP(mean): --  RR: 16 (22 Nov 2023 11:14) (16 - 20)  SpO2: 92% (22 Nov 2023 11:14) (92% - 98%)    Parameters below as of 22 Nov 2023 11:14  Patient On (Oxygen Delivery Method): room air        T(C): 36.4 (11-22-23 @ 11:14), Max: 36.4 (11-21-23 @ 04:46)  T(C): 36.4 (11-22-23 @ 11:14), Max: 39.2 (11-19-23 @ 22:48)  T(C): 36.4 (11-22-23 @ 11:14), Max: 39.2 (11-19-23 @ 22:48)    PHYSICAL EXAM:  HEENT: NC atraumatic  Neck: supple  Respiratory: no accessory muscle use, breathing comfortably  Cardiovascular: distant  Gastrointestinal: normal appearing, nondistended  Extremities: no clubbing, no cyanosis,        LABS:                          12.7   12.98 )-----------( 171      ( 21 Nov 2023 09:10 )             39.4       WBC  12.98 11-21 @ 09:10  8.48 11-19 @ 22:40      11-22    x   |  x   |  x   ----------------------------<  x   x    |  x   |  0.98    Ca    9.2      21 Nov 2023 09:10    TPro  6.7  /  Alb  2.5<L>  /  TBili  0.8  /  DBili  0.4<H>  /  AST  56<H>  /  ALT  45  /  AlkPhos  141<H>  11-22      Creatinine: 0.98 mg/dL (11-22-23 @ 08:15)  Creatinine: 0.93 mg/dL (11-21-23 @ 09:10)  Creatinine: 1.40 mg/dL (11-20-23 @ 02:56)  Creatinine: 1.40 mg/dL (11-19-23 @ 22:40)        Urinalysis Basic - ( 21 Nov 2023 09:10 )    Color: x / Appearance: x / SG: x / pH: x  Gluc: 117 mg/dL / Ketone: x  / Bili: x / Urobili: x   Blood: x / Protein: x / Nitrite: x   Leuk Esterase: x / RBC: x / WBC x   Sq Epi: x / Non Sq Epi: x / Bacteria: x            INFLAMMATORY MARKERS      MICROBIOLOGY:              RADIOLOGY & ADDITIONAL STUDIES:

## 2023-11-22 NOTE — PROGRESS NOTE ADULT - ASSESSMENT
89 F hx hypothyroid, hld, glaucoma, htn, afib on eliquis, asthma, gerd, edema, acute chronic respiratory failure with hypoxia on home O2, chf on torsemide, cardiac murmur, TAVR, cad s/p 1 stent BIBEMS from [place] due to shortness of breath. Earlier pt was complaining of indigestion, which resolved. EMS found pt to be in rapid afib, gave 5 IV metoprolol, EMS reading as STEMI. Pt states early this morning was having indigestion, after many hours was able to get medication from staff which improved this. SOB all day which worsened, prompting EMS call. Denies fever, cp, cough, n/v/d, leg swelling.    1 Acute hypoxic resp failure sec to COVID 19 POA  - cw remdesivir and decadron  - oulm fu   - off high flow now   - will monitor    2 Afib  - cw eliquis  - cw metoprolol    3 HLD  - cw statin    4 Asthma  - cw nebs

## 2023-11-22 NOTE — PROGRESS NOTE ADULT - SUBJECTIVE AND OBJECTIVE BOX
Patient is a 89y Female with a known history of :    HPI:  89 F hx hypothyroid, hld, glaucoma, htn, afib on eliquis, asthma, gerd, edema, acute chronic respiratory failure with hypoxia on home O2, chf on torsemide, cardiac murmur, TAVR, cad s/p 1 stent BIBEMS from [place] due to shortness of breath. Earlier pt was complaining of indigestion, which resolved. EMS found pt to be in rapid afib, gave 5 IV metoprolol, EMS reading as STEMI. Pt states early this morning was having indigestion, after many hours was able to get medication from staff which improved this. SOB all day which worsened, prompting EMS call. Denies fever, cp, cough, n/v/d, leg swelling. (20 Nov 2023 01:53)      REVIEW OF SYSTEMS:    CONSTITUTIONAL: No weakness, fevers or chills  EYES/ENT: No visual changes;  No vertigo or throat pain   NECK: No pain or stiffness  RESPIRATORY: No cough, wheezing, hemoptysis; No shortness of breath  CARDIOVASCULAR: No chest pain or palpitations  GASTROINTESTINAL: No abdominal pain. No nausea, vomiting, or hematemesis; No diarrhea or constipation. No melena or hematochezia.  GENITOURINARY: No dysuria, frequency or hematuria  NEUROLOGICAL: No numbness or weakness  SKIN: No itching or rash  All other review of systems is negative unless indicated above        MEDICATIONS  (STANDING):  albuterol/ipratropium for Nebulization 3 milliLiter(s) Nebulizer every 6 hours  apixaban 2.5 milliGRAM(s) Oral every 12 hours  atorvastatin 40 milliGRAM(s) Oral at bedtime  dexAMETHasone     Tablet 6 milliGRAM(s) Oral daily  ipratropium 17 MICROgram(s) HFA Inhaler 1 Puff(s) Inhalation every 6 hours  metoprolol succinate ER 25 milliGRAM(s) Oral daily  remdesivir  IVPB   IV Intermittent   remdesivir  IVPB 100 milliGRAM(s) IV Intermittent every 24 hours  torsemide 10 milliGRAM(s) Oral daily    MEDICATIONS  (PRN):  acetaminophen     Tablet .. 650 milliGRAM(s) Oral every 6 hours PRN Temp greater or equal to 38C (100.4F), Mild Pain (1 - 3)  guaiFENesin Oral Liquid (Sugar-Free) 200 milliGRAM(s) Oral every 6 hours PRN Cough  magnesium hydroxide Suspension 30 milliLiter(s) Oral daily PRN Constipation      ALLERGIES: amoxicillin (Rash)  penicillin (Rash)      FAMILY HISTORY:      PHYSICAL EXAMINATION:  -----------------------------  T(C): 36.3 (11-22-23 @ 05:21), Max: 36.4 (11-21-23 @ 20:43)  HR: 97 (11-22-23 @ 08:46) (97 - 110)  BP: 133/90 (11-22-23 @ 05:21) (133/90 - 135/68)  RR: 20 (11-22-23 @ 05:21) (19 - 20)  SpO2: 94% (11-22-23 @ 08:46) (93% - 98%)  Wt(kg): --    11-21 @ 07:01  -  11-22 @ 07:00  --------------------------------------------------------  IN:    Oral Fluid: 240 mL  Total IN: 240 mL    OUT:    Voided (mL): 500 mL  Total OUT: 500 mL    Total NET: -260 mL    PE reviewed from chart         LABS:   --------  11-22    x   |  x   |  x   ----------------------------<  x   x    |  x   |  0.98    Ca    9.2      21 Nov 2023 09:10    TPro  6.7  /  Alb  2.5<L>  /  TBili  0.8  /  DBili  0.4<H>  /  AST  56<H>  /  ALT  45  /  AlkPhos  141<H>  11-22                         12.7   12.98 )-----------( 171      ( 21 Nov 2023 09:10 )             39.4

## 2023-11-23 LAB
ALBUMIN SERPL ELPH-MCNC: 3 G/DL — LOW (ref 3.3–5)
ALBUMIN SERPL ELPH-MCNC: 3 G/DL — LOW (ref 3.3–5)
ALP SERPL-CCNC: 144 U/L — HIGH (ref 40–120)
ALP SERPL-CCNC: 144 U/L — HIGH (ref 40–120)
ALT FLD-CCNC: 42 U/L — SIGNIFICANT CHANGE UP (ref 12–78)
ALT FLD-CCNC: 42 U/L — SIGNIFICANT CHANGE UP (ref 12–78)
AST SERPL-CCNC: 41 U/L — HIGH (ref 15–37)
AST SERPL-CCNC: 41 U/L — HIGH (ref 15–37)
BILIRUB DIRECT SERPL-MCNC: 0.4 MG/DL — HIGH (ref 0–0.3)
BILIRUB DIRECT SERPL-MCNC: 0.4 MG/DL — HIGH (ref 0–0.3)
BILIRUB INDIRECT FLD-MCNC: 0.7 MG/DL — SIGNIFICANT CHANGE UP (ref 0.2–1)
BILIRUB INDIRECT FLD-MCNC: 0.7 MG/DL — SIGNIFICANT CHANGE UP (ref 0.2–1)
BILIRUB SERPL-MCNC: 1.1 MG/DL — SIGNIFICANT CHANGE UP (ref 0.2–1.2)
BILIRUB SERPL-MCNC: 1.1 MG/DL — SIGNIFICANT CHANGE UP (ref 0.2–1.2)
CREAT SERPL-MCNC: 1 MG/DL — SIGNIFICANT CHANGE UP (ref 0.5–1.3)
CREAT SERPL-MCNC: 1 MG/DL — SIGNIFICANT CHANGE UP (ref 0.5–1.3)
EGFR: 52 ML/MIN/1.73M2 — LOW
EGFR: 52 ML/MIN/1.73M2 — LOW
PROT SERPL-MCNC: 7.9 G/DL — SIGNIFICANT CHANGE UP (ref 6–8.3)
PROT SERPL-MCNC: 7.9 G/DL — SIGNIFICANT CHANGE UP (ref 6–8.3)

## 2023-11-23 RX ORDER — DILTIAZEM HCL 120 MG
30 CAPSULE, EXT RELEASE 24 HR ORAL
Refills: 0 | Status: DISCONTINUED | OUTPATIENT
Start: 2023-11-23 | End: 2023-11-25

## 2023-11-23 RX ADMIN — Medication 10 MILLIGRAM(S): at 05:26

## 2023-11-23 RX ADMIN — Medication 1 PUFF(S): at 23:07

## 2023-11-23 RX ADMIN — Medication 3 MILLILITER(S): at 08:34

## 2023-11-23 RX ADMIN — Medication 50 MILLIGRAM(S): at 05:26

## 2023-11-23 RX ADMIN — Medication 6 MILLIGRAM(S): at 05:26

## 2023-11-23 RX ADMIN — APIXABAN 2.5 MILLIGRAM(S): 2.5 TABLET, FILM COATED ORAL at 23:06

## 2023-11-23 RX ADMIN — APIXABAN 2.5 MILLIGRAM(S): 2.5 TABLET, FILM COATED ORAL at 11:54

## 2023-11-23 RX ADMIN — Medication 650 MILLIGRAM(S): at 14:13

## 2023-11-23 RX ADMIN — REMDESIVIR 200 MILLIGRAM(S): 5 INJECTION INTRAVENOUS at 12:27

## 2023-11-23 RX ADMIN — Medication 3 MILLILITER(S): at 19:45

## 2023-11-23 RX ADMIN — Medication 650 MILLIGRAM(S): at 12:26

## 2023-11-23 RX ADMIN — Medication 30 MILLIGRAM(S): at 12:27

## 2023-11-23 RX ADMIN — Medication 1 PUFF(S): at 01:25

## 2023-11-23 RX ADMIN — Medication 3 MILLILITER(S): at 01:24

## 2023-11-23 RX ADMIN — ATORVASTATIN CALCIUM 40 MILLIGRAM(S): 80 TABLET, FILM COATED ORAL at 23:06

## 2023-11-23 NOTE — PROGRESS NOTE ADULT - SUBJECTIVE AND OBJECTIVE BOX
[facility], Division of Infectious Diseases  ELAYNE Sims Y. Patel, S. Shah, G. Wilbert  [phone]  ([phone] - weekdays after 5pm and weekends)    Name: BLANCA BENSON  Age/Gender: 89y Female  MRN: [id number]    Interval History:  Notes reviewed.   No concerning overnight events.  Afebrile.     Allergies: amoxicillin (Rash)  penicillin (Rash)      Objective:  Vitals:   T(F): 97.5 (11-23-23 @ 11:30), Max: 97.6 (11-22-23 @ 20:06)  HR: 75 (11-23-23 @ 11:30) (75 - 103)  BP: 133/75 (11-23-23 @ 11:30) (133/75 - 146/79)  RR: 24 (11-23-23 @ 11:30) (20 - 24)  SpO2: 93% (11-23-23 @ 11:30) (92% - 98%)  Physical Examination:  General: no acute distress  HEENT: anicteric, NC  Cardio: S1, S2, normal rate  Resp: mild wheezes b/l  Abd: soft, NT, ND  Ext: no LE edema  Skin: warm, dry    Laboratory Studies:  CBC:   WBC Trend:  12.98 11-21-23 @ 09:10  8.48 11-19-23 @ 22:40    CMP: 11-23    x   |  x   |  x   ----------------------------<  x   x    |  x   |  1.00      TPro  7.9  /  Alb  3.0<L>  /  TBili  1.1  /  DBili  0.4<H>  /  AST  41<H>  /  ALT  42  /  AlkPhos  144<H>  11-23      LIVER FUNCTIONS - ( 23 Nov 2023 07:50 )  Alb: 3.0 g/dL / Pro: 7.9 g/dL / ALK PHOS: 144 U/L / ALT: 42 U/L / AST: 41 U/L / GGT: x               Microbiology: reviewed     Culture - Blood (collected 11-19-23 @ 22:40)  Source: .Blood Blood-Venous  Preliminary Report (11-23-23 @ 07:01):    No growth at 72 Hours    Culture - Blood (collected 11-19-23 @ 22:40)  Source: .Blood Blood-Venous  Preliminary Report (11-23-23 @ 07:01):    No growth at 72 Hours        Radiology: reviewed     Medications:  acetaminophen     Tablet .. 650 milliGRAM(s) Oral every 6 hours PRN  albuterol/ipratropium for Nebulization 3 milliLiter(s) Nebulizer every 6 hours  apixaban 2.5 milliGRAM(s) Oral every 12 hours  atorvastatin 40 milliGRAM(s) Oral at bedtime  dexAMETHasone     Tablet 6 milliGRAM(s) Oral daily  diltiazem    Tablet 30 milliGRAM(s) Oral two times a day PRN  guaiFENesin Oral Liquid (Sugar-Free) 200 milliGRAM(s) Oral every 6 hours PRN  ipratropium 17 MICROgram(s) HFA Inhaler 1 Puff(s) Inhalation every 6 hours  magnesium hydroxide Suspension 30 milliLiter(s) Oral daily PRN  metoprolol succinate ER 50 milliGRAM(s) Oral daily  remdesivir  IVPB 100 milliGRAM(s) IV Intermittent every 24 hours  remdesivir  IVPB   IV Intermittent   torsemide 10 milliGRAM(s) Oral daily    Antimicrobials:  remdesivir  IVPB   IV Intermittent   remdesivir  IVPB 100 milliGRAM(s) IV Intermittent every 24 hours

## 2023-11-23 NOTE — PROGRESS NOTE ADULT - ASSESSMENT
89 F hx hypothyroid, hld, glaucoma, htn, afib on eliquis, asthma, gerd, edema, acute chronic respiratory failure with hypoxia on home O2, chf on torsemide, cardiac murmur, TAVR, cad s/p 1 stent BIBEMS from [place] due to shortness of breath    bronchiectasis  resp viral illness  valv heart disease   HTN  HLD  AF  Asthma  GERD  COVID    cardio eval noted  GOC documented - DNR DNI  vs noted  remdesivir - decadron -     covid isol  remdesivir - decadron  NEBS ATC for Asthma and Bronchiectasis  fio2 titration - HF NC - NRB -   goal sat > 88 pct  GOC discussion   assist with needs  assess need for Diuresis - proBNP noted - pt with known extensive card hx -   on tele monitor  tylenol PRN   robitussin PRN   on AC -   prognosis guarded

## 2023-11-23 NOTE — PROGRESS NOTE ADULT - SUBJECTIVE AND OBJECTIVE BOX
Date/Time Patient Seen:  		  Referring MD:   Data Reviewed	       Patient is a 89y old  Female who presents with a chief complaint of SOB (22 Nov 2023 13:56)      Subjective/HPI     PAST MEDICAL & SURGICAL HISTORY:        Medication list         MEDICATIONS  (STANDING):  albuterol/ipratropium for Nebulization 3 milliLiter(s) Nebulizer every 6 hours  apixaban 2.5 milliGRAM(s) Oral every 12 hours  atorvastatin 40 milliGRAM(s) Oral at bedtime  dexAMETHasone     Tablet 6 milliGRAM(s) Oral daily  ipratropium 17 MICROgram(s) HFA Inhaler 1 Puff(s) Inhalation every 6 hours  metoprolol succinate ER 50 milliGRAM(s) Oral daily  remdesivir  IVPB   IV Intermittent   remdesivir  IVPB 100 milliGRAM(s) IV Intermittent every 24 hours  torsemide 10 milliGRAM(s) Oral daily    MEDICATIONS  (PRN):  acetaminophen     Tablet .. 650 milliGRAM(s) Oral every 6 hours PRN Temp greater or equal to 38C (100.4F), Mild Pain (1 - 3)  guaiFENesin Oral Liquid (Sugar-Free) 200 milliGRAM(s) Oral every 6 hours PRN Cough  magnesium hydroxide Suspension 30 milliLiter(s) Oral daily PRN Constipation         Vitals log        ICU Vital Signs Last 24 Hrs  T(C): 36.4 (22 Nov 2023 20:06), Max: 36.4 (22 Nov 2023 11:14)  T(F): 97.6 (22 Nov 2023 20:06), Max: 97.6 (22 Nov 2023 11:14)  HR: 103 (23 Nov 2023 05:23) (75 - 103)  BP: 146/79 (23 Nov 2023 05:23) (133/86 - 146/79)  BP(mean): --  ABP: --  ABP(mean): --  RR: 20 (22 Nov 2023 20:06) (16 - 20)  SpO2: 96% (23 Nov 2023 05:28) (92% - 98%)    O2 Parameters below as of 23 Nov 2023 05:28  Patient On (Oxygen Delivery Method): nasal cannula  O2 Flow (L/min): 4               Input and Output:  I&O's Detail    21 Nov 2023 07:01  -  22 Nov 2023 07:00  --------------------------------------------------------  IN:    Oral Fluid: 240 mL  Total IN: 240 mL    OUT:    Voided (mL): 500 mL  Total OUT: 500 mL    Total NET: -260 mL      22 Nov 2023 07:01 - 23 Nov 2023 05:54  --------------------------------------------------------  IN:    IV PiggyBack: 100 mL  Total IN: 100 mL    OUT:  Total OUT: 0 mL    Total NET: 100 mL          Lab Data                        12.7   12.98 )-----------( 171      ( 21 Nov 2023 09:10 )             39.4     11-22    x   |  x   |  x   ----------------------------<  x   x    |  x   |  0.98    Ca    9.2      21 Nov 2023 09:10    TPro  6.7  /  Alb  2.5<L>  /  TBili  0.8  /  DBili  0.4<H>  /  AST  56<H>  /  ALT  45  /  AlkPhos  141<H>  11-22            Review of Systems	      Objective     Physical Examination  heart s1s2  lung dc BS  head nc        Pertinent Lab findings & Imaging      Esther:  NO   Adequate UO     I&O's Detail    21 Nov 2023 07:01  -  22 Nov 2023 07:00  --------------------------------------------------------  IN:    Oral Fluid: 240 mL  Total IN: 240 mL    OUT:    Voided (mL): 500 mL  Total OUT: 500 mL    Total NET: -260 mL      22 Nov 2023 07:01  -  23 Nov 2023 05:54  --------------------------------------------------------  IN:    IV PiggyBack: 100 mL  Total IN: 100 mL    OUT:  Total OUT: 0 mL    Total NET: 100 mL               Discussed with:     Cultures:	        Radiology

## 2023-11-23 NOTE — PROGRESS NOTE ADULT - SUBJECTIVE AND OBJECTIVE BOX
[facility] Cardiology    CHIEF COMPLAINT: Patient is a 89y old  Female who presents with a chief complaint of SOB (23 Nov 2023 05:54)      Follow Up: [ ] Chest Pain      [ ] Dyspnea     [ ] Palpitations    [ ] Atrial Fibrillation     [ ] Ventricular Dysrhythmia    [ ] Abnormal EKG                      [ ] Abnormal Cardiac Enzymes     [ ] Valvular Disease    HPI:  89 F hx hypothyroid, hld, glaucoma, htn, afib on eliquis, asthma, gerd, edema, acute chronic respiratory failure with hypoxia on home O2, chf on torsemide, cardiac murmur, TAVR, cad s/p 1 stent BIBEMS from [place] due to shortness of breath. Earlier pt was complaining of indigestion, which resolved. EMS found pt to be in rapid afib, gave 5 IV metoprolol, EMS reading as STEMI. Pt states early this morning was having indigestion, after many hours was able to get medication from staff which improved this. SOB all day which worsened, prompting EMS call. Denies fever, cp, cough, n/v/d, leg swelling. (20 Nov 2023 01:53)    PAST MEDICAL & SURGICAL HISTORY:    MEDICATIONS  (STANDING):  albuterol/ipratropium for Nebulization 3 milliLiter(s) Nebulizer every 6 hours  apixaban 2.5 milliGRAM(s) Oral every 12 hours  atorvastatin 40 milliGRAM(s) Oral at bedtime  dexAMETHasone     Tablet 6 milliGRAM(s) Oral daily  ipratropium 17 MICROgram(s) HFA Inhaler 1 Puff(s) Inhalation every 6 hours  metoprolol succinate ER 50 milliGRAM(s) Oral daily  remdesivir  IVPB 100 milliGRAM(s) IV Intermittent every 24 hours  remdesivir  IVPB   IV Intermittent   torsemide 10 milliGRAM(s) Oral daily    MEDICATIONS  (PRN):  acetaminophen     Tablet .. 650 milliGRAM(s) Oral every 6 hours PRN Temp greater or equal to 38C (100.4F), Mild Pain (1 - 3)  guaiFENesin Oral Liquid (Sugar-Free) 200 milliGRAM(s) Oral every 6 hours PRN Cough  magnesium hydroxide Suspension 30 milliLiter(s) Oral daily PRN Constipation    Allergies    amoxicillin (Rash)  penicillin (Rash)    Intolerances        REVIEW OF SYSTEMS:    CONSTITUTIONAL: No weakness, fevers or chills.   EYES/ENT: No visual changes;    NECK: No pain or stiffness  RESPIRATORY: No cough, wheezing, No shortness of breath  CARDIOVASCULAR: No chest pain or palpitations  GASTROINTESTINAL: No abdominal pain, or hematochezia.  GENITOURINARY: No dysuria orhematuria  NEUROLOGICAL: No numbness or weakness  SKIN: No itching, burning, rashes  All other review of systems is negative unless indicated above    Vital Signs Last 24 Hrs  T(C): 36.4 (22 Nov 2023 20:06), Max: 36.4 (22 Nov 2023 11:14)  T(F): 97.6 (22 Nov 2023 20:06), Max: 97.6 (22 Nov 2023 11:14)  HR: 103 (23 Nov 2023 05:23) (75 - 103)  BP: 146/79 (23 Nov 2023 05:23) (133/86 - 146/79)  BP(mean): --  RR: 20 (22 Nov 2023 20:06) (16 - 20)  SpO2: 96% (23 Nov 2023 05:28) (92% - 98%)    Parameters below as of 23 Nov 2023 05:28  Patient On (Oxygen Delivery Method): nasal cannula  O2 Flow (L/min): 4    I&O's Summary    22 Nov 2023 07:01  -  23 Nov 2023 07:00  --------------------------------------------------------  IN: 100 mL / OUT: 0 mL / NET: 100 mL    PHYSICAL EXAM:  Constitutional: NAD  Neurological: Alert, no focal deficits  HEENT: no JVD, EOMI  O2 mask  Cardiovascular: Regular, S1 and S2, no murmur  Pulmonary: breath sounds bilaterally  Gastrointestinal: Bowel Sounds present, soft, nontender  EXT:  no peripheral edema  Skin: No rashes.  Psych:  Mood calm  LABS: All Labs Reviewed:      11-23    x   |  x   |  x   ----------------------------<  x   x    |  x   |  1.00      TPro  7.9  /  Alb  3.0<L>  /  TBili  1.1  /  DBili  0.4<H>  /  AST  41<H>  /  ALT  42  /  AlkPhos  144<H>  11-23        Assessment and Plan:   · Assessment    89F with known HLD, HTN, AF on Eliquis 2.5 bid, Asthma, GERD on home O2 who presents with worsening SOB found with afib and RVR along with Positive RVP and COVID along with elevated BNP OF 6K. Now clinically improving.    SUGGEST:    1. COVID/RVP  - C/w Remdisivir, Steroids     2. AF  - c/w Eliquis  - monitor on Tele  -  echocardiogram-ordered  - rate control slightly increased. Titrated up metoprolol from 25 mg to 50 mg    3. Will follow prn  -

## 2023-11-23 NOTE — PROGRESS NOTE ADULT - ASSESSMENT
89 F hx hypothyroid, hld, glaucoma, htn, afib on eliquis, asthma, gerd, edema, acute chronic respiratory failure with hypoxia on home O2, chf on torsemide, cardiac murmur, TAVR, cad s/p 1 stent BIBEMS from [place] due to shortness of breath. Earlier pt was complaining of indigestion, which resolved. EMS found pt to be in rapid afib, gave 5 IV metoprolol, EMS reading as STEMI. Pt states early this morning was having indigestion, after many hours was able to get medication from staff which improved this. SOB all day which worsened, prompting EMS call. Denies fever, cp, cough, n/v/d, leg swelling.    1 Acute hypoxic resp failure sec to COVID 19 POA  - cw remdesivir and decadron  - oulm fu   - off high flow now   - will monitor    2 Afib  - cw eliquis  - cw metoprolol    3 HLD  - cw statin    4 Asthma  - cw nebs

## 2023-11-23 NOTE — PROGRESS NOTE ADULT - SUBJECTIVE AND OBJECTIVE BOX
Patient is a 89y old  Female who presents with a chief complaint of SOB (23 Nov 2023 09:20)    Date of servie : 11-23-23 @ 12:10  INTERVAL HPI/OVERNIGHT EVENTS:  T(C): 36.4 (11-23-23 @ 11:30), Max: 36.4 (11-22-23 @ 20:06)  HR: 75 (11-23-23 @ 11:30) (75 - 103)  BP: 133/75 (11-23-23 @ 11:30) (133/75 - 146/79)  RR: 24 (11-23-23 @ 11:30) (20 - 24)  SpO2: 93% (11-23-23 @ 11:30) (92% - 98%)  Wt(kg): --  I&O's Summary    22 Nov 2023 07:01  -  23 Nov 2023 07:00  --------------------------------------------------------  IN: 100 mL / OUT: 0 mL / NET: 100 mL        LABS:    11-23    x   |  x   |  x   ----------------------------<  x   x    |  x   |  1.00      TPro  7.9  /  Alb  3.0<L>  /  TBili  1.1  /  DBili  0.4<H>  /  AST  41<H>  /  ALT  42  /  AlkPhos  144<H>  11-23        CAPILLARY BLOOD GLUCOSE                MEDICATIONS  (STANDING):  albuterol/ipratropium for Nebulization 3 milliLiter(s) Nebulizer every 6 hours  apixaban 2.5 milliGRAM(s) Oral every 12 hours  atorvastatin 40 milliGRAM(s) Oral at bedtime  dexAMETHasone     Tablet 6 milliGRAM(s) Oral daily  ipratropium 17 MICROgram(s) HFA Inhaler 1 Puff(s) Inhalation every 6 hours  metoprolol succinate ER 50 milliGRAM(s) Oral daily  remdesivir  IVPB 100 milliGRAM(s) IV Intermittent every 24 hours  remdesivir  IVPB   IV Intermittent   torsemide 10 milliGRAM(s) Oral daily    MEDICATIONS  (PRN):  acetaminophen     Tablet .. 650 milliGRAM(s) Oral every 6 hours PRN Temp greater or equal to 38C (100.4F), Mild Pain (1 - 3)  guaiFENesin Oral Liquid (Sugar-Free) 200 milliGRAM(s) Oral every 6 hours PRN Cough  magnesium hydroxide Suspension 30 milliLiter(s) Oral daily PRN Constipation          PHYSICAL EXAM:  GENERAL: NAD, well-groomed, well-developed  HEAD:  Atraumatic, Normocephalic  CHEST/LUNG: Clear to percussion bilaterally; No rales, rhonchi, wheezing, or rubs  HEART: Regular rate and rhythm; No murmurs, rubs, or gallops  ABDOMEN: Soft, Nontender, Nondistended; Bowel sounds present  EXTREMITIES:  2+ Peripheral Pulses, No clubbing, cyanosis, or edema  LYMPH: No lymphadenopathy noted  SKIN: No rashes or lesions    Care Discussed with Consultants/Other Providers [ x] YES  [ ] NO

## 2023-11-23 NOTE — CHART NOTE - NSCHARTNOTEFT_GEN_A_CORE
Assessment: Pt seen for nutrition follow-up. Chart reviewed, hospital course noted.    Brief hx: Pt is a "89 F hx hypothyroid, hld, glaucoma, htn, afib on eliquis, asthma, gerd, edema, acute chronic respiratory failure secondary to bronchiectasis with hypoxia on home O2, CHF on torsemide, cardiac murmur, TAVR, cad s/p 1 stent BIBEMS from [place] due to shortness of breath. EMS found pt to be in rapid afib, SOB worsened, prompting EMS call. Pt COVID+."    Visited pt at bedside this am. Pt consuming breakfast meal during visit. Pt consumed ~50% of ensure shake, ~25% of breakfast entree. Tolerating diet well. Reports fair appetite/intake overall. One po intake of % per nursing documentation. Denies chewing/swallowing difficulties. Denies N/V. +BM 11/22. Continues on DASH/TLC diet. Pt trialed ensure, reports good intake of supplement. Will continue to provide ensure plus HP BID (strawberry flavor). Additional food preferences obtained to optimize po intake/tolerance. RD remains available.     Factors impacting intake: [X] none [ ] nausea  [ ] vomiting [ ] diarrhea [ ] constipation  [ ]chewing problems [ ] swallowing issues  [ ] other:     Diet Prescription: Diet, DASH/TLC:   Sodium & Cholesterol Restricted (11-20-23 @ 01:55)    Intake: fair    Current Weight: Weight (kg): 54.4 (11-19 @ 21:51), 11/21 121# (no edema noted)  % Weight Change    Pertinent Medications: MEDICATIONS  (STANDING):  albuterol/ipratropium for Nebulization 3 milliLiter(s) Nebulizer every 6 hours  apixaban 2.5 milliGRAM(s) Oral every 12 hours  atorvastatin 40 milliGRAM(s) Oral at bedtime  dexAMETHasone     Tablet 6 milliGRAM(s) Oral daily  ipratropium 17 MICROgram(s) HFA Inhaler 1 Puff(s) Inhalation every 6 hours  metoprolol succinate ER 50 milliGRAM(s) Oral daily  remdesivir  IVPB 100 milliGRAM(s) IV Intermittent every 24 hours  remdesivir  IVPB   IV Intermittent   torsemide 10 milliGRAM(s) Oral daily    MEDICATIONS  (PRN):  acetaminophen     Tablet .. 650 milliGRAM(s) Oral every 6 hours PRN Temp greater or equal to 38C (100.4F), Mild Pain (1 - 3)  guaiFENesin Oral Liquid (Sugar-Free) 200 milliGRAM(s) Oral every 6 hours PRN Cough  magnesium hydroxide Suspension 30 milliLiter(s) Oral daily PRN Constipation    Pertinent Labs: 11-23 Na--    Glu --    K+ --    Cr  1.00 mg/dL BUN --    11-23 Alb 3.0 g/dL<L>    Skin: stage I pressure ulcers to BL heels and lower back, DTI with stage I to sacrum    Estimated Needs:   [X] no change since previous assessment: based on #/52.1kg  25-30kcal/kg (1302-1563kcal)  1.2-1.4g pro/kg (62-73gm protein)  [ ] recalculated:     Previous Nutrition Diagnosis:   [ ] Inadequate Energy Intake [ ]Inadequate Oral Intake [ ] Excessive Energy Intake   [ ] Underweight [X] Increased Nutrient Needs [ ] Overweight/Obesity   [ ] Altered GI Function [ ] Unintended Weight Loss [ ] Food & Nutrition Related Knowledge Deficit [X] Malnutrition     Nutrition Diagnosis is [X] ongoing  [ ] resolved [ ] not applicable     New Nutrition Diagnosis: [ ] not applicable     Interventions:   Recommend  [ ] Change Diet To:  [X] Nutrition Supplement: recommend ensure plus HP BID (strawberry flavor); 350kcal, 20gm protein per 8 fl oz serving  [ ] Nutrition Support  [X] Other: Continue DASH/TLC diet, assistance/encouragement at meal times as needed  MVI daily and vit C 500 mg BID     Monitoring and Evaluation:   [X] PO intake [ x ] Tolerance to diet prescription [ x ] weights [ x ] labs[ x ] follow up per protocol  [X] other: s/s GI distress, bowel function, skin integrity/ edema

## 2023-11-23 NOTE — PROGRESS NOTE ADULT - ASSESSMENT
89 F hx hypothyroid, hld, glaucoma, htn, afib on eliquis, asthma, gerd, edema, acute chronic respiratory failure secondary to bronchiectasis with hypoxia on home O2, CHF on torsemide, cardiac murmur, TAVR, cad s/p 1 stent BIBEMS from [place] due to shortness of breath. EMS found pt to be in rapid afib, SOB worsened, prompting EMS call. Starting Friday morning 11/17 she felt poorly and the woman with whom she has her meals has COVID and is right now doing her 5 days of isolation. PT COVID+    RECOMMENDATIONS    1-COVID-19  Remdesivir started 11/20 am, with pt already requiring oxygen recommend 5 days so last day 11/24-Friday in am  Pt with hypoxemia so dexamethasone 6mg PO daily x 6 days so last day 11/25  PT on apixaban for VTE prophylaxis  now weaned back to home 4L O2 for bronchiectasis  obs off abx    Dr. Christa Valerio will be covering tomorrow    Marty Atkins M.D.  [facility], Division of Infectious Diseases  [phone]  After 5pm on weekdays and all day on weekends - please call [phone]

## 2023-11-24 LAB
ALBUMIN SERPL ELPH-MCNC: 2.7 G/DL — LOW (ref 3.3–5)
ALBUMIN SERPL ELPH-MCNC: 2.7 G/DL — LOW (ref 3.3–5)
ALBUMIN SERPL ELPH-MCNC: 2.8 G/DL — LOW (ref 3.3–5)
ALBUMIN SERPL ELPH-MCNC: 2.8 G/DL — LOW (ref 3.3–5)
ALP SERPL-CCNC: 118 U/L — SIGNIFICANT CHANGE UP (ref 40–120)
ALP SERPL-CCNC: 118 U/L — SIGNIFICANT CHANGE UP (ref 40–120)
ALP SERPL-CCNC: 120 U/L — SIGNIFICANT CHANGE UP (ref 40–120)
ALP SERPL-CCNC: 120 U/L — SIGNIFICANT CHANGE UP (ref 40–120)
ALT FLD-CCNC: 30 U/L — SIGNIFICANT CHANGE UP (ref 12–78)
ALT FLD-CCNC: 30 U/L — SIGNIFICANT CHANGE UP (ref 12–78)
ALT FLD-CCNC: 31 U/L — SIGNIFICANT CHANGE UP (ref 12–78)
ALT FLD-CCNC: 31 U/L — SIGNIFICANT CHANGE UP (ref 12–78)
ANION GAP SERPL CALC-SCNC: 9 MMOL/L — SIGNIFICANT CHANGE UP (ref 5–17)
ANION GAP SERPL CALC-SCNC: 9 MMOL/L — SIGNIFICANT CHANGE UP (ref 5–17)
AST SERPL-CCNC: 29 U/L — SIGNIFICANT CHANGE UP (ref 15–37)
BILIRUB DIRECT SERPL-MCNC: 0.4 MG/DL — HIGH (ref 0–0.3)
BILIRUB DIRECT SERPL-MCNC: 0.4 MG/DL — HIGH (ref 0–0.3)
BILIRUB INDIRECT FLD-MCNC: 0.7 MG/DL — SIGNIFICANT CHANGE UP (ref 0.2–1)
BILIRUB INDIRECT FLD-MCNC: 0.7 MG/DL — SIGNIFICANT CHANGE UP (ref 0.2–1)
BILIRUB SERPL-MCNC: 1.1 MG/DL — SIGNIFICANT CHANGE UP (ref 0.2–1.2)
BILIRUB SERPL-MCNC: 1.1 MG/DL — SIGNIFICANT CHANGE UP (ref 0.2–1.2)
BILIRUB SERPL-MCNC: 1.2 MG/DL — SIGNIFICANT CHANGE UP (ref 0.2–1.2)
BILIRUB SERPL-MCNC: 1.2 MG/DL — SIGNIFICANT CHANGE UP (ref 0.2–1.2)
BUN SERPL-MCNC: 83 MG/DL — HIGH (ref 7–23)
BUN SERPL-MCNC: 83 MG/DL — HIGH (ref 7–23)
CALCIUM SERPL-MCNC: 9.5 MG/DL — SIGNIFICANT CHANGE UP (ref 8.5–10.1)
CALCIUM SERPL-MCNC: 9.5 MG/DL — SIGNIFICANT CHANGE UP (ref 8.5–10.1)
CHLORIDE SERPL-SCNC: 102 MMOL/L — SIGNIFICANT CHANGE UP (ref 96–108)
CHLORIDE SERPL-SCNC: 102 MMOL/L — SIGNIFICANT CHANGE UP (ref 96–108)
CO2 SERPL-SCNC: 30 MMOL/L — SIGNIFICANT CHANGE UP (ref 22–31)
CO2 SERPL-SCNC: 30 MMOL/L — SIGNIFICANT CHANGE UP (ref 22–31)
CREAT SERPL-MCNC: 1.4 MG/DL — HIGH (ref 0.5–1.3)
CREAT SERPL-MCNC: 1.4 MG/DL — HIGH (ref 0.5–1.3)
EGFR: 34 ML/MIN/1.73M2 — LOW
EGFR: 34 ML/MIN/1.73M2 — LOW
GLUCOSE SERPL-MCNC: 134 MG/DL — HIGH (ref 70–99)
GLUCOSE SERPL-MCNC: 134 MG/DL — HIGH (ref 70–99)
HCT VFR BLD CALC: 38.5 % — SIGNIFICANT CHANGE UP (ref 34.5–45)
HCT VFR BLD CALC: 38.5 % — SIGNIFICANT CHANGE UP (ref 34.5–45)
HGB BLD-MCNC: 12.4 G/DL — SIGNIFICANT CHANGE UP (ref 11.5–15.5)
HGB BLD-MCNC: 12.4 G/DL — SIGNIFICANT CHANGE UP (ref 11.5–15.5)
MCHC RBC-ENTMCNC: 32.2 GM/DL — SIGNIFICANT CHANGE UP (ref 32–36)
MCHC RBC-ENTMCNC: 32.2 GM/DL — SIGNIFICANT CHANGE UP (ref 32–36)
MCHC RBC-ENTMCNC: 33 PG — SIGNIFICANT CHANGE UP (ref 27–34)
MCHC RBC-ENTMCNC: 33 PG — SIGNIFICANT CHANGE UP (ref 27–34)
MCV RBC AUTO: 102.4 FL — HIGH (ref 80–100)
MCV RBC AUTO: 102.4 FL — HIGH (ref 80–100)
NRBC # BLD: 0 /100 WBCS — SIGNIFICANT CHANGE UP (ref 0–0)
NRBC # BLD: 0 /100 WBCS — SIGNIFICANT CHANGE UP (ref 0–0)
PLATELET # BLD AUTO: 191 K/UL — SIGNIFICANT CHANGE UP (ref 150–400)
PLATELET # BLD AUTO: 191 K/UL — SIGNIFICANT CHANGE UP (ref 150–400)
POTASSIUM SERPL-MCNC: 5.2 MMOL/L — SIGNIFICANT CHANGE UP (ref 3.5–5.3)
POTASSIUM SERPL-MCNC: 5.2 MMOL/L — SIGNIFICANT CHANGE UP (ref 3.5–5.3)
POTASSIUM SERPL-SCNC: 5.2 MMOL/L — SIGNIFICANT CHANGE UP (ref 3.5–5.3)
POTASSIUM SERPL-SCNC: 5.2 MMOL/L — SIGNIFICANT CHANGE UP (ref 3.5–5.3)
PROT SERPL-MCNC: 7.3 G/DL — SIGNIFICANT CHANGE UP (ref 6–8.3)
PROT SERPL-MCNC: 7.3 G/DL — SIGNIFICANT CHANGE UP (ref 6–8.3)
PROT SERPL-MCNC: 7.4 G/DL — SIGNIFICANT CHANGE UP (ref 6–8.3)
PROT SERPL-MCNC: 7.4 G/DL — SIGNIFICANT CHANGE UP (ref 6–8.3)
RBC # BLD: 3.76 M/UL — LOW (ref 3.8–5.2)
RBC # BLD: 3.76 M/UL — LOW (ref 3.8–5.2)
RBC # FLD: 15.5 % — HIGH (ref 10.3–14.5)
RBC # FLD: 15.5 % — HIGH (ref 10.3–14.5)
SODIUM SERPL-SCNC: 141 MMOL/L — SIGNIFICANT CHANGE UP (ref 135–145)
SODIUM SERPL-SCNC: 141 MMOL/L — SIGNIFICANT CHANGE UP (ref 135–145)
WBC # BLD: 16.73 K/UL — HIGH (ref 3.8–10.5)
WBC # BLD: 16.73 K/UL — HIGH (ref 3.8–10.5)
WBC # FLD AUTO: 16.73 K/UL — HIGH (ref 3.8–10.5)
WBC # FLD AUTO: 16.73 K/UL — HIGH (ref 3.8–10.5)

## 2023-11-24 PROCEDURE — 71045 X-RAY EXAM CHEST 1 VIEW: CPT | Mod: 26

## 2023-11-24 RX ORDER — DILTIAZEM HCL 120 MG
10 CAPSULE, EXT RELEASE 24 HR ORAL ONCE
Refills: 0 | Status: COMPLETED | OUTPATIENT
Start: 2023-11-24 | End: 2023-11-24

## 2023-11-24 RX ORDER — MEROPENEM 1 G/30ML
500 INJECTION INTRAVENOUS EVERY 12 HOURS
Refills: 0 | Status: DISCONTINUED | OUTPATIENT
Start: 2023-11-24 | End: 2023-11-25

## 2023-11-24 RX ORDER — METOPROLOL TARTRATE 50 MG
5 TABLET ORAL ONCE
Refills: 0 | Status: COMPLETED | OUTPATIENT
Start: 2023-11-24 | End: 2023-11-24

## 2023-11-24 RX ORDER — SODIUM CHLORIDE 9 MG/ML
1000 INJECTION INTRAMUSCULAR; INTRAVENOUS; SUBCUTANEOUS
Refills: 0 | Status: DISCONTINUED | OUTPATIENT
Start: 2023-11-24 | End: 2023-11-24

## 2023-11-24 RX ORDER — FUROSEMIDE 40 MG
40 TABLET ORAL ONCE
Refills: 0 | Status: COMPLETED | OUTPATIENT
Start: 2023-11-24 | End: 2023-11-24

## 2023-11-24 RX ORDER — POLYETHYLENE GLYCOL 3350 17 G/17G
17 POWDER, FOR SOLUTION ORAL ONCE
Refills: 0 | Status: COMPLETED | OUTPATIENT
Start: 2023-11-24 | End: 2023-11-24

## 2023-11-24 RX ORDER — MEROPENEM 1 G/30ML
INJECTION INTRAVENOUS
Refills: 0 | Status: DISCONTINUED | OUTPATIENT
Start: 2023-11-24 | End: 2023-11-24

## 2023-11-24 RX ADMIN — Medication 0.2 MILLIGRAM(S): at 23:55

## 2023-11-24 RX ADMIN — Medication 3 MILLILITER(S): at 00:34

## 2023-11-24 RX ADMIN — Medication 10 MILLIGRAM(S): at 22:50

## 2023-11-24 RX ADMIN — Medication 40 MILLIGRAM(S): at 23:58

## 2023-11-24 RX ADMIN — POLYETHYLENE GLYCOL 3350 17 GRAM(S): 17 POWDER, FOR SOLUTION ORAL at 12:36

## 2023-11-24 RX ADMIN — APIXABAN 2.5 MILLIGRAM(S): 2.5 TABLET, FILM COATED ORAL at 22:50

## 2023-11-24 RX ADMIN — Medication 10 MILLIGRAM(S): at 06:28

## 2023-11-24 RX ADMIN — Medication 1 PUFF(S): at 04:34

## 2023-11-24 RX ADMIN — APIXABAN 2.5 MILLIGRAM(S): 2.5 TABLET, FILM COATED ORAL at 10:35

## 2023-11-24 RX ADMIN — ATORVASTATIN CALCIUM 40 MILLIGRAM(S): 80 TABLET, FILM COATED ORAL at 22:50

## 2023-11-24 RX ADMIN — Medication 30 MILLIGRAM(S): at 04:33

## 2023-11-24 RX ADMIN — MAGNESIUM HYDROXIDE 30 MILLILITER(S): 400 TABLET, CHEWABLE ORAL at 04:34

## 2023-11-24 RX ADMIN — Medication 1 PUFF(S): at 09:44

## 2023-11-24 RX ADMIN — Medication 5 MILLIGRAM(S): at 20:27

## 2023-11-24 RX ADMIN — REMDESIVIR 200 MILLIGRAM(S): 5 INJECTION INTRAVENOUS at 10:35

## 2023-11-24 RX ADMIN — Medication 50 MILLIGRAM(S): at 06:28

## 2023-11-24 RX ADMIN — SODIUM CHLORIDE 60 MILLILITER(S): 9 INJECTION INTRAMUSCULAR; INTRAVENOUS; SUBCUTANEOUS at 20:27

## 2023-11-24 RX ADMIN — Medication 3 MILLILITER(S): at 13:43

## 2023-11-24 RX ADMIN — Medication 6 MILLIGRAM(S): at 06:28

## 2023-11-24 NOTE — CHART NOTE - NSCHARTNOTEFT_GEN_A_CORE
Called by RN for patient RVR 150s  - will give lopressor 5mg IV x stat    Addendum  - now desaturated requiring HFNC and HR 130s  - will get stat cxr and give Cardizem 10mg IV x 1 Called by RN for patient RVR 150s  - will give lopressor 5mg IV x stat    Addendum  - now desaturated requiring HFNC and HR 130s  - will get stat cxr, dc IVF, and give Cardizem 10mg IV x 1 Called by RN for patient RVR 150s  - will give lopressor 5mg IV x stat    Addendum  - HR now in 130s  - discussed with respiratory; patient was escalated from NC to HFNC earlier in the day  - will get stat cxr, dc IVF, and give Cardizem 10mg IV x 1 Called by RN for patient RVR 150s  - will give lopressor 5mg IV x stat    Addendum  - HR now in 130s; deneen give cardizem 10mg IV x 1 stat  - discussed with respiratory; patient was escalated from NC to HFNC earlier in the day  - patient very tachypneic with increased work of breathing on exam  - stat cxr with b/l consolidations and possible pulmonary congestion  - given patient diuretic was held and she was started on IVF for CHASITY recently; will stop IVF and give lasix 40mg IV x 1  - will also start meropenem empirically for super imposed bacterial pneumonia  - check blood cx and inflammatory markers/trend d-dimer, procalc, ferritin, crp Called by RN for patient RVR 150s  - will give lopressor 5mg IV x stat    Addendum  - HR now in 130s; deneen give cardizem 10mg IV x 1 stat  - discussed with respiratory; patient was escalated from NC to HFNC earlier in the day  - patient very tachypneic with increased work of breathing on exam  - stat cxr with b/l consolidations and possible pulmonary congestion  - given patient diuretic was held and she was started on IVF for CHASITY recently; will stop IVF and give lasix 40mg IV x 1  - check blood cx and inflammatory markers/trend d-dimer, procalc, ferritin, crp  - will also start meropenem empirically for super imposed bacterial pneumonia Called by RN for patient RVR 150s  - will give lopressor 5mg IV x stat    Addendum  - HR now in 130s; deneen give cardizem 10mg IV x 1 stat  - discussed with respiratory; patient was escalated from NC to HFNC earlier in the day  - patient very tachypneic with increased work of breathing on exam  - stat cxr with b/l consolidations and possible pulmonary congestion  - given patient diuretic was held and she was started on IVF for CHASITY recently; will stop IVF and give lasix 40mg IV x 1  - check blood cx and inflammatory markers/trend d-dimer, procalc, ferritin, crp  - will also start meropenem empirically for super imposed bacterial pneumonia and extend steroids with IV decadron     Addendum  - family notified about patients current medical condition   - family arrived to hospital and I discussed with them the current situation including current treatments being done   - family will discuss with eachother about comfort measures if patient making no progress on bipap Called by RN for patient RVR 150s  - will give lopressor 5mg IV x stat    Addendum  - HR now in 130s; deneen give cardizem 10mg IV x 1 stat  - discussed with respiratory; patient was escalated from NC to HFNC earlier in the day  - patient very tachypneic with increased work of breathing on exam  - stat cxr with b/l consolidations and possible pulmonary congestion  - given patient diuretic was held and she was started on IVF for CHASITY recently; will stop IVF and give lasix 40mg IV x 1  - check blood cx and inflammatory markers/trend d-dimer, procalc, ferritin, crp  - will also start meropenem empirically for super imposed bacterial pneumonia and extend steroids with IV decadron     Addendum  - family notified about patients current medical condition   - family arrived to hospital and I discussed with them the current situation including current treatments being done   - family will discuss with each other about comfort measures if patient making no progress on bipap  - all questions answered and emotional support provided Called by RN for patient RVR 150s  - will give lopressor 5mg IV x stat    Addendum  - HR now in 130s; deneen give cardizem 10mg IV x 1 stat  - discussed with respiratory; patient was escalated from NC to HFNC earlier in the day  - patient very tachypneic with increased work of breathing on exam  - stat cxr with b/l consolidations and possible pulmonary congestion  - given patient diuretic was held and she was started on IVF for CHASITY recently; will stop IVF and give lasix 40mg IV x 1  - check blood cx and inflammatory markers/trend d-dimer, procalc, ferritin, crp  - will also start meropenem empirically for super imposed bacterial pneumonia and extend steroids with IV decadron  - change eliquis 2.5mg BID to FD lovenox     Addendum  - family notified about patients current medical condition   - family arrived to hospital and I discussed with them the current situation including current treatments being done   - family will discuss with each other about comfort measures if patient making no progress on bipap  - all questions answered and emotional support provided

## 2023-11-24 NOTE — PROGRESS NOTE ADULT - SUBJECTIVE AND OBJECTIVE BOX
Patient is a 89y old  Female who presents with a chief complaint of SOB (24 Nov 2023 11:33)    Date of servie : 11-24-23 @ 11:59  INTERVAL HPI/OVERNIGHT EVENTS:  T(C): 36.4 (11-24-23 @ 11:51), Max: 36.8 (11-24-23 @ 04:30)  HR: 92 (11-24-23 @ 11:51) (80 - 130)  BP: 136/81 (11-24-23 @ 11:51) (133/92 - 136/81)  RR: 18 (11-24-23 @ 11:51) (18 - 20)  SpO2: 92% (11-24-23 @ 11:51) (92% - 98%)  Wt(kg): --  I&O's Summary    23 Nov 2023 07:01  -  24 Nov 2023 07:00  --------------------------------------------------------  IN: 0 mL / OUT: 1800 mL / NET: -1800 mL        LABS:                        12.4   16.73 )-----------( 191      ( 24 Nov 2023 08:46 )             38.5     11-24    141  |  102  |  83<H>  ----------------------------<  134<H>  5.2   |  30  |  1.40<H>    Ca    9.5      24 Nov 2023 08:46    TPro  7.3  /  Alb  2.8<L>  /  TBili  1.2  /  DBili  0.4<H>  /  AST  29  /  ALT  30  /  AlkPhos  118  11-24      Urinalysis Basic - ( 24 Nov 2023 08:46 )    Color: x / Appearance: x / SG: x / pH: x  Gluc: 134 mg/dL / Ketone: x  / Bili: x / Urobili: x   Blood: x / Protein: x / Nitrite: x   Leuk Esterase: x / RBC: x / WBC x   Sq Epi: x / Non Sq Epi: x / Bacteria: x      CAPILLARY BLOOD GLUCOSE            Urinalysis Basic - ( 24 Nov 2023 08:46 )    Color: x / Appearance: x / SG: x / pH: x  Gluc: 134 mg/dL / Ketone: x  / Bili: x / Urobili: x   Blood: x / Protein: x / Nitrite: x   Leuk Esterase: x / RBC: x / WBC x   Sq Epi: x / Non Sq Epi: x / Bacteria: x        MEDICATIONS  (STANDING):  albuterol/ipratropium for Nebulization 3 milliLiter(s) Nebulizer every 6 hours  apixaban 2.5 milliGRAM(s) Oral every 12 hours  atorvastatin 40 milliGRAM(s) Oral at bedtime  dexAMETHasone     Tablet 6 milliGRAM(s) Oral daily  ipratropium 17 MICROgram(s) HFA Inhaler 1 Puff(s) Inhalation every 6 hours  metoprolol succinate ER 50 milliGRAM(s) Oral daily  polyethylene glycol 3350 17 Gram(s) Oral once  sodium chloride 0.9%. 1000 milliLiter(s) (60 mL/Hr) IV Continuous <Continuous>    MEDICATIONS  (PRN):  acetaminophen     Tablet .. 650 milliGRAM(s) Oral every 6 hours PRN Temp greater or equal to 38C (100.4F), Mild Pain (1 - 3)  diltiazem    Tablet 30 milliGRAM(s) Oral two times a day PRN elevated HR greater than 110  guaiFENesin Oral Liquid (Sugar-Free) 200 milliGRAM(s) Oral every 6 hours PRN Cough  magnesium hydroxide Suspension 30 milliLiter(s) Oral daily PRN Constipation          PHYSICAL EXAM:  GENERAL: NAD, well-groomed, well-developed  HEAD:  Atraumatic, Normocephalic  CHEST/LUNG: Clear to percussion bilaterally; No rales, rhonchi, wheezing, or rubs  HEART: Regular rate and rhythm; No murmurs, rubs, or gallops  ABDOMEN: Soft, Nontender, Nondistended; Bowel sounds present  EXTREMITIES:  2+ Peripheral Pulses, No clubbing, cyanosis, or edema  LYMPH: No lymphadenopathy noted  SKIN: No rashes or lesions    Care Discussed with Consultants/Other Providers [ ] YES  [ ] NO

## 2023-11-24 NOTE — PHYSICAL THERAPY INITIAL EVALUATION ADULT - ADDITIONAL COMMENTS
Pt is a resident at [facility] and ambulates w/ RW in room and uses wheelchair for longer distances. Pt uses 4L 02 via n/c at baseline and requires assistance with ADLs.

## 2023-11-24 NOTE — PROGRESS NOTE ADULT - ASSESSMENT
89 F hx hypothyroid, hld, glaucoma, htn, afib on eliquis, asthma, gerd, edema, acute chronic respiratory failure secondary to bronchiectasis with hypoxia on home O2, CHF on torsemide, cardiac murmur, TAVR, cad s/p 1 stent BIBEMS from [place] due to shortness of breath. EMS found pt to be in rapid afib, SOB worsened, prompting EMS call. Starting Friday morning 11/17 she felt poorly and the woman with whom she has her meals has COVID and is right now doing her 5 days of isolation. PT COVID+    RECOMMENDATIONS    1-COVID-19  Remdesivir started 11/20 am, with pt already requiring oxygen recommend 5 days so last day 11/24-Friday in am  Pt with hypoxemia so dexamethasone 6mg PO daily x 6 days so last day 11/25  PT on apixaban for VTE prophylaxis  now weaned back to home 4L O2 for bronchiectasis  obs off abx    Over the weekend Dr. Atkins will be covering for our group.   If you have any questions, concerns or new micro data, please reach out to them at [phone].  Christa Valerio M.D.  [facility] Division of Infectious Diseases [phone]  For after 5 P.M. and weekends, please call [phone]   89 F hx hypothyroid, hld, glaucoma, htn, afib on eliquis, asthma, gerd, edema, acute chronic respiratory failure secondary to bronchiectasis with hypoxia on home O2, CHF on torsemide, cardiac murmur, TAVR, cad s/p 1 stent BIBEMS from [place] due to shortness of breath. EMS found pt to be in rapid afib, SOB worsened, prompting EMS call. Starting Friday morning 11/17 she felt poorly and the woman with whom she has her meals has COVID and is right now doing her 5 days of isolation. PT COVID+    RECOMMENDATIONS    1-COVID-19  Remdesivir started 11/20 am, with pt already requiring oxygen recommend 5 days so last day 11/24-Friday in am  Pt with hypoxemia so dexamethasone 6mg PO daily x 6 days so last day 11/25  PT on apixaban for VTE prophylaxis  now weaned back to home 4L O2 for bronchiectasis  obs off abx  trend temps/WBC--leukocytosis noted, likely 2/2 steroids    Over the weekend Dr. Atkins will be covering for our group.   If you have any questions, concerns or new micro data, please reach out to them at [phone].  Christa Valerio M.D.  [facility] Division of Infectious Diseases [phone]  For after 5 P.M. and weekends, please call [phone]

## 2023-11-24 NOTE — PROGRESS NOTE ADULT - SUBJECTIVE AND OBJECTIVE BOX
[facility] Cardiology    CHIEF COMPLAINT: Patient is a 89y old  Female who presents with a chief complaint of SOB (24 Nov 2023 05:46)      Follow Up: [ ] Chest Pain      [ ] Dyspnea     [ ] Palpitations    [ ] Atrial Fibrillation     [ ] Ventricular Dysrhythmia    [ ] Abnormal EKG                      [ ] Abnormal Cardiac Enzymes     [ ] Valvular Disease    HPI:  89 F hx hypothyroid, hld, glaucoma, htn, afib on eliquis, asthma, gerd, edema, acute chronic respiratory failure with hypoxia on home O2, chf on torsemide, cardiac murmur, TAVR, cad s/p 1 stent BIBEMS from [place] due to shortness of breath. Earlier pt was complaining of indigestion, which resolved. EMS found pt to be in rapid afib, gave 5 IV metoprolol, EMS reading as STEMI. Pt states early this morning was having indigestion, after many hours was able to get medication from staff which improved this. SOB all day which worsened, prompting EMS call. Denies fever, cp, cough, n/v/d, leg swelling. (20 Nov 2023 01:53)    PAST MEDICAL & SURGICAL HISTORY:    MEDICATIONS  (STANDING):  albuterol/ipratropium for Nebulization 3 milliLiter(s) Nebulizer every 6 hours  apixaban 2.5 milliGRAM(s) Oral every 12 hours  atorvastatin 40 milliGRAM(s) Oral at bedtime  dexAMETHasone     Tablet 6 milliGRAM(s) Oral daily  ipratropium 17 MICROgram(s) HFA Inhaler 1 Puff(s) Inhalation every 6 hours  metoprolol succinate ER 50 milliGRAM(s) Oral daily  torsemide 10 milliGRAM(s) Oral daily    MEDICATIONS  (PRN):  acetaminophen     Tablet .. 650 milliGRAM(s) Oral every 6 hours PRN Temp greater or equal to 38C (100.4F), Mild Pain (1 - 3)  diltiazem    Tablet 30 milliGRAM(s) Oral two times a day PRN elevated HR greater than 110  guaiFENesin Oral Liquid (Sugar-Free) 200 milliGRAM(s) Oral every 6 hours PRN Cough  magnesium hydroxide Suspension 30 milliLiter(s) Oral daily PRN Constipation    Allergies    amoxicillin (Rash)  penicillin (Rash)    Intolerances        REVIEW OF SYSTEMS:    CONSTITUTIONAL: No weakness, fevers or chills.   EYES/ENT: No visual changes;    NECK: No pain or stiffness  RESPIRATORY: No cough, wheezing, No shortness of breath  CARDIOVASCULAR: No chest pain or palpitations  GASTROINTESTINAL: No abdominal pain, or hematochezia.  GENITOURINARY: No dysuria orhematuria  NEUROLOGICAL: No numbness or weakness  SKIN: No itching, burning, rashes  All other review of systems is negative unless indicated above    Vital Signs Last 24 Hrs  T(C): 36.8 (24 Nov 2023 04:30), Max: 36.8 (24 Nov 2023 04:30)  T(F): 98.2 (24 Nov 2023 04:30), Max: 98.2 (24 Nov 2023 04:30)  HR: 120 (24 Nov 2023 04:37) (75 - 130)  BP: 133/92 (24 Nov 2023 04:30) (133/75 - 135/60)  BP(mean): --  RR: 20 (23 Nov 2023 20:00) (20 - 24)  SpO2: 93% (24 Nov 2023 04:37) (92% - 98%)    Parameters below as of 24 Nov 2023 04:30  Patient On (Oxygen Delivery Method): nasal cannula  O2 Flow (L/min): 4    I&O's Summary    23 Nov 2023 07:01  -  24 Nov 2023 07:00  --------------------------------------------------------  IN: 0 mL / OUT: 1800 mL / NET: -1800 mL  HYSICAL EXAM:  Constitutional: NAD  Neurological: calm  HEENT: no JVD,   O2 NC  EXT:  no peripheral edema  Skin: No rashes.  Psych:  Mood calm  LABS: All Labs Reviewed:                       12.4   16.73 )-----------( 191      ( 24 Nov 2023 08:46 )             38.5     11-24    141  |  102  |  83<H>  ----------------------------<  134<H>  5.2   |  30  |  1.40<H>    Ca    9.5      24 Nov 2023 08:46    TPro  7.3  /  Alb  2.8<L>  /  TBili  1.2  /  DBili  0.4<H>  /  AST  29  /  ALT  30  /  AlkPhos  118  11-24        Assessment and Plan:   · Assessment    89F with known HLD, HTN, AF on Eliquis 2.5 bid, Asthma, GERD on home O2 who presents with worsening SOB found with afib and RVR along with Positive RVP and COVID along with elevated BNP OF 6K. Now clinically improving.    SUGGEST:    1. COVID/RVP  - C/w Remdisivir, Steroids     2. AF  - c/w Eliquis  - monitor on Tele  -  echocardiogram-ordered  - rate  slightly increased. Titrated up metoprolol from 25 mg to 50 mg  -Rec add cardizem 30 mg po BID, PRN HR>110 bpm, hold SBP <100mmHG    3. Will follow prn  -

## 2023-11-24 NOTE — PROGRESS NOTE ADULT - ASSESSMENT
- monitor cr  - dc tos89 F hx hypothyroid, hld, glaucoma, htn, afib on eliquis, asthma, gerd, edema, acute chronic respiratory failure with hypoxia on home O2, chf on torsemide, cardiac murmur, TAVR, cad s/p 1 stent BIBEMS from [place] due to shortness of breath. Earlier pt was complaining of indigestion, which resolved. EMS found pt to be in rapid afib, gave 5 IV metoprolol, EMS reading as STEMI. Pt states early this morning was having indigestion, after many hours was able to get medication from staff which improved this. SOB all day which worsened, prompting EMS call. Denies fever, cp, cough, n/v/d, leg swelling.    1 Acute hypoxic resp failure sec to COVID 19 POA  - cw decadron  - finished remdesivir   - pulm fu   - off high flow now   - will monitor  - sajan off oxygen     2 Afib  - cw eliquis  - cw metoprolol    3 HLD  - cw statin    4 Asthma  - cw nebs    5 CHASITY  - gentle hydration  - dc diuretic

## 2023-11-24 NOTE — PROVIDER CONTACT NOTE (OTHER) - ACTION/TREATMENT ORDERED:
PRN PO Cardizem admin/ prashanth + pt will receive PO standing dose of Metroprolol at 6 am. Continue to monitor & assess pt per MD. MD li
Plan of care ongoing per MD order

## 2023-11-24 NOTE — PROGRESS NOTE ADULT - ASSESSMENT
89 F hx hypothyroid, hld, glaucoma, htn, afib on eliquis, asthma, gerd, edema, acute chronic respiratory failure with hypoxia on home O2, chf on torsemide, cardiac murmur, TAVR, cad s/p 1 stent BIBEMS from [place] due to shortness of breath    bronchiectasis  resp viral illness  valv heart disease   HTN  HLD  AF  Asthma  GERD  COVID    cardio eval noted  GOC documented - DNR DNI  vs noted  remdesivir - decadron -     covid isol  remdesivir - decadron  NEBS ATC for Asthma and Bronchiectasis  fio2 titration - HF NC - NRB -   goal sat > 88 pct  GOC discussion   assist with needs  assess need for Diuresis - proBNP noted - pt with known extensive card hx -   on tele monitor  tylenol PRN   robitussin PRN   on AC -   prognosis guarded

## 2023-11-24 NOTE — CASE MANAGEMENT PROGRESS NOTE - NSCMPROGRESSNOTE_GEN_ALL_CORE
Per MD No weekend D/C pt remains acute, WBC 16.73, Bun 83, creatinine 1.40, pt started on IVF. Per RN, pt with periods of tachycardia this am.

## 2023-11-24 NOTE — PROGRESS NOTE ADULT - SUBJECTIVE AND OBJECTIVE BOX
[facility], Division of Infectious Diseases  ELAYNE Sims Y. Patel, S. Shah, G. [facility]  [phone]    Name: BLANCA BENSON  Age: 89y  Gender: Female  MRN: [id number]    Interval History:  Patient seen and examined at bedside this morning  No acute overnight events. Afebrile  No complaints  Notes reviewed    Antibiotics:      Medications:  acetaminophen     Tablet .. 650 milliGRAM(s) Oral every 6 hours PRN  albuterol/ipratropium for Nebulization 3 milliLiter(s) Nebulizer every 6 hours  apixaban 2.5 milliGRAM(s) Oral every 12 hours  atorvastatin 40 milliGRAM(s) Oral at bedtime  dexAMETHasone     Tablet 6 milliGRAM(s) Oral daily  diltiazem    Tablet 30 milliGRAM(s) Oral two times a day PRN  guaiFENesin Oral Liquid (Sugar-Free) 200 milliGRAM(s) Oral every 6 hours PRN  ipratropium 17 MICROgram(s) HFA Inhaler 1 Puff(s) Inhalation every 6 hours  magnesium hydroxide Suspension 30 milliLiter(s) Oral daily PRN  metoprolol succinate ER 50 milliGRAM(s) Oral daily  torsemide 10 milliGRAM(s) Oral daily      Review of Systems:  A 10-point review of systems was obtained.     Pertinent positives and negatives--  Constitutional: No fevers. No Chills. No Rigors.   Cardiovascular: No chest pain. No palpitations.  Respiratory: No shortness of breath. No cough.  Gastrointestinal: No nausea or vomiting. No diarrhea or constipation.   Psychiatric: Pleasant. Appropriate affect.    Review of systems otherwise negative except as previously noted.    Allergies: amoxicillin (Rash)  penicillin (Rash)    For details regarding the patient's past medical history, social history, family history, and other miscellaneous elements, please refer the initial infectious diseases consultation and/or the admitting history and physical examination for this admission.    Objective:  Vitals:   T(C): 36.8 (11-24-23 @ 04:30), Max: 36.8 (11-24-23 @ 04:30)  HR: 120 (11-24-23 @ 04:37) (80 - 130)  BP: 133/92 (11-24-23 @ 04:30) (133/92 - 135/60)  RR: 20 (11-23-23 @ 20:00) (20 - 20)  SpO2: 93% (11-24-23 @ 04:37) (92% - 98%)    Physical Examination:  General: no acute distress  HEENT: NC/AT, EOMI,   Cardio: S1, S2 heard, RRR, no murmurs  Resp: decreased breath sounds on NC  Abd: soft, NT, ND,  Ext: no edema or cyanosis  Skin: warm, dry, no visible rash      Laboratory Studies:  CBC:                       12.4   16.73 )-----------( 191      ( 24 Nov 2023 08:46 )             38.5     CMP: 11-24    141  |  102  |  83<H>  ----------------------------<  134<H>  5.2   |  30  |  1.40<H>    Ca    9.5      24 Nov 2023 08:46    TPro  7.3  /  Alb  2.8<L>  /  TBili  1.2  /  DBili  0.4<H>  /  AST  29  /  ALT  30  /  AlkPhos  118  11-24    LIVER FUNCTIONS - ( 24 Nov 2023 08:46 )  Alb: 2.8 g/dL / Pro: 7.3 g/dL / ALK PHOS: 118 U/L / ALT: 30 U/L / AST: 29 U/L / GGT: x           Urinalysis Basic - ( 24 Nov 2023 08:46 )    Color: x / Appearance: x / SG: x / pH: x  Gluc: 134 mg/dL / Ketone: x  / Bili: x / Urobili: x   Blood: x / Protein: x / Nitrite: x   Leuk Esterase: x / RBC: x / WBC x   Sq Epi: x / Non Sq Epi: x / Bacteria: x        Microbiology: reviewed    Culture - Blood (collected 11-19-23 @ 22:40)  Source: .Blood Blood-Venous  Preliminary Report (11-24-23 @ 07:01):    No growth at 4 days    Culture - Blood (collected 11-19-23 @ 22:40)  Source: .Blood Blood-Venous  Preliminary Report (11-24-23 @ 07:01):    No growth at 4 days          Radiology: reviewed       [facility], Division of Infectious Diseases  ELAYNE Sims Y. Patel, S. Shah, G. [facility]  [phone]    Name: BLANCA BENSON  Age: 89y  Gender: Female  MRN: [id number]    Interval History:  Patient seen and examined at bedside this morning  No acute overnight events. Afebrile  No complaints, eating breakfast  Notes reviewed    Antibiotics:      Medications:  acetaminophen     Tablet .. 650 milliGRAM(s) Oral every 6 hours PRN  albuterol/ipratropium for Nebulization 3 milliLiter(s) Nebulizer every 6 hours  apixaban 2.5 milliGRAM(s) Oral every 12 hours  atorvastatin 40 milliGRAM(s) Oral at bedtime  dexAMETHasone     Tablet 6 milliGRAM(s) Oral daily  diltiazem    Tablet 30 milliGRAM(s) Oral two times a day PRN  guaiFENesin Oral Liquid (Sugar-Free) 200 milliGRAM(s) Oral every 6 hours PRN  ipratropium 17 MICROgram(s) HFA Inhaler 1 Puff(s) Inhalation every 6 hours  magnesium hydroxide Suspension 30 milliLiter(s) Oral daily PRN  metoprolol succinate ER 50 milliGRAM(s) Oral daily  torsemide 10 milliGRAM(s) Oral daily      Review of Systems:  A 10-point review of systems was obtained.   Review of systems otherwise negative except as previously noted.    Allergies: amoxicillin (Rash)  penicillin (Rash)    For details regarding the patient's past medical history, social history, family history, and other miscellaneous elements, please refer the initial infectious diseases consultation and/or the admitting history and physical examination for this admission.    Objective:  Vitals:   T(C): 36.8 (11-24-23 @ 04:30), Max: 36.8 (11-24-23 @ 04:30)  HR: 120 (11-24-23 @ 04:37) (80 - 130)  BP: 133/92 (11-24-23 @ 04:30) (133/92 - 135/60)  RR: 20 (11-23-23 @ 20:00) (20 - 20)  SpO2: 93% (11-24-23 @ 04:37) (92% - 98%)    Physical Examination:  General: no acute distress  HEENT: NC/AT, EOMI,   Cardio: S1, S2 heard, RRR, no murmurs  Resp: decreased breath sounds on NC  Abd: soft, NT, ND,  Ext: no edema or cyanosis  Skin: warm, dry, no visible rash      Laboratory Studies:  CBC:                       12.4   16.73 )-----------( 191      ( 24 Nov 2023 08:46 )             38.5     CMP: 11-24    141  |  102  |  83<H>  ----------------------------<  134<H>  5.2   |  30  |  1.40<H>    Ca    9.5      24 Nov 2023 08:46    TPro  7.3  /  Alb  2.8<L>  /  TBili  1.2  /  DBili  0.4<H>  /  AST  29  /  ALT  30  /  AlkPhos  118  11-24    LIVER FUNCTIONS - ( 24 Nov 2023 08:46 )  Alb: 2.8 g/dL / Pro: 7.3 g/dL / ALK PHOS: 118 U/L / ALT: 30 U/L / AST: 29 U/L / GGT: x           Urinalysis Basic - ( 24 Nov 2023 08:46 )    Color: x / Appearance: x / SG: x / pH: x  Gluc: 134 mg/dL / Ketone: x  / Bili: x / Urobili: x   Blood: x / Protein: x / Nitrite: x   Leuk Esterase: x / RBC: x / WBC x   Sq Epi: x / Non Sq Epi: x / Bacteria: x        Microbiology: reviewed    Culture - Blood (collected 11-19-23 @ 22:40)  Source: .Blood Blood-Venous  Preliminary Report (11-24-23 @ 07:01):    No growth at 4 days    Culture - Blood (collected 11-19-23 @ 22:40)  Source: .Blood Blood-Venous  Preliminary Report (11-24-23 @ 07:01):    No growth at 4 days          Radiology: reviewed

## 2023-11-24 NOTE — PROVIDER CONTACT NOTE (OTHER) - ASSESSMENT
Pt AXOX3, restless/anxious. Pt denies chest pain, SOB. Pt is afebrile
PT Alert/confused on high flow O2 at 50L, 80% Pt is more confused then this morning, restless & removes the high flow. Pt desat quickly. SPO2 now 93 %

## 2023-11-24 NOTE — PROVIDER CONTACT NOTE (OTHER) - BACKGROUND
89 y F admitted with shortness of breathe secondary to fever, pmh afib
89 Y F admitted for shortness of breath pmh of afib on airborne prec for covid

## 2023-11-24 NOTE — PHYSICAL THERAPY INITIAL EVALUATION ADULT - LEVEL OF INDEPENDENCE: STAND/SIT, REHAB EVAL
moderate assist (50% patients effort)

## 2023-11-24 NOTE — PROGRESS NOTE ADULT - SUBJECTIVE AND OBJECTIVE BOX
Date/Time Patient Seen:  		  Referring MD:   Data Reviewed	       Patient is a 89y old  Female who presents with a chief complaint of SOB (23 Nov 2023 14:00)      Subjective/HPI     PAST MEDICAL & SURGICAL HISTORY:        Medication list         MEDICATIONS  (STANDING):  albuterol/ipratropium for Nebulization 3 milliLiter(s) Nebulizer every 6 hours  apixaban 2.5 milliGRAM(s) Oral every 12 hours  atorvastatin 40 milliGRAM(s) Oral at bedtime  dexAMETHasone     Tablet 6 milliGRAM(s) Oral daily  ipratropium 17 MICROgram(s) HFA Inhaler 1 Puff(s) Inhalation every 6 hours  metoprolol succinate ER 50 milliGRAM(s) Oral daily  remdesivir  IVPB   IV Intermittent   remdesivir  IVPB 100 milliGRAM(s) IV Intermittent every 24 hours  torsemide 10 milliGRAM(s) Oral daily    MEDICATIONS  (PRN):  acetaminophen     Tablet .. 650 milliGRAM(s) Oral every 6 hours PRN Temp greater or equal to 38C (100.4F), Mild Pain (1 - 3)  diltiazem    Tablet 30 milliGRAM(s) Oral two times a day PRN elevated HR greater than 110  guaiFENesin Oral Liquid (Sugar-Free) 200 milliGRAM(s) Oral every 6 hours PRN Cough  magnesium hydroxide Suspension 30 milliLiter(s) Oral daily PRN Constipation         Vitals log        ICU Vital Signs Last 24 Hrs  T(C): 36.8 (24 Nov 2023 04:30), Max: 36.8 (24 Nov 2023 04:30)  T(F): 98.2 (24 Nov 2023 04:30), Max: 98.2 (24 Nov 2023 04:30)  HR: 120 (24 Nov 2023 04:37) (73 - 130)  BP: 133/92 (24 Nov 2023 04:30) (133/75 - 135/60)  BP(mean): --  ABP: --  ABP(mean): --  RR: 20 (23 Nov 2023 20:00) (20 - 24)  SpO2: 93% (24 Nov 2023 04:37) (92% - 98%)    O2 Parameters below as of 24 Nov 2023 04:30  Patient On (Oxygen Delivery Method): nasal cannula  O2 Flow (L/min): 4               Input and Output:  I&O's Detail    22 Nov 2023 07:01 - 23 Nov 2023 07:00  --------------------------------------------------------  IN:    IV PiggyBack: 100 mL  Total IN: 100 mL    OUT:  Total OUT: 0 mL    Total NET: 100 mL      23 Nov 2023 07:01 - 24 Nov 2023 05:46  --------------------------------------------------------  IN:  Total IN: 0 mL    OUT:    Voided (mL): 1800 mL  Total OUT: 1800 mL    Total NET: -1800 mL          Lab Data    11-23    x   |  x   |  x   ----------------------------<  x   x    |  x   |  1.00      TPro  7.9  /  Alb  3.0<L>  /  TBili  1.1  /  DBili  0.4<H>  /  AST  41<H>  /  ALT  42  /  AlkPhos  144<H>  11-23            Review of Systems	      Objective     Physical Examination    heart s1s2  lung dc BS  head nc      Pertinent Lab findings & Imaging      Esther:  NO   Adequate UO     I&O's Detail    22 Nov 2023 07:01 - 23 Nov 2023 07:00  --------------------------------------------------------  IN:    IV PiggyBack: 100 mL  Total IN: 100 mL    OUT:  Total OUT: 0 mL    Total NET: 100 mL      23 Nov 2023 07:01 - 24 Nov 2023 05:46  --------------------------------------------------------  IN:  Total IN: 0 mL    OUT:    Voided (mL): 1800 mL  Total OUT: 1800 mL    Total NET: -1800 mL               Discussed with:     Cultures:	        Radiology

## 2023-11-25 VITALS — OXYGEN SATURATION: 85 %

## 2023-11-25 LAB
ALBUMIN SERPL ELPH-MCNC: 2.5 G/DL — LOW (ref 3.3–5)
ALP SERPL-CCNC: 96 U/L — SIGNIFICANT CHANGE UP (ref 40–120)
ALP SERPL-CCNC: 96 U/L — SIGNIFICANT CHANGE UP (ref 40–120)
ALP SERPL-CCNC: 97 U/L — SIGNIFICANT CHANGE UP (ref 40–120)
ALP SERPL-CCNC: 97 U/L — SIGNIFICANT CHANGE UP (ref 40–120)
ALT FLD-CCNC: 24 U/L — SIGNIFICANT CHANGE UP (ref 12–78)
ALT FLD-CCNC: 24 U/L — SIGNIFICANT CHANGE UP (ref 12–78)
ALT FLD-CCNC: 25 U/L — SIGNIFICANT CHANGE UP (ref 12–78)
ALT FLD-CCNC: 25 U/L — SIGNIFICANT CHANGE UP (ref 12–78)
ANION GAP SERPL CALC-SCNC: 8 MMOL/L — SIGNIFICANT CHANGE UP (ref 5–17)
ANION GAP SERPL CALC-SCNC: 8 MMOL/L — SIGNIFICANT CHANGE UP (ref 5–17)
AST SERPL-CCNC: 31 U/L — SIGNIFICANT CHANGE UP (ref 15–37)
AST SERPL-CCNC: 31 U/L — SIGNIFICANT CHANGE UP (ref 15–37)
AST SERPL-CCNC: 32 U/L — SIGNIFICANT CHANGE UP (ref 15–37)
AST SERPL-CCNC: 32 U/L — SIGNIFICANT CHANGE UP (ref 15–37)
BILIRUB DIRECT SERPL-MCNC: 0.6 MG/DL — HIGH (ref 0–0.3)
BILIRUB DIRECT SERPL-MCNC: 0.6 MG/DL — HIGH (ref 0–0.3)
BILIRUB INDIRECT FLD-MCNC: 0.6 MG/DL — SIGNIFICANT CHANGE UP (ref 0.2–1)
BILIRUB INDIRECT FLD-MCNC: 0.6 MG/DL — SIGNIFICANT CHANGE UP (ref 0.2–1)
BILIRUB SERPL-MCNC: 1.2 MG/DL — SIGNIFICANT CHANGE UP (ref 0.2–1.2)
BUN SERPL-MCNC: 92 MG/DL — HIGH (ref 7–23)
BUN SERPL-MCNC: 92 MG/DL — HIGH (ref 7–23)
CALCIUM SERPL-MCNC: 9.4 MG/DL — SIGNIFICANT CHANGE UP (ref 8.5–10.1)
CALCIUM SERPL-MCNC: 9.4 MG/DL — SIGNIFICANT CHANGE UP (ref 8.5–10.1)
CHLORIDE SERPL-SCNC: 104 MMOL/L — SIGNIFICANT CHANGE UP (ref 96–108)
CHLORIDE SERPL-SCNC: 104 MMOL/L — SIGNIFICANT CHANGE UP (ref 96–108)
CO2 SERPL-SCNC: 34 MMOL/L — HIGH (ref 22–31)
CO2 SERPL-SCNC: 34 MMOL/L — HIGH (ref 22–31)
CREAT SERPL-MCNC: 1.9 MG/DL — HIGH (ref 0.5–1.3)
CREAT SERPL-MCNC: 1.9 MG/DL — HIGH (ref 0.5–1.3)
CRP SERPL-MCNC: 227 MG/L — HIGH
CRP SERPL-MCNC: 227 MG/L — HIGH
CULTURE RESULTS: SIGNIFICANT CHANGE UP
D DIMER BLD IA.RAPID-MCNC: 260 NG/ML DDU — HIGH
D DIMER BLD IA.RAPID-MCNC: 260 NG/ML DDU — HIGH
EGFR: 24 ML/MIN/1.73M2 — LOW
EGFR: 24 ML/MIN/1.73M2 — LOW
FERRITIN SERPL-MCNC: 867 NG/ML — HIGH (ref 13–330)
FERRITIN SERPL-MCNC: 867 NG/ML — HIGH (ref 13–330)
GLUCOSE SERPL-MCNC: 97 MG/DL — SIGNIFICANT CHANGE UP (ref 70–99)
GLUCOSE SERPL-MCNC: 97 MG/DL — SIGNIFICANT CHANGE UP (ref 70–99)
GRAM STN FLD: ABNORMAL
HCT VFR BLD CALC: 36.7 % — SIGNIFICANT CHANGE UP (ref 34.5–45)
HCT VFR BLD CALC: 36.7 % — SIGNIFICANT CHANGE UP (ref 34.5–45)
HGB BLD-MCNC: 11.4 G/DL — LOW (ref 11.5–15.5)
HGB BLD-MCNC: 11.4 G/DL — LOW (ref 11.5–15.5)
MCHC RBC-ENTMCNC: 31.1 GM/DL — LOW (ref 32–36)
MCHC RBC-ENTMCNC: 31.1 GM/DL — LOW (ref 32–36)
MCHC RBC-ENTMCNC: 32.3 PG — SIGNIFICANT CHANGE UP (ref 27–34)
MCHC RBC-ENTMCNC: 32.3 PG — SIGNIFICANT CHANGE UP (ref 27–34)
MCV RBC AUTO: 104 FL — HIGH (ref 80–100)
MCV RBC AUTO: 104 FL — HIGH (ref 80–100)
METHOD TYPE: SIGNIFICANT CHANGE UP
METHOD TYPE: SIGNIFICANT CHANGE UP
MSSA DNA SPEC QL NAA+PROBE: SIGNIFICANT CHANGE UP
MSSA DNA SPEC QL NAA+PROBE: SIGNIFICANT CHANGE UP
NRBC # BLD: 1 /100 WBCS — HIGH (ref 0–0)
NRBC # BLD: 1 /100 WBCS — HIGH (ref 0–0)
NT-PROBNP SERPL-SCNC: HIGH PG/ML (ref 0–450)
NT-PROBNP SERPL-SCNC: HIGH PG/ML (ref 0–450)
PLATELET # BLD AUTO: 180 K/UL — SIGNIFICANT CHANGE UP (ref 150–400)
PLATELET # BLD AUTO: 180 K/UL — SIGNIFICANT CHANGE UP (ref 150–400)
POTASSIUM SERPL-MCNC: 4.7 MMOL/L — SIGNIFICANT CHANGE UP (ref 3.5–5.3)
POTASSIUM SERPL-MCNC: 4.7 MMOL/L — SIGNIFICANT CHANGE UP (ref 3.5–5.3)
POTASSIUM SERPL-SCNC: 4.7 MMOL/L — SIGNIFICANT CHANGE UP (ref 3.5–5.3)
POTASSIUM SERPL-SCNC: 4.7 MMOL/L — SIGNIFICANT CHANGE UP (ref 3.5–5.3)
PROCALCITONIN SERPL-MCNC: 1.32 NG/ML — HIGH
PROCALCITONIN SERPL-MCNC: 1.32 NG/ML — HIGH
PROT SERPL-MCNC: 6.8 G/DL — SIGNIFICANT CHANGE UP (ref 6–8.3)
PROT SERPL-MCNC: 6.8 G/DL — SIGNIFICANT CHANGE UP (ref 6–8.3)
PROT SERPL-MCNC: 6.9 G/DL — SIGNIFICANT CHANGE UP (ref 6–8.3)
PROT SERPL-MCNC: 6.9 G/DL — SIGNIFICANT CHANGE UP (ref 6–8.3)
RBC # BLD: 3.53 M/UL — LOW (ref 3.8–5.2)
RBC # BLD: 3.53 M/UL — LOW (ref 3.8–5.2)
RBC # FLD: 15.6 % — HIGH (ref 10.3–14.5)
RBC # FLD: 15.6 % — HIGH (ref 10.3–14.5)
SODIUM SERPL-SCNC: 146 MMOL/L — HIGH (ref 135–145)
SODIUM SERPL-SCNC: 146 MMOL/L — HIGH (ref 135–145)
SPECIMEN SOURCE: SIGNIFICANT CHANGE UP
WBC # BLD: 9.09 K/UL — SIGNIFICANT CHANGE UP (ref 3.8–10.5)
WBC # BLD: 9.09 K/UL — SIGNIFICANT CHANGE UP (ref 3.8–10.5)
WBC # FLD AUTO: 9.09 K/UL — SIGNIFICANT CHANGE UP (ref 3.8–10.5)
WBC # FLD AUTO: 9.09 K/UL — SIGNIFICANT CHANGE UP (ref 3.8–10.5)

## 2023-11-25 RX ORDER — DEXAMETHASONE 0.5 MG/5ML
6 ELIXIR ORAL DAILY
Refills: 0 | Status: DISCONTINUED | OUTPATIENT
Start: 2023-11-26 | End: 2023-11-25

## 2023-11-25 RX ORDER — PHENYLEPHRINE HYDROCHLORIDE 10 MG/ML
50 INJECTION INTRAVENOUS ONCE
Refills: 0 | Status: DISCONTINUED | OUTPATIENT
Start: 2023-11-25 | End: 2023-11-25

## 2023-11-25 RX ORDER — DILTIAZEM HCL 120 MG
5 CAPSULE, EXT RELEASE 24 HR ORAL
Qty: 125 | Refills: 0 | Status: DISCONTINUED | OUTPATIENT
Start: 2023-11-25 | End: 2023-11-25

## 2023-11-25 RX ORDER — MORPHINE SULFATE 50 MG/1
2 CAPSULE, EXTENDED RELEASE ORAL
Refills: 0 | Status: DISCONTINUED | OUTPATIENT
Start: 2023-11-25 | End: 2023-11-25

## 2023-11-25 RX ORDER — SODIUM CHLORIDE 9 MG/ML
500 INJECTION INTRAMUSCULAR; INTRAVENOUS; SUBCUTANEOUS ONCE
Refills: 0 | Status: DISCONTINUED | OUTPATIENT
Start: 2023-11-25 | End: 2023-11-25

## 2023-11-25 RX ORDER — DIGOXIN 250 MCG
125 TABLET ORAL EVERY 6 HOURS
Refills: 0 | Status: DISCONTINUED | OUTPATIENT
Start: 2023-11-25 | End: 2023-11-25

## 2023-11-25 RX ORDER — SODIUM CHLORIDE 9 MG/ML
1000 INJECTION, SOLUTION INTRAVENOUS
Refills: 0 | Status: DISCONTINUED | OUTPATIENT
Start: 2023-11-25 | End: 2023-11-25

## 2023-11-25 RX ORDER — ENOXAPARIN SODIUM 100 MG/ML
55 INJECTION SUBCUTANEOUS EVERY 12 HOURS
Refills: 0 | Status: DISCONTINUED | OUTPATIENT
Start: 2023-11-25 | End: 2023-11-25

## 2023-11-25 RX ORDER — SCOPALAMINE 1 MG/3D
1 PATCH, EXTENDED RELEASE TRANSDERMAL ONCE
Refills: 0 | Status: DISCONTINUED | OUTPATIENT
Start: 2023-11-25 | End: 2023-11-25

## 2023-11-25 RX ORDER — DIGOXIN 250 MCG
250 TABLET ORAL ONCE
Refills: 0 | Status: DISCONTINUED | OUTPATIENT
Start: 2023-11-25 | End: 2023-11-25

## 2023-11-25 RX ORDER — METOPROLOL TARTRATE 50 MG
5 TABLET ORAL EVERY 6 HOURS
Refills: 0 | Status: DISCONTINUED | OUTPATIENT
Start: 2023-11-25 | End: 2023-11-25

## 2023-11-25 RX ORDER — DEXAMETHASONE 0.5 MG/5ML
10 ELIXIR ORAL DAILY
Refills: 0 | Status: DISCONTINUED | OUTPATIENT
Start: 2023-11-25 | End: 2023-11-25

## 2023-11-25 RX ADMIN — ENOXAPARIN SODIUM 55 MILLIGRAM(S): 100 INJECTION SUBCUTANEOUS at 02:32

## 2023-11-25 RX ADMIN — Medication 1 PUFF(S): at 02:33

## 2023-11-25 RX ADMIN — SODIUM CHLORIDE 55 MILLILITER(S): 9 INJECTION, SOLUTION INTRAVENOUS at 06:44

## 2023-11-25 RX ADMIN — MEROPENEM 100 MILLIGRAM(S): 1 INJECTION INTRAVENOUS at 01:11

## 2023-11-25 RX ADMIN — Medication 0.25 MILLIGRAM(S): at 02:32

## 2023-11-25 RX ADMIN — Medication 102 MILLIGRAM(S): at 06:44

## 2023-11-25 RX ADMIN — Medication 1 PUFF(S): at 01:12

## 2023-11-25 NOTE — PROGRESS NOTE ADULT - SUBJECTIVE AND OBJECTIVE BOX
CHIEF COMPLAINT: Patient is a 89y old  Female who presents with a chief complaint of SOB (25 Nov 2023 05:50)      Follow Up: [ ] Chest Pain      [ ] Dyspnea     [ ] Palpitations    [ ] Atrial Fibrillation     [ ] Ventricular Dysrhythmia    [ ] Abnormal EKG                      [ ] Abnormal Cardiac Enzymes     [ ] Valvular Disease   [ ] CAD  [ ] Hypertension [ ] CHF     HPI:  89 F hx hypothyroid, hld, glaucoma, htn, afib on eliquis, asthma, gerd, edema, acute chronic respiratory failure with hypoxia on home O2, chf on torsemide, cardiac murmur, TAVR, cad s/p 1 stent BIBEMS from [place] due to shortness of breath. Earlier pt was complaining of indigestion, which resolved. EMS found pt to be in rapid afib, gave 5 IV metoprolol, EMS reading as STEMI. Pt states early this morning was having indigestion, after many hours was able to get medication from staff which improved this. SOB all day which worsened, prompting EMS call. Denies fever, cp, cough, n/v/d, leg swelling. (20 Nov 2023 01:53)      PAST MEDICAL & SURGICAL HISTORY:      MEDICATIONS  (STANDING):  albuterol/ipratropium for Nebulization 3 milliLiter(s) Nebulizer every 6 hours  atorvastatin 40 milliGRAM(s) Oral at bedtime  dexAMETHasone  Injectable 6 milliGRAM(s) IV Push daily  dexAMETHasone  IVPB 10 milliGRAM(s) IV Intermittent daily  dextrose 5% + sodium chloride 0.45%. 1000 milliLiter(s) (55 mL/Hr) IV Continuous <Continuous>  digoxin  Injectable 125 MICROGram(s) IV Push every 6 hours  digoxin  Injectable 250 MICROGram(s) IV Push once  diltiazem Infusion 5 mG/Hr (5 mL/Hr) IV Continuous <Continuous>  enoxaparin Injectable 55 milliGRAM(s) SubCutaneous every 12 hours  ipratropium 17 MICROgram(s) HFA Inhaler 1 Puff(s) Inhalation every 6 hours  meropenem  IVPB 500 milliGRAM(s) IV Intermittent every 12 hours  metoprolol tartrate Injectable 5 milliGRAM(s) IV Push every 6 hours  phenylephrine   100 mCg/mL NaCl 0.9% Injectable 50 MICROGram(s) IV Push once  phenylephrine   100 mCg/mL NaCl 0.9% Injectable 50 MICROGram(s) IV Push once  sodium chloride 0.9% Bolus 500 milliLiter(s) IV Bolus once    MEDICATIONS  (PRN):  acetaminophen     Tablet .. 650 milliGRAM(s) Oral every 6 hours PRN Temp greater or equal to 38C (100.4F), Mild Pain (1 - 3)  diltiazem    Tablet 30 milliGRAM(s) Oral two times a day PRN elevated HR greater than 110  guaiFENesin Oral Liquid (Sugar-Free) 200 milliGRAM(s) Oral every 6 hours PRN Cough  magnesium hydroxide Suspension 30 milliLiter(s) Oral daily PRN Constipation      Allergies    amoxicillin (Rash)  penicillin (Rash)    Intolerances        REVIEW OF SYSTEMS:    CONSTITUTIONAL: No weakness, fevers or chills.   EYES/ENT: No visual changes;  No vertigo or throat pain   NECK: No pain or stiffness  RESPIRATORY: No cough, wheezing, hemoptysis; No shortness of breath  CARDIOVASCULAR: No chest pain or palpitations  GASTROINTESTINAL: No abdominal or epigastric pain. No nausea, vomiting, or hematemesis; No diarrhea or constipation. No melena or hematochezia.  GENITOURINARY: No dysuria, frequency or hematuria  NEUROLOGICAL: No numbness or weakness  SKIN: No itching, burning, rashes, or lesions   All other review of systems is negative unless indicated above    Vital Signs Last 24 Hrs  T(C): 36.7 (25 Nov 2023 05:40), Max: 36.7 (25 Nov 2023 05:40)  T(F): 98 (25 Nov 2023 05:40), Max: 98 (25 Nov 2023 05:40)  HR: 114 (25 Nov 2023 08:00) (92 - 151)  BP: 95/59 (25 Nov 2023 08:00) (95/59 - 136/81)  BP(mean): --  RR: 22 (25 Nov 2023 08:00) (18 - 24)  SpO2: 90% (25 Nov 2023 08:00) (80% - 93%)    Parameters below as of 25 Nov 2023 08:00  Patient On (Oxygen Delivery Method): BiPAP/CPAP        I&O's Summary    24 Nov 2023 07:01  -  25 Nov 2023 07:00  --------------------------------------------------------  IN: 360 mL / OUT: 1200 mL / NET: -840 mL        PHYSICAL EXAM:    Constitutional: NAD, awake and alert, well-developed  Eyes:  EOMI,  Pupils round, No oral cyanosis.  ENMT: No exudate or erythema  Pulmonary: Non-labored, breath sounds are clear bilaterally, No wheezing, rales or rhonchi  Cardiovascular: Regular, S1 and S2, No murmurs, rubs, gallops oir clicks  Gastrointestinal: Bowel Sounds present, soft, nontender.   Lymph: No peripheral edema. No lymphadenopathy.  Neurological: Alert, no focal deficits  Skin: No rashes.  Psych:  Mood & affect appropriate    LABS: All Labs Reviewed:                        11.4   9.09  )-----------( 180      ( 25 Nov 2023 04:28 )             36.7                         12.4   16.73 )-----------( 191      ( 24 Nov 2023 08:46 )             38.5     25 Nov 2023 04:28    146    |  104    |  92     ----------------------------<  97     4.7     |  34     |  1.90   24 Nov 2023 08:46    141    |  102    |  83     ----------------------------<  134    5.2     |  30     |  1.40   23 Nov 2023 07:50    x      |  x      |  x      ----------------------------<  x      x       |  x      |  1.00     Ca    9.4        25 Nov 2023 04:28  Ca    9.5        24 Nov 2023 08:46    TPro  6.9    /  Alb  2.5    /  TBili  1.2    /  DBili  0.6    /  AST  32     /  ALT  25     /  AlkPhos  96     25 Nov 2023 04:28  TPro  7.3    /  Alb  2.8    /  TBili  1.2    /  DBili  0.4    /  AST  29     /  ALT  30     /  AlkPhos  118    24 Nov 2023 08:46  TPro  7.9    /  Alb  3.0    /  TBili  1.1    /  DBili  0.4    /  AST  41     /  ALT  42     /  AlkPhos  144    23 Nov 2023 07:50          Blood Culture:         RADIOLOGY/EKG:

## 2023-11-25 NOTE — PROGRESS NOTE ADULT - SUBJECTIVE AND OBJECTIVE BOX
Date/Time Patient Seen:  		  Referring MD:   Data Reviewed	       Patient is a 89y old  Female who presents with a chief complaint of SOB (24 Nov 2023 11:58)      Subjective/HPI     PAST MEDICAL & SURGICAL HISTORY:        Medication list         MEDICATIONS  (STANDING):  albuterol/ipratropium for Nebulization 3 milliLiter(s) Nebulizer every 6 hours  atorvastatin 40 milliGRAM(s) Oral at bedtime  dexAMETHasone  IVPB 10 milliGRAM(s) IV Intermittent daily  enoxaparin Injectable 55 milliGRAM(s) SubCutaneous every 12 hours  ipratropium 17 MICROgram(s) HFA Inhaler 1 Puff(s) Inhalation every 6 hours  meropenem  IVPB 500 milliGRAM(s) IV Intermittent every 12 hours  metoprolol succinate ER 50 milliGRAM(s) Oral daily    MEDICATIONS  (PRN):  acetaminophen     Tablet .. 650 milliGRAM(s) Oral every 6 hours PRN Temp greater or equal to 38C (100.4F), Mild Pain (1 - 3)  diltiazem    Tablet 30 milliGRAM(s) Oral two times a day PRN elevated HR greater than 110  guaiFENesin Oral Liquid (Sugar-Free) 200 milliGRAM(s) Oral every 6 hours PRN Cough  magnesium hydroxide Suspension 30 milliLiter(s) Oral daily PRN Constipation         Vitals log        ICU Vital Signs Last 24 Hrs  T(C): 36.7 (25 Nov 2023 05:40), Max: 36.7 (25 Nov 2023 05:40)  T(F): 98 (25 Nov 2023 05:40), Max: 98 (25 Nov 2023 05:40)  HR: 144 (25 Nov 2023 05:40) (92 - 146)  BP: 110/68 (25 Nov 2023 05:40) (110/68 - 136/81)  BP(mean): --  ABP: --  ABP(mean): --  RR: 18 (25 Nov 2023 05:40) (18 - 18)  SpO2: 80% (25 Nov 2023 05:40) (80% - 93%)    O2 Parameters below as of 25 Nov 2023 05:40  Patient On (Oxygen Delivery Method): nasal cannula  O2 Flow (L/min): 4               Input and Output:  I&O's Detail    23 Nov 2023 07:01  -  24 Nov 2023 07:00  --------------------------------------------------------  IN:  Total IN: 0 mL    OUT:    Voided (mL): 1800 mL  Total OUT: 1800 mL    Total NET: -1800 mL      24 Nov 2023 07:01  -  25 Nov 2023 05:51  --------------------------------------------------------  IN:    sodium chloride 0.9%: 360 mL  Total IN: 360 mL    OUT:    Voided (mL): 1200 mL  Total OUT: 1200 mL    Total NET: -840 mL          Lab Data                        11.4   9.09  )-----------( 180      ( 25 Nov 2023 04:28 )             36.7     11-25    146<H>  |  104  |  92<H>  ----------------------------<  97  4.7   |  34<H>  |  1.90<H>    Ca    9.4      25 Nov 2023 04:28    TPro  6.9  /  Alb  2.5<L>  /  TBili  1.2  /  DBili  0.6<H>  /  AST  32  /  ALT  25  /  AlkPhos  96  11-25            Review of Systems	      Objective     Physical Examination    heart s1s2  lung dc BS  head nc      Pertinent Lab findings & Imaging      Esther:  NO   Adequate UO     I&O's Detail    23 Nov 2023 07:01  -  24 Nov 2023 07:00  --------------------------------------------------------  IN:  Total IN: 0 mL    OUT:    Voided (mL): 1800 mL  Total OUT: 1800 mL    Total NET: -1800 mL      24 Nov 2023 07:01  -  25 Nov 2023 05:51  --------------------------------------------------------  IN:    sodium chloride 0.9%: 360 mL  Total IN: 360 mL    OUT:    Voided (mL): 1200 mL  Total OUT: 1200 mL    Total NET: -840 mL               Discussed with:     Cultures:	        Radiology

## 2023-11-25 NOTE — CHART NOTE - NSCHARTNOTEFT_GEN_A_CORE
Expiration Note- PGY2    Called by RN because patient appears to no longer be breathing/is pulseless/has asystole on monitor.   Assessed patient at bedside.  Patient found to be unresponsive to verbal, physical and painful stimuli. No pulses palpable at radial carotid or femoral arteries. No heart or lung sounds heard on auscultation. No corneal reflex noted.     Time of death: 10:20am  Attending Dr. Richmond notified via phone   Family member Son and daughter in law notified at bedside.

## 2023-11-25 NOTE — CHART NOTE - NSCHARTNOTEFT_GEN_A_CORE
Resident Rapid Response Note    Rapid response was called at _____ for hypotension, desatting and tachycardia.     Events leading up to Rapid Response:    Patient was seen and examined at the bedside by the rapid response team. ICU PA at bedside.    Rapid Response Vital Signs:  BP: 80/40  HR: 140   RR: 22   SpO2: 80% on BiPAP   Temp: 97.7   FS: 78          Physical Exam:  Gen: well appearing, NAD  HEENT: NCAT, PEERLA b/l, EOMI b/l  Cardio: regular rate and rhythm, +s1s2, no murmurs, rubs, or gallops  Pulm: CTA b/l, no wheezes, rales or rhonchi  Abdomen: soft, nontender, nondistended, +BS x4 quadrants, no guarding  Extremities: no cyanosis or edema, +2 pedal pulses  Neuro: AAOx3, CNII-XII intact, 5/5 strength all extremities, sensation intact  Skin: warm and dry      Assessment/Plan:    89F with known HLD, HTN, AF on Eliquis 2.5 bid, Asthma, GERD on home O2 who presents with worsening SOB found with afib and RVR along with Positive RVP and COVID. Rapid called for hypotension and HRs of 140s.   - Dig 250mg IVP x 1 started followed by 150mg IVP 6hrs Q6 for two doses   - 500ml NS Bolus given x 1   - Keep on BIPAP   - Patient DNR/DNI   - On going conversation with Family about possible Comfort measures only at this time   - RN to call if any changes.  -Discussed with Dr. Richmond agrees with above plan  -Family updated by Dr. Atkins Resident Rapid Response Note    Rapid response was called at 8: 52 for hypotension, desatting and tachycardia. Unable to obtain meaningful history from patient as on BiPAP and lethargic.     Events leading up to Rapid Response:    Patient was seen and examined at the bedside by the rapid response team. ICU PA at bedside.    Rapid Response Vital Signs:  BP: 80/40  HR: 140   RR: 22   SpO2: 80% on BiPAP   Temp: 97.7   FS: 78      Physical Exam:  Gen: well appearing, NAD  HEENT: NCAT, PEERLA b/l, EOMI b/l  Cardio: regular rate and rhythm, +s1s2, no murmurs, rubs, or gallops  Pulm: CTA b/l, no wheezes, rales or rhonchi  Abdomen: soft, nontender, nondistended, +BS x4 quadrants, no guarding  Extremities: no cyanosis or edema, +2 pedal pulses  Neuro: AAOx3, CNII-XII intact, 5/5 strength all extremities, sensation intact  Skin: warm and dry    Assessment/Plan:    89F with known HLD, HTN, AF on Eliquis 2.5 bid, Asthma, GERD on home O2 who presents with worsening SOB found with afib and RVR along with Positive RVP and COVID. Rapid called for hypotension and HRs of 140s.   - Dig 250mg IVP x 1 started followed by 150mg IVP 6hrs Q6 for two doses   - 500ml NS Bolus given x 1   - Keep on BIPAP   - Patient DNR/DNI   - On going conversation with Family about possible Comfort measures only at this time   - RN to call if any changes.  -Discussed with Dr. Richmond agrees with above plan  -Family updated by Dr. Atkins Resident Rapid Response Note    Rapid response was called at 8: 52 for hypotension, desatting and tachycardia. Unable to obtain meaningful history from patient as on BiPAP and lethargic. Patient appears to be in respiratory distress and per chart patient with similar picture overnight. Prognosis guarded and family aware.     Events leading up to Rapid Response:    Patient was seen and examined at the bedside by the rapid response team. ICU PA at bedside.    Rapid Response Vital Signs:  BP: 80/40  HR: 140   RR: 22   SpO2: 80% on BiPAP   Temp: 97.7   FS: 78      Physical Exam:  Gen: well appearing, NAD  HEENT: NCAT, PEERLA b/l, EOMI b/l  Cardio: regular rate and rhythm, +s1s2, no murmurs, rubs, or gallops  Pulm: CTA b/l, no wheezes, rales or rhonchi  Abdomen: soft, nontender, nondistended, +BS x4 quadrants, no guarding  Extremities: no cyanosis or edema, +2 pedal pulses  Neuro: AAOx3, CNII-XII intact, 5/5 strength all extremities, sensation intact  Skin: warm and dry    Assessment/Plan:    89F with known HLD, HTN, AF on Eliquis 2.5 bid, Asthma, GERD on home O2 who presents with worsening SOB found with afib and RVR along with Positive RVP and COVID. Rapid called for hypotension and HRs of 140s.   - Dig 250mg IVP x 1 started followed by 150mg IVP 6hrs after Q6 for two additional doses   - 500ml NS Bolus given x 1   - Keep on BIPAP   - Patient DNR/DNI   - On going conversation with Family about possible Comfort measures only at this time   - RN to call if any changes.  -Discussed with Dr. Richmond agrees with above plan  -Family updated by Dr. Atkins Resident Rapid Response Note      Events leading up to Rapid Response:  Rapid response was called at 8: 52 for hypotension, desatting and tachycardia. Unable to obtain meaningful history from patient as patient is lethargic, agonally breathing on BiPAP. Per chart review, pt was placed on BIPAP and made DNR/DNI overnight due to clinical deterioration. Prognosis guarded and family aware. Patient was seen and examined at the bedside by the rapid response team. ICU PA at bedside.    Rapid Response Vital Signs:  BP: 80/40 ->62/37 ->49/30  HR: 140   RR: 22   SpO2: 80% on BiPAP   Temp: 97.7   FS: 78    Physical Exam:  Gen: acutely ill-appearing, lethargic  HEENT: NCAT, PEERLA b/l  Cardio: tachycardia, irregular rate and rhythm  Pulm: agonal breathing, diminished breath sounds b/l  Abdomen: soft, nondistended  Extremities: no cyanosis or edema  Neuro: lethargic, unable to respond to verbal or physical commands  Skin: warm and dry    Assessment/Plan:    89 yo F with PMHx of HLD, HTN, AF on Eliquis 2.5 bid, Asthma, GERD on home O2 who presents with worsening SOB found with afib and RVR along with Positive RVP and COVID. Rapid called for hypotension and HRs of 140s.   - Pt ordered for Lopressor 5mg x1 overnight and ordered for Cardizem gtt, although not administered due to hypotension  - Dig 250mg IVP x 1 STAT followed by 150mg IVP q6h x 2 doses ordered  - 500ml NS IV NS bolus x1 STAT  - Phenylephrine 50mcg x2 STAT  - Maintain BIPAP   - Patient DNR/DNI   - Family updated by Dr. Atkins, family to present to bedside shortly, ongoing conversation with family about possible CMO  - Discussed with Dr. Richmond agrees with above plan  - RN to call if any changes.    Addendum Resident Rapid Response Note      Events leading up to Rapid Response:  Rapid response was called at 8: 52 for hypotension, desatting and tachycardia. Unable to obtain meaningful history from patient as patient is lethargic, agonally breathing on BiPAP. Per chart review, pt was placed on BIPAP and made DNR/DNI overnight due to clinical deterioration. Prognosis guarded and family aware. Patient was seen and examined at the bedside by the rapid response team. ICU PA at bedside.    Rapid Response Vital Signs:  BP: 80/40 ->62/37 ->49/30  HR: 140   RR: 22   SpO2: 80% on BiPAP   Temp: 97.7   FS: 78    Physical Exam:  Gen: acutely ill-appearing, lethargic  HEENT: NCAT, PEERLA b/l  Cardio: tachycardia, irregular rate and rhythm  Pulm: agonal breathing, diminished breath sounds b/l  Abdomen: soft, nondistended  Extremities: no cyanosis or edema  Neuro: lethargic, unable to respond to verbal or physical commands  Skin: warm and dry    Assessment/Plan:    89 yo F with PMHx of HLD, HTN, AF on Eliquis 2.5 bid, Asthma, GERD on home O2 who presents with worsening SOB found with afib and RVR along with Positive RVP and COVID. Rapid called for hypotension and HRs of 140s.   - Pt ordered for Lopressor 5mg x1 overnight and ordered for Cardizem gtt, although not administered due to hypotension  - Dig 250mg IVP x 1 STAT followed by 150mg IVP q6h x 2 doses ordered  - 500ml NS IV NS bolus x1 STAT  - Phenylephrine 50mcg x2 STAT  - Maintain BIPAP   - Patient DNR/DNI   - Family updated by Dr. Atkins, family to present to bedside shortly, ongoing conversation with family about possible CMO  - Discussed with Dr. Richmond agrees with above plan  - RN to call if any changes.    Addendum  Spoke to family at bedside. Son and Daughter-in-law agree to make patient comfort measures due to poor prognosis  - started on PRN morphine 2mg IVP q3h  - scolamine patch ordered  - will transition to venti mask  - Discussed with Dr. Richmond agrees with above plan  - RN to call if any changes. Resident Rapid Response Note    Events leading up to Rapid Response:  Rapid response was called at 8:52am for hypotension, increased work of breathing, hypoxia and tachycardia. Patient was seen and examined at the bedside by the rapid response team. ICU PA at bedside. Unable to obtain history from patient due to lethargy, agonal breathing on BIPAP. Per chart review, pt was placed on BIPAP and made DNR/DNI overnight due to clinical deterioration.    Rapid Response Vital Signs:  BP: 80/40 ->62/37 ->49/30  HR: 140   RR: 22   SpO2: 80% on BiPAP   Temp: 97.7F  FS: 78    Physical Exam:  Gen: acutely ill-appearing, lethargic  HEENT: NCAT, PEERLA b/l  Cardio: tachycardia, irregular rate and rhythm  Pulm: agonal breathing, diminished breath sounds b/l  Abdomen: soft, nondistended  Extremities: no cyanosis or edema  Neuro: lethargic, unable to respond to verbal or physical commands  Skin: warm and dry    Assessment/Plan:    89 yo F with PMHx of HLD, HTN, AF on Eliquis 2.5 bid, Asthma, GERD on home O2 who presents with worsening SOB found with afib and RVR along with Positive RVP and COVID. Rapid called for hypotension, increased work of breathing, hypoxia and tachycardia.  - Overnight, pt ordered for Lopressor 5mg x1 and Cardizem gtt for uncontrolled HR, although not administered due to hypotension  - Digoxin 250mg IVP x 1 STAT followed by 125mg IVP q6h x 2 doses ordered for rate control  - 500ml NS IV NS bolus x1 STAT for hypotension  - Phenylephrine 50mcg x2 STAT for hypotension  - Continue BIPAP  - Patient made DNR/DNI overnight  - Family updated by Dr. Atkins, family to present to bedside shortly, ongoing conversation being had about CMO given rapid clinical deterioration  - Discussed with Dr. Richmond, agrees with above plan  - RN to call if any changes    Addendum:   Family presented to bedside. Son and daughter-in-law agree to make patient CMO due to rapid clinical deterioration and poor prognosis.   - CMO ordered   - Started PRN Morphine 2mg IVP q3h for respiratory distress  - Scopolamine patch x1 ordered  - BIPAP transitioned to Ventimask   - Discussed with Dr. Richmond, agrees with above plan  - RN to call if any changes Resident Rapid Response Note    Events leading up to Rapid Response:  Rapid response was called at 8:52am for hypotension, increased work of breathing, hypoxia and tachycardia. Patient was seen and examined at the bedside by the rapid response team. ICU PA at bedside. Unable to obtain history from patient due to lethargy, agonal breathing on BIPAP. Per chart review, pt was placed on BIPAP and made DNR/DNI overnight due to clinical deterioration.    Rapid Response Vital Signs:  BP: 80/40 ->62/37 ->49/30  HR: 140   RR: 22   SpO2: 80% on BiPAP   Temp: 97.7F  FS: 78    Physical Exam:  Gen: acutely ill-appearing, lethargic  HEENT: NCAT, PEERLA b/l  Cardio: tachycardia, irregular rate and rhythm  Pulm: agonal breathing, diminished breath sounds b/l  Abdomen: soft, nondistended  Extremities: no cyanosis or edema  Neuro: lethargic, unable to respond to verbal or physical commands  Skin: warm and dry    Assessment/Plan:    89 yo F with PMHx of HLD, HTN, AF on Eliquis 2.5 bid, Asthma, GERD on home O2 who presents with worsening SOB found with afib and RVR along with Positive RVP and COVID. Rapid called for hypotension, increased work of breathing, hypoxia and tachycardia.  - Overnight, pt ordered for Lopressor 5mg x1 and Cardizem gtt for uncontrolled HR, although not administered due to hypotension  - Digoxin 250mg IVP x 1 STAT followed by 125mg IVP q6h x 2 doses ordered for rate control  - 500ml NS IV NS bolus x1 STAT for hypotension  - Phenylephrine 50mcg x2 STAT for hypotension  - Continue BIPAP  - Patient made DNR/DNI overnight  - Family updated by Dr. Atkins, family to present to bedside shortly, ongoing conversation being had about CMO given rapid clinical deterioration  - Discussed with Dr. Richmond, agrees with above plan  - RN to call if any changes    Addendum:   Family presented to bedside. Son and daughter-in-law agree to make patient CMO due to rapid clinical deterioration and poor prognosis.   - CMO ordered   - Ordered PRN Morphine 2mg IVP q3h for respiratory distress  - Scopolamine patch x1 ordered  - BIPAP transitioned to Ventimask   - Discussed with Dr. Richmond, agrees with above plan  - RN to call if any changes

## 2023-11-25 NOTE — PROGRESS NOTE ADULT - PROVIDER SPECIALTY LIST ADULT
Cardiology
Hospitalist
Pulmonology
Pulmonology
Hospitalist
Pulmonology
Pulmonology
Cardiology
Cardiology
Hospitalist
Infectious Disease
Hospitalist
Infectious Disease
Pulmonology

## 2023-11-25 NOTE — CHART NOTE - NSCHARTNOTEFT_GEN_A_CORE
HR still afib with rvr 130s-150s non sustaining  - will change PO metoprolol to 5mg IV q6h  - change PO cardizem to cardizem gtt start at 5/hr and titrate up as necessary   - D5 1/2NS for hypernatremia and NPO on bipap

## 2023-11-25 NOTE — PROGRESS NOTE ADULT - NUTRITIONAL ASSESSMENT
This patient has been assessed with a concern for Malnutrition and has been determined to have a diagnosis/diagnoses of Severe protein-calorie malnutrition and Underweight (BMI < 19).    This patient is being managed with:   Diet DASH/TLC-  Sodium & Cholesterol Restricted  Supplement Feeding Modality:  Oral  Ensure Plus High Protein Cans or Servings Per Day:  1       Frequency:  Two Times a day  Entered: Nov 20 2023 10:25AM    Diet DASH/TLC-  Sodium & Cholesterol Restricted  Entered: Nov 20 2023  1:54AM    The following pending diet order is being considered for treatment of Severe protein-calorie malnutrition and Underweight (BMI < 19):null
This patient has been assessed with a concern for Malnutrition and has been determined to have a diagnosis/diagnoses of Severe protein-calorie malnutrition and Underweight (BMI < 19).    This patient is being managed with:   Diet NPO-  Except Medications  Entered: Nov 25 2023  2:13AM    The following pending diet order is being considered for treatment of Severe protein-calorie malnutrition and Underweight (BMI < 19):  Diet DASH/TLC-  Sodium & Cholesterol Restricted  Supplement Feeding Modality:  Oral  Ensure Plus High Protein Cans or Servings Per Day:  1       Frequency:  Two Times a day  Entered: Nov 20 2023 10:25AM      Rapid response team called this AM patient tachycardic, hypotensive  Hypoxic, now DNR/DNI  Prognosis grave
This patient has been assessed with a concern for Malnutrition and has been determined to have a diagnosis/diagnoses of Underweight (BMI < 19) and Severe protein-calorie malnutrition.    This patient is being managed with:   Diet DASH/TLC-  Sodium & Cholesterol Restricted  Supplement Feeding Modality:  Oral  Ensure Plus High Protein Cans or Servings Per Day:  1       Frequency:  Two Times a day  Entered: Nov 20 2023 10:25AM    Diet DASH/TLC-  Sodium & Cholesterol Restricted  Entered: Nov 20 2023  1:54AM    The following pending diet order is being considered for treatment of Underweight (BMI < 19) and Severe protein-calorie malnutrition:null
This patient has been assessed with a concern for Malnutrition and has been determined to have a diagnosis/diagnoses of Severe protein-calorie malnutrition and Underweight (BMI < 19).    This patient is being managed with:   Diet DASH/TLC-  Sodium & Cholesterol Restricted  Supplement Feeding Modality:  Oral  Ensure Plus High Protein Cans or Servings Per Day:  1       Frequency:  Two Times a day  Entered: Nov 20 2023 10:25AM    Diet DASH/TLC-  Sodium & Cholesterol Restricted  Entered: Nov 20 2023  1:54AM    The following pending diet order is being considered for treatment of Severe protein-calorie malnutrition and Underweight (BMI < 19):null
This patient has been assessed with a concern for Malnutrition and has been determined to have a diagnosis/diagnoses of Severe protein-calorie malnutrition and Underweight (BMI < 19).    This patient is being managed with:   Diet DASH/TLC-  Sodium & Cholesterol Restricted  Supplement Feeding Modality:  Oral  Ensure Plus High Protein Cans or Servings Per Day:  1       Frequency:  Two Times a day  Entered: Nov 20 2023 10:25AM    Diet DASH/TLC-  Sodium & Cholesterol Restricted  Entered: Nov 20 2023  1:54AM    The following pending diet order is being considered for treatment of Severe protein-calorie malnutrition and Underweight (BMI < 19):null

## 2023-11-25 NOTE — DISCHARGE NOTE FOR THE EXPIRED PATIENT - HOSPITAL COURSE
89 F hx hypothyroid, hld, glaucoma, htn, afib on eliquis, asthma, gerd, edema, acute chronic respiratory failure with hypoxia on home O2, chf on torsemide, cardiac murmur, TAVR, cad s/p 1 stent BIBEMS from [place] due to shortness of breath. Earlier pt was complaining of indigestion, which resolved. EMS found pt to be in rapid afib, gave 5 IV metoprolol, EMS reading as STEMI. Pt states early this morning was having indigestion, after many hours was able to get medication from staff which improved this. SOB all day which worsened, prompting EMS call. Denies fever, cp, cough, n/v/d, leg swelling.    Acute hypoxic resp failure sec to COVID 19 POA  - finished remdesivir  - respiratory status kept worsening  - family wanted comfort care

## 2023-11-25 NOTE — PROGRESS NOTE ADULT - ASSESSMENT
89 F hx hypothyroid, hld, glaucoma, htn, afib on eliquis, asthma, gerd, edema, acute chronic respiratory failure with hypoxia on home O2, chf on torsemide, cardiac murmur, TAVR, cad s/p 1 stent BIBEMS from [place] due to shortness of breath    bronchiectasis  resp viral illness  valv heart disease   HTN  HLD  AF  Asthma  GERD  COVID    cardio eval noted  GOC documented - DNR DNI  arrhythmia overnight  family aware  prognosis guarded    covid isol  s/p remdesivir - decadron  NEBS ATC for Asthma and Bronchiectasis  fio2 titration - HF NC - NRB -   goal sat > 88 pct  GOC discussion   assist with needs  assess need for Diuresis - proBNP noted - pt with known extensive card hx -   on tele monitor  tylenol PRN   robitussin PRN   on AC -   prognosis guarded 89 F hx hypothyroid, hld, glaucoma, htn, afib on eliquis, asthma, gerd, edema, acute chronic respiratory failure with hypoxia on home O2, chf on torsemide, cardiac murmur, TAVR, cad s/p 1 stent BIBEMS from [place] due to shortness of breath    bronchiectasis  resp viral illness  valv heart disease   HTN  HLD  AF  Asthma  GERD  COVID    cardio eval noted  GOC documented - DNR DNI, spoke with family this am - discussed goals of care and cmo  arrhythmia overnight  family aware  prognosis guarded    covid isol  s/p remdesivir - decadron  NEBS ATC for Asthma and Bronchiectasis  fio2 titration - HF NC - NRB -   goal sat > 88 pct  GOC discussion   assist with needs  assess need for Diuresis - proBNP noted - pt with known extensive card hx -   on tele monitor  tylenol PRN   robitussin PRN   on AC -   prognosis guarded

## 2023-11-25 NOTE — PHARMACOTHERAPY INTERVENTION NOTE - COMMENTS
Patient is a 89 year old female. Rapid response instantiated in the setting of hypotension and elevated heart rate. Rapid team wanted to start digoxin. Recommended a renally adjusted dose of 250mcg now followed by 125mcg 6 hours later. Accepted and orders entered. Assisted with obtaining medication and drawing up the 250mcg dose through a filter needle. Handed to nurse for administration

## 2023-11-25 NOTE — CHART NOTE - NSCHARTNOTEFT_GEN_A_CORE
89 year old female with a PMH of hypothyroidism, HLD, glaucoma, HTN, afib on Eliquis, asthma, GERD, edema, chronic hypoxic respiratory failure on home O2, CHF on torsemide, cardiac murmur, TAVR, CAD s/p 1 stent who presented to [place] ED on 11/19 with complaints of SOB.  The patient was found to be febrile and in afib RVR at that time.  She was found to be COVID+ and was admitted to medicine for further management.  Since admission she has been experiencing worsening respiratory status and increasing O2 demands.     She received one dose of azithromycin on 11/20. She has been treated with remdesivir which was completed yesterday. She has also been receiving decadron since 11/20. She was initiated on meropenem today.      RRT called today for afib RVR and worsening O2 saturation despite NIPPV.  Upon arrival patient on NIPPV 12/6/100% with an O2 saturation in the 80s.  Also hypotensive. Patient lethargic and unarousable.  Tachycardic rate and irregular rhythm.  Agonal respirations with diminished breath sounds.  Patient with a MOLST in place making her DNR/DNI.    The patient's family was contacted regarding the patient's worsening status and overall poor prognosis.  They stated that they were 20 minutes away and would like to get to the hospital before making any further decisions.  A 500 cc bolus of NS was ordered for the patient's hypotension.  250 mcg digoxin given for her afib RVR.  Her rate improved but she remained persistently hypotensive.  50 mcg phenylephrine given x2 to temporize blood pressure until family could arrive.     Upon family's arrival GOC were discussed and they decided to discontinue aggressive measures and focus on making the patient comfortable.  NS bolus stopped and no further pressors ordered.  Will transition the patient off of NIPPV and give morphine for comfort.     35 minutes assessing presenting problems of acute illness, which pose high probability of life threatening deterioration or end organ damage/dysfunction, as well as medical decision making including initiating plan of care, reviewing data, reviewing radiologic exams, discussing with multidisciplinary team,  discussing goals of care with patient/family, and writing this note.  Non-inclusive of procedures performed.  Date of entry of this note is equal to the date of services rendered.

## 2023-11-27 LAB
-  AMPICILLIN/SULBACTAM: SIGNIFICANT CHANGE UP
-  AMPICILLIN/SULBACTAM: SIGNIFICANT CHANGE UP
-  CEFAZOLIN: SIGNIFICANT CHANGE UP
-  CEFAZOLIN: SIGNIFICANT CHANGE UP
-  CLINDAMYCIN: SIGNIFICANT CHANGE UP
-  CLINDAMYCIN: SIGNIFICANT CHANGE UP
-  ERYTHROMYCIN: SIGNIFICANT CHANGE UP
-  ERYTHROMYCIN: SIGNIFICANT CHANGE UP
-  GENTAMICIN: SIGNIFICANT CHANGE UP
-  GENTAMICIN: SIGNIFICANT CHANGE UP
-  OXACILLIN: SIGNIFICANT CHANGE UP
-  OXACILLIN: SIGNIFICANT CHANGE UP
-  RIFAMPIN: SIGNIFICANT CHANGE UP
-  RIFAMPIN: SIGNIFICANT CHANGE UP
-  TETRACYCLINE: SIGNIFICANT CHANGE UP
-  TETRACYCLINE: SIGNIFICANT CHANGE UP
-  TRIMETHOPRIM/SULFAMETHOXAZOLE: SIGNIFICANT CHANGE UP
-  TRIMETHOPRIM/SULFAMETHOXAZOLE: SIGNIFICANT CHANGE UP
-  VANCOMYCIN: SIGNIFICANT CHANGE UP
-  VANCOMYCIN: SIGNIFICANT CHANGE UP
CULTURE RESULTS: ABNORMAL
METHOD TYPE: SIGNIFICANT CHANGE UP
METHOD TYPE: SIGNIFICANT CHANGE UP
ORGANISM # SPEC MICROSCOPIC CNT: ABNORMAL
ORGANISM # SPEC MICROSCOPIC CNT: SIGNIFICANT CHANGE UP
ORGANISM # SPEC MICROSCOPIC CNT: SIGNIFICANT CHANGE UP
SPECIMEN SOURCE: SIGNIFICANT CHANGE UP

## 2023-12-06 ENCOUNTER — APPOINTMENT (OUTPATIENT)
Dept: PULMONOLOGY | Facility: CLINIC | Age: 88
End: 2023-12-06

## 2023-12-31 NOTE — DISCHARGE NOTE PROVIDER - NSCORESITESY/N_GEN_A_CORE_RD
12/31/23 1151   Service Assessment   Patient Orientation Alert and Oriented   Cognition Alert   History Provided By Patient;Medical Record;Child/Family   Primary Caregiver Spouse   Support Systems Spouse/Significant Other;Family Members;Friends/Neighbors   PCP Verified by CM Yes   Last Visit to PCP Within last year   Prior Functional Level Assistance with the following:;Dressing;Mobility;Cooking   Current Functional Level Assistance with the following:;Bathing;Toileting;Mobility   Can patient return to prior living arrangement Unknown at present   Ability to make needs known: Good   Family able to assist with home care needs: Yes   Would you like for me to discuss the discharge plan with any other family members/significant others, and if so, who? Yes   Financial Resources Medicare   Community Resources None     This RN case manager to the bedside to speak with patient and initiate DC planning. From home with . Home has ramp, walker and handicapped accessible. Patient is going to discuss Swing bed with family. She does not want traditional SNF due to past experiences with SNF. CM will follow up with patient in AM for decision.     1244 - Family would like referral to Swing Bed. Referral made.    Yes

## 2024-01-19 RX ORDER — METOPROLOL TARTRATE 50 MG
1 TABLET ORAL
Refills: 0 | DISCHARGE

## 2024-01-19 RX ORDER — BRIMONIDINE TARTRATE 2 MG/MG
1 SOLUTION/ DROPS OPHTHALMIC
Refills: 0 | DISCHARGE

## 2024-01-19 RX ORDER — FLUTICASONE FUROATE, UMECLIDINIUM BROMIDE AND VILANTEROL TRIFENATATE 200; 62.5; 25 UG/1; UG/1; UG/1
1 POWDER RESPIRATORY (INHALATION)
Refills: 0 | DISCHARGE

## 2024-01-19 RX ORDER — MAGNESIUM HYDROXIDE 400 MG/1
30 TABLET, CHEWABLE ORAL
Refills: 0 | DISCHARGE

## 2024-01-19 RX ORDER — TIMOLOL 0.5 %
1 DROPS OPHTHALMIC (EYE)
Refills: 0 | DISCHARGE

## 2024-01-19 RX ORDER — SIMVASTATIN 20 MG/1
1 TABLET, FILM COATED ORAL
Refills: 0 | DISCHARGE

## 2024-01-19 RX ORDER — IPRATROPIUM BROMIDE 0.2 MG/ML
2.5 SOLUTION, NON-ORAL INHALATION
Refills: 0 | DISCHARGE

## 2024-01-19 RX ORDER — APIXABAN 2.5 MG/1
1 TABLET, FILM COATED ORAL
Refills: 0 | DISCHARGE

## 2024-01-19 RX ORDER — SENNA PLUS 8.6 MG/1
1 TABLET ORAL
Refills: 0 | DISCHARGE

## 2024-04-30 NOTE — GOALS OF CARE CONVERSATION - ADVANCED CARE PLANNING - NS PRO AD PATIENT TYPE
NP/PA record reviewed.  I agree with the recorded chart.   Np Ivory's note for details. I saw and evaluated the patient and agree with the finding and plans as written.   2229 ct results noted, d/w pt. she agrees w dc plan, no emc, not c/w facial abscess/airway comp/sepsis or sirs. Det ret inst given.     Dano Quinonez MD  04/30/24 [id number]     Health Care Proxy (HCP)

## 2024-05-07 NOTE — CAREGIVER ENGAGEMENT NOTE - DISCHARGE DATE
27-Jul-2023 Detail Level: Zone Render In Strict Bullet Format?: Yes Plan: Spontaneously draining, discussed additional management options and pt opted for:\\n\\n1. ILK as below\\n2. Start doxycycline 100mg by mouth daily with food x10 days \\n3. Warm compresses \\n4. CLn Wash to affected area (sample given)

## 2024-05-16 NOTE — H&P PST ADULT - HEMATOLOGY/LYMPHATICS
"Assessment   1. Encounter for annual routine gynecological examination  - pap up to date    2. Patient desires pregnancy  - referred to [facility] since trying to conceive greater than 6 months   - Referral to Reproductive Endocrinology; Future    Please return for your next visit in 1 year or sooner as needed.    Paulina Lr MD      Subjective     Pat Rg is a 36 y.o. female who is here for a routine exam.   PCP = Taran Cheema,     APE Concerns: Trying to conceive since 2023.     Gynecologic History:    OBHx:  x 2, boys x 2, 6 and 3yo  Menses: regular, 35 day cycle  Last Pap: NILM/neg HPV in 3/2023  HPV Vaccine: No, declines  History of Dysplasia: Denies  Sexually active: active with   STI testing: Denies  Contraception: None, trying to conceive  FamHx of GYN cancers:  paternal aunts x 2 and grandma (older age) had breast cancer  Social Hx:  general surgeon at , Pat is an  in [place]      PMH, PSH, FH, SH, medications and allergies reviewed and edited as needed.      Objective   /72   Ht 1.702 m (5' 7\")   Wt 84.8 kg (187 lb)   LMP 2024 (Exact Date)   BMI 29.29 kg/m²      General:   Alert and oriented, in no acute distress   Neck: Supple. No visible thyromegaly.    Breast/Axilla: Normal to palpation bilaterally without masses, skin changes, or nipple discharge.    Abdomen: Soft, non-tender, without masses or organomegaly   Vulva: Normal architecture without erythema, masses, or lesions.    Vagina: Normal mucosa without lesions, masses, or atrophy. No abnormal vaginal discharge.    Cervix: Normal without masses, lesions, or signs of cervicitis.    Uterus: Normal mobile, non-enlarged uterus    Adnexa: Normal without masses or lesions   Pelvic Floor No POP noted. No high tone pelvic floor   Psych Normal affect. Normal mood.        " negative

## 2024-06-04 NOTE — ED PROVIDER NOTE - NS ED MD DISPO SPECIAL CONSIDERATION1
[facility] Call Center    Phone Message    May a detailed message be left on voicemail: yes     Reason for Call: Symptoms or Concerns     If patient has red-flag symptoms, warm transfer to triage line    Current symptom or concern: Irregular heartbeat     Patient stated they have been experiencing some irregular heartbeat at least once a day, but experienced it twice today. Patient is feeling fine as of now and only realizes they are having irregular heartbeat when they take their blood pressure. Patient will like to know if they need to come in to see their cardiologist due to this, or just seeing their regular doctor. Please call patient back to further discuss.    Action Taken: Other: Cardiology    Travel Screening: Not Applicable     Thank you!  Specialty Access Center                                                                        None

## 2024-06-05 NOTE — PATIENT PROFILE ADULT - NSPROMEDSHERBAL_GEN_A_NUR
Dr. Guevara    Patient's (spouse) called requesting an Extension for Disability  Start: 7/5/24  End: 1/5/24 patient has appointment scheduled 7/26/24 to see you.    Do you support?    Sj   no

## 2024-07-15 NOTE — PRE-OP CHECKLIST - ALLERGIES REVIEWED
Patient is in the supine position.   The body was positioned using the following devices: safety strap, blanket and wedge. done

## 2024-07-30 NOTE — PRE-ANESTHESIA EVALUATION ADULT - NSANTHNECKRD_ENT_A_CORE
No Detail Level: Detailed Depth Of Biopsy: dermis Was A Bandage Applied: Yes Size Of Lesion In Cm: 0 Biopsy Type: H and E Biopsy Method: Dermablade Anesthesia Type: 1% lidocaine with epinephrine Anesthesia Volume In Cc: 0.5 Hemostasis: Ninfa's Wound Care: Petrolatum Dressing: bandage Destruction After The Procedure: No Type Of Destruction Used: Curettage Curettage Text: The wound bed was treated with curettage after the biopsy was performed. Cryotherapy Text: The wound bed was treated with cryotherapy after the biopsy was performed. Electrodesiccation Text: The wound bed was treated with electrodesiccation after the biopsy was performed. Electrodesiccation And Curettage Text: The wound bed was treated with electrodesiccation and curettage after the biopsy was performed. Silver Nitrate Text: The wound bed was treated with silver nitrate after the biopsy was performed. Lab: [id number] Lab Facility: [id number] Consent: Written consent was obtained and risks were reviewed including but not limited to scarring, infection, bleeding, scabbing, incomplete removal, nerve damage and allergy to anesthesia. Post-Care Instructions: I reviewed with the patient in detail post-care instructions. Patient is to keep the biopsy site dry overnight, and then apply bacitracin twice daily until healed. Patient may apply hydrogen peroxide soaks to remove any crusting. Notification Instructions: Patient will be notified of biopsy results. However, patient instructed to call the office if not contacted within 2 weeks. Billing Type: Third-Party Bill Information: Selecting Yes will display possible errors in your note based on the variables you have selected. This validation is only offered as a suggestion for you. PLEASE NOTE THAT THE VALIDATION TEXT WILL BE REMOVED WHEN YOU FINALIZE YOUR NOTE. IF YOU WANT TO FAX A PRELIMINARY NOTE YOU WILL NEED TO TOGGLE THIS TO 'NO' IF YOU DO NOT WANT IT IN YOUR FAXED NOTE. Detail Level: Simple

## 2024-11-04 NOTE — H&P ADULT - NSCORESITESY/N_GEN_A_CORE_RD
Subjective:       Patient ID: Leonie Salcido is a 55 y.o. female.    Chief Complaint: No chief complaint on file.    History of Present Illness               Past Medical History:   Diagnosis Date    Depression     situational    Environmental and seasonal allergies        Past Surgical History:   Procedure Laterality Date    BUNIONECTOMY Bilateral         Social History     Socioeconomic History    Marital status:     Number of children: 0    Highest education level: Master's degree (e.g., MA, MS, Daniel, MEd, MSW, LOKI)   Occupational History    Occupation: Chief  for [employer]   Tobacco Use    Smoking status: Former     Current packs/day: 0.00     Types: Cigarettes     Quit date:      Years since quittin.8    Smokeless tobacco: Never   Substance and Sexual Activity    Alcohol use: Yes     Alcohol/week: 3.0 standard drinks of alcohol     Types: 3 Glasses of wine per week    Drug use: Never    Sexual activity: Yes     Partners: Male     Birth control/protection: Post-menopausal     Social Drivers of Health     Financial Resource Strain: Low Risk  (2024)    Overall Financial Resource Strain (CARDIA)     Difficulty of Paying Living Expenses: Not hard at all   Food Insecurity: No Food Insecurity (2024)    Hunger Vital Sign     Worried About Running Out of Food in the Last Year: Never true     Ran Out of Food in the Last Year: Never true   Transportation Needs: No Transportation Needs (2024)    PRAPARE - Transportation     Lack of Transportation (Medical): No     Lack of Transportation (Non-Medical): No   Physical Activity: Sufficiently Active (2024)    Exercise Vital Sign     Days of Exercise per Week: 3 days     Minutes of Exercise per Session: 60 min   Stress: Stress Concern Present (2024)    [employer] - Occupational Stress Questionnaire     Feeling of Stress : To some extent   Housing Stability: Low Risk   (4/19/2024)    Housing Stability Vital Sign     Unable to Pay for Housing in the Last Year: No     Homeless in the Last Year: No       Family History   Problem Relation Name Age of Onset    Cancer Mother          scarcoma    Hypertension Mother      Dementia Mother          a form of but never dx    Heart disease Father      Hypertension Father      Kidney failure Father      Hypertension Sister      Breast cancer Sister      Skin cancer Sister      Gout Sister      Gout Brother      Alcohol abuse Brother          recovering    Bell's palsy Brother          d/t lyme disease    Colon cancer Neg Hx         Review of patient's allergies indicates:  No Known Allergies       Current Outpatient Medications:     tirzepatide, weight loss, (ZEPBOUND) 2.5 mg/0.5 mL PnIj, Inject 2.5 mg into the skin every 7 days., Disp: 4 Pen, Rfl: 1          Objective:      Physical Exam    Assessment:      No diagnosis found.    Plan:          There are no diagnoses linked to this encounter.    Risks, benefits, and side effects were discussed with the patient. All questions were answered to the fullest satisfaction of the patient, and pt verbalized understanding and agreement to treatment plan. Pt was to call with any new or worsening symptoms, or present to the ER.        This note was generated with the assistance of ambient listening technology. Verbal consent was obtained by the patient and accompanying visitor(s) for the recording of patient appointment to facilitate this note. I attest to having reviewed and edited the generated note for accuracy, though some syntax or spelling errors may persist. Please contact the author of this note for any clarification.             Yes

## 2024-11-22 NOTE — CARE COORDINATION ASSESSMENT. - LIVING ARRANGEMENTS/HOME SAFETY CONCERNS
Dr. Mcgowan called--offer the patient 1/7/24.    Patient called, offered 1/7/24 appointment at 10:20. She accepted, informed to arrive 10 minutes early.  
Per Dr. Mcgowan-message out to Renetta. She is waiting for Renetta to get back to her.  
Sandra Garcia is phoning [facility] location after having placed a STAT referral for infectious disease for Positive culture findings in wound [R89.5] for pt. Please review and advise for appropriate scheduling [phone]    
See telephone encounter 11/22/24    She is currently on linezolid,  new positive culture with rare Burkholderia cepacia complex. Started on Augmentin 875/125 mg 1 pill BID x 10 days    Dr. Mcgowan-next available consult appointment is on 12/31/24-please advise if this is ok or give options for earlier appointment.  
concerns to be addressed

## 2025-02-10 NOTE — H&P ADULT - NSHPSOCIALHISTORY_GEN_ALL_CORE
"Reviewed 2/10/25  Patient continues to endorse AH that threaten they are going to hurt him/family which has been ongoing. Patient reported to nursing this morning that his AH, depression, and anxiety have been \"really heavy.\" He continues to report increased fatigue and remains self isolative in his room.       Continue medication as follows:  Continue Clozapine 250 mg QHS for psychosis - increased 12/11/24 02/04: , BNP 10, CRP 9.5  02/04: ANC 4.42  To consider further careful titration  CBC w/diff and CK every 2 weeks  Continue haldol to 5 mg BID for psychosis   Continue Lexapro 20 mg daily for depression and anxiety  Continue Propanolol 10 mg BID for anxiety   Continue Melatonin 9 mg QHS for sleep  Continue Colace and Senna-Docusate sodium 50 mg tablet QHS for constipation  Continue Zofran 4 mg tablet Q6H PRN for nausea   Continue PRN mouth kote for xerostomia     - Pt remains on dual antipsychotic Tx due to failure on monotherapy   .  - S/p ECT treatments.  - ACT team referral was made for him living back with his aunt once MA funding comes through from the [facility] and sister will try to reapply  No associated orders from this encounter found during lookback period of 72 hours.    " lives at the [facility]  walks with a walker

## 2025-03-06 NOTE — ASU PREOP CHECKLIST - AICD PRESENT
Mohs Case Number: [id number] Date Of Previous Biopsy (Optional): 12/11/24 Previous Accession (Optional): [id number] Biopsy Photograph Reviewed: Yes Referring Physician (Optional): Tino Consent Type: Consent 1 (Standard) Eye Shield Used: No Initial Size Of Lesion: 0.4 Number Of Stages: 1 Primary Defect Length In Cm (Final Defect Size - Required For Flaps/Grafts): 0.8 Primary Defect Width In Cm (Final Defect Size - Required For Flaps/Grafts): 0.6 Primary Defect Depth In Cm (Optional But Required For Some Insurers): 0 Repair Type: Referred to plastics for closure Which Instrument Did You Use For Dermabrasion?: Wire Brush Which Eyelid Repair Cpt Are You Using?: 80851 Oculoplastic Surgeon Procedure Text (A): After obtaining clear surgical margins the patient was sent to oculoplastics for surgical repair.  The patient understands they will receive post-surgical care and follow-up from the referring physician's office. Otolaryngologist Procedure Text (A): After obtaining clear surgical margins the patient was sent to otolaryngology for surgical repair.  The patient understands they will receive post-surgical care and follow-up from the referring physician's office. Plastic Surgeon Procedure Text (A): After obtaining clear surgical margins the patient was sent to plastics for surgical repair.  The patient understands they will receive post-surgical care and follow-up from the referring physician's office. Mid-Level Procedure Text (A): After obtaining clear surgical margins the patient was sent to a mid-level provider for surgical repair.  The patient understands they will receive post-surgical care and follow-up from the mid-level provider. Provider Procedure Text (A): After obtaining clear surgical margins the defect was repaired by another provider. Asc Procedure Text (A): After obtaining clear surgical margins the patient was sent to an ASC for surgical repair.  The patient understands they will receive post-surgical care and follow-up from the ASC physician. Deep Sutures: 5-0 Monocryl Epidermal Sutures: 5-0 Fast Absorbing Gut Suturegard Retention Suture: 2-0 Nylon Retention Suture Bite Size: 3 mm Length To Time In Minutes Device Was In Place: 10 Undermining Type: Entire Wound Debridement Text: The wound edges were debrided prior to proceeding with the closure to facilitate wound healing. Helical Rim Text: The closure involved the helical rim. Vermilion Border Text: The closure involved the vermilion border. Nostril Rim Text: The closure involved the nostril rim. Retention Suture Text: Retention sutures were placed to support the closure and prevent dehiscence. Location Indication Override (Is Already Calculated Based On Selected Body Location): Area H Area H Indication Text: Tumors in this location are included in Area H (eyelids, eyebrows, nose, lips, chin, ear, pre-auricular, post-auricular, temple, genitalia, hands, feet, ankles and areola).  Tissue conservation is critical in these anatomic locations. Area M Indication Text: Tumors in this location are included in Area M (cheek, forehead, scalp, neck, jawline and pretibial skin).  Mohs surgery is indicated for tumors in these anatomic locations. Area L Indication Text: Tumors in this location are included in Area L (trunk and extremities).  Mohs surgery is indicated for larger tumors, or tumors with aggressive histologic features, in these anatomic locations. Depth Of Tumor Invasion (For Histology): tumor not visualized (deep and peripheral margins are clear of tumor) no Perineural Invasion (For Histology - Be Specific If Possible): absent Special Stains Stage 1 - Results: Base On Clearance Noted Above Stage 2: Additional Anesthesia Type: 1% lidocaine with epinephrine Staging Info: By selecting yes to the question above you will include information on AJCC 8 tumor staging in your Mohs note. Information on tumor staging will be automatically added for SCCs on the head and neck. AJCC 8 includes tumor size, tumor depth, perineural involvement and bone invasion. Tumor Depth: Less than 6mm from granular layer and no invasion beyond the subcutaneous fat Was The Patient On Physician Recommended Anticoagulation Therapy?: Please Select the Appropriate Response Medical Necessity Statement: Based on the clinical exam, the pathology, the patient's preference and my medical judgement, Mohs surgery is the most appropriate treatment for this cancer compared to other treatments. Alternatives Discussed Intro (Do Not Add Period): I discussed alternative treatments to Mohs surgery and specifically discussed the risks and benefits of Consent 1/Introductory Paragraph: The rationale for Mohs was explained to the patient and consent was obtained. The risks, benefits and alternatives to therapy were discussed in detail. Specifically, the risks of infection, scarring, pain, bleeding, prolonged wound healing, incomplete removal, allergy to anesthesia, nerve injury, recurrence and the possible need of delayed or additional reconstruction were addressed. Prior to the procedure, the treatment site was clearly identified and confirmed by the patient. All components of Universal Protocol/PAUSE Rule completed. Consent 2/Introductory Paragraph: Mohs surgery was explained to the patient and consent was obtained. The risks, benefits and alternatives to therapy were discussed in detail. Specifically, the risks of infection, scarring, bleeding, prolonged wound healing, incomplete removal, allergy to anesthesia, nerve injury and recurrence were addressed. Prior to the procedure, the treatment site was clearly identified and confirmed by the patient. All components of Universal Protocol/PAUSE Rule completed. Consent 3/Introductory Paragraph: I gave the patient a chance to ask questions they had about the procedure.  Following this I explained the Mohs procedure and consent was obtained. The risks, benefits and alternatives to therapy were discussed in detail. Specifically, the risks of infection, scarring, bleeding, prolonged wound healing, incomplete removal, allergy to anesthesia, nerve injury and recurrence were addressed. Prior to the procedure, the treatment site was clearly identified and confirmed by the patient. All components of Universal Protocol/PAUSE Rule completed. Consent (Temporal Branch)/Introductory Paragraph: The rationale for Mohs was explained to the patient and consent was obtained. The risks, benefits and alternatives to therapy were discussed in detail. Specifically, the risks of damage to the temporal branch of the facial nerve, infection, scarring, pain, bleeding, prolonged wound healing, incomplete removal, allergy to anesthesia, nerve injury, recurrence and the possible need of delayed or additional reconstruction were addressed.  Prior to the procedure, the treatment site was clearly identified and confirmed by the patient. All components of Universal Protocol/PAUSE Rule completed. Consent (Marginal Mandibular)/Introductory Paragraph: The rationale for Mohs was explained to the patient and consent was obtained. The risks, benefits and alternatives to therapy were discussed in detail. Specifically, the risks of damage to the marginal mandibular branch of the facial nerve, infection, scarring, bleeding, prolonged wound healing, incomplete removal, allergy to anesthesia, and recurrence were addressed. Prior to the procedure, the treatment site was clearly identified and confirmed by the patient. All components of Universal Protocol/PAUSE Rule completed. Consent (Spinal Accessory)/Introductory Paragraph: The rationale for Mohs was explained to the patient and consent was obtained. The risks, benefits and alternatives to therapy were discussed in detail. Specifically, the risks of damage to the spinal accessory nerve, infection, scarring, bleeding, prolonged wound healing, incomplete removal, allergy to anesthesia, and recurrence were addressed. Prior to the procedure, the treatment site was clearly identified and confirmed by the patient. All components of Universal Protocol/PAUSE Rule completed. Consent (Near Eyelid Margin)/Introductory Paragraph: The rationale for Mohs was explained to the patient and consent was obtained. The risks, benefits and alternatives to therapy were discussed in detail. Specifically, the risks of ectropion or eyelid deformity, infection, scarring, bleeding, prolonged wound healing, incomplete removal, allergy to anesthesia, nerve injury and recurrence were addressed. Prior to the procedure, the treatment site was clearly identified and confirmed by the patient. All components of Universal Protocol/PAUSE Rule completed. Consent (Ear)/Introductory Paragraph: The rationale for Mohs was explained to the patient and consent was obtained. The risks, benefits and alternatives to therapy were discussed in detail. Specifically, the risks of ear deformity, infection, scarring, bleeding, prolonged wound healing, incomplete removal, allergy to anesthesia, nerve injury and recurrence were addressed. Prior to the procedure, the treatment site was clearly identified and confirmed by the patient. All components of Universal Protocol/PAUSE Rule completed. Consent (Nose)/Introductory Paragraph: The rationale for Mohs was explained to the patient and consent was obtained. The risks, benefits and alternatives to therapy were discussed in detail. Specifically, the risks of nasal deformity, changes in the flow of air through the nose, infection, scarring, bleeding, prolonged wound healing, incomplete removal, allergy to anesthesia, nerve injury and recurrence were addressed. Prior to the procedure, the treatment site was clearly identified and confirmed by the patient. All components of Universal Protocol/PAUSE Rule completed. Consent (Lip)/Introductory Paragraph: The rationale for Mohs was explained to the patient and consent was obtained. The risks, benefits and alternatives to therapy were discussed in detail. Specifically, the risks of lip deformity, changes in the oral aperture, infection, scarring, bleeding, prolonged wound healing, incomplete removal, allergy to anesthesia, nerve injury and recurrence were addressed. Prior to the procedure, the treatment site was clearly identified and confirmed by the patient. All components of Universal Protocol/PAUSE Rule completed. Consent (Scalp)/Introductory Paragraph: The rationale for Mohs was explained to the patient and consent was obtained. The risks, benefits and alternatives to therapy were discussed in detail. Specifically, the risks of changes in hair growth pattern secondary to repair, infection, scarring, bleeding, prolonged wound healing, incomplete removal, allergy to anesthesia, nerve injury and recurrence were addressed. Prior to the procedure, the treatment site was clearly identified and confirmed by the patient. All components of Universal Protocol/PAUSE Rule completed. Detail Level: Detailed Postop Diagnosis: same Surgeon: Becca Campos MD Anesthesia Type: 1% lidocaine with 1:100,000 epinephrine and a 1:10 solution of 8.4% sodium bicarbonate Anesthesia Volume In Cc: 3 Hemostasis: Electrocautery Estimated Blood Loss (Cc): minimal Brow Lift Text: A midfrontal incision was made medially to the defect to allow access to the tissues just superior to the left eyebrow. Following careful dissection inferiorly in a supraperiosteal plane to the level of the left eyebrow, several 3-0 monocryl sutures were used to resuspend the eyebrow orbicularis oculi muscular unit to the superior frontal bone periosteum. This resulted in an appropriate reapproximation of static eyebrow symmetry and correction of the left brow ptosis. Epidermal Closure: running Suturegard Intro: Intraoperative tissue expansion was performed, utilizing the SUTUREGARD device, in order to reduce wound tension. Suturegard Body: The suture ends were repeatedly re-tightened and re-clamped to achieve the desired tissue expansion. Hemigard Intro: Due to skin fragility and wound tension, it was decided to use HEMIGARD adhesive retention suture devices to permit a linear closure. The skin was cleaned and dried for a 6cm distance away from the wound. Excessive hair, if present, was removed to allow for adhesion. Hemigard Postcare Instructions: The HEMIGARD strips are to remain completely dry for at least 5-7 days. Donor Site Anesthesia Type: same as repair anesthesia Epidermal Closure Graft Donor Site (Optional): simple interrupted Graft Donor Site Bandage (Optional-Leave Blank If You Don't Want In Note): Steri-strips and a pressure bandage were applied to the donor site. Closure 2 Information: This tab is for additional flaps and grafts, including complex repair and grafts and complex repair and flaps. You can also specify a different location for the additional defect, if the location is the same you do not need to select a new one. We will insert the automated text for the repair you select below just as we do for solitary flaps and grafts. Please note that at this time if you select a location with a different insurance zone you will need to override the ICD10 and CPT if appropriate. Closure 3 Information: This tab is for additional flaps and grafts above and beyond our usual structured repairs.  Please note if you enter information here it will not currently bill and you will need to add the billing information manually. Dressing: pressure dressing Wound Care (No Sutures): Petrolatum Dressing (No Sutures): dry sterile dressing Unna Boot Text: An Unna boot was placed to help immobilize the limb and facilitate more rapid healing. Home Suture Removal Text: Patient was provided instructions on removing sutures and will remove their sutures at home.  If they have any questions or difficulties they will call the office. Post-Care Instructions: Should the patient develop any fevers, chills, bleeding, severe pain patient will contact the office immediately. Pain Refusal Text: I offered to prescribe pain medication but the patient refused to take this medication. Mauc Instructions: By selecting yes to the question below the MAUC number will be added into the note.  This will be calculated automatically based on the diagnosis chosen, the size entered, the body zone selected (H,M,L) and the specific indications you chose. You will also have the option to override the Mohs AUC if you disagree with the automatically calculated number and this option is found in the Case Summary tab. Where Do You Want The Question To Include Opioid Counseling Located?: Case Summary Tab Eye Protection Verbiage: Before proceeding with the stage, a plastic scleral shield was inserted. The globe was anesthetized with a few drops of 1% lidocaine with 1:100,000 epinephrine. Then, an appropriate sized scleral shield was chosen and coated with lacrilube ointment. The shield was gently inserted and left in place for the duration of each stage. After the stage was completed, the shield was gently removed. Mohs Method Verbiage: The operative site was curetted for margins and a beveled incision following the standard Mohs approach was done.  The specimen was harvested as a microscopic controlled layer and a map of the extirpated tissue was made for orientation. Surgeon/Pathologist Verbiage (Will Incorporate Name Of Surgeon From Intro If Not Blank): operated in two distinct and integrated capacities as the surgeon and pathologist. Mohs Histo Method Verbiage: Each section was then chromacoded and processed in the Mohs lab using the Mohs protocol and submitted for horizontal frozen section. Subsequent Stages Histo Method Verbiage: Using a similar technique to that described above, a thin layer of tissue was removed from all areas where tumor was visible on the previous stage.  The tissue was again oriented, mapped, dyed, and processed as above. Mohs Rapid Report Verbiage: The area of clinically evident tumor was marked with skin marking ink and appropriately hatched.  The initial incision was made following the Mohs approach through the skin.  The specimen was taken to the lab, divided into the necessary number of pieces, chromacoded and processed according to the Mohs protocol.  This was repeated in successive stages until a tumor free defect was achieved. Complex Repair Preamble Text (Leave Blank If You Do Not Want): Extensive wide undermining was performed. Intermediate Repair Preamble Text (Leave Blank If You Do Not Want): Wide undermining was performed with blunt dissection. Crescentic Intermediate Repair Preamble Text (Leave Blank If You Do Not Want): Undermining was performed with blunt dissection. Graft Cartilage Fenestration Text: The cartilage was fenestrated with a 2mm punch biopsy to help facilitate graft survival and healing. Non-Graft Cartilage Fenestration Text: The cartilage was fenestrated with a 2mm punch biopsy to help facilitate healing. Secondary Intention Text (Leave Blank If You Do Not Want): The defect will heal with secondary intention. No Repair - Repaired With Adjacent Surgical Defect Text (Leave Blank If You Do Not Want): After obtaining clear surgical margins the defect was repaired concurrently with another surgical defect which was in close approximation. Referred To Mid-Level For Closure Text (Leave Blank If You Do Not Want): After obtaining clear surgical margins the patient was sent to Skip Martino PA-C for surgical repair.  The patient understands they will receive post-surgical care and follow-up from the mid-level provider. Referred To Plastics For Closure Text (Leave Blank If You Do Not Want): After obtaining clear surgical margins the patient was sent to plastics for surgical repair.  The patient understands they will receive post-surgical care and follow-up from Dr. Nowak. Adjacent Tissue Transfer Text: The defect edges were debeveled with a #15 scalpel blade.  Given the location of the defect and the proximity to free margins an adjacent tissue transfer was deemed most appropriate.  Using a sterile surgical marker, an appropriate flap was drawn incorporating the defect and placing the expected incisions within the relaxed skin tension lines where possible.    The area thus outlined was incised deep to adipose tissue with a #15 scalpel blade.  The skin margins were undermined to an appropriate distance in all directions utilizing iris scissors. Advancement Flap (Single) Text: The defect edges were debeveled with a #15 scalpel blade.  Given the location of the defect and the proximity to free margins a single advancement flap was deemed most appropriate.  Using a sterile surgical marker, an appropriate advancement flap was drawn incorporating the defect and placing the expected incisions within the relaxed skin tension lines where possible.    The area thus outlined was incised deep to adipose tissue with a #15 scalpel blade.  The skin margins were undermined to an appropriate distance in all directions utilizing iris scissors. Advancement Flap (Double) Text: The defect edges were debeveled with a #15 scalpel blade.  Given the location of the defect and the proximity to free margins a double advancement flap was deemed most appropriate.  Using a sterile surgical marker, the appropriate advancement flaps were drawn incorporating the defect and placing the expected incisions within the relaxed skin tension lines where possible.    The area thus outlined was incised deep to adipose tissue with a #15 scalpel blade.  The skin margins were undermined to an appropriate distance in all directions utilizing iris scissors. Advancement-Rotation Flap Text: The defect edges were debeveled with a #15 scalpel blade.  Given the location of the defect, shape of the defect and the proximity to free margins an advancement-rotation flap was deemed most appropriate.  Using a sterile surgical marker, an appropriate flap was drawn incorporating the defect and placing the expected incisions within the relaxed skin tension lines where possible. The area thus outlined was incised deep to adipose tissue with a #15 scalpel blade.  The skin margins were undermined to an appropriate distance in all directions utilizing iris scissors. Alar Island Pedicle Flap Text: The defect edges were debeveled with a #15 scalpel blade.  Given the location of the defect, shape of the defect and the proximity to the alar rim an island pedicle advancement flap was deemed most appropriate.  Using a sterile surgical marker, an appropriate advancement flap was drawn incorporating the defect, outlining the appropriate donor tissue and placing the expected incisions within the nasal ala running parallel to the alar rim. The area thus outlined was incised with a #15 scalpel blade.  The skin margins were undermined minimally to an appropriate distance in all directions around the primary defect and laterally outward around the island pedicle utilizing iris scissors.  There was minimal undermining beneath the pedicle flap. A-T Advancement Flap Text: The defect edges were debeveled with a #15 scalpel blade.  Given the location of the defect, shape of the defect and the proximity to free margins an A-T advancement flap was deemed most appropriate.  Using a sterile surgical marker, an appropriate advancement flap was drawn incorporating the defect and placing the expected incisions within the relaxed skin tension lines where possible.    The area thus outlined was incised deep to adipose tissue with a #15 scalpel blade.  The skin margins were undermined to an appropriate distance in all directions utilizing iris scissors. Banner Transposition Flap Text: The defect edges were debeveled with a #15 scalpel blade.  Given the location of the defect and the proximity to free margins a Banner transposition flap was deemed most appropriate.  Using a sterile surgical marker, an appropriate flap drawn around the defect. The area thus outlined was incised deep to adipose tissue with a #15 scalpel blade.  The skin margins were undermined to an appropriate distance in all directions utilizing iris scissors. Bilateral Helical Rim Advancement Flap Text: The defect edges were debeveled with a #15 blade scalpel.  Given the location of the defect and the proximity to free margins (helical rim) a bilateral helical rim advancement flap was deemed most appropriate.  Using a sterile surgical marker, the appropriate advancement flaps were drawn incorporating the defect and placing the expected incisions between the helical rim and antihelix where possible.  The area thus outlined was incised through and through with a #15 scalpel blade.  With a skin hook and iris scissors, the flaps were gently and sharply undermined and freed up. Bilateral Rotation Flap Text: The defect edges were debeveled with a #15 scalpel blade. Given the location of the defect, shape of the defect and the proximity to free margins a bilateral rotation flap was deemed most appropriate. Using a sterile surgical marker, an appropriate rotation flap was drawn incorporating the defect and placing the expected incisions within the relaxed skin tension lines where possible. The area thus outlined was incised deep to adipose tissue with a #15 scalpel blade. The skin margins were undermined to an appropriate distance in all directions utilizing iris scissors. Following this, the designed flap was carried over into the primary defect and sutured into place. Bilobed Flap Text: The defect edges were debeveled with a #15 scalpel blade.  Given the location of the defect and the proximity to free margins a bilobe flap was deemed most appropriate.  Using a sterile surgical marker, an appropriate bilobe flap drawn around the defect.    The area thus outlined was incised deep to adipose tissue with a #15 scalpel blade.  The skin margins were undermined to an appropriate distance in all directions utilizing iris scissors. Bilobed Transposition Flap Text: The defect edges were debeveled with a #15 scalpel blade.  Given the location of the defect and the proximity to free margins a bilobed transposition flap was deemed most appropriate.  Using a sterile surgical marker, an appropriate bilobe flap drawn around the defect.    The area thus outlined was incised deep to adipose tissue with a #15 scalpel blade.  The skin margins were undermined to an appropriate distance in all directions utilizing iris scissors. Bi-Rhombic Flap Text: The defect edges were debeveled with a #15 scalpel blade.  Given the location of the defect and the proximity to free margins a bi-rhombic flap was deemed most appropriate.  Using a sterile surgical marker, an appropriate rhombic flap was drawn incorporating the defect. The area thus outlined was incised deep to adipose tissue with a #15 scalpel blade.  The skin margins were undermined to an appropriate distance in all directions utilizing iris scissors. Burow's Advancement Flap Text: The defect edges were debeveled with a #15 scalpel blade.  Given the location of the defect and the proximity to free margins a Burow's advancement flap was deemed most appropriate.  Using a sterile surgical marker, the appropriate advancement flap was drawn incorporating the defect and placing the expected incisions within the relaxed skin tension lines where possible.    The area thus outlined was incised deep to adipose tissue with a #15 scalpel blade.  The skin margins were undermined to an appropriate distance in all directions utilizing iris scissors. Chonodrocutaneous Helical Advancement Flap Text: The defect edges were debeveled with a #15 scalpel blade.  Given the location of the defect and the proximity to free margins a chondrocutaneous helical advancement flap was deemed most appropriate.  Using a sterile surgical marker, the appropriate advancement flap was drawn incorporating the defect and placing the expected incisions within the relaxed skin tension lines where possible.    The area thus outlined was incised deep to adipose tissue with a #15 scalpel blade.  The skin margins were undermined to an appropriate distance in all directions utilizing iris scissors. Crescentic Advancement Flap Text: The defect edges were debeveled with a #15 scalpel blade.  Given the location of the defect and the proximity to free margins a crescentic advancement flap was deemed most appropriate.  Using a sterile surgical marker, the appropriate advancement flap was drawn incorporating the defect and placing the expected incisions within the relaxed skin tension lines where possible.    The area thus outlined was incised deep to adipose tissue with a #15 scalpel blade.  The skin margins were undermined to an appropriate distance in all directions utilizing iris scissors. Dorsal Nasal Flap Text: The defect edges were debeveled with a #15 scalpel blade.  Given the location of the defect and the proximity to free margins a dorsal nasal flap was deemed most appropriate.  Using a sterile surgical marker, an appropriate dorsal nasal flap was drawn around the defect.    The area thus outlined was incised deep to adipose tissue with a #15 scalpel blade.  The skin margins were undermined to an appropriate distance in all directions utilizing iris scissors. Double Island Pedicle Flap Text: The defect edges were debeveled with a #15 scalpel blade.  Given the location of the defect, shape of the defect and the proximity to free margins a double island pedicle advancement flap was deemed most appropriate.  Using a sterile surgical marker, an appropriate advancement flap was drawn incorporating the defect, outlining the appropriate donor tissue and placing the expected incisions within the relaxed skin tension lines where possible.    The area thus outlined was incised deep to adipose tissue with a #15 scalpel blade.  The skin margins were undermined to an appropriate distance in all directions around the primary defect and laterally outward around the island pedicle utilizing iris scissors.  There was minimal undermining beneath the pedicle flap. Double O-Z Flap Text: The defect edges were debeveled with a #15 scalpel blade.  Given the location of the defect, shape of the defect and the proximity to free margins a Double O-Z flap was deemed most appropriate.  Using a sterile surgical marker, an appropriate transposition flap was drawn incorporating the defect and placing the expected incisions within the relaxed skin tension lines where possible. The area thus outlined was incised deep to adipose tissue with a #15 scalpel blade.  The skin margins were undermined to an appropriate distance in all directions utilizing iris scissors. Double O-Z Plasty Text: The defect edges were debeveled with a #15 scalpel blade.  Given the location of the defect, shape of the defect and the proximity to free margins a Double O-Z plasty (double transposition flap) was deemed most appropriate.  Using a sterile surgical marker, the appropriate transposition flaps were drawn incorporating the defect and placing the expected incisions within the relaxed skin tension lines where possible. The area thus outlined was incised deep to adipose tissue with a #15 scalpel blade.  The skin margins were undermined to an appropriate distance in all directions utilizing iris scissors.  Hemostasis was achieved with electrocautery.  The flaps were then transposed into place, one clockwise and the other counterclockwise, and anchored with interrupted buried subcutaneous sutures. Double Z Plasty Text: The lesion was extirpated to the level of the fat with a #15 scalpel blade. Given the location of the defect, shape of the defect and the proximity to free margins a double Z-plasty was deemed most appropriate for repair. Using a sterile surgical marker, the appropriate transposition arms of the double Z-plasty were drawn incorporating the defect and placing the expected incisions within the relaxed skin tension lines where possible. The area thus outlined was incised deep to adipose tissue with a #15 scalpel blade. The skin margins were undermined to an appropriate distance in all directions utilizing iris scissors. The opposing transposition arms were then transposed and carried over into place in opposite direction and anchored with interrupted buried subcutaneous sutures. Ear Star Wedge Flap Text: The defect edges were debeveled with a #15 blade scalpel.  Given the location of the defect and the proximity to free margins (helical rim) an ear star wedge flap was deemed most appropriate.  Using a sterile surgical marker, the appropriate flap was drawn incorporating the defect and placing the expected incisions between the helical rim and antihelix where possible.  The area thus outlined was incised through and through with a #15 scalpel blade. Flip-Flop Flap Text: The defect edges were debeveled with a #15 blade scalpel.  Given the location of the defect and the proximity to free margins a flip-flop flap was deemed most appropriate. Using a sterile surgical marker, the appropriate flap was drawn incorporating the defect and placing the expected incisions between the helical rim and antihelix where possible.  The area thus outlined was incised through and through with a #15 scalpel blade. Following this, the designed flap was carried over into the primary defect and sutured into place. Hatchet Flap Text: The defect edges were debeveled with a #15 scalpel blade.  Given the location of the defect, shape of the defect and the proximity to free margins a hatchet flap was deemed most appropriate.  Using a sterile surgical marker, an appropriate hatchet flap was drawn incorporating the defect and placing the expected incisions within the relaxed skin tension lines where possible.    The area thus outlined was incised deep to adipose tissue with a #15 scalpel blade.  The skin margins were undermined to an appropriate distance in all directions utilizing iris scissors. Helical Rim Advancement Flap Text: The defect edges were debeveled with a #15 blade scalpel.  Given the location of the defect and the proximity to free margins (helical rim) a double helical rim advancement flap was deemed most appropriate.  Using a sterile surgical marker, the appropriate advancement flaps were drawn incorporating the defect and placing the expected incisions between the helical rim and antihelix where possible.  The area thus outlined was incised through and through with a #15 scalpel blade.  With a skin hook and iris scissors, the flaps were gently and sharply undermined and freed up. H Plasty Text: Given the location of the defect, shape of the defect and the proximity to free margins a H-plasty was deemed most appropriate for repair.  Using a sterile surgical marker, the appropriate advancement arms of the H-plasty were drawn incorporating the defect and placing the expected incisions within the relaxed skin tension lines where possible. The area thus outlined was incised deep to adipose tissue with a #15 scalpel blade. The skin margins were undermined to an appropriate distance in all directions utilizing iris scissors.  The opposing advancement arms were then advanced into place in opposite direction and anchored with interrupted buried subcutaneous sutures. Island Pedicle Flap Text: The defect edges were debeveled with a #15 scalpel blade.  Given the location of the defect, shape of the defect and the proximity to free margins an island pedicle advancement flap was deemed most appropriate.  Using a sterile surgical marker, an appropriate advancement flap was drawn incorporating the defect, outlining the appropriate donor tissue and placing the expected incisions within the relaxed skin tension lines where possible.    The area thus outlined was incised deep to adipose tissue with a #15 scalpel blade.  The skin margins were undermined to an appropriate distance in all directions around the primary defect and laterally outward around the island pedicle utilizing iris scissors.  There was minimal undermining beneath the pedicle flap. Island Pedicle Flap With Canthal Suspension Text: The defect edges were debeveled with a #15 scalpel blade.  Given the location of the defect, shape of the defect and the proximity to free margins an island pedicle advancement flap was deemed most appropriate.  Using a sterile surgical marker, an appropriate advancement flap was drawn incorporating the defect, outlining the appropriate donor tissue and placing the expected incisions within the relaxed skin tension lines where possible. The area thus outlined was incised deep to adipose tissue with a #15 scalpel blade.  The skin margins were undermined to an appropriate distance in all directions around the primary defect and laterally outward around the island pedicle utilizing iris scissors.  There was minimal undermining beneath the pedicle flap. A suspension suture was placed in the canthal tendon to prevent tension and prevent ectropion. Island Pedicle Flap-Requiring Vessel Identification Text: The defect edges were debeveled with a #15 scalpel blade.  Given the location of the defect, shape of the defect and the proximity to free margins an island pedicle advancement flap was deemed most appropriate.  Using a sterile surgical marker, an appropriate advancement flap was drawn, based on the axial vessel mentioned above, incorporating the defect, outlining the appropriate donor tissue and placing the expected incisions within the relaxed skin tension lines where possible.    The area thus outlined was incised deep to adipose tissue with a #15 scalpel blade.  The skin margins were undermined to an appropriate distance in all directions around the primary defect and laterally outward around the island pedicle utilizing iris scissors.  There was minimal undermining beneath the pedicle flap. Keystone Flap Text: The defect edges were debeveled with a #15 scalpel blade.  Given the location of the defect, shape of the defect a keystone flap was deemed most appropriate.  Using a sterile surgical marker, an appropriate keystone flap was drawn incorporating the defect, outlining the appropriate donor tissue and placing the expected incisions within the relaxed skin tension lines where possible. The area thus outlined was incised deep to adipose tissue with a #15 scalpel blade.  The skin margins were undermined to an appropriate distance in all directions around the primary defect and laterally outward around the flap utilizing iris scissors. Melolabial Transposition Flap Text: The defect edges were debeveled with a #15 scalpel blade.  Given the location of the defect and the proximity to free margins a melolabial flap was deemed most appropriate.  Using a sterile surgical marker, an appropriate melolabial transposition flap was drawn incorporating the defect.    The area thus outlined was incised deep to adipose tissue with a #15 scalpel blade.  The skin margins were undermined to an appropriate distance in all directions utilizing iris scissors. Mercedes Flap Text: The defect edges were debeveled with a #15 scalpel blade.  Given the location of the defect, shape of the defect and the proximity to free margins a Mercedes flap was deemed most appropriate.  Using a sterile surgical marker, an appropriate advancement flap was drawn incorporating the defect and placing the expected incisions within the relaxed skin tension lines where possible. The area thus outlined was incised deep to adipose tissue with a #15 scalpel blade.  The skin margins were undermined to an appropriate distance in all directions utilizing iris scissors. Modified Advancement Flap Text: The defect edges were debeveled with a #15 scalpel blade.  Given the location of the defect, shape of the defect and the proximity to free margins a modified advancement flap was deemed most appropriate.  Using a sterile surgical marker, an appropriate advancement flap was drawn incorporating the defect and placing the expected incisions within the relaxed skin tension lines where possible.    The area thus outlined was incised deep to adipose tissue with a #15 scalpel blade.  The skin margins were undermined to an appropriate distance in all directions utilizing iris scissors. Mucosal Advancement Flap Text: Given the location of the defect, shape of the defect and the proximity to free margins a mucosal advancement flap was deemed most appropriate. Incisions were made with a 15 blade scalpel in the appropriate fashion along the cutaneous vermilion border and the mucosal lip. The remaining actinically damaged mucosal tissue was excised.  The mucosal advancement flap was then elevated to the gingival sulcus with care taken to preserve the neurovascular structures and advanced into the primary defect. Care was taken to ensure that precise realignment of the vermilion border was achieved. Muscle Hinge Flap Text: The defect edges were debeveled with a #15 scalpel blade.  Given the size, depth and location of the defect and the proximity to free margins a muscle hinge flap was deemed most appropriate.  Using a sterile surgical marker, an appropriate hinge flap was drawn incorporating the defect. The area thus outlined was incised with a #15 scalpel blade.  The skin margins were undermined to an appropriate distance in all directions utilizing iris scissors. Mustarde Flap Text: The defect edges were debeveled with a #15 scalpel blade.  Given the size, depth and location of the defect and the proximity to free margins a Mustarde flap was deemed most appropriate.  Using a sterile surgical marker, an appropriate flap was drawn incorporating the defect. The area thus outlined was incised with a #15 scalpel blade.  The skin margins were undermined to an appropriate distance in all directions utilizing iris scissors. Nasal Turnover Hinge Flap Text: The defect edges were debeveled with a #15 scalpel blade.  Given the size, depth, location of the defect and the defect being full thickness a nasal turnover hinge flap was deemed most appropriate.  Using a sterile surgical marker, an appropriate hinge flap was drawn incorporating the defect. The area thus outlined was incised with a #15 scalpel blade. The flap was designed to recreate the nasal mucosal lining and the alar rim. The skin margins were undermined to an appropriate distance in all directions utilizing iris scissors. Nasalis-Muscle-Based Myocutaneous Island Pedicle Flap Text: Using a #15 blade, an incision was made around the donor flap to the level of the nasalis muscle. Wide lateral undermining was then performed in both the subcutaneous plane above the nasalis muscle, and in a submuscular plane just above periosteum. This allowed the formation of a free nasalis muscle axial pedicle (based on the angular artery) which was still attached to the actual cutaneous flap, increasing its mobility and vascular viability. Hemostasis was obtained with pinpoint electrocoagulation. The flap was mobilized into position and the pivotal anchor points positioned and stabilized with buried interrupted sutures. Subcutaneous and dermal tissues were closed in a multilayered fashion with sutures. Tissue redundancies were excised, and the epidermal edges were apposed without significant tension and sutured with sutures. Nasalis Myocutaneous Flap Text: Using a #15 blade, an incision was made around the donor flap to the level of the nasalis muscle. Wide lateral undermining was then performed in both the subcutaneous plane above the nasalis muscle, and in a submuscular plane just above periosteum. This allowed the formation of a free nasalis muscle axial pedicle which was still attached to the actual cutaneous flap, increasing its mobility and vascular viability. Hemostasis was obtained with pinpoint electrocoagulation. The flap was mobilized into position and the pivotal anchor points positioned and stabilized with buried interrupted sutures. Subcutaneous and dermal tissues were closed in a multilayered fashion with sutures. Tissue redundancies were excised, and the epidermal edges were apposed without significant tension and sutured with sutures. Nasolabial Transposition Flap Text: The defect edges were debeveled with a #15 scalpel blade.  Given the size, depth and location of the defect and the proximity to free margins a nasolabial transposition flap was deemed most appropriate. Using a sterile surgical marker, an appropriate flap was drawn incorporating the defect. The area thus outlined was incised with a #15 scalpel blade. The skin margins were undermined to an appropriate distance in all directions utilizing iris scissors. Following this, the designed flap was carried into the primary defect and sutured into place. Orbicularis Oris Muscle Flap Text: The defect edges were debeveled with a #15 scalpel blade.  Given that the defect affected the competency of the oral sphincter an obicularis oris muscle flap was deemed most appropriate to restore this competency and normal muscle function.  Using a sterile surgical marker, an appropriate flap was drawn incorporating the defect. The area thus outlined was incised with a #15 scalpel blade. O-T Advancement Flap Text: The defect edges were debeveled with a #15 scalpel blade.  Given the location of the defect, shape of the defect and the proximity to free margins an O-T advancement flap was deemed most appropriate.  Using a sterile surgical marker, an appropriate advancement flap was drawn incorporating the defect and placing the expected incisions within the relaxed skin tension lines where possible.    The area thus outlined was incised deep to adipose tissue with a #15 scalpel blade.  The skin margins were undermined to an appropriate distance in all directions utilizing iris scissors. O-T Plasty Text: The defect edges were debeveled with a #15 scalpel blade.  Given the location of the defect, shape of the defect and the proximity to free margins an O-T plasty was deemed most appropriate.  Using a sterile surgical marker, an appropriate O-T plasty was drawn incorporating the defect and placing the expected incisions within the relaxed skin tension lines where possible.    The area thus outlined was incised deep to adipose tissue with a #15 scalpel blade.  The skin margins were undermined to an appropriate distance in all directions utilizing iris scissors. O-L Flap Text: The defect edges were debeveled with a #15 scalpel blade.  Given the location of the defect, shape of the defect and the proximity to free margins an O-L flap was deemed most appropriate.  Using a sterile surgical marker, an appropriate advancement flap was drawn incorporating the defect and placing the expected incisions within the relaxed skin tension lines where possible.    The area thus outlined was incised deep to adipose tissue with a #15 scalpel blade.  The skin margins were undermined to an appropriate distance in all directions utilizing iris scissors. O-Z Flap Text: The defect edges were debeveled with a #15 scalpel blade.  Given the location of the defect, shape of the defect and the proximity to free margins an O-Z flap was deemed most appropriate.  Using a sterile surgical marker, an appropriate transposition flap was drawn incorporating the defect and placing the expected incisions within the relaxed skin tension lines where possible. The area thus outlined was incised deep to adipose tissue with a #15 scalpel blade.  The skin margins were undermined to an appropriate distance in all directions utilizing iris scissors. O-Z Plasty Text: The defect edges were debeveled with a #15 scalpel blade.  Given the location of the defect, shape of the defect and the proximity to free margins an O-Z plasty (double transposition flap) was deemed most appropriate.  Using a sterile surgical marker, the appropriate transposition flaps were drawn incorporating the defect and placing the expected incisions within the relaxed skin tension lines where possible.    The area thus outlined was incised deep to adipose tissue with a #15 scalpel blade.  The skin margins were undermined to an appropriate distance in all directions utilizing iris scissors.  Hemostasis was achieved with electrocautery.  The flaps were then transposed into place, one clockwise and the other counterclockwise, and anchored with interrupted buried subcutaneous sutures. Peng Advancement Flap Text: The defect edges were debeveled with a #15 scalpel blade.  Given the location of the defect, shape of the defect and the proximity to free margins a Peng advancement flap was deemed most appropriate.  Using a sterile surgical marker, an appropriate advancement flap was drawn incorporating the defect and placing the expected incisions within the relaxed skin tension lines where possible. The area thus outlined was incised deep to adipose tissue with a #15 scalpel blade.  The skin margins were undermined to an appropriate distance in all directions utilizing iris scissors. Rectangular Flap Text: The defect edges were debeveled with a #15 scalpel blade. Given the location of the defect and the proximity to free margins a rectangular flap was deemed most appropriate. Using a sterile surgical marker, an appropriate rectangular flap was drawn incorporating the defect. The area thus outlined was incised deep to adipose tissue with a #15 scalpel blade. The skin margins were undermined to an appropriate distance in all directions utilizing iris scissors. Following this, the designed flap was carried over into the primary defect and sutured into place. Rhombic Flap Text: The defect edges were debeveled with a #15 scalpel blade.  Given the location of the defect and the proximity to free margins a rhombic flap was deemed most appropriate.  Using a sterile surgical marker, an appropriate rhombic flap was drawn incorporating the defect.    The area thus outlined was incised deep to adipose tissue with a #15 scalpel blade.  The skin margins were undermined to an appropriate distance in all directions utilizing iris scissors. Rhomboid Transposition Flap Text: The defect edges were debeveled with a #15 scalpel blade.  Given the location of the defect and the proximity to free margins a rhomboid transposition flap was deemed most appropriate.  Using a sterile surgical marker, an appropriate rhomboid flap was drawn incorporating the defect.    The area thus outlined was incised deep to adipose tissue with a #15 scalpel blade.  The skin margins were undermined to an appropriate distance in all directions utilizing iris scissors. Rotation Flap Text: The defect edges were debeveled with a #15 scalpel blade.  Given the location of the defect, shape of the defect and the proximity to free margins a rotation flap was deemed most appropriate.  Using a sterile surgical marker, an appropriate rotation flap was drawn incorporating the defect and placing the expected incisions within the relaxed skin tension lines where possible.    The area thus outlined was incised deep to adipose tissue with a #15 scalpel blade.  The skin margins were undermined to an appropriate distance in all directions utilizing iris scissors. Spiral Flap Text: The defect edges were debeveled with a #15 scalpel blade.  Given the location of the defect, shape of the defect and the proximity to free margins a spiral flap was deemed most appropriate.  Using a sterile surgical marker, an appropriate rotation flap was drawn incorporating the defect and placing the expected incisions within the relaxed skin tension lines where possible. The area thus outlined was incised deep to adipose tissue with a #15 scalpel blade.  The skin margins were undermined to an appropriate distance in all directions utilizing iris scissors. Staged Advancement Flap Text: The defect edges were debeveled with a #15 scalpel blade.  Given the location of the defect, shape of the defect and the proximity to free margins a staged advancement flap was deemed most appropriate.  Using a sterile surgical marker, an appropriate advancement flap was drawn incorporating the defect and placing the expected incisions within the relaxed skin tension lines where possible. The area thus outlined was incised deep to adipose tissue with a #15 scalpel blade.  The skin margins were undermined to an appropriate distance in all directions utilizing iris scissors. Star Wedge Flap Text: The defect edges were debeveled with a #15 scalpel blade.  Given the location of the defect, shape of the defect and the proximity to free margins a star wedge flap was deemed most appropriate.  Using a sterile surgical marker, an appropriate rotation flap was drawn incorporating the defect and placing the expected incisions within the relaxed skin tension lines where possible. The area thus outlined was incised deep to adipose tissue with a #15 scalpel blade.  The skin margins were undermined to an appropriate distance in all directions utilizing iris scissors. Transposition Flap Text: The defect edges were debeveled with a #15 scalpel blade.  Given the location of the defect and the proximity to free margins a transposition flap was deemed most appropriate.  Using a sterile surgical marker, an appropriate transposition flap was drawn incorporating the defect.    The area thus outlined was incised deep to adipose tissue with a #15 scalpel blade.  The skin margins were undermined to an appropriate distance in all directions utilizing iris scissors. Trilobed Flap Text: The defect edges were debeveled with a #15 scalpel blade.  Given the location of the defect and the proximity to free margins a trilobed flap was deemed most appropriate.  Using a sterile surgical marker, an appropriate trilobed flap drawn around the defect.    The area thus outlined was incised deep to adipose tissue with a #15 scalpel blade.  The skin margins were undermined to an appropriate distance in all directions utilizing iris scissors. V-Y Flap Text: The defect edges were debeveled with a #15 scalpel blade.  Given the location of the defect, shape of the defect and the proximity to free margins a V-Y flap was deemed most appropriate.  Using a sterile surgical marker, an appropriate advancement flap was drawn incorporating the defect and placing the expected incisions within the relaxed skin tension lines where possible.    The area thus outlined was incised deep to adipose tissue with a #15 scalpel blade.  The skin margins were undermined to an appropriate distance in all directions utilizing iris scissors. V-Y Plasty Text: The defect edges were debeveled with a #15 scalpel blade.  Given the location of the defect, shape of the defect and the proximity to free margins an V-Y advancement flap was deemed most appropriate.  Using a sterile surgical marker, an appropriate advancement flap was drawn incorporating the defect and placing the expected incisions within the relaxed skin tension lines where possible.    The area thus outlined was incised deep to adipose tissue with a #15 scalpel blade.  The skin margins were undermined to an appropriate distance in all directions utilizing iris scissors. W Plasty Text: The lesion was extirpated to the level of the fat with a #15 scalpel blade.  Given the location of the defect, shape of the defect and the proximity to free margins a W-plasty was deemed most appropriate for repair.  Using a sterile surgical marker, the appropriate transposition arms of the W-plasty were drawn incorporating the defect and placing the expected incisions within the relaxed skin tension lines where possible.    The area thus outlined was incised deep to adipose tissue with a #15 scalpel blade.  The skin margins were undermined to an appropriate distance in all directions utilizing iris scissors.  The opposing transposition arms were then transposed into place in opposite direction and anchored with interrupted buried subcutaneous sutures. Z Plasty Text: The lesion was extirpated to the level of the fat with a #15 scalpel blade.  Given the location of the defect, shape of the defect and the proximity to free margins a Z-plasty was deemed most appropriate for repair.  Using a sterile surgical marker, the appropriate transposition arms of the Z-plasty were drawn incorporating the defect and placing the expected incisions within the relaxed skin tension lines where possible.    The area thus outlined was incised deep to adipose tissue with a #15 scalpel blade.  The skin margins were undermined to an appropriate distance in all directions utilizing iris scissors.  The opposing transposition arms were then transposed into place in opposite direction and anchored with interrupted buried subcutaneous sutures. Zygomaticofacial Flap Text: Given the location of the defect, shape of the defect and the proximity to free margins a zygomaticofacial flap was deemed most appropriate for repair.  Using a sterile surgical marker, the appropriate flap was drawn incorporating the defect and placing the expected incisions within the relaxed skin tension lines where possible. The area thus outlined was incised deep to adipose tissue with a #15 scalpel blade with preservation of a vascular pedicle.  The skin margins were undermined to an appropriate distance in all directions utilizing iris scissors.  The flap was then placed into the defect and anchored with interrupted buried subcutaneous sutures. Abbe Flap (Lower To Upper Lip) Text: The defect of the upper lip was assessed and measured.  Given the location and size of the defect, an Abbe flap was deemed most appropriate.  Using a sterile surgical marker, an appropriate Abbe flap was measured and drawn on the lower lip. Local anesthesia was then infiltrated. A scalpel was then used to incise the upper lip through and through the skin, vermilion, muscle and mucosa, leaving the flap pedicled on the opposite side.  The flap was then rotated and transferred to the lower lip defect.  The flap was then sutured into place with a three layer technique, closing the orbicularis oris muscle layer with subcutaneous buried sutures, followed by a mucosal layer and an epidermal layer. Abbe Flap (Upper To Lower Lip) Text: The defect of the lower lip was assessed and measured.  Given the location and size of the defect, an Abbe flap was deemed most appropriate.  Using a sterile surgical marker, an appropriate Abbe flap was measured and drawn on the upper lip. Local anesthesia was then infiltrated.  A scalpel was then used to incise the upper lip through and through the skin, vermilion, muscle and mucosa, leaving the flap pedicled on the opposite side.  The flap was then rotated and transferred to the lower lip defect.  The flap was then sutured into place with a three layer technique, closing the orbicularis oris muscle layer with subcutaneous buried sutures, followed by a mucosal layer and an epidermal layer. Cheek Interpolation Flap Text: A decision was made to reconstruct the defect utilizing an interpolation axial flap and a staged reconstruction.  A telfa template was made of the defect.  This telfa template was then used to outline the Cheek Interpolation flap.  The donor area for the pedicle flap was then injected with anesthesia.  The flap was excised through the skin and subcutaneous tissue down to the layer of the underlying musculature.  The interpolation flap was carefully excised within this deep plane to maintain its blood supply.  The edges of the donor site were undermined.   The donor site was closed in a primary fashion.  The pedicle was then rotated into position and sutured.  Once the tube was sutured into place, adequate blood supply was confirmed with blanching and refill.  The pedicle was then wrapped with xeroform gauze and dressed appropriately with a telfa and gauze bandage to ensure continued blood supply and protect the attached pedicle. Cheek-To-Nose Interpolation Flap Text: A decision was made to reconstruct the defect utilizing an interpolation axial flap and a staged reconstruction.  A telfa template was made of the defect.  This telfa template was then used to outline the Cheek-To-Nose Interpolation flap.  The donor area for the pedicle flap was then injected with anesthesia.  The flap was excised through the skin and subcutaneous tissue down to the layer of the underlying musculature.  The interpolation flap was carefully excised within this deep plane to maintain its blood supply.  The edges of the donor site were undermined.   The donor site was closed in a primary fashion.  The pedicle was then rotated into position and sutured.  Once the tube was sutured into place, adequate blood supply was confirmed with blanching and refill.  The pedicle was then wrapped with xeroform gauze and dressed appropriately with a telfa and gauze bandage to ensure continued blood supply and protect the attached pedicle. Estlander Flap (Lower To Upper Lip) Text: The defect of the lower lip was assessed and measured.  Given the location and size of the defect, an Estlander flap was deemed most appropriate.  Using a sterile surgical marker, an appropriate Estlander flap was measured and drawn on the upper lip. Local anesthesia was then infiltrated. A scalpel was then used to incise the lateral aspect of the flap, through skin, muscle and mucosa, leaving the flap pedicled medially.  The flap was then rotated and positioned to fill the lower lip defect.  The flap was then sutured into place with a three layer technique, closing the orbicularis oris muscle layer with subcutaneous buried sutures, followed by a mucosal layer and an epidermal layer. Interpolation Flap Text: A decision was made to reconstruct the defect utilizing an interpolation axial flap and a staged reconstruction.  A telfa template was made of the defect.  This telfa template was then used to outline the interpolation flap.  The donor area for the pedicle flap was then injected with anesthesia.  The flap was excised through the skin and subcutaneous tissue down to the layer of the underlying musculature.  The interpolation flap was carefully excised within this deep plane to maintain its blood supply.  The edges of the donor site were undermined.   The donor site was closed in a primary fashion.  The pedicle was then rotated into position and sutured.  Once the tube was sutured into place, adequate blood supply was confirmed with blanching and refill.  The pedicle was then wrapped with xeroform gauze and dressed appropriately with a telfa and gauze bandage to ensure continued blood supply and protect the attached pedicle. Melolabial Interpolation Flap Text: A decision was made to reconstruct the defect utilizing an interpolation axial flap and a staged reconstruction.  A telfa template was made of the defect.  This telfa template was then used to outline the melolabial interpolation flap.  The donor area for the pedicle flap was then injected with anesthesia.  The flap was excised through the skin and subcutaneous tissue down to the layer of the underlying musculature.  The pedicle flap was carefully excised within this deep plane to maintain its blood supply.  The edges of the donor site were undermined.   The donor site was closed in a primary fashion.  The pedicle was then rotated into position and sutured.  Once the tube was sutured into place, adequate blood supply was confirmed with blanching and refill.  The pedicle was then wrapped with xeroform gauze and dressed appropriately with a telfa and gauze bandage to ensure continued blood supply and protect the attached pedicle. Mastoid Interpolation Flap Text: A decision was made to reconstruct the defect utilizing an interpolation axial flap and a staged reconstruction.  A telfa template was made of the defect.  This telfa template was then used to outline the mastoid interpolation flap.  The donor area for the pedicle flap was then injected with anesthesia.  The flap was excised through the skin and subcutaneous tissue down to the layer of the underlying musculature.  The pedicle flap was carefully excised within this deep plane to maintain its blood supply.  The edges of the donor site were undermined.   The donor site was closed in a primary fashion.  The pedicle was then rotated into position and sutured.  Once the tube was sutured into place, adequate blood supply was confirmed with blanching and refill.  The pedicle was then wrapped with xeroform gauze and dressed appropriately with a telfa and gauze bandage to ensure continued blood supply and protect the attached pedicle. Paramedian Forehead Flap Text: A decision was made to reconstruct the defect utilizing an interpolation axial flap and a staged reconstruction.  A telfa template was made of the defect.  This telfa template was then used to outline the paramedian forehead pedicle flap.  The donor area for the pedicle flap was then injected with anesthesia.  The flap was excised through the skin and subcutaneous tissue down to the layer of the underlying musculature.  The pedicle flap was carefully excised within this deep plane to maintain its blood supply.  The edges of the donor site were undermined.   The donor site was closed in a primary fashion.  The pedicle was then rotated into position and sutured.  Once the tube was sutured into place, adequate blood supply was confirmed with blanching and refill.  The pedicle was then wrapped with xeroform gauze and dressed appropriately with a telfa and gauze bandage to ensure continued blood supply and protect the attached pedicle. Posterior Auricular Interpolation Flap Text: A decision was made to reconstruct the defect utilizing an interpolation axial flap and a staged reconstruction.  A telfa template was made of the defect.  This telfa template was then used to outline the posterior auricular interpolation flap.  The donor area for the pedicle flap was then injected with anesthesia.  The flap was excised through the skin and subcutaneous tissue down to the layer of the underlying musculature.  The pedicle flap was carefully excised within this deep plane to maintain its blood supply.  The edges of the donor site were undermined.   The donor site was closed in a primary fashion.  The pedicle was then rotated into position and sutured.  Once the tube was sutured into place, adequate blood supply was confirmed with blanching and refill.  The pedicle was then wrapped with xeroform gauze and dressed appropriately with a telfa and gauze bandage to ensure continued blood supply and protect the attached pedicle. Cheiloplasty (Complex) Text: A decision was made to reconstruct the defect with a  cheiloplasty.  The defect was undermined extensively.  Additional obicularis oris muscle was excised with a 15 blade scalpel.  The defect was converted into a full thickness wedge to facilite a better cosmetic result.  Small vessels were then tied off with 5-0 monocyrl. The obicularis oris, superficial fascia, adipose and dermis were then reapproximated.  After the deeper layers were approximated the epidermis was reapproximated with particular care given to realign the vermilion border. Cheiloplasty (Less Than 50%) Text: A decision was made to reconstruct the defect with a  cheiloplasty.  The defect was undermined extensively.  Additional obicularis oris muscle was excised with a 15 blade scalpel.  The defect was converted into a full thickness wedge, of less than 50% of the vertical height of the lip, to facilite a better cosmetic result.  Small vessels were then tied off with 5-0 monocyrl. The obicularis oris, superficial fascia, adipose and dermis were then reapproximated.  After the deeper layers were approximated the epidermis was reapproximated with particular care given to realign the vermilion border. Ear Wedge Repair Text: A wedge excision was completed by carrying down an excision through the full thickness of the ear and cartilage with an inward facing Burow's triangle. The wound was then closed in a layered fashion. Full Thickness Lip Wedge Repair (Flap) Text: Given the location of the defect and the proximity to free margins a full thickness wedge repair was deemed most appropriate.  Using a sterile surgical marker, the appropriate repair was drawn incorporating the defect and placing the expected incisions perpendicular to the vermilion border.  The vermilion border was also meticulously outlined to ensure appropriate reapproximation during the repair.  The area thus outlined was incised through and through with a #15 scalpel blade.  The muscularis and dermis were reaproximated with deep sutures following hemostasis. Care was taken to realign the vermilion border before proceeding with the superficial closure.  Once the vermilion was realigned the superfical and mucosal closure was finished. Burow's Graft Text: The defect edges were debeveled with a #15 scalpel blade.  Given the location of the defect, shape of the defect, the proximity to free margins and the presence of a standing cone deformity a Burow's skin graft was deemed most appropriate. The standing cone was removed and this tissue was then trimmed to the shape of the primary defect. The adipose tissue was also removed until only dermis and epidermis were left.  The skin margins of the secondary defect were undermined to an appropriate distance in all directions utilizing iris scissors.  The secondary defect was closed with interrupted buried subcutaneous sutures.  The skin edges were then re-apposed with running  sutures.  The skin graft was then placed in the primary defect and oriented appropriately. Cartilage Graft Text: The defect edges were debeveled with a #15 scalpel blade.  Given the location of the defect, shape of the defect, the fact the defect involved a full thickness cartilage defect a cartilage graft was deemed most appropriate.  An appropriate donor site was identified, cleansed, and anesthetized. The cartilage graft was then harvested and transferred to the recipient site, oriented appropriately and then sutured into place.  The secondary defect was then repaired using a primary closure. Composite Graft Text: The defect edges were debeveled with a #15 scalpel blade.  Given the location of the defect, shape of the defect, the proximity to free margins and the fact the defect was full thickness a composite graft was deemed most appropriate.  The defect was outline and then transferred to the donor site.  A full thickness graft was then excised from the donor site. The graft was then placed in the primary defect, oriented appropriately and then sutured into place.  The secondary defect was then repaired using a primary closure. Epidermal Autograft Text: The defect edges were debeveled with a #15 scalpel blade.  Given the location of the defect, shape of the defect and the proximity to free margins an epidermal autograft was deemed most appropriate.  Using a sterile surgical marker, the primary defect shape was transferred to the donor site. The epidermal graft was then harvested.  The skin graft was then placed in the primary defect and oriented appropriately. Dermal Autograft Text: The defect edges were debeveled with a #15 scalpel blade.  Given the location of the defect, shape of the defect and the proximity to free margins a dermal autograft was deemed most appropriate.  Using a sterile surgical marker, the primary defect shape was transferred to the donor site. The area thus outlined was incised deep to adipose tissue with a #15 scalpel blade.  The harvested graft was then trimmed of adipose and epidermal tissue until only dermis was left.  The skin graft was then placed in the primary defect and oriented appropriately. Ftsg Text: The defect edges were debeveled with a #15 scalpel blade.  Given the location of the defect, shape of the defect and the proximity to free margins a full thickness skin graft was deemed most appropriate.  Using a sterile surgical marker, the primary defect shape was transferred to the donor site. The area thus outlined was incised deep to adipose tissue with a #15 scalpel blade.  The harvested graft was then trimmed of adipose tissue until only dermis and epidermis was left.  The skin margins of the secondary defect were undermined to an appropriate distance in all directions utilizing iris scissors.  The secondary defect was closed with interrupted buried subcutaneous sutures.  The skin edges were then re-apposed with running  sutures.  The skin graft was then placed in the primary defect and oriented appropriately. Pinch Graft Text: The defect edges were debeveled with a #15 scalpel blade. Given the location of the defect, shape of the defect and the proximity to free margins a pinch graft was deemed most appropriate. Using a sterile surgical marker, the primary defect shape was transferred to the donor site. The area thus outlined was incised deep to adipose tissue with a #15 scalpel blade.  The harvested graft was then trimmed of adipose tissue until only dermis and epidermis was left. The skin margins of the secondary defect were undermined to an appropriate distance in all directions utilizing iris scissors.  The secondary defect was closed with interrupted buried subcutaneous sutures.  The skin edges were then re-apposed with running  sutures.  The skin graft was then placed in the primary defect and oriented appropriately. Skin Substitute Text: The defect edges were debeveled with a #15 scalpel blade.  Given the location of the defect, shape of the defect and the proximity to free margins a skin substitute graft was deemed most appropriate.  The graft material was trimmed to fit the size of the defect. The graft was then placed in the primary defect and oriented appropriately. Split-Thickness Skin Graft Text: The defect edges were debeveled with a #15 scalpel blade.  Given the location of the defect, shape of the defect and the proximity to free margins a split thickness skin graft was deemed most appropriate.  Using a sterile surgical marker, the primary defect shape was transferred to the donor site. The split thickness graft was then harvested.  The skin graft was then placed in the primary defect and oriented appropriately. Tissue Cultured Epidermal Autograft Text: The defect edges were debeveled with a #15 scalpel blade.  Given the location of the defect, shape of the defect and the proximity to free margins a tissue cultured epidermal autograft was deemed most appropriate.  The graft was then trimmed to fit the size of the defect.  The graft was then placed in the primary defect and oriented appropriately. Xenograft Text: The defect edges were debeveled with a #15 scalpel blade.  Given the location of the defect, shape of the defect and the proximity to free margins a xenograft was deemed most appropriate.  The graft was then trimmed to fit the size of the defect.  The graft was then placed in the primary defect and oriented appropriately. Complex Repair And Flap Additional Text (Will Appearing After The Standard Complex Repair Text): The complex repair was not sufficient to completely close the primary defect. The remaining additional defect was repaired with the flap mentioned below. Complex Repair And Graft Additional Text (Will Appearing After The Standard Complex Repair Text): The complex repair was not sufficient to completely close the primary defect. The remaining additional defect was repaired with the graft mentioned below. Eyelid Full Thickness Repair - 23916: The eyelid defect was full thickness which required a wedge repair of the eyelid. Special care was taken to ensure that the eyelid margin was realligned when placing sutures. Eyelid Partial Thickness Repair - 18741: The eyelid defect was partial thickness which required a wedge repair of the eyelid. Special care was taken to ensure that the eyelid margin was realligned when placing sutures. Intermediate Repair And Flap Additional Text (Will Appearing After The Standard Complex Repair Text): The intermediate repair was not sufficient to completely close the primary defect. The remaining additional defect was repaired with the flap mentioned below. Intermediate Repair And Graft Additional Text (Will Appearing After The Standard Complex Repair Text): The intermediate repair was not sufficient to completely close the primary defect. The remaining additional defect was repaired with the graft mentioned below. Localized Dermabrasion With 15 Blade Text: The patient was draped in routine manner.  Localized dermabrasion using a 15 blade was performed in routine manner to papillary dermis. This spot dermabrasion is being performed to complete skin cancer reconstruction. It also will eliminate the other sun damaged precancerous cells that are known to be part of the regional effect of a lifetime's worth of sun exposure. This localized dermabrasion is therapeutic and should not be considered cosmetic in any regard. Localized Dermabrasion With Sand Papertext: The patient was draped in routine manner.  Localized dermabrasion using sterile sand paper was performed in routine manner to papillary dermis. This spot dermabrasion is being performed to complete skin cancer reconstruction. It also will eliminate the other sun damaged precancerous cells that are known to be part of the regional effect of a lifetime's worth of sun exposure. This localized dermabrasion is therapeutic and should not be considered cosmetic in any regard. Localized Dermabrasion With Wire Brush Text: The patient was draped in routine manner.  Localized dermabrasion using 3 x 17 mm wire brush was performed in routine manner to papillary dermis. This spot dermabrasion is being performed to complete skin cancer reconstruction. It also will eliminate the other sun damaged precancerous cells that are known to be part of the regional effect of a lifetime's worth of sun exposure. This localized dermabrasion is therapeutic and should not be considered cosmetic in any regard. Purse String (Simple) Text: Given the location of the defect and the characteristics of the surrounding skin a purse string closure was deemed most appropriate.  Undermining was performed circumfirentially around the surgical defect.  A purse string suture was then placed and tightened. Purse String (Intermediate) Text: Given the location of the defect and the characteristics of the surrounding skin a purse string intermediate closure was deemed most appropriate.  Undermining was performed circumfirentially around the surgical defect.  A purse string suture was then placed and tightened. Partial Purse String (Simple) Text: Given the location of the defect and the characteristics of the surrounding skin a simple purse string closure was deemed most appropriate.  Undermining was performed circumfirentially around the surgical defect.  A purse string suture was then placed and tightened. Wound tension only allowed a partial closure of the circular defect. Partial Purse String (Intermediate) Text: Given the location of the defect and the characteristics of the surrounding skin an intermediate purse string closure was deemed most appropriate.  Undermining was performed circumfirentially around the surgical defect.  A purse string suture was then placed and tightened. Wound tension only allowed a partial closure of the circular defect. Tarsorrhaphy Text: A tarsorrhaphy was performed using Frost sutures. Manual Repair Warning Statement: We plan on removing the manually selected variable below in favor of our much easier automatic structured text blocks found in the previous tab. We decided to do this to help make the flow better and give you the full power of structured data. Manual selection is never going to be ideal in our platform and I would encourage you to avoid using manual selection from this point on, especially since I will be sunsetting this feature. It is important that you do one of two things with the customized text below. First, you can save all of the text in a word file so you can have it for future reference. Second, transfer the text to the appropriate area in the Library tab. Lastly, if there is a flap or graft type which we do not have you need to let us know right away so I can add it in before the variable is hidden. No need to panic, we plan to give you roughly 6 months to make the change. Same Histology In Subsequent Stages Text: The pattern and morphology of the tumor is as described in the first stage. No Residual Tumor Seen Histology Text: There were no malignant cells seen in the sections examined. Inflammation Suggestive Of Cancer Camouflage Histology Text: There was a dense lymphocytic infiltrate which prevented adequate histologic evaluation of adjacent structures. Bcc Histology Text: There were numerous aggregates of basaloid cells. Bcc Infiltrative Histology Text: There were numerous aggregates of basaloid cells demonstrating an infiltrative pattern. Mart-1 - Positive Histology Text: MART-1 staining demonstrates areas of higher density and clustering of melanocytes with Pagetoid spread upwards within the epidermis. The surgical margins are positive for tumor cells. Mart-1 - Negative Histology Text: MART-1 staining demonstrates a normal density and pattern of melanocytes along the dermal-epidermal junction. The surgical margins are negative for tumor cells. Information: Selecting Yes will display possible errors in your note based on the variables you have selected. This validation is only offered as a suggestion for you. PLEASE NOTE THAT THE VALIDATION TEXT WILL BE REMOVED WHEN YOU FINALIZE YOUR NOTE. IF YOU WANT TO FAX A PRELIMINARY NOTE YOU WILL NEED TO TOGGLE THIS TO 'NO' IF YOU DO NOT WANT IT IN YOUR FAXED NOTE. Bill 59 Modifier?: No - Continue to Bill 79 Modifier

## 2025-03-25 NOTE — PATIENT PROFILE ADULT - FUNCTIONAL ASSESSMENT - BASIC MOBILITY 5.
Preventive Care Visit  [facility]  Mar 25, 2025    Assessment & Plan   11 year old 10 month old, here for preventive care.    Encounter for routine child health examination w/o abnormal findings  - BEHAVIORAL/EMOTIONAL ASSESSMENT (96992)  - SCREENING TEST, PURE TONE, AIR ONLY  - SCREENING, VISUAL ACUITY, QUANTITATIVE, BILAT  - MENINGOCOCCAL (MENQUADFI ) (2 YRS - 55 YRS)  - HPV, IM (9-26 YRS) - Gardasil 9  - TDAP 10-64Y (ADACEL,BOOSTRIX)  - PRIMARY CARE FOLLOW-UP SCHEDULING      Growth      Height: Normal , Weight: Overweight (BMI 85-94.9%)  Pediatric Healthy Lifestyle Action Plan         Exercise and nutrition counseling performed    Immunizations   Appropriate vaccinations were ordered.  For each of the following first vaccine components I provided face to face vaccine counseling, answered questions, and explained the benefits and risks of the vaccine components:  HPV (Human Papilloma Virus) and Meningococcal ACYW    Anticipatory Guidance    Reviewed age appropriate anticipatory guidance. This includes body changes with puberty and sexuality, including STIs as appropriate.    The following topics were discussed:  SOCIAL/ FAMILY:    Peer pressure    Parent/ teen communication    Social media    School/ homework  NUTRITION:    Healthy food choices    Calcium    Weight management  HEALTH/ SAFETY:    Adequate sleep/ exercise    Sleep issues    Dental care  SEXUALITY:    Body changes with puberty    Cleared for sports:  Not addressed    Referrals/Ongoing Specialty Care  None  Verbal Dental Referral: Patient has established dental home  Dental Fluoride Varnish:   No, sees dentist.        Abbey   Ancelmo is presenting for the following:  Well Child            3/25/2025     8:19 AM   Additional Questions   Accompanied by mom   Questions for today's visit No   Surgery, major illness, or injury since last physical No           3/24/2025   Social   Lives with  "Parent(s)     Sibling(s)    Recent potential stressors None    History of trauma No    Family Hx mental health challenges (!) YES    Lack of transportation has limited access to appts/meds No    Do you have housing? (Housing is defined as stable permanent housing and does not include staying ouside in a car, in a tent, in an abandoned building, in an overnight shelter, or couch-surfing.) Yes    Are you worried about losing your housing? No        Proxy-reported    Multiple values from one day are sorted in reverse-chronological order         3/24/2025     5:42 PM   Health Risks/Safety   Where does your child sit in the car?  Back seat    Does your child always wear a seat belt? Yes        Proxy-reported         12/17/2023     3:28 PM   TB Screening   Was your child born outside of the [place]? No        Proxy-reported         3/24/2025   TB Screening: Consider immunosuppression as a risk factor for TB   Recent TB infection or positive TB test in patient/family/close contact No    Recent residence in high-risk group setting (correctional facility/health care facility/homeless shelter) No        Proxy-reported            3/24/2025     5:42 PM   Dyslipidemia   FH: premature cardiovascular disease No, these conditions are not present in the patient's biologic parents or grandparents    FH: hyperlipidemia No    Personal risk factors for heart disease NO diabetes, high blood pressure, obesity, smokes cigarettes, kidney problems, heart or kidney transplant, history of Kawasaki disease with an aneurysm, lupus, rheumatoid arthritis, or HIV        Proxy-reported     No results for input(s): \"CHOL\", \"HDL\", \"LDL\", \"TRIG\", \"CHOLHDLRATIO\" in the last 52720 hours.        3/24/2025     5:42 PM   Dental Screening   Has your child seen a dentist? Yes    When was the last visit? 3 months to 6 months ago    Has your child had cavities in the last 3 years? (!) YES, 1-2 CAVITIES IN THE LAST 3 YEARS- MODERATE RISK    Have " parents/caregivers/siblings had cavities in the last 2 years? (!) YES, IN THE LAST 7-23 MONTHS- MODERATE RISK        Proxy-reported         3/24/2025   Diet   Questions about child's height or weight No    What does your child regularly drink? Water     Cow's milk    What type of milk? Skim    What type of water? Tap    How often does your family eat meals together? Most days    Servings of fruits/vegetables per day (!) 3-4    At least 3 servings of food or beverages that have calcium each day? Yes    In past 12 months, concerned food might run out No    In past 12 months, food has run out/couldn't afford more No        Proxy-reported    Multiple values from one day are sorted in reverse-chronological order           3/24/2025     5:42 PM   Elimination   Bowel or bladder concerns? No concerns        Proxy-reported         3/24/2025   Activity   Days per week of moderate/strenuous exercise 5 days    On average, how many minutes do you engage in exercise at this level? 60 min    What does your child do for exercise?  Hockey, baseball, mini hoop, knee hockey    What activities is your child involved with?  hockey, baseball, [employer]uc[employer] group        Proxy-reported         3/24/2025     5:42 PM   Media Use   Hours per day of screen time (for entertainment) 4    Screen in bedroom (!) YES        Proxy-reported         3/24/2025     5:42 PM   Sleep   Do you have any concerns about your child's sleep?  No concerns, sleeps well through the night        Proxy-reported         3/24/2025     5:42 PM   School   School concerns No concerns    Grade in school 6th Grade    Current school [employer]    School absences (>2 days/mo) No    Concerns about friendships/relationships? No        Proxy-reported         3/24/2025     5:42 PM   Vision/Hearing   Vision or hearing concerns No concerns        Proxy-reported         3/24/2025     5:42 PM   Development / Social-Emotional Screen   Developmental concerns No         "Proxy-reported     Psycho-Social/Depression - PSC-17 required for C&TC through age 17  General screening:  Electronic PSC       3/24/2025     5:43 PM   PSC SCORES   Inattentive / Hyperactive Symptoms Subtotal 2    Externalizing Symptoms Subtotal 0    Internalizing Symptoms Subtotal 3    PSC - 17 Total Score 5        Proxy-reported       Follow up:  no follow up necessary         Objective     Exam  /66   Pulse 90   Temp 98.2  F (36.8  C) (Oral)   Resp (!) 13   Ht 4' 9.09\" (1.45 m)   Wt 98 lb 8 oz (44.7 kg)   SpO2 98%   BMI 21.25 kg/m    33 %ile (Z= -0.43) based on [facility] (Boys, 2-20 Years) Stature-for-age data based on Stature recorded on 3/25/2025.  71 %ile (Z= 0.57) based on [facility] (Boys, 2-20 Years) weight-for-age data using data from 3/25/2025.  87 %ile (Z= 1.13) based on [facility] (Boys, 2-20 Years) BMI-for-age based on BMI available on 3/25/2025.  Blood pressure %janes are 44% systolic and 65% diastolic based on the 2017 AAP Clinical Practice Guideline. This reading is in the normal blood pressure range.    Vision Screen  Vision Screen Details  Does the patient have corrective lenses (glasses/contacts)?: No  No Corrective Lenses, PLUS LENS REQUIRED: Pass  Vision Acuity Screen  Vision Acuity Tool: Adam  RIGHT EYE: 10/10 (20/20)  LEFT EYE: 10/10 (20/20)  Is there a two line difference?: No  Vision Screen Results: Pass    Hearing Screen  RIGHT EAR  1000 Hz on Level 40 dB (Conditioning sound): Pass  1000 Hz on Level 20 dB: Pass  2000 Hz on Level 20 dB: Pass  4000 Hz on Level 20 dB: Pass  6000 Hz on Level 20 dB: Pass  8000 Hz on Level 20 dB: Pass  LEFT EAR  8000 Hz on Level 20 dB: Pass  6000 Hz on Level 20 dB: Pass  4000 Hz on Level 20 dB: Pass  2000 Hz on Level 20 dB: Pass  1000 Hz on Level 20 dB: Pass  500 Hz on Level 25 dB: Pass  RIGHT EAR  500 Hz on Level 25 dB: Pass  Results  Hearing Screen Results: Pass      Physical Exam  GENERAL: Active, alert, in no acute distress.  SKIN: Clear. No significant rash, " abnormal pigmentation or lesions  HEAD: Normocephalic  EYES: Pupils equal, round, reactive, Extraocular muscles intact. Normal conjunctivae.  EARS: Normal canals. Tympanic membranes are normal; gray and translucent.  NOSE: Normal without discharge.  MOUTH/THROAT: Clear. No oral lesions. Teeth without obvious abnormalities.  NECK: Supple, no masses.  No thyromegaly.  LYMPH NODES: No adenopathy  LUNGS: Clear. No rales, rhonchi, wheezing or retractions  HEART: Regular rhythm. Normal S1/S2. No murmurs. Normal pulses.  ABDOMEN: Soft, non-tender, not distended, no masses or hepatosplenomegaly. Bowel sounds normal.   NEUROLOGIC: No focal findings. Cranial nerves grossly intact: DTR's normal. Normal gait, strength and tone  BACK: Spine is straight, no scoliosis.  EXTREMITIES: Full range of motion, no deformities  : Normal male external genitalia. Tuan stage 1-2,  both testes descended, no hernia.       No Marfan stigmata: kyphoscoliosis, high-arched palate, pectus excavatuM, arachnodactyly, arm span > height, hyperlaxity, myopia, MVP, aortic insufficieny)  Eyes: normal fundoscopic and pupils  Cardiovascular: normal PMI, simultaneous femoral/radial pulses, no murmurs (standing, supine, Valsalva)  Skin: no HSV, MRSA, tinea corporis  Musculoskeletal    Neck: normal    Back: normal    Shoulder/arm: normal    Elbow/forearm: normal    Wrist/hand/fingers: normal    Hip/thigh: normal    Knee: normal    Leg/ankle: normal    Foot/toes: normal    Functional (Single Leg Hop or Squat): normal      Signed Electronically by: Elvie Acosta MD     4 = No assist / stand by assistance

## 2025-06-12 NOTE — H&P ADULT - BIRTH SEX
Female Assessment/Plan:  HECTOR RUBIN is a 70 F with PMHx NAFLD cirrhosis, migraines, T2DM, COPD, and HCC (listed for liver transplant with MELD 20B), UGIB, chronic back pain 2/2 spinal fracture, originally presented to [facility] on  for coffee ground emesis and melena, EGD showed no active bleeding, patient was transfused and stabilized prior to transfer to [facility] on  for further management. Patient denies bloody bowel movements or bloody emesis since . Patient endorses low back pain and frontal lobe headache, Neurology consulted for persistent headache, recommended to start nortriptyline. When the appropriate organ was available, the patient was brought to OR and S/p OLT from a  doner under steroids and Simulect induction with Dr. Caicedo on . Now admitted to [facility] for functional deficits and initiation of a multidisciplinary rehab program consisting focused on functional mobility, transfers and ADLs.    #S/p OLT   - HBV Core + donor: Entecavir 0.5mg daily  - Good graft  function   - S/p solumedrol ---> Prednisone   - Pain Management: Tramadol PRN   - ASA 81mg daily   - Hallucinations -->  holding FK, BH following; on seroquel, UA: Neg. keppra ppx   - FK stopped, switched to Cyclo for mental status changes,  MMF , Pred 20  - PPx: Bactrim, Valcyte 450 (CMV +/-, inc 900mg as able), Fluc, PPI, OsCal  - Simulect completed    Comprehensive Multidisciplinary Rehab Program:  - Start comprehensive rehab program, 3 hours a day, 5 days a week.  - PT 1hr/day: Focused on improving strength, endurance, coordination, balance, functional mobility, and transfers  - OT 1hr/day: Focused on improving strength, fine motor skills, coordination, posture and ADLs.    - Speech Therapy 1hr/day: to diagnose and treat deficits in swallowing, cognition and communication.   - Activity Limitations: Decreased social, vocational and leisure activities, decreased self care and ADLs, decreased mobility, decreased ability to manage household and finances.     -----------------------------------------------------------------------------------  Concurrent Medical Problems    #Migraines  -Reglan 10mg IV [id number] prn, Benadryl 25mg IV [id number] prn akathisia/dystonia, and Tylenol 500mg IV [id number] prn  -Sumatriptan    #COPD  -Advair daily   -Albuterol PRN    #SCOTT  -Trend CMP  -Renal dose meds  -Avoid nephrotoxic meds     #Transaminitis (improving)  -Trend LFTs    #Acute blood loss anemia  -S/p multiple blood products  -Trend H/H  -Transfuse if hgb <7 PRN    #Type 2 Diabetes Mellitus  #Steroid induced hyperglycemia   -A1c 6.0  -FBG ACHS + Mod ISS  -Lantus 5U HS  -Admelog 12U breakfast; 8U Lunch; 5U dinner     #HTN/HLD  -Nifedipine 30mg daily     #Mood/Cognition  #Sleep disturbance  #Depression  #Hallucinations   -Seroquel 75mg HS   -Neuropsychology consult PRN  -Maintain quiet hours and low stim environment.  -Melatonin 6mg HS PRN to maximize participation in therapy during the day.     #GI/Bowel:  Senna QHS, Miralax Daily    #/Bladder  # E.Faecium UTI, asymptomatic  - Zosyn/Vanco completed  - Strict I/O  - PVR x1 on admission (SC if > 400)  - Monitor U/O    #Skin/Pressure Injury:   - Skin assessment on admission: ***    #Diet   SLP consult for swallow function evaluation and treatment  -Current Diet: Carb consistent diet    [X] Aspiration Precautions  -Nutrition consult   -Magnesium oxide 400mg TID    #Precautions / PROPHYLAXIS:   - Falls  - ortho: Weight bearing status: WBAT   - Lungs: Aspiration, Incentive Spirometer   - DVT ppx: Heparin, TEDs  - Pressure injury/Skin: TOOB to Chair, PT/OT      ---------------  Code Status: FULL  Emergency Contact:    Outpatient Follow-up (Specialty/Name of physician):    --------------  Goals: Safe discharge to Home*****  Estimated Length of Stay: 10-14 days  Rehab Potential: Good  Medical Prognosis: Good  Estimated Disposition: Home with Home Care  ---------------    PRESCREEN COMPARISON:  I have reviewed the prescreen information and I have found no relevant changes between the preadmission screening and my post admission evaluation.    RATIONALE FOR INPATIENT ADMISSION: Patient demonstrates the following:  [X] Medically appropriate for rehabilitation admission  [X] Has attainable rehab goals with an appropriate initial discharge plan  [X]Has rehabilitation potential (expected to make a significant improvement within a reasonable period of time)  [X] Requires close medical management by a rehab physician, rehab nursing care, Hospitalist and comprehensive interdisciplinary team (including PT, OT and/or SLP, Prosthetics and Orthotics)       Assessment/Plan:  HECTOR RUBIN is a 70 F with PMHx NAFLD cirrhosis, migraines, T2DM, COPD, and HCC (listed for liver transplant with MELD 20B), UGIB, chronic back pain 2/2 spinal fracture, originally presented to [facility] on  for coffee ground emesis and melena, EGD showed no active bleeding, patient was transfused and stabilized prior to transfer to [facility] on  for further management. Patient denies bloody bowel movements or bloody emesis since . Patient endorses low back pain and frontal lobe headache, Neurology consulted for persistent headache, recommended to start nortriptyline. When the appropriate organ was available, the patient was brought to OR and S/p OLT from a  doner under steroids and Simulect induction with Dr. Caicedo on . Now admitted to [facility] for functional deficits and initiation of a multidisciplinary rehab program consisting focused on functional mobility, transfers and ADLs.    #S/p OLT   - HBV Core + donor: Entecavir 0.5mg daily  - Good graft  function   - S/p solumedrol ---> Prednisone   - Pain Management: Tramadol PRN   - ASA 81mg daily   - Hallucinations -->  holding FK, BH following; on seroquel, UA: Neg. keppra ppx   - FK stopped, switched to Cyclo for mental status changes,  MMF , Pred 20  - PPx: Bactrim, Valcyte 450 (CMV +/-, inc 900mg as able), Fluc, PPI, OsCal  - Simulect completed    Comprehensive Multidisciplinary Rehab Program:  - Start comprehensive rehab program, 3 hours a day, 5 days a week.  - PT 1hr/day: Focused on improving strength, endurance, coordination, balance, functional mobility, and transfers  - OT 1hr/day: Focused on improving strength, fine motor skills, coordination, posture and ADLs.    - Speech Therapy 1hr/day: to diagnose and treat deficits in swallowing, cognition and communication.   - Activity Limitations: Decreased social, vocational and leisure activities, decreased self care and ADLs, decreased mobility, decreased ability to manage household and finances.     -----------------------------------------------------------------------------------  Concurrent Medical Problems    #Migraines  -Reglan 10mg IV [id number] prn, Benadryl 25mg IV [id number] prn akathisia/dystonia, and Tylenol 500mg IV [id number] prn  -If sumatriptan desired, would give as 100mg PO BID prn (no more than 2 doses in a day and 3 doses/week)    #COPD  -Advair daily   -Albuterol PRN    #SCOTT  -Trend CMP  -Renal dose meds  -Avoid nephrotoxic meds     #Transaminitis (improving)  -Trend LFTs    #Acute blood loss anemia  -S/p multiple blood products  -Trend H/H  -Transfuse if hgb <7 PRN    #Type 2 Diabetes Mellitus  #Steroid induced hyperglycemia   -A1c 6.0  -FBG ACHS + Mod ISS  -Lantus 5U HS  -Admelog 12U breakfast; 8U Lunch; 5U dinner     #HTN/HLD  -Nifedipine 30mg daily     #Mood/Cognition  #Sleep disturbance  #Depression  #Hallucinations   -Seroquel 75mg HS   -Neuropsychology consult PRN  -Maintain quiet hours and low stim environment.  -Melatonin 6mg HS PRN to maximize participation in therapy during the day.     #GI/Bowel:  Senna QHS, Miralax Daily    #/Bladder  # E.Faecium UTI, asymptomatic  - Zosyn/Vanco completed  - Strict I/O  - PVR x1 on admission (SC if > 400)  - Monitor U/O    #Skin/Pressure Injury:   - Skin assessment on admission: ***    #Diet   SLP consult for swallow function evaluation and treatment  -Current Diet: Carb consistent diet    [X] Aspiration Precautions  -Nutrition consult   -Magnesium oxide 400mg TID    #Precautions / PROPHYLAXIS:   - Falls  - ortho: Weight bearing status: WBAT   - Lungs: Aspiration, Incentive Spirometer   - DVT ppx: Heparin, TEDs  - Pressure injury/Skin: TOOB to Chair, PT/OT      ---------------  Code Status: FULL  Emergency Contact:    Outpatient Follow-up (Specialty/Name of physician):    [facility]  Surgery  [address] [place] [id number]  Phone: [phone]  Fax: [phone]    Skylar Julian  Transplant Hepatology  [address] [place] [address]  Phone: [phone]  Fax: [phone]    Lulu Torres  [facility]  GASTR[facility] [address]  Scheduled Appointment: 2025      1. Follow up with Dr. Hatfield / Psych Clinic  2. Follow up with Neurology for further headache management plan       --------------  Goals: Safe discharge to Home*****  Estimated Length of Stay: 10-14 days  Rehab Potential: Good  Medical Prognosis: Good  Estimated Disposition: Home with Home Care  ---------------    PRESCREEN COMPARISON:  I have reviewed the prescreen information and I have found no relevant changes between the preadmission screening and my post admission evaluation.    RATIONALE FOR INPATIENT ADMISSION: Patient demonstrates the following:  [X] Medically appropriate for rehabilitation admission  [X] Has attainable rehab goals with an appropriate initial discharge plan  [X]Has rehabilitation potential (expected to make a significant improvement within a reasonable period of time)  [X] Requires close medical management by a rehab physician, rehab nursing care, Hospitalist and comprehensive interdisciplinary team (including PT, OT and/or SLP, Prosthetics and Orthotics)       Assessment/Plan:  HECTOR RUBIN is a 70 F with PMHx NAFLD cirrhosis, migraines, T2DM, COPD, and HCC (listed for liver transplant with MELD 20B), UGIB, chronic back pain 2/2 spinal fracture, originally presented to [facility] on  for coffee ground emesis and melena, EGD showed no active bleeding, patient was transfused and stabilized prior to transfer to [facility] on  for further management. Patient denies bloody bowel movements or bloody emesis since . Patient endorses low back pain and frontal lobe headache, Neurology consulted for persistent headache, recommended to start nortriptyline. When the appropriate organ was available, the patient was brought to OR and S/p OLT from a  doner under steroids and Simulect induction with Dr. Caicedo on . Now admitted to [facility] for functional deficits and initiation of a multidisciplinary rehab program consisting focused on functional mobility, transfers and ADLs.    #S/p OLT   - HBV Core + donor: Entecavir 0.5mg daily  - Good graft  function   - S/p solumedrol ---> Prednisone   - Pain Management: Tramadol PRN   - ASA 81mg daily   - Hallucinations -->  holding FK, BH following; on seroquel, UA: Neg. keppra ppx   - FK stopped, switched to Cyclo for mental status changes,  MMF , Pred 20  - PPx: Bactrim, Valcyte 450 (CMV +/-, inc 900mg as able), Fluc, PPI, OsCal  - Simulect completed    Comprehensive Multidisciplinary Rehab Program:  - Start comprehensive rehab program, 3 hours a day, 5 days a week.  - PT 1hr/day: Focused on improving strength, endurance, coordination, balance, functional mobility, and transfers  - OT 1hr/day: Focused on improving strength, fine motor skills, coordination, posture and ADLs.    - Speech Therapy 1hr/day: to diagnose and treat deficits in swallowing, cognition and communication.   - Activity Limitations: Decreased social, vocational and leisure activities, decreased self care and ADLs, decreased mobility, decreased ability to manage household and finances.     -----------------------------------------------------------------------------------  Concurrent Medical Problems    #Migraines  -Reglan 10mg IV [id number] prn, Benadryl 25mg IV [id number] prn akathisia/dystonia, and Tylenol 500mg IV [id number] prn  -If sumatriptan desired, would give as 100mg PO BID prn (no more than 2 doses in a day and 3 doses/week)    #COPD  -Advair daily   -Albuterol PRN    #SCOTT  -Trend CMP  -Renal dose meds  -Avoid nephrotoxic meds     #Transaminitis (improving)  -Trend LFTs    #Acute blood loss anemia  -S/p multiple blood products  -Trend H/H  -Transfuse if hgb <7 PRN    #Type 2 Diabetes Mellitus  #Steroid induced hyperglycemia   -A1c 6.0  -FBG ACHS + Mod ISS  -Lantus 5U HS  -Admelog 12U breakfast; 8U Lunch; 5U dinner     #HTN/HLD  -Nifedipine 30mg daily     #Mood/Cognition  #Sleep disturbance  #Depression  #Hallucinations   -Seroquel 75mg HS   -Neuropsychology consult PRN  -Maintain quiet hours and low stim environment.  -Melatonin 6mg HS PRN to maximize participation in therapy during the day.     #GI/Bowel:  Senna QHS, Miralax Daily    #/Bladder  # E.Faecium UTI, asymptomatic  - Zosyn/Vanco completed  - Strict I/O  - PVR x1 on admission (SC if > 400)  - Monitor U/O    #Skin/Pressure Injury:   - Skin assessment on admission: ***    #Diet/health maintenance    SLP consult for swallow function evaluation and treatment  -Current Diet: Carb consistent diet    [X] Aspiration Precautions  -Nutrition consult   -Magnesium oxide 400mg TID    #Precautions / PROPHYLAXIS:   - Falls  - ortho: Weight bearing status: WBAT   - Lungs: Aspiration, Incentive Spirometer   - DVT ppx: Heparin, TEDs  - Pressure injury/Skin: TOOB to Chair, PT/OT      ---------------  Code Status: FULL  Emergency Contact:    Outpatient Follow-up (Specialty/Name of physician):    [facility]  Surgery  [address] [place] [id number]  Phone: [phone]  Fax: [phone]    Skylar Julian  Transplant Hepatology  [address] [place] [address]  Phone: [phone]  Fax: [phone]    Lulu Torres  [facility]  [facility] [address]  Scheduled Appointment: 2025      1. Follow up with Dr. Hatfield / Psych Clinic  2. Follow up with Neurology for further headache management plan       --------------  Goals: Safe discharge to Home*****  Estimated Length of Stay: 10-14 days  Rehab Potential: Good  Medical Prognosis: Good  Estimated Disposition: Home with Home Care  ---------------    PRESCREEN COMPARISON:  I have reviewed the prescreen information and I have found no relevant changes between the preadmission screening and my post admission evaluation.    RATIONALE FOR INPATIENT ADMISSION: Patient demonstrates the following:  [X] Medically appropriate for rehabilitation admission  [X] Has attainable rehab goals with an appropriate initial discharge plan  [X]Has rehabilitation potential (expected to make a significant improvement within a reasonable period of time)  [X] Requires close medical management by a rehab physician, rehab nursing care, Hospitalist and comprehensive interdisciplinary team (including PT, OT and/or SLP, Prosthetics and Orthotics)       Assessment/Plan:  HECTOR RUBIN is a 70 F with PMHx NAFLD cirrhosis, migraines, T2DM, COPD, and HCC (listed for liver transplant with MELD 20B), UGIB, chronic back pain 2/2 spinal fracture, originally presented to [facility] on  for coffee ground emesis and melena, EGD showed no active bleeding, patient was transfused and stabilized prior to transfer to [facility] on  for further management. Patient denies bloody bowel movements or bloody emesis since . Patient endorses low back pain and frontal lobe headache, Neurology consulted for persistent headache, recommended to start nortriptyline. When the appropriate organ was available, the patient was brought to OR and S/p OLT from a  doner under steroids and Simulect induction with Dr. Caicedo on . Now admitted to [facility] for functional deficits and initiation of a multidisciplinary rehab program consisting focused on functional mobility, transfers and ADLs.    #S/p OLT   - HBV Core + donor: Entecavir 0.5mg daily  - Good graft  function   - S/p solumedrol ---> Prednisone   - Pain Management: Tramadol PRN   - ASA 81mg daily   - Hallucinations -->  holding FK, BH following; on seroquel, UA: Neg. keppra ppx   - FK stopped, switched to Cyclo for mental status changes,  MMF , Pred 20  - PPx: Bactrim, Valcyte 450 (CMV +/-, inc 900mg as able), Fluc, PPI, OsCal  - Simulect completed    Comprehensive Multidisciplinary Rehab Program:  - Start comprehensive rehab program, 3 hours a day, 5 days a week.  - PT 1hr/day: Focused on improving strength, endurance, coordination, balance, functional mobility, and transfers  - OT 1hr/day: Focused on improving strength, fine motor skills, coordination, posture and ADLs.    - Speech Therapy 1hr/day: to diagnose and treat deficits in swallowing, cognition and communication.   - Activity Limitations: Decreased social, vocational and leisure activities, decreased self care and ADLs, decreased mobility, decreased ability to manage household and finances.     -----------------------------------------------------------------------------------  Concurrent Medical Problems    #Migraines  -If sumatriptan desired, would give as 100mg PO BID prn (no more than 2 doses in a day and 3 doses/week)    #COPD  -Advair daily   -Albuterol PRN    #SCOTT  -Trend CMP  -Renal dose meds  -Avoid nephrotoxic meds     #Transaminitis (improving)  -Trend LFTs    #Acute blood loss anemia  -S/p multiple blood products  -Trend H/H  -Transfuse if hgb <7 PRN    #Type 2 Diabetes Mellitus  #Steroid induced hyperglycemia   -A1c 6.0  -FBG ACHS + Mod ISS  -Lantus 5U HS  -Admelog 12U breakfast; 8U Lunch; 5U dinner     #HTN/HLD  -Nifedipine 30mg daily     #Mood/Cognition  #Sleep disturbance  #Depression  #Hallucinations   -Seroquel 75mg HS   -Neuropsychology consult PRN  -Maintain quiet hours and low stim environment.  -Melatonin 6mg HS PRN to maximize participation in therapy during the day.     #GI/Bowel:  Senna QHS, Miralax Daily    #/Bladder  # E.Faecium UTI, asymptomatic  - Zosyn/Vanco completed  - Strict I/O  - PVR x1 on admission (SC if > 400)  - Monitor U/O    #Skin/Pressure Injury:   - Skin assessment on admission: BUE ecchymosis; RUE wound scabbed 2.5x2cm; vertical abd incision 9cm with 14 staples; transverse abd incision 30cm with 38 staples; LUQ drain site 1cm with 3 sutures; RUQ drain site x 2 1) 1cm with 4 sutures 2) 1cm with 3 sutures; RLQ wound scabbed 1.8x0.7cm; R hip scratch marks; blanchable redness sacrum/buttocks; R groin and perineal area ecchymosis    #Diet/health maintenance    SLP consult for swallow function evaluation and treatment  -Current Diet: Carb consistent diet    [X] Aspiration Precautions  -Nutrition consult   -Magnesium oxide 400mg TID    #Precautions / PROPHYLAXIS:   - Falls  - ortho: Weight bearing status: WBAT   - Lungs: Aspiration, Incentive Spirometer   - DVT ppx: Heparin, TEDs  - Pressure injury/Skin: TOOB to Chair, PT/OT      ---------------  Code Status: FULL  Emergency Contact:    Outpatient Follow-up (Specialty/Name of physician):    Esvin Jim [facility]  Surgery  [address] [place] [address]  Phone: [phone]  Fax: [phone]    Skylar Julian  Transplant Hepatology  [address] [place] [address]  Phone: [phone]  Fax: [phone]    Lulu Torres  [facility]  [facility] [address]  Scheduled Appointment: 2025      1. Follow up with Dr. Hatfield / Psych Clinic  2. Follow up with Neurology for further headache management plan       --------------  Goals: Safe discharge to Home*****  Estimated Length of Stay: 10-14 days  Rehab Potential: Good  Medical Prognosis: Good  Estimated Disposition: Home with Home Care  ---------------    PRESCREEN COMPARISON:  I have reviewed the prescreen information and I have found no relevant changes between the preadmission screening and my post admission evaluation.    RATIONALE FOR INPATIENT ADMISSION: Patient demonstrates the following:  [X] Medically appropriate for rehabilitation admission  [X] Has attainable rehab goals with an appropriate initial discharge plan  [X]Has rehabilitation potential (expected to make a significant improvement within a reasonable period of time)  [X] Requires close medical management by a rehab physician, rehab nursing care, Hospitalist and comprehensive interdisciplinary team (including PT, OT and/or SLP, Prosthetics and Orthotics)        70 F with PMHx NAFLD cirrhosis, migraines, T2DM, COPD, and HCC (listed for liver transplant with MELD 20B), UGIB, chronic back pain 2/2 spinal fracture, originally presented to [facility] on  for coffee ground emesis and melena, EGD showed no active bleeding, patient was transfused and stabilized prior to transfer to [facility] on  for further management. Patient denies bloody bowel movements or bloody emesis since . Patient endorses low back pain and frontal lobe headache, Neurology consulted for persistent headache, recommended to start nortriptyline. When the appropriate organ was available, the patient was brought to OR and S/p OLT from a  doner under steroids and Simulect induction with Dr. Caicedo on . Now admitted to [facility] for functional deficits and initiation of a multidisciplinary rehab program consisting focused on functional mobility, transfers and ADLs.    LIVER TRANSPLANT PATIENT: DO NOT COMMENCE ANY NEW MEDICATION OR CHANGE TREATMENT PLAN WITHOUT CLEARANCE FROM TRANSPLANT TEAM OR PRIMARY REHAB TEAM:   24 hr contact in provider hand off and 24hr teams vincent    * Labs discussed Cr elevated, with concentrated urine--f/u UA and Repeat CBC BMP tomorrow, if Cr still rising, will get renal consult  * Monitoring of [place] level in liaison with transplant team  * Commence therapy as stated   * Migrane, with light intolerance. 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 keppra ppx   - FK stopped, switched to Cyclo for mental status changes,  MMF , Pred 20  - PPx: Bactrim, Valcyte 450 (CMV +/-, inc 900mg as able), Fluc, PPI, OsCal  - Simulect completed    Comprehensive Multidisciplinary Rehab Program:  - Commence comprehensive rehab program, 3 hours a day, 5 days a week.  - PT 1hr/day: Focused on improving strength, endurance, coordination, balance, functional mobility, and transfers  - OT 1hr/day: Focused on improving strength, fine motor skills, coordination, posture and ADLs.    - Speech Therapy 1hr/day: to diagnose and treat deficits in swallowing, cognition and communication.   - Activity Limitations: Decreased social, vocational and leisure activities, decreased self care and ADLs, decreased mobility, decreased ability to manage household and finances.     -----------------------------------------------------------------------------------  Concurrent Medical Problems    #Migraines  -If sumatriptan desired, would give as 100mg PO BID prn (no more than 2 doses in a day and 3 doses/week)    #COPD  -Advair daily   -Albuterol PRN    #SCOTT  -Trend CMP  -Renal dose meds  -Avoid nephrotoxic meds   --Rising cr, repeat labs     #Transaminitis (improving)  -Trend LFTs    #Acute blood loss anemia  -S/p multiple blood products  -Trend H/H  -Transfuse if hgb <7 PRN    #Type 2 Diabetes Mellitus  #Steroid induced hyperglycemia   -A1c 6.0  -FBG ACHS + Mod ISS  -Lantus 5U HS  -Admelog 12U breakfast; 8U Lunch; 5U dinner     #HTN/HLD  -Nifedipine 30mg daily     #Mood/Cognition  #Sleep disturbance  #Depression  #Hallucinations   -Seroquel 75mg HS   -Neuropsychology consult PRN  -Maintain quiet hours and low stim environment.  -Melatonin 6mg HS PRN to maximize participation in therapy during the day.     #GI/Bowel:  Senna QHS, Miralax Daily    #/Bladder  # E.Faecium UTI, asymptomatic  - Zosyn/Vanco completed  --Concentrated urine f/u UA  --Rising Cr 1.3 f/u repeat labs     #Skin/Pressure Injury:   - Skin assessment on admission: BUE ecchymosis; RUE wound scabbed 2.5x2cm; vertical abd incision 9cm with 14 staples; transverse abd incision 30cm with 38 staples; LUQ drain site 1cm with 3 sutures; RUQ drain site x 2 1) 1cm with 4 sutures 2) 1cm with 3 sutures; RLQ wound scabbed 1.8x0.7cm; R hip scratch marks; blanchable redness sacrum/buttocks; R groin and perineal area ecchymosis    #Diet/health maintenance    SLP consult for swallow function evaluation and treatment  -Current Diet: Carb consistent diet    [X] Aspiration Precautions  -Nutrition consult   -Magnesium oxide 400mg TID    #Precautions / PROPHYLAXIS:   - Falls  - ortho: Weight bearing status: WBAT   - Lungs: Aspiration, Incentive Spirometer   - DVT ppx: Heparin, TEDs  - Pressure injury/Skin: TOOB to Chair, PT/OT      ---------------  Code Status: FULL  Emergency Contact:    Outpatient Follow-up (Specialty/Name of physician):    Esvin Jim Yuriy  Surgery  [address] [place] [id number]  Phone: [phone]  Fax: [phone]    kSylar Julian  Transplant Hepatology  [address] [place] [id number]  Phone: [phone]  Fax: [phone]    Lulu Torres  [facility]  [facility] [address]  Scheduled Appointment: 2025      1. Follow up with Dr. Hatfield / [facility]  2. Follow up with Neurology for further headache management plan       --------------  Goals: Safe discharge to Home  Estimated Length of Stay: 10-14 days  Rehab Potential: Good  Medical Prognosis: Good  Estimated Disposition: Home with Home Care  ---------------    PRESCREEN COMPARISON:  I have reviewed the prescreen information and I have found no relevant changes between the preadmission screening and my post admission evaluation.    RATIONALE FOR INPATIENT ADMISSION: Patient demonstrates the following:  [X] Medically appropriate for rehabilitation admission  [X] Has attainable rehab goals with an appropriate initial discharge plan  [X]Has rehabilitation potential (expected to make a significant improvement within a reasonable period of time)  [X] Requires close medical management by a rehab physician, rehab nursing care, Hospitalist and comprehensive interdisciplinary team (including PT, OT and/or SLP, Prosthetics and Orthotics)

## (undated) DEVICE — DRAPE TOWEL BLUE 17" X 24"

## (undated) DEVICE — WARMING BLANKET UPPER ADULT

## (undated) DEVICE — DRSG XEROFORM 5 X 9"

## (undated) DEVICE — VENODYNE/SCD SLEEVE CALF LARGE

## (undated) DEVICE — MEDICATION LABELS W MARKER

## (undated) DEVICE — CANISTER KCI 500ML GEL SENSA TRAC

## (undated) DEVICE — GOWN TRIMAX LG

## (undated) DEVICE — DRSG STERISTRIPS 0.5 X 4"

## (undated) DEVICE — SOL IRR POUR H2O 250ML

## (undated) DEVICE — DRSG CURITY GAUZE SPONGE 4 X 4" 12-PLY

## (undated) DEVICE — PACK BREAST MAJOR

## (undated) DEVICE — MARKING PEN W RULER

## (undated) DEVICE — WARMING BLANKET LOWER ADULT

## (undated) DEVICE — GLV 8.5 PROTEXIS (WHITE)

## (undated) DEVICE — DRAIN JACKSON PRATT 10MM FLAT FULL NO TROCAR

## (undated) DEVICE — TONGUE DEPRESSOR

## (undated) DEVICE — BLADE SCALPEL SAFETYLOCK #15

## (undated) DEVICE — DRAPE 3/4 SHEET W REINFORCEMENT 56X77"

## (undated) DEVICE — DRSG KLING 4"

## (undated) DEVICE — STAPLER SKIN VISI-STAT 35 WIDE

## (undated) DEVICE — DRAPE 1/2 SHEET 40X57"

## (undated) DEVICE — POSITIONER FOAM EGG CRATE ULNAR 2PCS (PINK)

## (undated) DEVICE — APPLICATOR COTTON TIP 3" STERILE

## (undated) DEVICE — DRSG TEGADERM 4X4.75"

## (undated) DEVICE — DRSG UNNA BOOT FLEX ELASTIC 4"

## (undated) DEVICE — SUT SOFSILK 2-0 18" C-15

## (undated) DEVICE — SUT MONOCRYL 4-0 27" PS-2 UNDYED

## (undated) DEVICE — ELCTR BOVIE TIP NEEDLE 2.84"

## (undated) DEVICE — SUT PLAIN GUT 4-0 30" C-13

## (undated) DEVICE — GLV 7.5 PROTEXIS (WHITE)

## (undated) DEVICE — GLV 8 PROTEXIS (WHITE)

## (undated) DEVICE — DRAIN RESERVOIR FOR JACKSON PRATT 100CC CARDINAL

## (undated) DEVICE — DRSG OPSITE 13.75 X 4"

## (undated) DEVICE — DRAPE MAYO STAND 30"

## (undated) DEVICE — DRSG MASTISOL

## (undated) DEVICE — DRSG KLING 6"

## (undated) DEVICE — SOL IRR POUR NS 0.9% 500ML

## (undated) DEVICE — SPECIMEN CONTAINER 100ML

## (undated) DEVICE — DRSG WEBRIL 4"

## (undated) DEVICE — COTTONBALL PREPPING LG NS

## (undated) DEVICE — PREP BETADINE SPONGE STICKS

## (undated) DEVICE — BLADE SCALPEL SAFETYLOCK #10

## (undated) DEVICE — ZIMMER DERMACARRIER SKIN GRAFT MESH 1.5:1

## (undated) DEVICE — GLV 6.5 PROTEXIS (WHITE)

## (undated) DEVICE — SUT MONOCRYL 5-0 18" P-3 UNDYED

## (undated) DEVICE — VENODYNE/SCD SLEEVE CALF MEDIUM

## (undated) DEVICE — PREP CHLORAPREP HI-LITE ORANGE 26ML

## (undated) DEVICE — GLV 7 PROTEXIS (WHITE)

## (undated) DEVICE — FOLEY TRAY 16FR 5CC LTX UMETER CLOSED

## (undated) DEVICE — STAPLER SKIN FIXED HEAD 35R

## (undated) DEVICE — DRAPE INSTRUMENT POUCH 6.75" X 11"

## (undated) DEVICE — PACK MINOR WITH LAP

## (undated) DEVICE — ZIMMER BLADE DERMATONE

## (undated) DEVICE — DRSG TEGADERM 6"X8"

## (undated) DEVICE — DRSG VAC GRANUFOAM LARGE (BLACK)

## (undated) DEVICE — DRSG ACE BANDAGE 4"

## (undated) DEVICE — BLADE SURGICAL #10 STAINLESS